# Patient Record
Sex: FEMALE | Race: WHITE | NOT HISPANIC OR LATINO | Employment: OTHER | URBAN - METROPOLITAN AREA
[De-identification: names, ages, dates, MRNs, and addresses within clinical notes are randomized per-mention and may not be internally consistent; named-entity substitution may affect disease eponyms.]

---

## 2017-01-24 ENCOUNTER — OFFICE VISIT (OUTPATIENT)
Dept: FAMILY MEDICINE | Facility: CLINIC | Age: 82
End: 2017-01-24
Payer: MEDICARE

## 2017-01-24 ENCOUNTER — DOCUMENTATION ONLY (OUTPATIENT)
Dept: FAMILY MEDICINE | Facility: CLINIC | Age: 82
End: 2017-01-24

## 2017-01-24 VITALS
SYSTOLIC BLOOD PRESSURE: 114 MMHG | WEIGHT: 136 LBS | DIASTOLIC BLOOD PRESSURE: 73 MMHG | HEART RATE: 65 BPM | TEMPERATURE: 99 F | BODY MASS INDEX: 20.61 KG/M2 | OXYGEN SATURATION: 98 % | RESPIRATION RATE: 16 BRPM | HEIGHT: 68 IN

## 2017-01-24 DIAGNOSIS — Z23 NEED FOR VACCINATION WITH 13-POLYVALENT PNEUMOCOCCAL CONJUGATE VACCINE: ICD-10-CM

## 2017-01-24 DIAGNOSIS — M79.602 LEFT ARM PAIN: Primary | ICD-10-CM

## 2017-01-24 DIAGNOSIS — I10 ESSENTIAL HYPERTENSION: ICD-10-CM

## 2017-01-24 DIAGNOSIS — G47.00 INSOMNIA, UNSPECIFIED TYPE: ICD-10-CM

## 2017-01-24 DIAGNOSIS — F41.8 DEPRESSION WITH ANXIETY: ICD-10-CM

## 2017-01-24 DIAGNOSIS — K21.9 GASTROESOPHAGEAL REFLUX DISEASE, ESOPHAGITIS PRESENCE NOT SPECIFIED: ICD-10-CM

## 2017-01-24 DIAGNOSIS — M79.2 PERIPHERAL NEUROPATHIC PAIN: ICD-10-CM

## 2017-01-24 DIAGNOSIS — E03.9 HYPOTHYROIDISM, UNSPECIFIED TYPE: ICD-10-CM

## 2017-01-24 DIAGNOSIS — E78.5 HYPERLIPIDEMIA, UNSPECIFIED HYPERLIPIDEMIA TYPE: ICD-10-CM

## 2017-01-24 PROCEDURE — 1157F ADVNC CARE PLAN IN RCRD: CPT | Mod: S$GLB,,, | Performed by: INTERNAL MEDICINE

## 2017-01-24 PROCEDURE — G0009 ADMIN PNEUMOCOCCAL VACCINE: HCPCS | Mod: S$GLB,,, | Performed by: INTERNAL MEDICINE

## 2017-01-24 PROCEDURE — 93010 ELECTROCARDIOGRAM REPORT: CPT | Mod: ,,, | Performed by: INTERNAL MEDICINE

## 2017-01-24 PROCEDURE — 1159F MED LIST DOCD IN RCRD: CPT | Mod: S$GLB,,, | Performed by: INTERNAL MEDICINE

## 2017-01-24 PROCEDURE — 1126F AMNT PAIN NOTED NONE PRSNT: CPT | Mod: S$GLB,,, | Performed by: INTERNAL MEDICINE

## 2017-01-24 PROCEDURE — 3074F SYST BP LT 130 MM HG: CPT | Mod: S$GLB,,, | Performed by: INTERNAL MEDICINE

## 2017-01-24 PROCEDURE — 93005 ELECTROCARDIOGRAM TRACING: CPT | Mod: S$GLB,,, | Performed by: INTERNAL MEDICINE

## 2017-01-24 PROCEDURE — 99499 UNLISTED E&M SERVICE: CPT | Mod: S$GLB,,, | Performed by: INTERNAL MEDICINE

## 2017-01-24 PROCEDURE — 1160F RVW MEDS BY RX/DR IN RCRD: CPT | Mod: S$GLB,,, | Performed by: INTERNAL MEDICINE

## 2017-01-24 PROCEDURE — 99214 OFFICE O/P EST MOD 30 MIN: CPT | Mod: 25,S$GLB,, | Performed by: INTERNAL MEDICINE

## 2017-01-24 PROCEDURE — 90670 PCV13 VACCINE IM: CPT | Mod: S$GLB,,, | Performed by: INTERNAL MEDICINE

## 2017-01-24 PROCEDURE — 3078F DIAST BP <80 MM HG: CPT | Mod: S$GLB,,, | Performed by: INTERNAL MEDICINE

## 2017-01-24 RX ORDER — ATENOLOL 50 MG/1
50 TABLET ORAL NIGHTLY
Qty: 90 TABLET | Refills: 3 | Status: SHIPPED | OUTPATIENT
Start: 2017-01-24 | End: 2017-01-25 | Stop reason: SDUPTHER

## 2017-01-24 RX ORDER — PAROXETINE HYDROCHLORIDE 20 MG/1
20 TABLET, FILM COATED ORAL DAILY
Qty: 90 TABLET | Refills: 3 | Status: SHIPPED | OUTPATIENT
Start: 2017-01-24 | End: 2017-12-09 | Stop reason: SDUPTHER

## 2017-01-24 RX ORDER — DILTIAZEM HYDROCHLORIDE 240 MG/1
240 CAPSULE, EXTENDED RELEASE ORAL DAILY
Qty: 90 CAPSULE | Refills: 3 | Status: SHIPPED | OUTPATIENT
Start: 2017-01-24 | End: 2017-12-09 | Stop reason: SDUPTHER

## 2017-01-24 RX ORDER — OMEPRAZOLE 20 MG/1
20 CAPSULE, DELAYED RELEASE ORAL DAILY
Qty: 90 CAPSULE | Refills: 1 | Status: SHIPPED | OUTPATIENT
Start: 2017-01-24 | End: 2017-06-14 | Stop reason: SDUPTHER

## 2017-01-24 RX ORDER — PRAVASTATIN SODIUM 20 MG/1
20 TABLET ORAL DAILY
Qty: 30 TABLET | Refills: 11 | Status: SHIPPED | OUTPATIENT
Start: 2017-01-24 | End: 2017-08-23 | Stop reason: HOSPADM

## 2017-01-24 RX ORDER — CYANOCOBALAMIN 1000 UG/ML
INJECTION, SOLUTION INTRAMUSCULAR; SUBCUTANEOUS
Qty: 10 ML | Refills: 11 | Status: SHIPPED | OUTPATIENT
Start: 2017-01-24 | End: 2017-04-24 | Stop reason: SDUPTHER

## 2017-01-24 NOTE — PROGRESS NOTES
Allergies and two pt identifiers verified.  Pneumococcal 13 vaccine administered IM per MD order and MAR.  Tolerated injection well.  VIS given and instructed to wait 15 minutes before leaving clinic to monitor for adverse reactions.  Voiced understanding.

## 2017-01-24 NOTE — PROGRESS NOTES
Subjective:       Patient ID: Lorena Vallejo is a 83 y.o. female.    Chief Complaint: Insomnia (refills)    HPI         CHIEF COMPLAINT: Left arm pain HPI:     ONSET/TIMIN weeks ago   arm pain occurred while she was sitting still.  It didn't matter if she moved her arm.  There was no tenderness at the time.    DURATION:  2 minutes  QUALITY/COURSE:  Resolved.    SEVERITY: #   8  /10 ( on 1 to 10 scale)    PREVIOUS CARDIAC TESTING: : electrocardiogram (ECG).    LOCATION:  substernal   Radiation -  none    All choices for next 4 sections below are positive to the patient ONLY if BOLDED, otherwise negative:    AGGRAVATING FACTORS: Swallowing, , Deep_Breathing, Coughing, Neck/Arm/Chest_Movement     RELIEVING FACTORS: Nitroglycerin, Resting, Change_of_Position    ASSOCIATED SYMPTOMS:  Hemoptysis,Tenderness,  Leg_Pain    RISK FACTORS: Diabetes, HTN, Hyperlipidemia, smoking,                       CHIEF COMPLAINT: Hypertension  HPI:     ONSET:      QUALITY/COURSE:   Controlled:  yes     INTENSITY/SEVERITY:  Average blood pressure is 115/70 .     MODIFIERS/TREATMENTS:  Taking medications: yes. .High sodium intake: no. alcohol: no      The following symptoms are positive only if BOLDED, otherwise are negative.      SYMPTOMS/RELATED: Possible medication side effects include:   Depression..  . Cough. . Constipation.    REVIEW OF SYMPTOMS: . Weight_loss . Weight_gain . Leg_cramps ..    TARGET ORGAN DAMAGE:: angina/ prior myocardial infarction, chronic kidney disease, heart failure, left ventricular hypertrophy, peripheral artery disease, prior coronary revascularization, retinopathy, stroke. transient ischemic attack.    CHIEF COMPLAINT: insomnia .  HPI:     ONSET/TIMING: Onset     40 y     ago. Sudden: no .  Similar problems in past: no. ..    DURATION:  Intermittent    QUALITY/COURSE: .Improving.     Can't fall asleep: yes. . Wakes up early: no.      INTENSITY/SEVERITY:  Severity 7   (on a 1-10 scale).       "MODIFIERS/TREATMENTS: . Taking medications: yes. Benedryl.  . Effective: yes.  SYMPTOMS/RELATED: . Possible medication side effects include:     .     The following symptoms/statements  are positive if BOLD, negative otherwise.      CONTEXT/WHEN:  .Nocturnal. . Napping.  shift work . Time_zone_changes.     REVIEW OF SYSTEMS :  .   Nocturia 1x/night . Restless_legs . pain . Obstructive_sleep_apnea?  . depression . anxiety . Substance_abuse . Copd . Grief .         Review of Systems   Constitutional: Negative for diaphoresis, fever and unexpected weight change.   HENT: Negative for trouble swallowing.    Eyes: Negative for visual disturbance.   Respiratory: Negative for cough, chest tightness and shortness of breath.    Cardiovascular: Negative for chest pain, palpitations and leg swelling.   Gastrointestinal: Negative for nausea and vomiting.   Neurological: Negative for dizziness, syncope, weakness, light-headedness, numbness and headaches.   Psychiatric/Behavioral: Negative for dysphoric mood. The patient is not nervous/anxious.        Objective:      Vitals:    01/24/17 1010   BP: 114/73   Pulse: 65   Resp: 16   Temp: 98.6 °F (37 °C)   TempSrc: Oral   SpO2: 98%   Weight: 61.7 kg (136 lb 0.4 oz)   Height: 5' 8" (1.727 m)   PainSc: 0-No pain     Physical Exam   Constitutional: She appears well-developed and well-nourished.   Eyes: Pupils are equal, round, and reactive to light.   Cardiovascular: Normal rate, regular rhythm and normal heart sounds.    Pulmonary/Chest: Effort normal and breath sounds normal.   Abdominal: Soft. There is no tenderness.   Neurological: She is alert.   Psychiatric: She has a normal mood and affect. Her behavior is normal. Thought content normal.   Nursing note and vitals reviewed.        Assessment:       1. Left arm pain    2. Depression with anxiety    3. Essential hypertension    4. Peripheral neuropathic pain    5. Need for vaccination with 13-polyvalent pneumococcal conjugate vaccine "    6. Hyperlipidemia, unspecified hyperlipidemia type    7. Insomnia, unspecified type    8. Gastroesophageal reflux disease, esophagitis presence not specified    9. Hypothyroidism, unspecified type          Plan:     Left arm pain  -     EKG 12-lead  -     Ambulatory referral to Cardiology    Depression with anxiety  -     paroxetine (PAXIL) 20 MG tablet; Take 1 tablet (20 mg total) by mouth once daily.  Dispense: 90 tablet; Refill: 3    Essential hypertension  -     diltiaZEM (TIAZAC) 240 MG CpSR; Take 1 capsule (240 mg total) by mouth once daily.  Dispense: 90 capsule; Refill: 3  -     atenolol (TENORMIN) 50 MG tablet; Take 1 tablet (50 mg total) by mouth every evening.  Dispense: 90 tablet; Refill: 3    Peripheral neuropathic pain  -     cyanocobalamin 1,000 mcg/mL injection; INJECT 2ML IMTRAMUSCULAR EVERY MONTH  Dispense: 10 mL; Refill: 11    Need for vaccination with 13-polyvalent pneumococcal conjugate vaccine  -     Pneumococcal Conjugate Vaccine (13 Valent) (IM)    Hyperlipidemia, unspecified hyperlipidemia type  -     Comprehensive metabolic panel; Future; Expected date: 1/24/17  -     pravastatin (PRAVACHOL) 20 MG tablet; Take 1 tablet (20 mg total) by mouth once daily.  Dispense: 30 tablet; Refill: 11  -     Lipid panel; Future; Expected date: 1/24/17    Insomnia, unspecified type    Gastroesophageal reflux disease, esophagitis presence not specified  -     omeprazole (PRILOSEC) 20 MG capsule; Take 1 capsule (20 mg total) by mouth once daily. Pt takes 1 every other day  Dispense: 90 capsule; Refill: 1    Hypothyroidism, unspecified type  -     thyroid, pork, (ARMOUR THYROID) 300 mg Tab; Take 150 mg by mouth once daily. 30 mg sent earlier in error.  Dispense: 45 tablet; Refill: 3      Return in about 3 months (around 4/24/2017).

## 2017-01-24 NOTE — PATIENT INSTRUCTIONS
Restrict yourself to 4-1/2 hours of sleep for 4 days.  Then increase it by 15 minutes every 4 days.    Decreased Benadryl to 25 mg    Have Kiran watch his sleep and see if you stop.breathing

## 2017-01-24 NOTE — PROGRESS NOTES
Health Maintenance Due   Topic Date Due    TETANUS VACCINE  03/27/1951    Zoster Vaccine  03/27/1993    Influenza Vaccine  08/01/2016    Pneumococcal (65+) (2 of 2 - PPSV23) 11/04/2016

## 2017-01-24 NOTE — MR AVS SNAPSHOT
[facility]  [address] [address] [address]  Phone: [phone]  Fax: [phone]                  Lorena Vallejo   2017 9:40 AM   Office Visit    Description:  Female : 3/27/1933   Provider:  Stephon Cates MD   Department:  [facility]           Reason for Visit     Insomnia           Diagnoses this Visit        Comments    Left arm pain    -  Primary     Depression with anxiety         Essential hypertension         Peripheral neuropathic pain         Need for vaccination with 13-polyvalent pneumococcal conjugate vaccine         Hyperlipidemia, unspecified hyperlipidemia type         Insomnia, unspecified type         Gastroesophageal reflux disease, esophagitis presence not specified         Hypothyroidism, unspecified type                To Do List           Future Appointments        Provider Department Dept Phone    2017 8:15 AM LAB, [facility] - Lab [phone]    2017 11:20 AM Stephon Cates MD [facility] [phone]      Goals (5 Years of Data)     None      Follow-Up and Disposition     Return in about 3 months (around 2017).       These Medications        Disp Refills Start End    paroxetine (PAXIL) 20 MG tablet 90 tablet 3 2017     Take 1 tablet (20 mg total) by mouth once daily. - Oral    [facility]: [facility] [address] [facility] Ph #: [phone]       diltiaZEM (TIAZAC) 240 MG CpSR 90 capsule 3 2017     Take 1 capsule (240 mg total) by mouth once daily. - Oral    Pharmacy: [facility] [address] [facility] Ph #: [phone]       atenolol (TENORMIN) 50 MG tablet 90 tablet 3 2017     Take 1 tablet (50 mg total) by mouth every evening. - Oral    Pharmacy: [facility] [address] [facility] Ph #: [phone]       omeprazole (PRILOSEC) 20 MG capsule 90 capsule 1  1/24/2017     Take 1 capsule (20 mg total) by mouth once daily. Pt takes 1 every other day - Oral    Pharmacy: [facility] [address] [facility] Ph #: [phone]       cyanocobalamin 1,000 mcg/mL injection 10 mL 11 1/24/2017     INJECT 2ML IMTRAMUSCULAR EVERY MONTH    Pharmacy: [facility] [address] [facility] Ph #: [phone]       thyroid, pork, (ARMOUR THYROID) 300 mg Tab 45 tablet 3 1/24/2017 1/24/2018    Take 150 mg by mouth once daily. 30 mg sent earlier in error. - Oral    Pharmacy: [facility] [address] [facility] Ph #: [phone]       pravastatin (PRAVACHOL) 20 MG tablet 30 tablet 11 1/24/2017     Take 1 tablet (20 mg total) by mouth once daily. - Oral    Pharmacy: [facility] [address] [facility] Ph #: [phone]         [facility]     [facility] On Call Nurse Care Line - 24/7 Assistance  Registered nurses in the [facility] On Call Center provide clinical advisement, health education, appointment booking, and other advisory services.  Call for this free service at [phone].             Medications           Message regarding Medications     Verify the changes and/or additions to your medication regime listed below are the same as discussed with your clinician today.  If any of these changes or additions are incorrect, please notify your healthcare provider.        START taking these NEW medications        Refills    pravastatin (PRAVACHOL) 20 MG tablet 11    Sig: Take 1 tablet (20 mg total) by mouth once daily.    Class: Normal    Route: Oral      CHANGE how you are taking these medications     Start Taking Instead of    diltiaZEM (TIAZAC) 240 MG CpSR diltiazem (TIAZAC) 240 MG CpSR    Dosage:  Take 1 capsule (240 mg total) by mouth once daily. Dosage:  Take 1 capsule (240 mg total) by mouth once daily.    Reason for Change:  Reorder     omeprazole  (PRILOSEC) 20 MG capsule omeprazole (PRILOSEC) 20 MG capsule    Dosage:  Take 1 capsule (20 mg total) by mouth once daily. Pt takes 1 every other day Dosage:  Take 1 capsule by mouth once daily. Pt takes 1 every other day    Reason for Change:  Reorder       STOP taking these medications     ranitidine (ZANTAC) 150 MG tablet Take 1 tablet (150 mg total) by mouth 2 (two) times daily.    ranitidine (ZANTAC) 150 MG tablet Take 1 tablet (150 mg total) by mouth 2 (two) times daily.           Verify that the below list of medications is an accurate representation of the medications you are currently taking.  If none reported, the list may be blank. If incorrect, please contact your healthcare provider. Carry this list with you in case of emergency.           Current Medications     acetaminophen (TYLENOL) 500 MG tablet Take 1,000 mg by mouth every 6 (six) hours as needed.    aspirin (ECOTRIN) 81 MG EC tablet Take 81 mg by mouth every evening.    atenolol (TENORMIN) 50 MG tablet Take 1 tablet (50 mg total) by mouth every evening.    BIFIDOBACTERIUM INFANTIS (ALIGN ORAL) Take by mouth once daily.    cyanocobalamin 1,000 mcg/mL injection INJECT 2ML IMTRAMUSCULAR EVERY MONTH    diltiaZEM (TIAZAC) 240 MG CpSR Take 1 capsule (240 mg total) by mouth once daily.    fish oil-omega-3 fatty acids 300-1,000 mg capsule Take 2 g by mouth 2 (two) times daily.    loperamide (IMODIUM) 2 mg capsule Take 2 mg by mouth 4 (four) times daily as needed for Diarrhea.    magnesium oxide (MAG-OX) 400 mg tablet Take 400 mg by mouth once daily.     omeprazole (PRILOSEC) 20 MG capsule Take 1 capsule (20 mg total) by mouth once daily. Pt takes 1 every other day    paroxetine (PAXIL) 20 MG tablet Take 1 tablet (20 mg total) by mouth once daily.    ranitidine (ZANTAC) 150 MG capsule Take 1 capsule (150 mg total) by mouth 2 (two) times daily.    thyroid, pork, (ARMOUR THYROID) 300 mg Tab Take 150 mg by mouth once daily. 30 mg sent earlier in error.     "VIT C/E/ZN/COPPR/LUTEIN/ZEAXAN (PRESERVISION AREDS 2 ORAL) Take by mouth 2 (two) times daily.    pravastatin (PRAVACHOL) 20 MG tablet Take 1 tablet (20 mg total) by mouth once daily.           Clinical Reference Information           Vital Signs - Last Recorded  Most recent update: 1/24/2017 10:13 AM by Mikala Vizcarra MA    BP Pulse Temp Resp Ht Wt    114/73 (BP Location: Left arm, Patient Position: Sitting, BP Method: Automatic) 65 98.6 °F (37 °C) (Oral) 16 5' 8" (1.727 m) 61.7 kg (136 lb 0.4 oz)    SpO2 BMI             98% 20.68 kg/m2         Blood Pressure          Most Recent Value    BP  114/73      Allergies as of 1/24/2017     Flagyl [Metronidazole Hcl]    Latex, Natural Rubber    Levaquin [Levofloxacin]    Morphine    Augmentin [Amoxicillin-pot Clavulanate]    Ciprofloxacin    Codeine    Demerol [Meperidine]    Dilaudid [Hydromorphone (Bulk)]    Fentanyl    Naproxen    Nsaids (Non-steroidal Anti-inflammatory Drug)    Percocet [Oxycodone-acetaminophen]    Prednisone    Reglan [Metoclopramide Hcl]    Sulfa (Sulfonamide Antibiotics)    Vicodin [Hydrocodone-acetaminophen]    Zosyn [Piperacillin-tazobactam]    Ambien [Zolpidem]    Ibuprofen    Macrobid [Nitrofurantoin Monohyd/m-cryst]    Pcn [Penicillins]    Vasotec [Enalapril Maleate]      Immunizations Administered on Date of Encounter - 1/24/2017     Name Date Dose VIS Date Route    Pneumococcal Conjugate - 13 Valent  Incomplete 0.5 mL 11/5/2015 Intramuscular      Orders Placed During Today's Visit      Normal Orders This Visit    Ambulatory referral to Cardiology     EKG 12-lead     Pneumococcal Conjugate Vaccine (13 Valent) (IM)     Future Labs/Procedures Expected by Expires    Comprehensive metabolic panel  1/24/2017 1/25/2018    Lipid panel  1/24/2017 1/25/2018      Instructions    Restrict yourself to 4-1/2 hours of sleep for 4 days.  Then increase it by 15 minutes every 4 days.    Decreased Benadryl to 25 mg    Have Kiran watch his sleep and see " if you stop.breathing

## 2017-01-25 ENCOUNTER — TELEPHONE (OUTPATIENT)
Dept: FAMILY MEDICINE | Facility: CLINIC | Age: 82
End: 2017-01-25

## 2017-01-25 DIAGNOSIS — I10 ESSENTIAL HYPERTENSION: ICD-10-CM

## 2017-01-25 RX ORDER — ATENOLOL 50 MG/1
50 TABLET ORAL NIGHTLY
Qty: 90 TABLET | Refills: 3 | Status: SHIPPED | OUTPATIENT
Start: 2017-01-25 | End: 2017-08-09

## 2017-01-25 NOTE — TELEPHONE ENCOUNTER
Dr Cates the prescription for atenolol did not go to [facility] and the patient doesn't have the printed one.Could you resend to [facility] please.

## 2017-01-27 ENCOUNTER — TELEPHONE (OUTPATIENT)
Dept: FAMILY MEDICINE | Facility: CLINIC | Age: 82
End: 2017-01-27

## 2017-01-27 NOTE — TELEPHONE ENCOUNTER
----- Message from Rosmery Raya sent at 1/27/2017  2:00 PM CST -----  Contact: pt [phone]  Call placed to pod patient called and asked for a call back today from the nurse about her medication. Amaprozole.

## 2017-04-24 ENCOUNTER — OFFICE VISIT (OUTPATIENT)
Dept: FAMILY MEDICINE | Facility: CLINIC | Age: 82
End: 2017-04-24
Payer: MEDICARE

## 2017-04-24 ENCOUNTER — DOCUMENTATION ONLY (OUTPATIENT)
Dept: FAMILY MEDICINE | Facility: CLINIC | Age: 82
End: 2017-04-24

## 2017-04-24 VITALS
DIASTOLIC BLOOD PRESSURE: 67 MMHG | TEMPERATURE: 98 F | WEIGHT: 133.19 LBS | OXYGEN SATURATION: 97 % | BODY MASS INDEX: 20.18 KG/M2 | HEIGHT: 68 IN | SYSTOLIC BLOOD PRESSURE: 132 MMHG | HEART RATE: 68 BPM | RESPIRATION RATE: 16 BRPM

## 2017-04-24 DIAGNOSIS — M67.472 GANGLION CYST OF LEFT FOOT: Primary | ICD-10-CM

## 2017-04-24 DIAGNOSIS — M70.21 OLECRANON BURSITIS OF RIGHT ELBOW: ICD-10-CM

## 2017-04-24 DIAGNOSIS — E03.9 HYPOTHYROIDISM, UNSPECIFIED TYPE: ICD-10-CM

## 2017-04-24 DIAGNOSIS — E78.5 HYPERLIPIDEMIA, UNSPECIFIED HYPERLIPIDEMIA TYPE: ICD-10-CM

## 2017-04-24 DIAGNOSIS — M79.2 PERIPHERAL NEUROPATHIC PAIN: ICD-10-CM

## 2017-04-24 PROCEDURE — 3078F DIAST BP <80 MM HG: CPT | Mod: S$GLB,,, | Performed by: INTERNAL MEDICINE

## 2017-04-24 PROCEDURE — 1160F RVW MEDS BY RX/DR IN RCRD: CPT | Mod: S$GLB,,, | Performed by: INTERNAL MEDICINE

## 2017-04-24 PROCEDURE — 3075F SYST BP GE 130 - 139MM HG: CPT | Mod: S$GLB,,, | Performed by: INTERNAL MEDICINE

## 2017-04-24 PROCEDURE — 99213 OFFICE O/P EST LOW 20 MIN: CPT | Mod: S$GLB,,, | Performed by: INTERNAL MEDICINE

## 2017-04-24 PROCEDURE — 1159F MED LIST DOCD IN RCRD: CPT | Mod: S$GLB,,, | Performed by: INTERNAL MEDICINE

## 2017-04-24 PROCEDURE — 1126F AMNT PAIN NOTED NONE PRSNT: CPT | Mod: S$GLB,,, | Performed by: INTERNAL MEDICINE

## 2017-04-24 PROCEDURE — 99499 UNLISTED E&M SERVICE: CPT | Mod: S$GLB,,, | Performed by: INTERNAL MEDICINE

## 2017-04-24 RX ORDER — CYANOCOBALAMIN 1000 UG/ML
INJECTION, SOLUTION INTRAMUSCULAR; SUBCUTANEOUS
Qty: 10 ML | Refills: 11 | Status: SHIPPED | OUTPATIENT
Start: 2017-04-24 | End: 2019-06-21

## 2017-04-24 NOTE — PROGRESS NOTES
Health Maintenance Due   Topic Date Due    TETANUS VACCINE  [birth year]27/1951    Zoster Vaccine  03/27/1993    Influenza Vaccine  08/01/2016

## 2017-04-24 NOTE — MR AVS SNAPSHOT
[facility]  [address] [address] [address]  Phone: [phone]  Fax: [phone]                  Lorena Vallejo   2017 11:20 AM   Office Visit    Description:  Female : 3/27/1933   Provider:  Stephon Cates MD   Department:  [facility]           Reason for Visit     knot on toe           Diagnoses this Visit        Comments    Ganglion cyst of left foot    -  Primary     Peripheral neuropathic pain         Hypothyroidism, unspecified type         Olecranon bursitis of right elbow         Hyperlipidemia, unspecified hyperlipidemia type                To Do List           Future Appointments        Provider Department Dept Phone    2017 8:15 AM [facility] - Lab [phone]    2017 2:40 PM Stephon Cates MD [facility] [phone]      Goals (5 Years of Data)     None      Follow-Up and Disposition     Return in about 1 month (around 2017).    Follow-up and Disposition History       These Medications        Disp Refills Start End    cyanocobalamin 1,000 mcg/mL injection 10 mL 11 2017     INJECT 2ML IMTRAMUSCULAR EVERY MONTH    Pharmacy: [facility] [address] [place] [address]. Ph #: [phone]       thyroid, pork, (ARMOUR THYROID) 300 mg Tab 45 tablet 3 2017    Take 150 mg by mouth once daily. 30 mg sent earlier in error. - Oral    Pharmacy: [facility] [address] [place] [address]. Ph #: [phone]         [facility] On Call     [facility] On [facility] Nurse Care Line -  Assistance  Unless otherwise directed by your provider, please contact [facility], our nurse care line that is available for  assistance.     Registered nurses in the [facility] On Call Center provide: appointment scheduling, clinical advisement, health education, and other advisory services.  Call: [phone] (toll free)               Medications            Message regarding Medications     Verify the changes and/or additions to your medication regime listed below are the same as discussed with your clinician today.  If any of these changes or additions are incorrect, please notify your healthcare provider.             Verify that the below list of medications is an accurate representation of the medications you are currently taking.  If none reported, the list may be blank. If incorrect, please contact your healthcare provider. Carry this list with you in case of emergency.           Current Medications     acetaminophen (TYLENOL) 500 MG tablet Take 1,000 mg by mouth every 6 (six) hours as needed.    aspirin (ECOTRIN) 81 MG EC tablet Take 81 mg by mouth every evening.    atenolol (TENORMIN) 50 MG tablet Take 1 tablet (50 mg total) by mouth every evening.    BIFIDOBACTERIUM INFANTIS (ALIGN ORAL) Take by mouth once daily.    cyanocobalamin 1,000 mcg/mL injection INJECT 2ML IMTRAMUSCULAR EVERY MONTH    diltiaZEM (TIAZAC) 240 MG CpSR Take 1 capsule (240 mg total) by mouth once daily.    fish oil-omega-3 fatty acids 300-1,000 mg capsule Take 2 g by mouth 2 (two) times daily.    loperamide (IMODIUM) 2 mg capsule Take 2 mg by mouth 4 (four) times daily as needed for Diarrhea.    magnesium oxide (MAG-OX) 400 mg tablet Take 400 mg by mouth once daily.     omeprazole (PRILOSEC) 20 MG capsule Take 1 capsule (20 mg total) by mouth once daily. Pt takes 1 every other day    paroxetine (PAXIL) 20 MG tablet Take 1 tablet (20 mg total) by mouth once daily.    ranitidine (ZANTAC) 150 MG capsule Take 1 capsule (150 mg total) by mouth 2 (two) times daily.    thyroid, pork, (ARMOUR THYROID) 300 mg Tab Take 150 mg by mouth once daily. 30 mg sent earlier in error.    VIT C/E/ZN/COPPR/LUTEIN/ZEAXAN (PRESERVISION AREDS 2 ORAL) Take by mouth 2 (two) times daily.    pravastatin (PRAVACHOL) 20 MG tablet Take 1 tablet (20 mg total) by mouth once daily.           Clinical Reference Information  "          Your Vitals Were     BP Pulse Temp Resp Height Weight    132/67 (BP Location: Right arm, Patient Position: Sitting, BP Method: Automatic) 68 98.3 °F (36.8 °C) (Oral) 16 5' 8" (1.727 m) 60.4 kg (133 lb 2.5 oz)    SpO2 BMI             97% 20.25 kg/m2         Blood Pressure          Most Recent Value    BP  132/67      Allergies as of 4/24/2017     Flagyl [Metronidazole Hcl]    Latex, Natural Rubber    Levaquin [Levofloxacin]    Morphine    Augmentin [Amoxicillin-pot Clavulanate]    Ciprofloxacin    Codeine    Demerol [Meperidine]    Dilaudid [Hydromorphone (Bulk)]    Fentanyl    Naproxen    Nsaids (Non-steroidal Anti-inflammatory Drug)    Percocet [Oxycodone-acetaminophen]    Prednisone    Reglan [Metoclopramide Hcl]    Sulfa (Sulfonamide Antibiotics)    Vicodin [Hydrocodone-acetaminophen]    Zosyn [Piperacillin-tazobactam]    Ambien [Zolpidem]    Ibuprofen    Macrobid [Nitrofurantoin Monohyd/m-cryst]    Pcn [Penicillins]    Vasotec [Enalapril Maleate]      Immunizations Administered on Date of Encounter - 4/24/2017     None      Orders Placed During Today's Visit      Normal Orders This Visit    Ambulatory Referral to Podiatry     Future Labs/Procedures Expected by Expires    Comprehensive metabolic panel  4/24/2017 4/25/2018    Lipid panel  4/24/2017 4/25/2018    TSH  4/24/2017 4/24/2018      Instructions    Ace bandage to right elbow.    Get labs that were ordered and take the Pravachol.       Language Assistance Services     ATTENTION: Language assistance services are available, free of charge. Please call [phone].      ATENCIÓN: Si habkirsten white, tiene a renner disposición servicios gratuitos de asistencia lingüística. Llame al [phone].     CHÚ Ý: N?u b?n nói Ti?ng Vi?t, có các d?ch v? h? tr? ngôn ng? mi?n phí dành cho b?n. G?i s? [phone].         [facility] complies with applicable Federal civil rights laws and does not discriminate on the basis of race, color, " national origin, age, disability, or sex.

## 2017-04-24 NOTE — PROGRESS NOTES
"Subjective:       Patient ID: Lorena Vallejo is a 84 y.o. female.    Chief Complaint: knot on toe    HPI       Lower_Extremity_Problems(+). Pain: yes. . Injury: no.   HPI:     ONSET/TIMING: . Onset   5 months   ago.     DURATION:   Continuous    QUALITY/COURSE:    Worsening,. .     LOCATION: PP joint of left great toe       . Radiation: no.      INTENSITY/SEVERITY:. Severity is #  1  (10 point scale).        MODIFIERS/TREATMENTS: Current_Limitations_are:  none..   Taking medications: no    Physical_Therapy: no.  Chiropractor: no.     SYMPTOMS/RELATED: . --Possible medication side effects include:.     The following symptoms/statements  are positive if BOLD, negative otherwise.      CONTEXT/WHEN: . Activity . weight bearing. Inactivity.  Sudden  Work related.  Similar_problems_in past.   . Litigation_pending . X-rays. CT . MRI      REVIEW OF SYMPTOMS:   Numbness. Weakness. Muscle_Problems. Fever. Joint_Problems. Swelling. Erythema. Weight_loss. Instability.  Numbness. Weakness.     .   The patient never started her Pravachol because she didn't think her cholesterol was bad.      The patient fell 2 weeks ago and since then has had a swelling over the elbow.  It's improving.  3/10 severity.  No pain.  No numbness or weakness in the hands          Review of Systems    Objective:      Vitals:    04/24/17 1214   BP: 132/67   Pulse: 68   Resp: 16   Temp: 98.3 °F (36.8 °C)   TempSrc: Oral   SpO2: 97%   Weight: 60.4 kg (133 lb 2.5 oz)   Height: 5' 8" (1.727 m)   PainSc: 0-No pain     Physical Exam   Musculoskeletal:   6 soft fluctuant fluid collection over the right olecranon bursa   Skin:   Hard 6 mm nodule on the dorsal side of the PP joint of the left great toe         Assessment:       1. Ganglion cyst of left foot    2. Peripheral neuropathic pain    3. Hypothyroidism, unspecified type    4. Olecranon bursitis of right elbow    5. Hyperlipidemia, unspecified hyperlipidemia type          Plan:     Ganglion cyst of left " foot  -     Ambulatory Referral to Podiatry    Peripheral neuropathic pain  -     cyanocobalamin 1,000 mcg/mL injection; INJECT 2ML IMTRAMUSCULAR EVERY MONTH  Dispense: 10 mL; Refill: 11    Hypothyroidism, unspecified type  -     thyroid, pork, (ARMOUR THYROID) 300 mg Tab; Take 150 mg by mouth once daily. 30 mg sent earlier in error.  Dispense: 45 tablet; Refill: 3  -     TSH; Future; Expected date: 4/24/17    Olecranon bursitis of right elbow    Hyperlipidemia, unspecified hyperlipidemia type  -     Comprehensive metabolic panel; Future; Expected date: 4/24/17  -     Lipid panel; Future; Expected date: 4/24/17      Return in about 1 month (around 5/24/2017).

## 2017-05-16 ENCOUNTER — LAB VISIT (OUTPATIENT)
Dept: LAB | Facility: HOSPITAL | Age: 82
End: 2017-05-16
Attending: INTERNAL MEDICINE
Payer: MEDICARE

## 2017-05-16 DIAGNOSIS — E78.5 HYPERLIPIDEMIA, UNSPECIFIED HYPERLIPIDEMIA TYPE: ICD-10-CM

## 2017-05-16 DIAGNOSIS — E03.9 HYPOTHYROIDISM, UNSPECIFIED TYPE: ICD-10-CM

## 2017-05-16 LAB
ALBUMIN SERPL BCP-MCNC: 3.4 G/DL
ALP SERPL-CCNC: 71 U/L
ALT SERPL W/O P-5'-P-CCNC: 10 U/L
ANION GAP SERPL CALC-SCNC: 11 MMOL/L
AST SERPL-CCNC: 15 U/L
BILIRUB SERPL-MCNC: 0.5 MG/DL
BUN SERPL-MCNC: 15 MG/DL
CALCIUM SERPL-MCNC: 8.8 MG/DL
CHLORIDE SERPL-SCNC: 112 MMOL/L
CHOLEST/HDLC SERPL: 1.8 {RATIO}
CO2 SERPL-SCNC: 24 MMOL/L
CREAT SERPL-MCNC: 0.9 MG/DL
EST. GFR  (AFRICAN AMERICAN): >60 ML/MIN/1.73 M^2
EST. GFR  (NON AFRICAN AMERICAN): 58.9 ML/MIN/1.73 M^2
GLUCOSE SERPL-MCNC: 84 MG/DL
HDL/CHOLESTEROL RATIO: 55.6 %
HDLC SERPL-MCNC: 55 MG/DL
HDLC SERPL-MCNC: 99 MG/DL
LDLC SERPL CALC-MCNC: 18.8 MG/DL
NONHDLC SERPL-MCNC: 44 MG/DL
POTASSIUM SERPL-SCNC: 4.3 MMOL/L
PROT SERPL-MCNC: 6.4 G/DL
SODIUM SERPL-SCNC: 147 MMOL/L
T4 FREE SERPL-MCNC: 0.68 NG/DL
TRIGL SERPL-MCNC: 126 MG/DL
TSH SERPL DL<=0.005 MIU/L-ACNC: 4.09 UIU/ML

## 2017-05-16 PROCEDURE — 80061 LIPID PANEL: CPT

## 2017-05-16 PROCEDURE — 84439 ASSAY OF FREE THYROXINE: CPT

## 2017-05-16 PROCEDURE — 80053 COMPREHEN METABOLIC PANEL: CPT

## 2017-05-16 PROCEDURE — 84443 ASSAY THYROID STIM HORMONE: CPT

## 2017-05-16 PROCEDURE — 36415 COLL VENOUS BLD VENIPUNCTURE: CPT | Mod: PO

## 2017-05-18 ENCOUNTER — OFFICE VISIT (OUTPATIENT)
Dept: PODIATRY | Facility: CLINIC | Age: 82
End: 2017-05-18
Payer: MEDICARE

## 2017-05-18 ENCOUNTER — HOSPITAL ENCOUNTER (OUTPATIENT)
Dept: RADIOLOGY | Facility: CLINIC | Age: 82
Discharge: HOME | End: 2017-05-18
Attending: PODIATRIST
Payer: MEDICARE

## 2017-05-18 VITALS — HEIGHT: 68 IN | WEIGHT: 130.5 LBS | BODY MASS INDEX: 19.78 KG/M2

## 2017-05-18 DIAGNOSIS — M67.471 DIGITAL MUCOUS CYST OF TOE OF RIGHT FOOT: Primary | ICD-10-CM

## 2017-05-18 DIAGNOSIS — M79.674 TOE PAIN, RIGHT: ICD-10-CM

## 2017-05-18 DIAGNOSIS — M72.2 PLANTAR FIBROMATOSIS: ICD-10-CM

## 2017-05-18 DIAGNOSIS — M67.471 DIGITAL MUCOUS CYST OF TOE OF RIGHT FOOT: ICD-10-CM

## 2017-05-18 PROCEDURE — 73630 X-RAY EXAM OF FOOT: CPT | Mod: 26,RT,S$GLB, | Performed by: RADIOLOGY

## 2017-05-18 PROCEDURE — 1159F MED LIST DOCD IN RCRD: CPT | Mod: S$GLB,,, | Performed by: PODIATRIST

## 2017-05-18 PROCEDURE — 99999 PR PBB SHADOW E&M-EST. PATIENT-LVL III: CPT | Mod: PBBFAC,,, | Performed by: PODIATRIST

## 2017-05-18 PROCEDURE — 99203 OFFICE O/P NEW LOW 30 MIN: CPT | Mod: 25,S$GLB,, | Performed by: PODIATRIST

## 2017-05-18 PROCEDURE — 1126F AMNT PAIN NOTED NONE PRSNT: CPT | Mod: S$GLB,,, | Performed by: PODIATRIST

## 2017-05-18 PROCEDURE — 1160F RVW MEDS BY RX/DR IN RCRD: CPT | Mod: S$GLB,,, | Performed by: PODIATRIST

## 2017-05-18 PROCEDURE — 20612 ASPIRATE/INJ GANGLION CYST: CPT | Mod: RT,S$GLB,, | Performed by: PODIATRIST

## 2017-05-18 PROCEDURE — 73630 X-RAY EXAM OF FOOT: CPT | Mod: TC,PO,RT

## 2017-05-18 RX ORDER — DEXAMETHASONE SODIUM PHOSPHATE 4 MG/ML
4 INJECTION, SOLUTION INTRA-ARTICULAR; INTRALESIONAL; INTRAMUSCULAR; INTRAVENOUS; SOFT TISSUE
Status: COMPLETED | OUTPATIENT
Start: 2017-05-18 | End: 2017-05-18

## 2017-05-18 RX ADMIN — DEXAMETHASONE SODIUM PHOSPHATE 4 MG: 4 INJECTION, SOLUTION INTRA-ARTICULAR; INTRALESIONAL; INTRAMUSCULAR; INTRAVENOUS; SOFT TISSUE at 01:05

## 2017-05-18 NOTE — PROGRESS NOTES
Subjective:      Patient ID: Lorena Vallejo is a 84 y.o. female.    Chief Complaint: Cyst (right great toe)    Small mass right big toe and bottom right arch with occasional pain/tenderness.  Gradual onset, worsening over past several weeks, aggravated by increased weight bearing, shoe gear, pressure.  No previous medical treatment.  OTC pain med not helping.  Denies trauma and signs infection.      Review of Systems   Constitution: Negative for chills, diaphoresis, fever, malaise/fatigue and night sweats.   Cardiovascular: Negative for claudication, cyanosis, leg swelling and syncope.   Skin: Positive for suspicious lesions. Negative for color change, dry skin, nail changes, rash and unusual hair distribution.   Musculoskeletal: Negative for falls, joint pain, joint swelling, muscle cramps, muscle weakness and stiffness.   Gastrointestinal: Negative for constipation, diarrhea, nausea and vomiting.   Neurological: Negative for brief paralysis, disturbances in coordination, focal weakness, numbness, paresthesias, sensory change and tremors.           Objective:      Physical Exam   Constitutional: She is oriented to person, place, and time. She appears well-developed and well-nourished. She is cooperative.   Oriented to time, place, and person.   Cardiovascular:   Pulses:       Dorsalis pedis pulses are 1+ on the right side, and 1+ on the left side.        Posterior tibial pulses are 1+ on the right side, and 1+ on the left side.   Capillary fill time 3-5 seconds.  All toes warm to touch.      Negative lower extremity edema bilateral.    Negative elevational pallor and dependent rubor bilateral.     Musculoskeletal:   Normal angle, base, station of gait. Decreased stride length, early heel off, moderately propulsive toe off bilateral.    All ten toes without clubbing, cyanosis, or signs of ischemia.      No pain to palpation bilateral lower extremities.      Range of motion, stability, muscle strength, and muscle tone  are age and health appropriate normal bilateral feet and legs.       Lymphadenopathy:   Negative lymphadenopathy bilateral popliteal fossa and tarsal tunnel.     Neurological: She is alert and oriented to person, place, and time. She has normal strength. She is not disoriented. She displays no atrophy and no tremor. No sensory deficit. She exhibits normal muscle tone.   Reflex Scores:       Patellar reflexes are 2+ on the right side and 2+ on the left side.       Achilles reflexes are 2+ on the right side and 2+ on the left side.  Negative tinel sign to percussion sural, superficial peroneal, deep peroneal, saphenous, and posterior tibial nerves right and left ankles and feet.       Skin: Skin is warm, dry and intact. No abrasion, no bruising, no burn, no ecchymosis, no laceration, no lesion, no petechiae and no rash noted. She is not diaphoretic. No cyanosis or erythema. No pallor. Nails show no clubbing.   Skin thin, atrophic, with decreased density and distribution of pedal hair bilateral, but without hyperpigmentation, ofelia discoloration,  ulcers, masses, nodules or cords palpated bilateral feet and legs.      Toenails 1st, 2nd, 3rd, 4th, 5th  bilateral are hypertrophic thickened 2-3 mm, dystrophic, discolored tanish brown with tan, gray crumbly subungual debris.  Tender to distal nail plate pressure, without periungual skin abnormality of each.               Assessment:       Encounter Diagnoses   Name Primary?    Digital mucous cyst of toe of right foot Yes    Toe pain, right     Plantar fibromatosis          Plan:       Lorena was seen today for cyst.    Diagnoses and all orders for this visit:    Digital mucous cyst of toe of right foot  -     X-Ray Foot Complete Right; Future    Toe pain, right  -     X-Ray Foot Complete Right; Future    Plantar fibromatosis    Other orders  -     dexamethasone injection 4 mg; Inject 1 mL (4 mg total) into the vein one time.      I counseled the patient on her  conditions, their implications and medical management.    Discussed very low chance of cancer with any mass in body only disprovable by biopsy and pathology.  Patient verbalizes understanding and declines biopsy/immaging today.       Discussed conservative treatment with shoes of adequate dimensions, material, and style to alleviate symptoms and delay or prevent surgical intervention.     Discussed very low chance of cancer with any mass in body only disprovable by biopsy and pathology.  Patient verbalizes understanding and declines biopsy/immaging today.     Counseled the patient on the potential complications of local injection of corticosteroid including, but not limited to:  Discoloration of skin, erosion of soft tissue, and increased likelihood of rupture of a soft tissue structure (ie. Plantar fascia, muscle, tendon, ligament, or capsule in the area of injection).   Very high rate of recurrence.  Patient indicates understanding of my explanation, that any questions have been answered to patient satisfaction, and patient gives verbal consent for injection of affected area.    After sterile skin prep, steroid injection was performed with patient verbal consent and timeout procedure, at right hallux dorsal medial cyst 2mg dexamethazone sodium phosphate. This was well tolerated.    Rx xrays.     Declines scheduled follow up.          Return if symptoms worsen or fail to improve.

## 2017-05-18 NOTE — MR AVS SNAPSHOT
[facility]  [address]  [place] [address]  Phone: [phone]                  Lorena Vallejo   2017 1:30 PM   Office Visit    Description:  Female : 3/27/1933   Provider:  Matt Hollis DPM   Department:  [facility]           Reason for Visit     Cyst           Diagnoses this Visit        Comments    Digital mucous cyst of toe of right foot    -  Primary     Toe pain, right                To Do List           Future Appointments        Provider Department Dept Phone    2017 2:40 PM Stephon Cates MD [facility] [phone]      Goals (5 Years of Data)     None      Follow-Up and Disposition     Return if symptoms worsen or fail to improve.      [facility]     [facility] On Call Nurse Care Line -  Assistance  Unless otherwise directed by your provider, please contact [facility] On-Call, our nurse care line that is available for  assistance.     Registered nurses in the [facility] On Call Center provide: appointment scheduling, clinical advisement, health education, and other advisory services.  Call: [phone] (toll free)               Medications           Message regarding Medications     Verify the changes and/or additions to your medication regime listed below are the same as discussed with your clinician today.  If any of these changes or additions are incorrect, please notify your healthcare provider.        These medications were administered today        Dose Freq    dexamethasone injection 4 mg 4 mg Clinic/HOD 1 time    Sig: Inject 1 mL (4 mg total) into the vein one time.    Class: Normal    Route: Intravenous           Verify that the below list of medications is an accurate representation of the medications you are currently taking.  If none reported, the list may be blank. If incorrect, please contact your healthcare provider. Carry this list with you in case of emergency.           Current Medications     acetaminophen (TYLENOL)  "500 MG tablet Take 1,000 mg by mouth every 6 (six) hours as needed.    aspirin (ECOTRIN) 81 MG EC tablet Take 81 mg by mouth every evening.    atenolol (TENORMIN) 50 MG tablet Take 1 tablet (50 mg total) by mouth every evening.    BIFIDOBACTERIUM INFANTIS (ALIGN ORAL) Take by mouth once daily.    cyanocobalamin 1,000 mcg/mL injection INJECT 2ML IMTRAMUSCULAR EVERY MONTH    diltiaZEM (TIAZAC) 240 MG CpSR Take 1 capsule (240 mg total) by mouth once daily.    fish oil-omega-3 fatty acids 300-1,000 mg capsule Take 2 g by mouth 2 (two) times daily.    loperamide (IMODIUM) 2 mg capsule Take 2 mg by mouth 4 (four) times daily as needed for Diarrhea.    magnesium oxide (MAG-OX) 400 mg tablet Take 400 mg by mouth once daily.     omeprazole (PRILOSEC) 20 MG capsule Take 1 capsule (20 mg total) by mouth once daily. Pt takes 1 every other day    paroxetine (PAXIL) 20 MG tablet Take 1 tablet (20 mg total) by mouth once daily.    pravastatin (PRAVACHOL) 20 MG tablet Take 1 tablet (20 mg total) by mouth once daily.    ranitidine (ZANTAC) 150 MG capsule Take 1 capsule (150 mg total) by mouth 2 (two) times daily.    thyroid, pork, (ARMOUR THYROID) 300 mg Tab Take 150 mg by mouth once daily. 30 mg sent earlier in error.    VIT C/E/ZN/COPPR/LUTEIN/ZEAXAN (PRESERVISION AREDS 2 ORAL) Take by mouth 2 (two) times daily.           Clinical Reference Information           Your Vitals Were     Height Weight BMI          5' 8" (1.727 m) 59.2 kg (130 lb 8.2 oz) 19.84 kg/m2        Allergies as of 5/18/2017     Flagyl [Metronidazole Hcl]    Latex, Natural Rubber    Levaquin [Levofloxacin]    Morphine    Augmentin [Amoxicillin-pot Clavulanate]    Ciprofloxacin    Codeine    Demerol [Meperidine]    Dilaudid [Hydromorphone (Bulk)]    Fentanyl    Naproxen    Nsaids (Non-steroidal Anti-inflammatory Drug)    Percocet [Oxycodone-acetaminophen]    Prednisone    Reglan [Metoclopramide Hcl]    Sulfa (Sulfonamide Antibiotics)    Vicodin " [Hydrocodone-acetaminophen]    Zosyn [Piperacillin-tazobactam]    Ambien [Zolpidem]    Ibuprofen    Macrobid [Nitrofurantoin Monohyd/m-cryst]    Pcn [Penicillins]    Vasotec [Enalapril Maleate]      Immunizations Administered on Date of Encounter - 5/18/2017     None      Orders Placed During Today's Visit     Future Labs/Procedures Expected by Expires    X-Ray Foot Complete Right  5/18/2017 5/18/2018      Language Assistance Services     ATTENTION: Language assistance services are available, free of charge. Please call [phone].      ATENCIÓN: Si habla christopher, tiene a renner disposición servicios gratuitos de asistencia lingüística. Llame al [phone].     CHÚ Ý: N?u b?n nói Ti?ng Vi?t, có các d?ch v? h? tr? ngôn ng? mi?n phí dành cho b?n. G?i s? [phone].         [facility] complies with applicable Federal civil rights laws and does not discriminate on the basis of race, color, national origin, age, disability, or sex.

## 2017-05-18 NOTE — LETTER
May 18, 2017      Stephon Cates MD  [address]  [place] [address]           [facility]  [address]  [place] [address]  Phone: [phone]          Patient: Lorena Vallejo   MR Number: [id number]   YOB: 1933   Date of Visit: 5/18/2017       Dear Dr. Stephon Cates:    Thank you for referring Lorena Vallejo to me for evaluation. Attached you will find relevant portions of my assessment and plan of care.    If you have questions, please do not hesitate to call me. I look forward to following Lorena Vallejo along with you.    Sincerely,    Matt Hollis, LUIZA    Enclosure  CC:  No Recipients    If you would like to receive this communication electronically, please contact [email or web address] or [phone] to request more information on [facility] access.    For providers and/or their staff who would like to refer a patient to [facility], please contact us through our one-stop-shop provider referral line, [facility], at [phone].    If you feel you have received this communication in error or would no longer like to receive these types of communications, please e-mail [email or web address]

## 2017-05-23 ENCOUNTER — DOCUMENTATION ONLY (OUTPATIENT)
Dept: FAMILY MEDICINE | Facility: CLINIC | Age: 82
End: 2017-05-23

## 2017-05-23 ENCOUNTER — OFFICE VISIT (OUTPATIENT)
Dept: FAMILY MEDICINE | Facility: CLINIC | Age: 82
End: 2017-05-23
Payer: MEDICARE

## 2017-05-23 VITALS
BODY MASS INDEX: 19.55 KG/M2 | OXYGEN SATURATION: 99 % | WEIGHT: 129 LBS | DIASTOLIC BLOOD PRESSURE: 66 MMHG | TEMPERATURE: 98 F | HEIGHT: 68 IN | SYSTOLIC BLOOD PRESSURE: 123 MMHG | HEART RATE: 62 BPM

## 2017-05-23 DIAGNOSIS — I10 ESSENTIAL HYPERTENSION: ICD-10-CM

## 2017-05-23 DIAGNOSIS — K90.829 SHORT BOWEL SYNDROME: ICD-10-CM

## 2017-05-23 DIAGNOSIS — M70.50 ANSERINE BURSITIS: Primary | ICD-10-CM

## 2017-05-23 PROCEDURE — 3074F SYST BP LT 130 MM HG: CPT | Mod: S$GLB,,, | Performed by: INTERNAL MEDICINE

## 2017-05-23 PROCEDURE — 99499 UNLISTED E&M SERVICE: CPT | Mod: S$GLB,,, | Performed by: INTERNAL MEDICINE

## 2017-05-23 PROCEDURE — 99212 OFFICE O/P EST SF 10 MIN: CPT | Mod: 25,S$GLB,, | Performed by: INTERNAL MEDICINE

## 2017-05-23 PROCEDURE — 1159F MED LIST DOCD IN RCRD: CPT | Mod: S$GLB,,, | Performed by: INTERNAL MEDICINE

## 2017-05-23 PROCEDURE — 3078F DIAST BP <80 MM HG: CPT | Mod: S$GLB,,, | Performed by: INTERNAL MEDICINE

## 2017-05-23 PROCEDURE — 1160F RVW MEDS BY RX/DR IN RCRD: CPT | Mod: S$GLB,,, | Performed by: INTERNAL MEDICINE

## 2017-05-23 PROCEDURE — 20610 DRAIN/INJ JOINT/BURSA W/O US: CPT | Mod: S$GLB,,, | Performed by: INTERNAL MEDICINE

## 2017-05-23 PROCEDURE — 1125F AMNT PAIN NOTED PAIN PRSNT: CPT | Mod: S$GLB,,, | Performed by: INTERNAL MEDICINE

## 2017-05-23 RX ORDER — METHYLPREDNISOLONE ACETATE 40 MG/ML
40 INJECTION, SUSPENSION INTRA-ARTICULAR; INTRALESIONAL; INTRAMUSCULAR; SOFT TISSUE
Status: DISCONTINUED | OUTPATIENT
Start: 2017-05-23 | End: 2017-05-23 | Stop reason: HOSPADM

## 2017-05-23 RX ADMIN — METHYLPREDNISOLONE ACETATE 40 MG: 40 INJECTION, SUSPENSION INTRA-ARTICULAR; INTRALESIONAL; INTRAMUSCULAR; SOFT TISSUE at 04:05

## 2017-05-23 NOTE — PROCEDURES
Large Joint Aspiration/Injection  Date/Time: 5/23/2017 4:42 PM  Performed by: ASHLEY LANE  Authorized by: ASHLEY LANE     Consent Done?:  Yes (Written)  Indications:  Pain  Timeout: Prior to procedure the correct patient, procedure, and site was verified      Location:  Knee  Site:  L knee  Ultrasonic Guidance for needle placement: No  Needle size:  25 G  Approach:  Medial  Medications:  40 mg methylPREDNISolone acetate 40 mg/mL  Patient tolerance:  Patient tolerated the procedure well with no immediate complications

## 2017-05-23 NOTE — PROGRESS NOTES
Subjective:       Patient ID: Lorena Vallejo is a 84 y.o. female.    Chief Complaint: Follow-up    HPI       CHIEF COMPLAINT: Hypertension  HPI:     ONSET:      QUALITY/COURSE:   Controlled:  yes     INTENSITY/SEVERITY:  Average blood pressure is 155/80 in the morning.  During the day it's 120/60.     MODIFIERS/TREATMENTS:  Taking medications: yes. .High sodium intake: no. alcohol: no      The following symptoms are positive only if BOLDED, otherwise are negative.      SYMPTOMS/RELATED: Possible medication side effects include:   Depression..  . Cough. . Constipation.    REVIEW OF SYMPTOMS: . Weight_loss . Weight_gain . Leg_cramps .Potency_problems .    TARGET ORGAN DAMAGE:: angina/ prior myocardial infarction, chronic kidney disease, heart failure, left ventricular hypertrophy, peripheral artery disease, prior coronary revascularization, retinopathy, stroke. transient ischemic attack.          Lower_Extremity_Problems(+). Pain: yes. . Injury: no.   HPI:     ONSET/TIMING: . Onset   one month   ago.     DURATION:   Intermittent         QUALITY/COURSE:    Worsening,. .     LOCATION:  Left knee      . Radiation: no.      INTENSITY/SEVERITY:. Severity is #   6   (10 point scale).        MODIFIERS/TREATMENTS: Current_Limitations_are:  none..   Taking medications: no    Physical_Therapy: no.  Chiropractor: no.     SYMPTOMS/RELATED: . --Possible medication side effects include:.     The following symptoms/statements  are positive if BOLD, negative otherwise.      CONTEXT/WHEN: . Activity . weight bearing. Inactivity.  Sudden  Work related.  Similar_problems_in past.   . Litigation_pending . X-rays. CT . MRI      REVIEW OF SYMPTOMS:   Numbness. Weakness. Muscle_Problems. Fever. Joint_Problems. Swelling. Erythema. Weight_loss. Instability.      .               Review of Systems    Objective:      Vitals:    05/23/17 1519   BP: 123/66   Pulse: 62   Temp: 98.4 °F (36.9 °C)   TempSrc: Oral   SpO2: 99%   Weight: 58.5 kg (128 lb  "15.5 oz)   Height: 5' 8" (1.727 m)   PainSc:   5   PainLoc: Knee     Physical Exam   Constitutional: She appears well-developed and well-nourished.   Eyes: Pupils are equal, round, and reactive to light.   Cardiovascular: Normal rate, regular rhythm and normal heart sounds.    Pulmonary/Chest: Effort normal and breath sounds normal.   Abdominal: Soft. There is no tenderness.   Neurological: She is alert.   Psychiatric: She has a normal mood and affect. Her behavior is normal. Thought content normal.   Nursing note and vitals reviewed.        Assessment:       1. Anserine bursitis (for procedure only)     2. Essential hypertension          Plan:     Anserine bursitis  -     Large Joint Aspiration/Injection  -     methylPREDNISolone acetate injection 40 mg; Inject 40 mg into the articular space.    Essential hypertension    Short bowel syndrome  -     Comprehensive metabolic panel; Future; Expected date: 05/23/2017      Return in about 3 months (around 8/23/2017).      "

## 2017-05-23 NOTE — PATIENT INSTRUCTIONS
Take the diltiazem at bedtime        Low-Salt Diet  This diet removes foods that are high in salt. It also limits the amount of salt you use when cooking. It is most often used for people with high blood pressure, edema (fluid retention), and kidney, liver, or heart disease.  Table salt contains the mineral sodium. Your body needs sodium to work normally. But too much sodium can make your health problems worse. Your healthcare provider is recommending a low-salt (also called low-sodium) diet for you. Your total daily allowance of salt is 1,500 to 2,300 milligrams (mg). It is less than 1 teaspoon of table salt. This means you can have only about 500 to 700 mg of sodium at each meal. People with certain health problems should limit salt intake to the lower end of the recommended range.    When you cook, dont add much salt. If you can cook without using salt, even better. Dont add salt to your food at the table.  When shopping, read food labels. Salt is often called sodium on the label. Choose foods that are salt-free, low salt, or very low salt. Note that foods with reduced salt may not lower your salt intake enough.    Beans, potatoes, and pasta  Ok: Dry beans, split peas, lentils, potatoes, rice, macaroni, pasta, spaghetti without added salt  Avoid: Potato chips, tortilla chips, and similar products  Breads and cereals  Ok: Low-sodium breads, rolls, cereals, and cakes; low-salt crackers, matzo crackers  Avoid: Salted crackers, pretzels, popcorn, Yoruba toast, pancakes, muffins  Dairy  Ok: Milk, chocolate milk, hot chocolate mix, low-salt cheeses, and yogurt  Avoid: Processed cheese and cheese spreads; Roquefort, Camembert, and cottage cheese; buttermilk, instant breakfast drink  Desserts  Ok: Ice cream, frozen yogurt, juice bars, gelatin, cookies and pies, sugar, honey, jelly, hard candy  Avoid: Most pies, cakes and cookies prepared or processed with salt; instant pudding  Drinks  Ok: Tea, coffee, fizzy  (carbonated) drinks, juices  Avoid: Flavored coffees, electrolyte replacement drinks, sports drinks  Meats  Ok: All fresh meat, fish, poultry, low-salt tuna, eggs, egg substitute  Avoid: Smoked, pickled, brine-cured, or salted meats and fish. This includes jimenez, chipped beef, corned beef, hot dogs, deli meats, ham, kosher meats, salt pork, sausage, canned tuna, salted codfish, smoked salmon, herring, sardines, or anchovies.  Seasonings and spices  Ok: Most seasonings are okay. Good substitutes for salt include: fresh herb blends, hot sauce, lemon, garlic, castle, vinegar, dry mustard, parsley, cilantro, horseradish, tomato paste, regular margarine, mayonnaise, unsalted butter, cream cheese, vegetable oil, cream, low-salt salad dressing and gravy.  Avoid: Regular ketchup, relishes, pickles, soy sauce, teriyaki sauce, Worcestershire sauce, BBQ sauce, tartar sauce, meat tenderizer, chili sauce, regular gravy, regular salad dressing, salted butter  Soups  Ok: Low-salt soups and broths made with allowed foods  Avoid: Bouillon cubes, soups with smoked or salted meats, regular soup and broth  Vegetables  Ok: Most vegetables are okay; also low-salt tomato and vegetable juices  Avoid: Sauerkraut and other brine-soaked vegetables; pickles and other pickled vegetables; tomato juice, olives  © 8928-2466 [employer]. [address] [place] [address]. All rights reserved. This information is not intended as a substitute for professional medical care. Always follow your healthcare professional's instructions.

## 2017-05-23 NOTE — PROGRESS NOTES
Health Maintenance Due   Topic Date Due    TETANUS VACCINE  03/27/1951    Zoster Vaccine  03/27/1993

## 2017-06-14 DIAGNOSIS — K21.9 GASTROESOPHAGEAL REFLUX DISEASE, ESOPHAGITIS PRESENCE NOT SPECIFIED: ICD-10-CM

## 2017-06-14 RX ORDER — OMEPRAZOLE 20 MG/1
CAPSULE, DELAYED RELEASE ORAL
Qty: 90 CAPSULE | Refills: 1 | Status: SHIPPED | OUTPATIENT
Start: 2017-06-14 | End: 2017-08-09 | Stop reason: SDUPTHER

## 2017-06-29 ENCOUNTER — OFFICE VISIT (OUTPATIENT)
Dept: PODIATRY | Facility: CLINIC | Age: 82
End: 2017-06-29
Payer: MEDICARE

## 2017-06-29 VITALS — WEIGHT: 131.81 LBS | HEIGHT: 68 IN | BODY MASS INDEX: 19.98 KG/M2

## 2017-06-29 DIAGNOSIS — M79.674 TOE PAIN, RIGHT: ICD-10-CM

## 2017-06-29 DIAGNOSIS — M67.471 DIGITAL MUCOUS CYST OF TOE OF RIGHT FOOT: Primary | ICD-10-CM

## 2017-06-29 PROCEDURE — 20612 ASPIRATE/INJ GANGLION CYST: CPT | Mod: RT,S$GLB,, | Performed by: PODIATRIST

## 2017-06-29 PROCEDURE — 99999 PR PBB SHADOW E&M-EST. PATIENT-LVL II: CPT | Mod: PBBFAC,,, | Performed by: PODIATRIST

## 2017-06-29 PROCEDURE — 1159F MED LIST DOCD IN RCRD: CPT | Mod: S$GLB,,, | Performed by: PODIATRIST

## 2017-06-29 PROCEDURE — 1125F AMNT PAIN NOTED PAIN PRSNT: CPT | Mod: S$GLB,,, | Performed by: PODIATRIST

## 2017-06-29 PROCEDURE — 99212 OFFICE O/P EST SF 10 MIN: CPT | Mod: 25,S$GLB,, | Performed by: PODIATRIST

## 2017-06-29 NOTE — PROGRESS NOTES
Subjective:      Patient ID: Lorena Vallejo is a 84 y.o. female.    Chief Complaint: Foot Pain (right)    Small mass right big toe and bottom right arch with occasional pain/tenderness.  Gradual onset, worsening over past several weeks, aggravated by increased weight bearing, shoe gear, pressure.  No previous medical treatment.  OTC pain med not helping.  Denies trauma and signs infection.      Review of Systems   Constitution: Negative for chills, diaphoresis, fever, malaise/fatigue and night sweats.   Cardiovascular: Negative for claudication, cyanosis, leg swelling and syncope.   Skin: Positive for suspicious lesions. Negative for color change, dry skin, nail changes, rash and unusual hair distribution.   Musculoskeletal: Negative for falls, joint pain, joint swelling, muscle cramps, muscle weakness and stiffness.   Gastrointestinal: Negative for constipation, diarrhea, nausea and vomiting.   Neurological: Negative for brief paralysis, disturbances in coordination, focal weakness, numbness, paresthesias, sensory change and tremors.           Objective:      Physical Exam   Constitutional: She is oriented to person, place, and time. She appears well-developed and well-nourished. She is cooperative.   Oriented to time, place, and person.   Cardiovascular:   Pulses:       Dorsalis pedis pulses are 1+ on the right side, and 1+ on the left side.        Posterior tibial pulses are 1+ on the right side, and 1+ on the left side.   Capillary fill time 3-5 seconds.  All toes warm to touch.      Negative lower extremity edema bilateral.    Negative elevational pallor and dependent rubor bilateral.     Musculoskeletal:   Normal angle, base, station of gait. Decreased stride length, early heel off, moderately propulsive toe off bilateral.    All ten toes without clubbing, cyanosis, or signs of ischemia.      No pain to palpation bilateral lower extremities.      Range of motion, stability, muscle strength, and muscle tone are  age and health appropriate normal bilateral feet and legs.       Lymphadenopathy:   Negative lymphadenopathy bilateral popliteal fossa and tarsal tunnel.     Neurological: She is alert and oriented to person, place, and time. She has normal strength. She is not disoriented. She displays no atrophy and no tremor. No sensory deficit. She exhibits normal muscle tone.   Reflex Scores:       Patellar reflexes are 2+ on the right side and 2+ on the left side.       Achilles reflexes are 2+ on the right side and 2+ on the left side.  Negative tinel sign to percussion sural, superficial peroneal, deep peroneal, saphenous, and posterior tibial nerves right and left ankles and feet.       Skin: Skin is warm, dry and intact. No abrasion, no bruising, no burn, no ecchymosis, no laceration, no lesion, no petechiae and no rash noted. She is not diaphoretic. No cyanosis or erythema. No pallor. Nails show no clubbing.   Small <1cm diameter cyst distal medial right hallux at proximal medial nail fold  without ulceration, drainage, pus, tracking, fluctuance, malodor, or cardinal signs infection.     Otherwise,Skin thin, atrophic, with decreased density and distribution of pedal hair bilateral, but without hyperpigmentation, ofelia discoloration,  ulcers, masses, nodules or cords palpated bilateral feet and legs.      Toenails 1st, 2nd, 3rd, 4th, 5th  bilateral are hypertrophic thickened 2-3 mm, dystrophic, discolored tanish brown with tan, gray crumbly subungual debris.  Tender to distal nail plate pressure, without periungual skin abnormality of each.               Assessment:       Encounter Diagnoses   Name Primary?    Digital mucous cyst of toe of right foot Yes    Toe pain, right          Plan:       Lorena was seen today for foot pain.    Diagnoses and all orders for this visit:    Digital mucous cyst of toe of right foot    Toe pain, right      I counseled the patient on her conditions, their implications and medical  management.    Discussed very low chance of cancer with any mass in body only disprovable by biopsy and pathology.  Patient verbalizes understanding and declines biopsy/immaging today.     I advised against repeated steroid injection to this area (toe again) due to very thin tissues and potential for destruction, infection and bone spread of infectious process.  Patient verbalizes understanding.    Discussed conservative treatment with shoes of adequate dimensions, material, and style to alleviate symptoms and delay or prevent surgical intervention.     Very high rate of recurrence.  Patient indicates understanding of my explanation, that any questions have been answered to patient satisfaction, and patient gives verbal consent for injection drainage of affected area.    After sterile skin prep, injection/aspiration cyst was performed right hallux with patient verbal consent and timeout procedure, at right hallux dorsal medial cyst. This was well tolerated.    Rx xrays.    Declines scheduled follow up.          No Follow-up on file.

## 2017-07-31 ENCOUNTER — TELEPHONE (OUTPATIENT)
Dept: PODIATRY | Facility: CLINIC | Age: 82
End: 2017-07-31

## 2017-07-31 DIAGNOSIS — Z01.818 PRE-OP TESTING: Primary | ICD-10-CM

## 2017-07-31 NOTE — TELEPHONE ENCOUNTER
----- Message from Soledad Steward sent at 7/28/2017 12:57 PM CDT -----  states that she's ready for dr to take cyst off toe, told by  appt didn't have to be made but to just let him know..[phone] (home)

## 2017-08-02 NOTE — TELEPHONE ENCOUNTER
Patient is ready to have surgery, Digital mucous cyst of toe of right foot. Can she be cleared by Chart, if not can she get soon. Surgical date of 08/14/2017.

## 2017-08-03 ENCOUNTER — TELEPHONE (OUTPATIENT)
Dept: PODIATRY | Facility: CLINIC | Age: 82
End: 2017-08-03

## 2017-08-03 NOTE — TELEPHONE ENCOUNTER
----- Message from Marjorie Denice sent at 8/2/2017 12:39 PM CDT -----  Patient would like to know what she needs to do to set up her surgery on her toe.     Call placed to pod. No answer    Please call patient on her cell [phone] or at home after 2 pm    Thank you

## 2017-08-04 NOTE — TELEPHONE ENCOUNTER
Patient will need follow up.  Last visit in  May.  Scheduled for 08/09/17 at 120 pm.  information given to spouse.  Patient was not available.

## 2017-08-09 ENCOUNTER — OFFICE VISIT (OUTPATIENT)
Dept: FAMILY MEDICINE | Facility: CLINIC | Age: 82
End: 2017-08-09
Payer: MEDICARE

## 2017-08-09 VITALS
TEMPERATURE: 99 F | OXYGEN SATURATION: 98 % | SYSTOLIC BLOOD PRESSURE: 111 MMHG | WEIGHT: 131.19 LBS | HEIGHT: 68 IN | DIASTOLIC BLOOD PRESSURE: 69 MMHG | BODY MASS INDEX: 19.88 KG/M2 | HEART RATE: 66 BPM | RESPIRATION RATE: 16 BRPM

## 2017-08-09 DIAGNOSIS — Z01.818 PREOPERATIVE CLEARANCE: ICD-10-CM

## 2017-08-09 DIAGNOSIS — R00.2 PALPITATIONS: ICD-10-CM

## 2017-08-09 DIAGNOSIS — K29.50 CHRONIC GASTRITIS, PRESENCE OF BLEEDING UNSPECIFIED, UNSPECIFIED GASTRITIS TYPE: ICD-10-CM

## 2017-08-09 DIAGNOSIS — N39.0 URINARY TRACT INFECTION WITHOUT HEMATURIA, SITE UNSPECIFIED: Primary | ICD-10-CM

## 2017-08-09 DIAGNOSIS — K21.9 GASTROESOPHAGEAL REFLUX DISEASE, ESOPHAGITIS PRESENCE NOT SPECIFIED: ICD-10-CM

## 2017-08-09 DIAGNOSIS — M17.12 OSTEOARTHRITIS OF LEFT KNEE, UNSPECIFIED OSTEOARTHRITIS TYPE: ICD-10-CM

## 2017-08-09 PROCEDURE — 87088 URINE BACTERIA CULTURE: CPT

## 2017-08-09 PROCEDURE — 87077 CULTURE AEROBIC IDENTIFY: CPT

## 2017-08-09 PROCEDURE — 99214 OFFICE O/P EST MOD 30 MIN: CPT | Mod: S$GLB,,, | Performed by: INTERNAL MEDICINE

## 2017-08-09 PROCEDURE — 1159F MED LIST DOCD IN RCRD: CPT | Mod: S$GLB,,, | Performed by: INTERNAL MEDICINE

## 2017-08-09 PROCEDURE — 1126F AMNT PAIN NOTED NONE PRSNT: CPT | Mod: S$GLB,,, | Performed by: INTERNAL MEDICINE

## 2017-08-09 PROCEDURE — 3078F DIAST BP <80 MM HG: CPT | Mod: S$GLB,,, | Performed by: INTERNAL MEDICINE

## 2017-08-09 PROCEDURE — 3074F SYST BP LT 130 MM HG: CPT | Mod: S$GLB,,, | Performed by: INTERNAL MEDICINE

## 2017-08-09 PROCEDURE — 87186 SC STD MICRODIL/AGAR DIL: CPT

## 2017-08-09 PROCEDURE — 87086 URINE CULTURE/COLONY COUNT: CPT

## 2017-08-09 PROCEDURE — 81002 URINALYSIS NONAUTO W/O SCOPE: CPT | Mod: S$GLB,,, | Performed by: INTERNAL MEDICINE

## 2017-08-09 PROCEDURE — 99499 UNLISTED E&M SERVICE: CPT | Mod: S$GLB,,, | Performed by: INTERNAL MEDICINE

## 2017-08-09 PROCEDURE — 3008F BODY MASS INDEX DOCD: CPT | Mod: S$GLB,,, | Performed by: INTERNAL MEDICINE

## 2017-08-09 RX ORDER — GRANULES FOR ORAL 3 G/1
3 POWDER ORAL ONCE
Qty: 3 G | Refills: 0 | Status: SHIPPED | OUTPATIENT
Start: 2017-08-09 | End: 2017-08-09 | Stop reason: SDUPTHER

## 2017-08-09 RX ORDER — OMEPRAZOLE 20 MG/1
20 CAPSULE, DELAYED RELEASE ORAL DAILY
Qty: 90 CAPSULE | Refills: 1 | Status: SHIPPED | OUTPATIENT
Start: 2017-08-09 | End: 2017-12-27 | Stop reason: SDUPTHER

## 2017-08-09 RX ORDER — METOPROLOL SUCCINATE 100 MG/1
100 TABLET, EXTENDED RELEASE ORAL DAILY
Qty: 90 TABLET | Refills: 1 | Status: SHIPPED | OUTPATIENT
Start: 2017-08-09 | End: 2017-08-23

## 2017-08-09 RX ORDER — GRANULES FOR ORAL 3 G/1
3 POWDER ORAL ONCE
Qty: 3 G | Refills: 0 | Status: SHIPPED | OUTPATIENT
Start: 2017-08-09 | End: 2017-08-09

## 2017-08-09 RX ORDER — GABAPENTIN 100 MG/1
100 CAPSULE ORAL NIGHTLY
COMMUNITY
End: 2017-08-09 | Stop reason: SDUPTHER

## 2017-08-09 NOTE — PROGRESS NOTES
Subjective:       Patient ID: Lorena Vallejo is a 84 y.o. female.    Chief Complaint: surgical clearance (spot on toe) and Dysuria (refills and discuss medications)    HPI        CHIEF COMPLAINT: Bladder/UTI(+).  HPI:     ONSET/TIMING: Onset   1 d    ago. Sudden:: no .     DURATION:  continuous    QUALITY/COURSE:   worse     LOCATION: pain/pressure: suprapubic    .     INTENSITY/SEVERITY: # 4   /10 (on a 1 to 10 scale).    CONTEXT/WHEN: --Similar problems: yes. .  Past_treatments: no . Past_evaluations: no    MODIFIERS/TREATMENTS:  .     The following symptoms are positive if BOLD, negative otherwise.       REVIEW OF SYMPTOMS:Urine_Frequency . Urine_Urgency . Dysuria . Suprapubic_Pain . Hematuria . Urine_Incontinence . . Fever . Chills . Nausea . Vomiting . Perineal Pain .  Sharp_pain . Dull_pain . Burning_pain .Tenesmus . Back_pain . Vaginal_Discharge . Vaginal_Itching . Vaginal_Burning . Dyspareunia .     Urinalysis  @[id number](coloru,clarityu,specgrav,phur,proteinua,glucoseu,bilirubincon,bloodu,wbcu,rbcu,bacteria,mucus,nitrite,leukocytesur,urobilinogen,hyalinecasts)@            C.C..  Surgical Clearance    Surgery:     Has a ganglion cyst ,  To be removed.   Date of Surgery:     8/14/17  By Bunny.      Patient has  tolerated anesthesia without problems in the past. No hx of cad. No chest pain in last 6 mo. No hx of lung disease and is not a smoker.     Functional status:  Good.       ;      Lower_Extremity_Problems(+). Pain: yes. . Injury: no.   HPI:     ONSET/TIMING: . Onset  1 y    ago.     DURATION:   Intermittent  worse after inactivity       QUALITY/COURSE:    unchanged ,. .     LOCATION:    Left knee    . Radiation: no.      INTENSITY/SEVERITY:. Severity is #   5   (10 point scale).        MODIFIERS/TREATMENTS: Current_Limitations_are:  none..   Taking medications: no    Physical_Therapy: no.  Chiropractor: no.     SYMPTOMS/RELATED: . --Possible medication side effects include:.     The following  "symptoms/statements  are positive if BOLD, negative otherwise.      CONTEXT/WHEN: . Activity . weight bearing. Inactivity.  Sudden  Work related.  Similar_problems_in past.   . Litigation_pending . X-rays. CT . MRI      REVIEW OF SYMPTOMS:   Numbness. Weakness. Muscle_Problems. Fever. Joint_Problems. Swelling. Erythema. Weight_loss. Instability.      .                          Review of Systems    Objective:      Vitals:    08/09/17 1338   BP: 111/69   Pulse: 66   Resp: 16   Temp: 98.7 °F (37.1 °C)   TempSrc: Oral   SpO2: 98%   Weight: 59.5 kg (131 lb 2.8 oz)   Height: 5' 8" (1.727 m)   PainSc: 0-No pain     Physical Exam   Constitutional: She appears well-developed and well-nourished.   Eyes: Pupils are equal, round, and reactive to light.   Cardiovascular: Normal rate, regular rhythm and normal heart sounds.    Pulmonary/Chest: Effort normal and breath sounds normal.   Abdominal: Soft. There is no tenderness.   Neurological: She is alert.   Skin:   Ganglion cyst on left great toe   Psychiatric: She has a normal mood and affect. Her behavior is normal. Thought content normal.   Nursing note and vitals reviewed.        Assessment:       1. Urinary tract infection without hematuria, site unspecified    2. Gastroesophageal reflux disease, esophagitis presence not specified    3. Osteoarthritis of left knee, unspecified osteoarthritis type    4. Chronic gastritis, presence of bleeding unspecified, unspecified gastritis type    5. Palpitations    6. Preoperative clearance          Plan:     Urinary tract infection without hematuria, site unspecified  -     Discontinue: fosfomycin (MONUROL) 3 gram Pack; Take 3 g by mouth once.  Dispense: 3 g; Refill: 0  -     Urine culture  -     fosfomycin (MONUROL) 3 gram Pack; Take 3 g by mouth once.  Dispense: 3 g; Refill: 0    Gastroesophageal reflux disease, esophagitis presence not specified  -     omeprazole (PRILOSEC) 20 MG capsule; Take 1 capsule (20 mg total) by mouth once " daily.  Dispense: 90 capsule; Refill: 1    Osteoarthritis of left knee, unspecified osteoarthritis type  -     Ambulatory Referral to Orthopedics    Chronic gastritis, presence of bleeding unspecified, unspecified gastritis type  -     ranitidine (ZANTAC) 150 MG capsule; Take 1 capsule (150 mg total) by mouth 2 (two) times daily.  Dispense: 60 capsule; Refill: 11    Palpitations  -     metoprolol succinate (TOPROL-XL) 100 MG 24 hr tablet; Take 1 tablet (100 mg total) by mouth once daily.  Dispense: 90 tablet; Refill: 1    Preoperative clearance      Return for if you are not better return in one week.

## 2017-08-10 RX ORDER — GABAPENTIN 100 MG/1
100 CAPSULE ORAL NIGHTLY
Qty: 360 CAPSULE | Refills: 0 | Status: SHIPPED | OUTPATIENT
Start: 2017-08-10 | End: 2017-09-18 | Stop reason: DRUGHIGH

## 2017-08-11 ENCOUNTER — TELEPHONE (OUTPATIENT)
Dept: PODIATRY | Facility: CLINIC | Age: 82
End: 2017-08-11

## 2017-08-11 NOTE — TELEPHONE ENCOUNTER
Spoke with Nelsy Buckley about patient having a UTI and not being scheduled for surgery. (Culture results still pending at this time.) Informed patient that she will have to be cleared from the UTI before surgery can be scheduled.  Patient will contact Dr Cates's office on Monday, and will speak with Nesly next week.

## 2017-08-11 NOTE — TELEPHONE ENCOUNTER
----- Message from Karlo DUMONT Frisard sent at 8/11/2017  2:31 PM CDT -----  Contact: same  Patient called in and stated she thought she had surgery schedule for this Monday 8/14 at [facility]?  Patient call back number is [phone]

## 2017-08-12 LAB — BACTERIA UR CULT: NORMAL

## 2017-08-14 ENCOUNTER — TELEPHONE (OUTPATIENT)
Dept: FAMILY MEDICINE | Facility: CLINIC | Age: 82
End: 2017-08-14

## 2017-08-14 DIAGNOSIS — N39.0 URINARY TRACT INFECTION WITHOUT HEMATURIA, SITE UNSPECIFIED: Primary | ICD-10-CM

## 2017-08-14 RX ORDER — CEPHALEXIN 500 MG/1
500 CAPSULE ORAL 4 TIMES DAILY
Qty: 20 CAPSULE | Refills: 0 | Status: SHIPPED | OUTPATIENT
Start: 2017-08-14 | End: 2017-08-23 | Stop reason: ALTCHOICE

## 2017-08-14 NOTE — TELEPHONE ENCOUNTER
Spoke with patient.  Has taken Keflex 500 mg tid before.  Did not cause diarrhea like other do.  [facility]  On [address].

## 2017-08-14 NOTE — TELEPHONE ENCOUNTER
----- Message from Dilma Galindo sent at 8/14/2017 11:52 AM CDT -----  Contact:   call  //[phone]    Calling to  Speak  To the  Nurse // please call

## 2017-08-15 LAB
BILIRUB SERPL-MCNC: NORMAL MG/DL
BLOOD URINE, POC: NORMAL
COLOR, POC UA: YELLOW
GLUCOSE UR QL STRIP: NORMAL
KETONES UR QL STRIP: NORMAL
LEUKOCYTE ESTERASE URINE, POC: NORMAL
NITRITE, POC UA: NORMAL
PH, POC UA: 5
PROTEIN, POC: NORMAL
SPECIFIC GRAVITY, POC UA: 1.01
UROBILINOGEN, POC UA: NORMAL

## 2017-08-15 NOTE — TELEPHONE ENCOUNTER
----- Message from Carlota Torres sent at 8/15/2017  1:24 PM CDT -----  Contact: Magdalena  Patient waiting for Antibiotic at pharmacy please send over, any questions please call back at [phone] (home)

## 2017-08-16 ENCOUNTER — LAB VISIT (OUTPATIENT)
Dept: LAB | Facility: HOSPITAL | Age: 82
End: 2017-08-16
Attending: INTERNAL MEDICINE
Payer: MEDICARE

## 2017-08-16 DIAGNOSIS — K90.829 SHORT BOWEL SYNDROME: ICD-10-CM

## 2017-08-16 LAB
ALBUMIN SERPL BCP-MCNC: 3.7 G/DL
ALP SERPL-CCNC: 94 U/L
ALT SERPL W/O P-5'-P-CCNC: 21 U/L
ANION GAP SERPL CALC-SCNC: 9 MMOL/L
AST SERPL-CCNC: 21 U/L
BILIRUB SERPL-MCNC: 0.7 MG/DL
BUN SERPL-MCNC: 22 MG/DL
CALCIUM SERPL-MCNC: 9.6 MG/DL
CHLORIDE SERPL-SCNC: 103 MMOL/L
CO2 SERPL-SCNC: 28 MMOL/L
CREAT SERPL-MCNC: 0.8 MG/DL
EST. GFR  (AFRICAN AMERICAN): >60 ML/MIN/1.73 M^2
EST. GFR  (NON AFRICAN AMERICAN): >60 ML/MIN/1.73 M^2
GLUCOSE SERPL-MCNC: 84 MG/DL
POTASSIUM SERPL-SCNC: 4.6 MMOL/L
PROT SERPL-MCNC: 7 G/DL
SODIUM SERPL-SCNC: 140 MMOL/L

## 2017-08-16 PROCEDURE — 36415 COLL VENOUS BLD VENIPUNCTURE: CPT | Mod: PO

## 2017-08-16 PROCEDURE — 80053 COMPREHEN METABOLIC PANEL: CPT

## 2017-08-22 ENCOUNTER — DOCUMENTATION ONLY (OUTPATIENT)
Dept: FAMILY MEDICINE | Facility: CLINIC | Age: 82
End: 2017-08-22

## 2017-08-23 ENCOUNTER — OFFICE VISIT (OUTPATIENT)
Dept: FAMILY MEDICINE | Facility: CLINIC | Age: 82
End: 2017-08-23
Payer: MEDICARE

## 2017-08-23 VITALS
TEMPERATURE: 99 F | HEIGHT: 68 IN | WEIGHT: 131.38 LBS | RESPIRATION RATE: 16 BRPM | OXYGEN SATURATION: 97 % | SYSTOLIC BLOOD PRESSURE: 139 MMHG | DIASTOLIC BLOOD PRESSURE: 73 MMHG | BODY MASS INDEX: 19.91 KG/M2 | HEART RATE: 85 BPM

## 2017-08-23 DIAGNOSIS — N39.0 URINARY TRACT INFECTION WITHOUT HEMATURIA, SITE UNSPECIFIED: ICD-10-CM

## 2017-08-23 DIAGNOSIS — I47.10 PSVT (PAROXYSMAL SUPRAVENTRICULAR TACHYCARDIA): ICD-10-CM

## 2017-08-23 DIAGNOSIS — E78.5 HYPERLIPIDEMIA, UNSPECIFIED HYPERLIPIDEMIA TYPE: ICD-10-CM

## 2017-08-23 DIAGNOSIS — M67.479 GANGLION CYST OF FOOT: ICD-10-CM

## 2017-08-23 DIAGNOSIS — Z01.818 PREOPERATIVE CLEARANCE: Primary | ICD-10-CM

## 2017-08-23 DIAGNOSIS — K90.829 SHORT BOWEL SYNDROME: ICD-10-CM

## 2017-08-23 PROCEDURE — 3078F DIAST BP <80 MM HG: CPT | Mod: S$GLB,,, | Performed by: INTERNAL MEDICINE

## 2017-08-23 PROCEDURE — 99213 OFFICE O/P EST LOW 20 MIN: CPT | Mod: 25,S$GLB,, | Performed by: INTERNAL MEDICINE

## 2017-08-23 PROCEDURE — 99499 UNLISTED E&M SERVICE: CPT | Mod: S$GLB,,, | Performed by: INTERNAL MEDICINE

## 2017-08-23 PROCEDURE — 3008F BODY MASS INDEX DOCD: CPT | Mod: S$GLB,,, | Performed by: INTERNAL MEDICINE

## 2017-08-23 PROCEDURE — 3075F SYST BP GE 130 - 139MM HG: CPT | Mod: S$GLB,,, | Performed by: INTERNAL MEDICINE

## 2017-08-23 PROCEDURE — 81002 URINALYSIS NONAUTO W/O SCOPE: CPT | Mod: S$GLB,,, | Performed by: INTERNAL MEDICINE

## 2017-08-23 PROCEDURE — 1126F AMNT PAIN NOTED NONE PRSNT: CPT | Mod: S$GLB,,, | Performed by: INTERNAL MEDICINE

## 2017-08-23 PROCEDURE — 1159F MED LIST DOCD IN RCRD: CPT | Mod: S$GLB,,, | Performed by: INTERNAL MEDICINE

## 2017-08-23 RX ORDER — METOPROLOL SUCCINATE 50 MG/1
50 TABLET, EXTENDED RELEASE ORAL DAILY
Qty: 90 TABLET | Refills: 1 | Status: SHIPPED | OUTPATIENT
Start: 2017-08-23 | End: 2018-03-13 | Stop reason: SDUPTHER

## 2017-08-23 RX ORDER — CHOLECALCIFEROL (VITAMIN D3) 25 MCG
1000 TABLET ORAL DAILY
COMMUNITY
End: 2022-05-27

## 2017-08-23 NOTE — PROGRESS NOTES
"Subjective:       Patient ID: Lorena Vallejo is a 84 y.o. female.    Chief Complaint: surgical clearance (discuss medications)    HPI     C.C..  Surgical Clearance    Surgery:     Ganglion cyst   Date of Surgery:   Not scheduled.        Patient has  tolerated anesthesia without problems in the past. No hx of cad except paroxysmal atrial tachycardia. No chest pain in last 6 mo. No hx of lung disease and is not a smoker.     Functional status:  Good.       Last week the patient had a urinary tract infection.  Fosfomycin was prescribed.  She had diarrhea with it that is no longer having any dysuria.    Review of Systems    Objective:      Vitals:    08/23/17 1058   BP: 139/73   Pulse: 85   Resp: 16   Temp: 98.5 °F (36.9 °C)   TempSrc: Oral   SpO2: 97%   Weight: 59.6 kg (131 lb 6.3 oz)   Height: 5' 8" (1.727 m)   PainSc: 0-No pain     Physical Exam   Constitutional: She appears well-developed and well-nourished.   Eyes: Pupils are equal, round, and reactive to light.   Cardiovascular: Normal rate, regular rhythm and normal heart sounds.    Pulmonary/Chest: Effort normal and breath sounds normal.   Abdominal: Soft. There is no tenderness.   Neurological: She is alert.   Skin:   5 mm cyst on the dorsum of the right great toe.   Psychiatric: She has a normal mood and affect. Her behavior is normal. Thought content normal.   Nursing note and vitals reviewed.   urinalysis normal  EKG done 3 months ago was normal.      Assessment:       1. Preoperative clearance    2. Ganglion cyst of foot    3. PSVT (paroxysmal supraventricular tachycardia)    4. Urinary tract infection without hematuria, site unspecified          Plan:   Patient is medically cleared.  A letter sent to Dr. Hollis.  Preoperative clearance    Ganglion cyst of foot    PSVT (paroxysmal supraventricular tachycardia)  -     metoprolol succinate (TOPROL-XL) 50 MG 24 hr tablet; Take 1 tablet (50 mg total) by mouth once daily.  Dispense: 90 tablet; Refill: 1    Urinary " tract infection without hematuria, site unspecified  -     POCT urine dipstick without microscope      No Follow-up on file.

## 2017-08-24 LAB
BILIRUB SERPL-MCNC: NORMAL MG/DL
BLOOD URINE, POC: NORMAL
COLOR, POC UA: NORMAL
GLUCOSE UR QL STRIP: NORMAL
KETONES UR QL STRIP: NORMAL
LEUKOCYTE ESTERASE URINE, POC: NORMAL
NITRITE, POC UA: NORMAL
PH, POC UA: 5
PROTEIN, POC: NORMAL
SPECIFIC GRAVITY, POC UA: 1.01
UROBILINOGEN, POC UA: NORMAL

## 2017-08-28 ENCOUNTER — TELEPHONE (OUTPATIENT)
Dept: FAMILY MEDICINE | Facility: CLINIC | Age: 82
End: 2017-08-28

## 2017-08-28 NOTE — TELEPHONE ENCOUNTER
----- Message from Marah Fajardo sent at 8/28/2017  2:40 PM CDT -----  [facility]/ [phone] is calling because they received a prescription for this patient but they do not have her in their system. She states that it was on a [employer] form.

## 2017-08-28 NOTE — TELEPHONE ENCOUNTER
Gabapentin was faxed to them.  Patient is not in their system.  Instructed to disregard.  [facility]

## 2017-09-05 ENCOUNTER — OFFICE VISIT (OUTPATIENT)
Dept: ORTHOPEDICS | Facility: CLINIC | Age: 82
End: 2017-09-05
Payer: MEDICARE

## 2017-09-05 ENCOUNTER — HOSPITAL ENCOUNTER (OUTPATIENT)
Dept: RADIOLOGY | Facility: HOSPITAL | Age: 82
Discharge: HOME | End: 2017-09-05
Attending: ORTHOPAEDIC SURGERY
Payer: MEDICARE

## 2017-09-05 VITALS
WEIGHT: 131.38 LBS | BODY MASS INDEX: 19.91 KG/M2 | DIASTOLIC BLOOD PRESSURE: 65 MMHG | SYSTOLIC BLOOD PRESSURE: 137 MMHG | HEIGHT: 68 IN | HEART RATE: 71 BPM

## 2017-09-05 DIAGNOSIS — M25.562 LEFT KNEE PAIN, UNSPECIFIED CHRONICITY: ICD-10-CM

## 2017-09-05 DIAGNOSIS — M25.562 LEFT KNEE PAIN, UNSPECIFIED CHRONICITY: Primary | ICD-10-CM

## 2017-09-05 DIAGNOSIS — S82.025A CLOSED NONDISPLACED LONGITUDINAL FRACTURE OF LEFT PATELLA, INITIAL ENCOUNTER: Primary | ICD-10-CM

## 2017-09-05 PROCEDURE — 1159F MED LIST DOCD IN RCRD: CPT | Mod: S$GLB,,, | Performed by: ORTHOPAEDIC SURGERY

## 2017-09-05 PROCEDURE — 99203 OFFICE O/P NEW LOW 30 MIN: CPT | Mod: S$GLB,,, | Performed by: ORTHOPAEDIC SURGERY

## 2017-09-05 PROCEDURE — 73562 X-RAY EXAM OF KNEE 3: CPT | Mod: 26,LT,, | Performed by: RADIOLOGY

## 2017-09-05 PROCEDURE — 73560 X-RAY EXAM OF KNEE 1 OR 2: CPT | Mod: TC,PN,RT

## 2017-09-05 PROCEDURE — 99999 PR PBB SHADOW E&M-EST. PATIENT-LVL III: CPT | Mod: PBBFAC,,, | Performed by: ORTHOPAEDIC SURGERY

## 2017-09-05 PROCEDURE — 1125F AMNT PAIN NOTED PAIN PRSNT: CPT | Mod: S$GLB,,, | Performed by: ORTHOPAEDIC SURGERY

## 2017-09-05 PROCEDURE — 3008F BODY MASS INDEX DOCD: CPT | Mod: S$GLB,,, | Performed by: ORTHOPAEDIC SURGERY

## 2017-09-05 PROCEDURE — 73560 X-RAY EXAM OF KNEE 1 OR 2: CPT | Mod: 26,59,RT, | Performed by: RADIOLOGY

## 2017-09-05 PROCEDURE — 3078F DIAST BP <80 MM HG: CPT | Mod: S$GLB,,, | Performed by: ORTHOPAEDIC SURGERY

## 2017-09-05 PROCEDURE — 3075F SYST BP GE 130 - 139MM HG: CPT | Mod: S$GLB,,, | Performed by: ORTHOPAEDIC SURGERY

## 2017-09-07 DIAGNOSIS — M25.562 LEFT KNEE PAIN, UNSPECIFIED CHRONICITY: Primary | ICD-10-CM

## 2017-09-07 NOTE — PROGRESS NOTES
Past Medical History:   Diagnosis Date    Arthritis     Blood transfusion     Bowel obstruction     X 3    Cancer     MELANOMA RIGHT EYE    Cancer of eyeball, except conjunctiva, cornea, retina, and choroid right    Diverticulitis     GERD (gastroesophageal reflux disease)     Hypertension     Peripheral neuropathy     PSVT (paroxysmal supraventricular tachycardia)     HAD X 3 OVER 10 YRS. LAST 7-25-12. HAD ADENOSINE IN [facility] ER. WAS ON INCREASED THYROID MED. NO PROBLEM SINCE    Salmonella     Short bowel syndrome     Thyroid disease     Transient ischemic attack     UTI (urinary tract infection)        Past Surgical History:   Procedure Laterality Date    ABDOMINAL SURGERY      X 3. ILEUM REMOVED. OSTOMY PLACED AND CLOSED.    ADENOIDECTOMY      COLONOSCOPY      COLONOSCOPY N/A 8/24/2016    Procedure: COLONOSCOPY;  Surgeon: Alex Corral MD;  Location: [facility];  Service: Endoscopy;  Laterality: N/A;    CTR      BILAT    EYE SURGERY      RIGHT  - LASER MELANOMA; BILAT CATARACT    HYSTERECTOMY      BSO    JOINT REPLACEMENT      RIGHT KNEE    MENISCECTOMY      MEDIAL AND LATERAL REPAIR, BAKERS CYST    TENDON REPAIR      LEFT THUMB    TONSILLECTOMY      T AND A       Current Outpatient Prescriptions   Medication Sig    acetaminophen (TYLENOL) 500 MG tablet Take 1,000 mg by mouth every 6 (six) hours as needed.    aspirin (ECOTRIN) 81 MG EC tablet Take 81 mg by mouth every evening.    BIFIDOBACTERIUM INFANTIS (ALIGN ORAL) Take by mouth once daily.    cyanocobalamin 1,000 mcg/mL injection INJECT 2ML IMTRAMUSCULAR EVERY MONTH    diltiaZEM (TIAZAC) 240 MG CpSR Take 1 capsule (240 mg total) by mouth once daily.    fish oil-omega-3 fatty acids 300-1,000 mg capsule Take 2 g by mouth 2 (two) times daily.    gabapentin (NEURONTIN) 100 MG capsule Take 1 capsule (100 mg total) by mouth every evening. Pt takes four tabs qhs (Patient taking differently: Take 100 mg by mouth every evening. Pt takes 3  tabs qhs)    loperamide (IMODIUM) 2 mg capsule Take 2 mg by mouth 4 (four) times daily as needed for Diarrhea.    magnesium oxide (MAG-OX) 400 mg tablet Take 400 mg by mouth once daily.     metoprolol succinate (TOPROL-XL) 50 MG 24 hr tablet Take 1 tablet (50 mg total) by mouth once daily.    omeprazole (PRILOSEC) 20 MG capsule Take 1 capsule (20 mg total) by mouth once daily.    paroxetine (PAXIL) 20 MG tablet Take 1 tablet (20 mg total) by mouth once daily.    ranitidine (ZANTAC) 150 MG capsule Take 1 capsule (150 mg total) by mouth 2 (two) times daily.    thyroid, pork, (ARMOUR THYROID) 300 mg Tab Take 150 mg by mouth once daily. 30 mg sent earlier in error.    VIT C/E/ZN/COPPR/LUTEIN/ZEAXAN (PRESERVISION AREDS 2 ORAL) Take by mouth 2 (two) times daily.    vitamin D 1000 units Tab Take 1,000 Units by mouth once daily.     No current facility-administered medications for this visit.        Review of patient's allergies indicates:   Allergen Reactions    Flagyl [metronidazole hcl] Diarrhea     SEVERE DIARRHEA    Latex, natural rubber Rash     ITCHING    Levaquin [levofloxacin] Diarrhea     MOOD CHANGES, WEAKNESS    Morphine Other (See Comments)     HALLUCINATES    Augmentin [amoxicillin-pot clavulanate] Other (See Comments)     AGITATION    Ciprofloxacin Anxiety    Codeine Nausea And Vomiting    Demerol [meperidine] Nausea And Vomiting     CAN TAKE WITH PHENERGAN    Dilaudid [hydromorphone (bulk)] Itching     OK WITH BENADRYL    Fentanyl Itching     OK WITH BENADRYL    Fosfomycin      diarrhia    Naproxen Other (See Comments)     HURT STOMACH    Nsaids (non-steroidal anti-inflammatory drug) Other (See Comments)     TOLD NOT TO TAKE AS HAD AN ULCER. HAD BLEEDING    Percocet [oxycodone-acetaminophen] Rash    Prednisone Other (See Comments)     AGITATION    Reglan [metoclopramide hcl] Anxiety    Sulfa (sulfonamide antibiotics) Rash    Vicodin [hydrocodone-acetaminophen] Rash    Zosyn  "[piperacillin-tazobactam] Rash    Ambien [zolpidem]     Ibuprofen     Macrobid [nitrofurantoin monohyd/m-cryst]     Pcn [penicillins]     Vasotec [enalapril maleate] Other (See Comments)     DRY COUGH       Family History   Problem Relation Age of Onset    Coronary artery disease Other     Hypertension Other     Stroke Other     Diabetes Other     COPD Other        Social History     Social History    Marital status:      Spouse name: N/A    Number of children: N/A    Years of education: N/A     Occupational History    Not on file.     Social History Main Topics    Smoking status: Former Smoker     Packs/day: 1.00     Years: 18.00     Quit date: 2/13/1973    Smokeless tobacco: Not on file    Alcohol use Yes      Comment: WINE 2-3 X PER WEEK    Drug use: No    Sexual activity: Not on file     Other Topics Concern    Not on file     Social History Narrative    No narrative on file       Chief Complaint:   Chief Complaint   Patient presents with    Left Knee - Injury       Consulting Physician: Stephon Cates MD    History of present illness:    This is a 84 y.o. year old female who complains of left knee pain following a fall 9-4-17 onto knee. Pain 10/10 and worse with WB and use.    Review of Systems:    Constitution: Denies chills, fever, and sweats.  HENT: Denies headaches or blurry vision.  Cardiovascular: Denies chest pain or irregular heart beat.  Respiratory: Denies cough or shortness of breath.  Gastrointestinal: Denies abdominal pain, nausea, or vomiting.  Musculoskeletal:  Denies muscle cramps.  Neurological: Denies dizziness or focal weakness.  Psychiatric/Behavioral: Normal mental status.  Hematologic/Lymphatic: Denies bleeding problem or easy bruising/bleeding.  Skin: Denies rash or suspicious lesions.    Examination:    Vital Signs:    Vitals:    09/05/17 1558   BP: 137/65   Pulse: 71   Weight: 59.6 kg (131 lb 6.3 oz)   Height: 5' 8" (1.727 m)   PainSc: 10-Worst pain ever "   PainLoc: Knee       Body mass index is 19.98 kg/m².    This a well-developed, well nourished patient in no acute distress.    Alert and oriented x 3 and cooperative to examination.       Physical Exam: Left Knee Exam    Gait   antalgic    Skin  Rash:   None  Scars:   None    Inspection  Erythema:  None  Bruising:  None  Effusion:  mild  Masses:  None  Lymphadenopathy: None    Range of Motion: Not tested due to fracture    Medial Joint : None  Lateral Joint : None    Patellofemoral Tenderness: yes    Varus Stress:  Stable  Valgus Stress:  Stable    Strength:  4+/5    Pulses:  Palpable distal    Sensation:  Intact          Imaging: X-rays ordered and reviewed today personally of the left knee show a non-displaced vertical fracture.        Assessment: Closed nondisplaced longitudinal fracture of left patella, initial encounter        Plan:  We will place her into an immobilizer brace and allow WB for transfers but minimal otherwise. Back in 2 weeks with new XR.      DISCLAIMER: This note may have been dictated using voice recognition software and may contain grammatical errors.     NOTE: Consult report sent to referring provider via [employer].

## 2017-09-12 ENCOUNTER — OFFICE VISIT (OUTPATIENT)
Dept: ORTHOPEDICS | Facility: CLINIC | Age: 82
End: 2017-09-12
Payer: MEDICARE

## 2017-09-12 ENCOUNTER — HOSPITAL ENCOUNTER (OUTPATIENT)
Dept: RADIOLOGY | Facility: HOSPITAL | Age: 82
Discharge: HOME | End: 2017-09-12
Attending: ORTHOPAEDIC SURGERY
Payer: MEDICARE

## 2017-09-12 VITALS
BODY MASS INDEX: 19.91 KG/M2 | HEIGHT: 68 IN | WEIGHT: 131.38 LBS | SYSTOLIC BLOOD PRESSURE: 126 MMHG | HEART RATE: 73 BPM | DIASTOLIC BLOOD PRESSURE: 56 MMHG

## 2017-09-12 DIAGNOSIS — S82.025D CLOSED NONDISPLACED LONGITUDINAL FRACTURE OF LEFT PATELLA WITH ROUTINE HEALING, SUBSEQUENT ENCOUNTER: Primary | ICD-10-CM

## 2017-09-12 DIAGNOSIS — M25.562 LEFT KNEE PAIN, UNSPECIFIED CHRONICITY: ICD-10-CM

## 2017-09-12 PROCEDURE — 99999 PR PBB SHADOW E&M-EST. PATIENT-LVL III: CPT | Mod: PBBFAC,,, | Performed by: ORTHOPAEDIC SURGERY

## 2017-09-12 PROCEDURE — 1159F MED LIST DOCD IN RCRD: CPT | Mod: S$GLB,,, | Performed by: ORTHOPAEDIC SURGERY

## 2017-09-12 PROCEDURE — 3078F DIAST BP <80 MM HG: CPT | Mod: S$GLB,,, | Performed by: ORTHOPAEDIC SURGERY

## 2017-09-12 PROCEDURE — 99213 OFFICE O/P EST LOW 20 MIN: CPT | Mod: S$GLB,,, | Performed by: ORTHOPAEDIC SURGERY

## 2017-09-12 PROCEDURE — 1125F AMNT PAIN NOTED PAIN PRSNT: CPT | Mod: S$GLB,,, | Performed by: ORTHOPAEDIC SURGERY

## 2017-09-12 PROCEDURE — 3008F BODY MASS INDEX DOCD: CPT | Mod: S$GLB,,, | Performed by: ORTHOPAEDIC SURGERY

## 2017-09-12 PROCEDURE — 73560 X-RAY EXAM OF KNEE 1 OR 2: CPT | Mod: 26,LT,, | Performed by: RADIOLOGY

## 2017-09-12 PROCEDURE — 3074F SYST BP LT 130 MM HG: CPT | Mod: S$GLB,,, | Performed by: ORTHOPAEDIC SURGERY

## 2017-09-12 PROCEDURE — 73560 X-RAY EXAM OF KNEE 1 OR 2: CPT | Mod: TC,PN,LT

## 2017-09-15 ENCOUNTER — DOCUMENTATION ONLY (OUTPATIENT)
Dept: FAMILY MEDICINE | Facility: CLINIC | Age: 82
End: 2017-09-15

## 2017-09-15 NOTE — PROGRESS NOTES
Pre-Visit Chart Review  For Appointment Scheduled on 09/18/2017      Health Maintenance Due   Topic Date Due    Influenza Vaccine  08/01/2017

## 2017-09-16 NOTE — PROGRESS NOTES
Past Medical History:   Diagnosis Date    Arthritis     Blood transfusion     Bowel obstruction     X 3    Cancer     MELANOMA RIGHT EYE    Cancer of eyeball, except conjunctiva, cornea, retina, and choroid right    Diverticulitis     GERD (gastroesophageal reflux disease)     Hypertension     Peripheral neuropathy     PSVT (paroxysmal supraventricular tachycardia)     HAD X 3 OVER 10 YRS. LAST 7-25-12. HAD ADENOSINE IN [facility] ER. WAS ON INCREASED THYROID MED. NO PROBLEM SINCE    Salmonella     Short bowel syndrome     Thyroid disease     Transient ischemic attack     UTI (urinary tract infection)        Past Surgical History:   Procedure Laterality Date    ABDOMINAL SURGERY      X 3. ILEUM REMOVED. OSTOMY PLACED AND CLOSED.    ADENOIDECTOMY      COLONOSCOPY      COLONOSCOPY N/A 8/24/2016    Procedure: COLONOSCOPY;  Surgeon: Alex Corral MD;  Location: [facility];  Service: Endoscopy;  Laterality: N/A;    CTR      BILAT    EYE SURGERY      RIGHT  - LASER MELANOMA; BILAT CATARACT    HYSTERECTOMY      BSO    JOINT REPLACEMENT      RIGHT KNEE    MENISCECTOMY      MEDIAL AND LATERAL REPAIR, BAKERS CYST    TENDON REPAIR      LEFT THUMB    TONSILLECTOMY      T AND A       Current Outpatient Prescriptions   Medication Sig    acetaminophen (TYLENOL) 500 MG tablet Take 1,000 mg by mouth every 6 (six) hours as needed.    aspirin (ECOTRIN) 81 MG EC tablet Take 81 mg by mouth every evening.    BIFIDOBACTERIUM INFANTIS (ALIGN ORAL) Take by mouth once daily.    cyanocobalamin 1,000 mcg/mL injection INJECT 2ML IMTRAMUSCULAR EVERY MONTH    diltiaZEM (TIAZAC) 240 MG CpSR Take 1 capsule (240 mg total) by mouth once daily.    fish oil-omega-3 fatty acids 300-1,000 mg capsule Take 2 g by mouth 2 (two) times daily.    gabapentin (NEURONTIN) 100 MG capsule Take 1 capsule (100 mg total) by mouth every evening. Pt takes four tabs qhs (Patient taking differently: Take 100 mg by mouth every evening. Pt takes 3  tabs qhs)    loperamide (IMODIUM) 2 mg capsule Take 2 mg by mouth 4 (four) times daily as needed for Diarrhea.    magnesium oxide (MAG-OX) 400 mg tablet Take 400 mg by mouth once daily.     metoprolol succinate (TOPROL-XL) 50 MG 24 hr tablet Take 1 tablet (50 mg total) by mouth once daily.    omeprazole (PRILOSEC) 20 MG capsule Take 1 capsule (20 mg total) by mouth once daily.    paroxetine (PAXIL) 20 MG tablet Take 1 tablet (20 mg total) by mouth once daily.    ranitidine (ZANTAC) 150 MG capsule Take 1 capsule (150 mg total) by mouth 2 (two) times daily.    thyroid, pork, (ARMOUR THYROID) 300 mg Tab Take 150 mg by mouth once daily. 30 mg sent earlier in error.    VIT C/E/ZN/COPPR/LUTEIN/ZEAXAN (PRESERVISION AREDS 2 ORAL) Take by mouth 2 (two) times daily.    vitamin D 1000 units Tab Take 1,000 Units by mouth once daily.     No current facility-administered medications for this visit.        Review of patient's allergies indicates:   Allergen Reactions    Flagyl [metronidazole hcl] Diarrhea     SEVERE DIARRHEA    Latex, natural rubber Rash     ITCHING    Levaquin [levofloxacin] Diarrhea     MOOD CHANGES, WEAKNESS    Morphine Other (See Comments)     HALLUCINATES    Augmentin [amoxicillin-pot clavulanate] Other (See Comments)     AGITATION    Ciprofloxacin Anxiety    Codeine Nausea And Vomiting    Demerol [meperidine] Nausea And Vomiting     CAN TAKE WITH PHENERGAN    Dilaudid [hydromorphone (bulk)] Itching     OK WITH BENADRYL    Fentanyl Itching     OK WITH BENADRYL    Fosfomycin      diarrhia    Naproxen Other (See Comments)     HURT STOMACH    Nsaids (non-steroidal anti-inflammatory drug) Other (See Comments)     TOLD NOT TO TAKE AS HAD AN ULCER. HAD BLEEDING    Percocet [oxycodone-acetaminophen] Rash    Prednisone Other (See Comments)     AGITATION    Reglan [metoclopramide hcl] Anxiety    Sulfa (sulfonamide antibiotics) Rash    Vicodin [hydrocodone-acetaminophen] Rash    Zosyn  "[piperacillin-tazobactam] Rash    Ambien [zolpidem]     Ibuprofen     Macrobid [nitrofurantoin monohyd/m-cryst]     Pcn [penicillins]     Vasotec [enalapril maleate] Other (See Comments)     DRY COUGH       Family History   Problem Relation Age of Onset    Coronary artery disease Other     Hypertension Other     Stroke Other     Diabetes Other     COPD Other        Social History     Social History    Marital status:      Spouse name: N/A    Number of children: N/A    Years of education: N/A     Occupational History    Not on file.     Social History Main Topics    Smoking status: Former Smoker     Packs/day: 1.00     Years: 18.00     Quit date: 2/13/1973    Smokeless tobacco: Not on file    Alcohol use Yes      Comment: WINE 2-3 X PER WEEK    Drug use: No    Sexual activity: Not on file     Other Topics Concern    Not on file     Social History Narrative    No narrative on file       Chief Complaint:   Chief Complaint   Patient presents with    Knee Injury     longitudinal fx of Left patella        Consulting Physician: No ref. provider found    History of present illness:    This is a 84 y.o. year old female who complains of left knee pain following a fall 9-4-17 onto knee. Pain 4/10.    Review of Systems:    Constitution: Denies chills, fever, and sweats.  HENT: Denies headaches or blurry vision.  Cardiovascular: Denies chest pain or irregular heart beat.  Respiratory: Denies cough or shortness of breath.  Gastrointestinal: Denies abdominal pain, nausea, or vomiting.  Musculoskeletal:  Denies muscle cramps.  Neurological: Denies dizziness or focal weakness.  Psychiatric/Behavioral: Normal mental status.  Hematologic/Lymphatic: Denies bleeding problem or easy bruising/bleeding.  Skin: Denies rash or suspicious lesions.    Examination:    Vital Signs:    Vitals:    09/12/17 1357   BP: (!) 126/56   Pulse: 73   Weight: 59.6 kg (131 lb 6.3 oz)   Height: 5' 8" (1.727 m)   PainSc:   4 "   PainLoc: Knee       Body mass index is 19.98 kg/m².    This a well-developed, well nourished patient in no acute distress.    Alert and oriented x 3 and cooperative to examination.       Physical Exam: Left Knee Exam    Gait   antalgic    Skin  Rash:   None  Scars:   None    Inspection  Erythema:  None  Bruising:  None  Effusion:  mild  Masses:  None  Lymphadenopathy: None    Range of Motion: Not tested due to fracture    Medial Joint : None  Lateral Joint : None    Patellofemoral Tenderness: yes    Varus Stress:  Stable  Valgus Stress:  Stable    Strength:  4+/5    Pulses:  Palpable distal    Sensation:  Intact          Imaging: X-rays ordered and reviewed today personally of the left knee show a non-displaced vertical fracture.        Assessment: Closed nondisplaced longitudinal fracture of left patella with routine healing, subsequent encounter        Plan:  We will continue her immobilizer brace and allow WB for transfers but minimal otherwise. Back in 2 weeks with new XR.      DISCLAIMER: This note may have been dictated using voice recognition software and may contain grammatical errors.     NOTE: Consult report sent to referring provider via [employer].

## 2017-09-18 ENCOUNTER — OFFICE VISIT (OUTPATIENT)
Dept: FAMILY MEDICINE | Facility: CLINIC | Age: 82
End: 2017-09-18
Payer: MEDICARE

## 2017-09-18 VITALS
SYSTOLIC BLOOD PRESSURE: 129 MMHG | HEART RATE: 77 BPM | DIASTOLIC BLOOD PRESSURE: 76 MMHG | HEIGHT: 68 IN | WEIGHT: 135.13 LBS | TEMPERATURE: 99 F | BODY MASS INDEX: 20.48 KG/M2

## 2017-09-18 DIAGNOSIS — H35.30 MACULAR DEGENERATION (SENILE) OF RETINA, UNSPECIFIED: ICD-10-CM

## 2017-09-18 DIAGNOSIS — Z87.81 HISTORY OF FRACTURE: ICD-10-CM

## 2017-09-18 DIAGNOSIS — E78.5 HYPERLIPIDEMIA, UNSPECIFIED HYPERLIPIDEMIA TYPE: ICD-10-CM

## 2017-09-18 DIAGNOSIS — G62.9 PERIPHERAL POLYNEUROPATHY: ICD-10-CM

## 2017-09-18 DIAGNOSIS — Z23 FLU VACCINE NEED: ICD-10-CM

## 2017-09-18 DIAGNOSIS — Z78.0 ASYMPTOMATIC MENOPAUSAL STATE: ICD-10-CM

## 2017-09-18 DIAGNOSIS — Z00.00 ENCOUNTER FOR PREVENTIVE HEALTH EXAMINATION: Primary | ICD-10-CM

## 2017-09-18 DIAGNOSIS — F32.A DEPRESSION, UNSPECIFIED DEPRESSION TYPE: ICD-10-CM

## 2017-09-18 PROCEDURE — 99397 PER PM REEVAL EST PAT 65+ YR: CPT | Mod: 25,S$GLB,, | Performed by: PHYSICIAN ASSISTANT

## 2017-09-18 PROCEDURE — G0008 ADMIN INFLUENZA VIRUS VAC: HCPCS | Mod: S$GLB,,, | Performed by: PHYSICIAN ASSISTANT

## 2017-09-18 PROCEDURE — 90662 IIV NO PRSV INCREASED AG IM: CPT | Mod: S$GLB,,, | Performed by: PHYSICIAN ASSISTANT

## 2017-09-18 PROCEDURE — 99999 PR PBB SHADOW E&M-EST. PATIENT-LVL III: CPT | Mod: PBBFAC,,, | Performed by: PHYSICIAN ASSISTANT

## 2017-09-18 PROCEDURE — 99499 UNLISTED E&M SERVICE: CPT | Mod: S$GLB,,, | Performed by: PHYSICIAN ASSISTANT

## 2017-09-18 RX ORDER — GABAPENTIN 300 MG/1
300 CAPSULE ORAL NIGHTLY
COMMUNITY
End: 2018-05-09

## 2017-09-18 NOTE — Clinical Note
Primary Care Providers: Stephon Cates MD, MD (General)  Your patient was seen today for a HRA visit. Gap(s) in care (HEDIS gaps) have been identified during this visit that require additional testing and possible follow up.  Orders Placed This Encounter     DXA Bone Density Spine And Hip         Standing Status: Future         Standing Expiration Date: 9/18/2018         Order Specific Question: May the Radiologist modify the order per protocol to meet the clinical needs of the patient?         Answer: Yes     Influenza - High Dose (65+) (PF) (IM)   These orders were placed using [facility] approved protocol and any results will be forwarded to your office for appropriate follow up. I have included a copy of my visit note; please review the note and feel free to contact me with any questions.   Thank you for allowing me to participate in the care of your patients. TEQUILA Magaña

## 2017-09-18 NOTE — PROGRESS NOTES
"Lorena Vallejo presented for a  Medicare AWV and comprehensive Health Risk Assessment today. The following components were reviewed and updated:    · Medical history  · Family History  · Social history  · Allergies and Current Medications  · Health Risk Assessment  · Health Maintenance  · Care Team     ** See Completed Assessments for Annual Wellness Visit within the encounter summary.**       The following assessments were completed:  · Living Situation  · CAGE  · Depression Screening  · Timed Get Up and Go  · Whisper Test  · Cognitive Function Screening  · Nutrition Screening  · ADL Screening  · PAQ Screening    Vitals:    09/18/17 1400   BP: 129/76   BP Location: Left arm   Patient Position: Sitting   BP Method: Small (Automatic)   Pulse: 77   Temp: 98.6 °F (37 °C)   TempSrc: Oral   Weight: 61.3 kg (135 lb 2.3 oz)   Height: 5' 8" (1.727 m)     Body mass index is 20.55 kg/m².  Physical Exam   Constitutional: She is oriented to person, place, and time. She appears well-developed and well-nourished.   HENT:   Head: Normocephalic and atraumatic.   Eyes: Conjunctivae are normal. Pupils are equal, round, and reactive to light.   Neck: Normal range of motion. Neck supple. No JVD present.   Cardiovascular: Normal rate and regular rhythm.  Exam reveals no gallop and no friction rub.    No murmur heard.  Pulmonary/Chest: Effort normal and breath sounds normal. No respiratory distress. She has no wheezes. She has no rales.   Neurological: She is alert and oriented to person, place, and time.   Skin: Skin is warm and dry.   Psychiatric: She has a normal mood and affect. Her behavior is normal. Judgment and thought content normal.               Diagnoses and health risks identified today and associated recommendations/orders:    Lorena was seen today for health risk assessment.    Diagnoses and all orders for this visit:    Encounter for preventive health examination    History of fracture  Comments:  followed by " Orthopedics    Asymptomatic menopausal state  Comments:  stable, continue to monitor  Orders:  -     DXA Bone Density Spine And Hip; Future    Hyperlipidemia, unspecified hyperlipidemia type  Comments:  controlled, continue regimen    Peripheral polyneuropathy  Comments:  chronic, continue regimen    Macular degeneration   Comments:  chronic, continue regimen    Depression, unspecified depression type  Comments:  controlled, continue regimen    Flu vaccine need  Comments:  Flu vaccine given today  Orders:  -     Influenza - High Dose (65+) (PF) (IM)        Provided Lorena with a 5-10 year written screening schedule and personal prevention plan. Recommendations were developed using the USPSTF age appropriate recommendations. Education, counseling, and referrals were provided as needed. After Visit Summary printed and given to patient which includes a list of additional screenings\tests needed.    No Follow-up on file.    TEQUILA Magaña

## 2017-09-18 NOTE — PATIENT INSTRUCTIONS
Counseling and Referral of Other Preventative  (Italic type indicates deductible and co-insurance are waived)    Patient Name: Lorena Vallejo  Today's Date: 9/18/2017      SERVICE LIMITATIONS RECOMMENDATION    Vaccines    · Pneumococcal (once after 65)    · Influenza (annually)    · Hepatitis B (if medium/high risk)    · Prevnar 13      Hepatitis B medium/high risk factors:       - End-stage renal disease       - Hemophiliacs who received Factor VII or         IX concentrates       - Clients of institutions for the mentally             retarded       - Persons who live in the same house as          a HepB carrier       - Homosexual men       - Illicit injectable drug abusers     Pneumococcal: Scheduled - see appointments     Influenza: Scheduled - see appointments     Hepatitis B: N/A     Prevnar 13: N/A    Mammogram (biennial age 50-74)  Annually (age 40 or over)  N/A    Pap (up to age 70 and after 70 if unknown history or abnormal study last 10 years)    N/A     The USPSTF recommends against screening for cervical cancer in women older than age 65 years who have had adequate prior screening and are not otherwise at high risk for cervical cancer.      Colorectal cancer screening (to age 75)    · Fecal occult blood test (annual)  · Flexible sigmoidoscopy (5y)  · Screening colonoscopy (10y)  · Barium enema   N/A    Diabetes self-management training (no USPSTF recommendations)  Requires referral by treating physician for patient with diabetes or renal disease. 10 hours of initial DSMT sessions of no less than 30 minutes each in a continuous 12-month period. 2 hours of follow-up DSMT in subsequent years.  N/A    Bone mass measurements (age 65 & older, biennial)  Requires diagnosis related to osteoporosis or estrogen deficiency. Biennial benefit unless patient has history of long-term glucocorticoid  Scheduled, see appointments    Glaucoma screening (no USPSTF recommendation)  Diabetes mellitus, family history     American, age 50 or over    American, age 65 or over  Recommend follow up with eye care professional regularly    Medical nutrition therapy for diabetes or renal disease (no recommended schedule)  Requires referral by treating physician for patient with diabetes or renal disease or kidney transplant within the past 3 years.  Can be provided in same year as diabetes self-management training (DSMT), and CMS recommends medical nutrition therapy take place after DSMT. Up to 3 hours for initial year and 2 hours in subsequent years.  N/A    Cardiovascular screening blood tests (every 5 years)  · Fasting lipid panel  Order as a panel if possible  Last done 5/16/17, recommend to repeat every 1  years    Diabetes screening tests (at least every 3 years, Medicare covers annually or at 6-month intervals for prediabetic patients)  · Fasting blood sugar (FBS) or glucose tolerance test (GTT)  Patient must be diagnosed with one of the following:       - Hypertension       - Dyslipidemia       - Obesity (BMI 30kg/m2)       - Previous elevated impaired FBS or GTT       ... or any two of the following:       - Overweight (BMI 25 but <30)       - Family history of diabetes       - Age 65 or older       - History of gestational diabetes or birth of baby weighing more than 9 pounds  N/A    HIV screening (annually for increased risk patients)  · HIV-1 and HIV-2 by EIA, or NICOLAS, rapid antibody test or oral mucosa transudate  Patients must be at increased risk for HIV infection per USPSTF guidelines or pregnant. Tests covered annually for patient at increased risk or as requested by the patient. Pregnant patients may receive up to 3 tests during pregnancy.  Risks discussed, screening is not recommended    Smoking cessation counseling (up to 8 sessions per year)  Patients must be asymptomatic of tobacco-related conditions to receive as a preventative service.  Non-smoker    Subsequent annual wellness visit  At least 12 months since  last AWV  Return in one year     The following information is provided to all patients.  This information is to help you find resources for any of the problems found today that may be affecting your health:                Living healthy guide: www.[facility].[place].[facility]      Understanding Diabetes: www.diabetes.org      Eating healthy: www.cdc.gov/healthyweight      CDC home safety checklist: www.cdc.gov/steadi/patient.html      Agency on Aging: www.goea.[place].[facility]      Alcoholics anonymous (AA): www.aa.org      Physical Activity: www.donya.nih.gov/ce7jfda      Tobacco use: www.quitwithusla.org

## 2017-09-25 DIAGNOSIS — M25.562 LEFT KNEE PAIN, UNSPECIFIED CHRONICITY: Primary | ICD-10-CM

## 2017-09-26 ENCOUNTER — HOSPITAL ENCOUNTER (OUTPATIENT)
Dept: RADIOLOGY | Facility: HOSPITAL | Age: 82
Discharge: HOME | End: 2017-09-26
Attending: ORTHOPAEDIC SURGERY
Payer: MEDICARE

## 2017-09-26 ENCOUNTER — OFFICE VISIT (OUTPATIENT)
Dept: ORTHOPEDICS | Facility: CLINIC | Age: 82
End: 2017-09-26
Payer: MEDICARE

## 2017-09-26 VITALS
HEART RATE: 71 BPM | BODY MASS INDEX: 20.48 KG/M2 | WEIGHT: 135.13 LBS | SYSTOLIC BLOOD PRESSURE: 124 MMHG | HEIGHT: 68 IN | DIASTOLIC BLOOD PRESSURE: 57 MMHG

## 2017-09-26 DIAGNOSIS — S82.025D CLOSED NONDISPLACED LONGITUDINAL FRACTURE OF LEFT PATELLA WITH ROUTINE HEALING, SUBSEQUENT ENCOUNTER: Primary | ICD-10-CM

## 2017-09-26 DIAGNOSIS — M25.562 LEFT KNEE PAIN, UNSPECIFIED CHRONICITY: ICD-10-CM

## 2017-09-26 PROCEDURE — 3074F SYST BP LT 130 MM HG: CPT | Mod: S$GLB,,, | Performed by: ORTHOPAEDIC SURGERY

## 2017-09-26 PROCEDURE — 3078F DIAST BP <80 MM HG: CPT | Mod: S$GLB,,, | Performed by: ORTHOPAEDIC SURGERY

## 2017-09-26 PROCEDURE — 99213 OFFICE O/P EST LOW 20 MIN: CPT | Mod: S$GLB,,, | Performed by: ORTHOPAEDIC SURGERY

## 2017-09-26 PROCEDURE — 3008F BODY MASS INDEX DOCD: CPT | Mod: S$GLB,,, | Performed by: ORTHOPAEDIC SURGERY

## 2017-09-26 PROCEDURE — 1126F AMNT PAIN NOTED NONE PRSNT: CPT | Mod: S$GLB,,, | Performed by: ORTHOPAEDIC SURGERY

## 2017-09-26 PROCEDURE — 73560 X-RAY EXAM OF KNEE 1 OR 2: CPT | Mod: 26,LT,, | Performed by: RADIOLOGY

## 2017-09-26 PROCEDURE — 73560 X-RAY EXAM OF KNEE 1 OR 2: CPT | Mod: TC,PN,LT

## 2017-09-26 PROCEDURE — 99999 PR PBB SHADOW E&M-EST. PATIENT-LVL III: CPT | Mod: PBBFAC,,, | Performed by: ORTHOPAEDIC SURGERY

## 2017-09-26 PROCEDURE — 1159F MED LIST DOCD IN RCRD: CPT | Mod: S$GLB,,, | Performed by: ORTHOPAEDIC SURGERY

## 2017-09-27 ENCOUNTER — LAB VISIT (OUTPATIENT)
Dept: LAB | Facility: HOSPITAL | Age: 82
End: 2017-09-27
Attending: INTERNAL MEDICINE
Payer: MEDICARE

## 2017-09-27 DIAGNOSIS — K90.829 SHORT BOWEL SYNDROME: ICD-10-CM

## 2017-09-27 DIAGNOSIS — E78.5 HYPERLIPIDEMIA, UNSPECIFIED HYPERLIPIDEMIA TYPE: ICD-10-CM

## 2017-09-27 LAB
ALBUMIN SERPL BCP-MCNC: 3.6 G/DL
ALP SERPL-CCNC: 91 U/L
ALT SERPL W/O P-5'-P-CCNC: 12 U/L
ANION GAP SERPL CALC-SCNC: 10 MMOL/L
AST SERPL-CCNC: 17 U/L
BASOPHILS # BLD AUTO: 0.05 K/UL
BASOPHILS NFR BLD: 0.9 %
BILIRUB SERPL-MCNC: 0.5 MG/DL
BUN SERPL-MCNC: 25 MG/DL
CALCIUM SERPL-MCNC: 9.3 MG/DL
CHLORIDE SERPL-SCNC: 108 MMOL/L
CHOLEST SERPL-MCNC: 140 MG/DL
CHOLEST/HDLC SERPL: 2.2 {RATIO}
CO2 SERPL-SCNC: 23 MMOL/L
CREAT SERPL-MCNC: 0.9 MG/DL
DIFFERENTIAL METHOD: ABNORMAL
EOSINOPHIL # BLD AUTO: 0.3 K/UL
EOSINOPHIL NFR BLD: 4.4 %
ERYTHROCYTE [DISTWIDTH] IN BLOOD BY AUTOMATED COUNT: 13.5 %
EST. GFR  (AFRICAN AMERICAN): >60 ML/MIN/1.73 M^2
EST. GFR  (NON AFRICAN AMERICAN): 58.9 ML/MIN/1.73 M^2
GLUCOSE SERPL-MCNC: 83 MG/DL
HCT VFR BLD AUTO: 37.8 %
HDLC SERPL-MCNC: 64 MG/DL
HDLC SERPL: 45.7 %
HGB BLD-MCNC: 12.2 G/DL
LDLC SERPL CALC-MCNC: 56.2 MG/DL
LYMPHOCYTES # BLD AUTO: 2.7 K/UL
LYMPHOCYTES NFR BLD: 47.5 %
MCH RBC QN AUTO: 31.9 PG
MCHC RBC AUTO-ENTMCNC: 32.3 G/DL
MCV RBC AUTO: 99 FL
MONOCYTES # BLD AUTO: 0.7 K/UL
MONOCYTES NFR BLD: 13 %
NEUTROPHILS # BLD AUTO: 1.9 K/UL
NEUTROPHILS NFR BLD: 34.2 %
NONHDLC SERPL-MCNC: 76 MG/DL
PLATELET # BLD AUTO: 276 K/UL
PMV BLD AUTO: 9.6 FL
POTASSIUM SERPL-SCNC: 4.3 MMOL/L
PROT SERPL-MCNC: 7.1 G/DL
RBC # BLD AUTO: 3.83 M/UL
SODIUM SERPL-SCNC: 141 MMOL/L
TRIGL SERPL-MCNC: 99 MG/DL
WBC # BLD AUTO: 5.62 K/UL

## 2017-09-27 PROCEDURE — 80053 COMPREHEN METABOLIC PANEL: CPT

## 2017-09-27 PROCEDURE — 36415 COLL VENOUS BLD VENIPUNCTURE: CPT | Mod: PO

## 2017-09-27 PROCEDURE — 80061 LIPID PANEL: CPT

## 2017-09-27 PROCEDURE — 85025 COMPLETE CBC W/AUTO DIFF WBC: CPT

## 2017-10-02 NOTE — PROGRESS NOTES
Past Medical History:   Diagnosis Date    Arthritis     Blood transfusion     Bowel obstruction     X 3    Cancer     MELANOMA RIGHT EYE    Cancer of eyeball, except conjunctiva, cornea, retina, and choroid right    Diverticulitis     GERD (gastroesophageal reflux disease)     Hypertension     Peripheral neuropathy     PSVT (paroxysmal supraventricular tachycardia)     HAD X 3 OVER 10 YRS. LAST 7-25-12. HAD ADENOSINE IN [facility]. WAS ON INCREASED THYROID MED. NO PROBLEM SINCE    Salmonella     Short bowel syndrome     Thyroid disease     Transient ischemic attack     UTI (urinary tract infection)        Past Surgical History:   Procedure Laterality Date    ABDOMINAL SURGERY      X 3. ILEUM REMOVED. OSTOMY PLACED AND CLOSED.    ADENOIDECTOMY      COLONOSCOPY      COLONOSCOPY N/A 8/24/2016    Procedure: COLONOSCOPY;  Surgeon: Alex Corral MD;  [facility];  Service: Endoscopy;  Laterality: N/A;    CTR      BILAT    EYE SURGERY      RIGHT  - LASER MELANOMA; BILAT CATARACT    HYSTERECTOMY      BSO    JOINT REPLACEMENT      RIGHT KNEE    MANDIBLE FRACTURE SURGERY      MENISCECTOMY      MEDIAL AND LATERAL REPAIR, BAKERS CYST    TENDON REPAIR      LEFT THUMB    TONSILLECTOMY      T AND A       Current Outpatient Prescriptions   Medication Sig    acetaminophen (TYLENOL) 500 MG tablet Take 1,000 mg by mouth every 6 (six) hours as needed.    aspirin (ECOTRIN) 81 MG EC tablet Take 81 mg by mouth every evening.    BIFIDOBACTERIUM INFANTIS (ALIGN ORAL) Take by mouth once daily.    cyanocobalamin 1,000 mcg/mL injection INJECT 2ML IMTRAMUSCULAR EVERY MONTH    diltiaZEM (TIAZAC) 240 MG CpSR Take 1 capsule (240 mg total) by mouth once daily.    fish oil-omega-3 fatty acids 300-1,000 mg capsule Take 2 g by mouth 2 (two) times daily.    gabapentin (NEURONTIN) 300 MG capsule Take 300 mg by mouth every evening.    loperamide (IMODIUM) 2 mg capsule Take 2 mg by mouth 4 (four) times daily as  needed for Diarrhea.    magnesium oxide (MAG-OX) 400 mg tablet Take 400 mg by mouth once daily.     metoprolol succinate (TOPROL-XL) 50 MG 24 hr tablet Take 1 tablet (50 mg total) by mouth once daily.    omeprazole (PRILOSEC) 20 MG capsule Take 1 capsule (20 mg total) by mouth once daily.    paroxetine (PAXIL) 20 MG tablet Take 1 tablet (20 mg total) by mouth once daily.    ranitidine (ZANTAC) 150 MG capsule Take 1 capsule (150 mg total) by mouth 2 (two) times daily.    THYROID,PORK (ARMOUR THYROID ORAL) Take 150 mg by mouth every morning.    VIT C/E/ZN/COPPR/LUTEIN/ZEAXAN (PRESERVISION AREDS 2 ORAL) Take by mouth 2 (two) times daily.    vitamin D 1000 units Tab Take 1,000 Units by mouth once daily.     No current facility-administered medications for this visit.        Review of patient's allergies indicates:   Allergen Reactions    Flagyl [metronidazole hcl] Diarrhea     SEVERE DIARRHEA    Latex, natural rubber Rash     ITCHING    Levaquin [levofloxacin] Diarrhea     MOOD CHANGES, WEAKNESS    Morphine Other (See Comments)     HALLUCINATES    Augmentin [amoxicillin-pot clavulanate] Other (See Comments)     AGITATION    Ciprofloxacin Anxiety    Codeine Nausea And Vomiting    Demerol [meperidine] Nausea And Vomiting     CAN TAKE WITH PHENERGAN    Dilaudid [hydromorphone (bulk)] Itching     OK WITH BENADRYL    Fentanyl Itching     OK WITH BENADRYL    Fosfomycin      diarrhia    Naproxen Other (See Comments)     HURT STOMACH    Nsaids (non-steroidal anti-inflammatory drug) Other (See Comments)     TOLD NOT TO TAKE AS HAD AN ULCER. HAD BLEEDING    Percocet [oxycodone-acetaminophen] Rash    Prednisone Other (See Comments)     AGITATION    Reglan [metoclopramide hcl] Anxiety    Sulfa (sulfonamide antibiotics) Rash    Vicodin [hydrocodone-acetaminophen] Rash    Zosyn [piperacillin-tazobactam] Rash    Ambien [zolpidem]     Ibuprofen     Macrobid [nitrofurantoin monohyd/m-cryst]     Pcn  "[penicillins]     Vasotec [enalapril maleate] Other (See Comments)     DRY COUGH       Family History   Problem Relation Age of Onset    Coronary artery disease Other     Hypertension Other     Stroke Other     Diabetes Other     COPD Other        Social History     Social History    Marital status:      Spouse name: N/A    Number of children: N/A    Years of education: N/A     Occupational History    Not on file.     Social History Main Topics    Smoking status: Former Smoker     Packs/day: 1.00     Years: 18.00     Quit date: 2/13/1973    Smokeless tobacco: Never Used    Alcohol use Yes      Comment: WINE once per day    Drug use: No    Sexual activity: Not on file     Other Topics Concern    Not on file     Social History Narrative    No narrative on file       Chief Complaint:   Chief Complaint   Patient presents with    Knee Injury     longitudinal fx of LEFT patella 9/4/17       Consulting Physician: No ref. provider found    History of present illness:    This is a 84 y.o. year old female who complains of left knee pain following a fall 9-4-17 onto knee. Pain 0/10.    Review of Systems:    Constitution: Denies chills, fever, and sweats.  HENT: Denies headaches or blurry vision.  Cardiovascular: Denies chest pain or irregular heart beat.  Respiratory: Denies cough or shortness of breath.  Gastrointestinal: Denies abdominal pain, nausea, or vomiting.  Musculoskeletal:  Denies muscle cramps.  Neurological: Denies dizziness or focal weakness.  Psychiatric/Behavioral: Normal mental status.  Hematologic/Lymphatic: Denies bleeding problem or easy bruising/bleeding.  Skin: Denies rash or suspicious lesions.    Examination:    Vital Signs:    Vitals:    09/26/17 1359   BP: (!) 124/57   Pulse: 71   Weight: 61.3 kg (135 lb 2.3 oz)   Height: 5' 8" (1.727 m)   PainSc: 0-No pain   PainLoc: Knee       Body mass index is 20.55 kg/m².    This a well-developed, well nourished patient in no acute " distress.    Alert and oriented x 3 and cooperative to examination.       Physical Exam: Left Knee Exam    Gait   antalgic    Skin  Rash:   None  Scars:   None    Inspection  Erythema:  None  Bruising:  None  Effusion:  mild  Masses:  None  Lymphadenopathy: None    Range of Motion: Not tested due to fracture    Medial Joint : None  Lateral Joint : None    Patellofemoral Tenderness: yes    Varus Stress:  Stable  Valgus Stress:  Stable    Strength:  4+/5    Pulses:  Palpable distal    Sensation:  Intact          Imaging: X-rays ordered and reviewed today personally of the left knee show a non-displaced vertical fracture.        Assessment: Closed nondisplaced longitudinal fracture of left patella with routine healing, subsequent encounter        Plan:  We will continue her immobilizer brace and allow WB for transfers but minimal otherwise. Back in 3 weeks with new XR.      DISCLAIMER: This note may have been dictated using voice recognition software and may contain grammatical errors.     NOTE: Consult report sent to referring provider via [employer] EMR.

## 2017-10-04 ENCOUNTER — OFFICE VISIT (OUTPATIENT)
Dept: FAMILY MEDICINE | Facility: CLINIC | Age: 82
End: 2017-10-04
Payer: MEDICARE

## 2017-10-04 ENCOUNTER — DOCUMENTATION ONLY (OUTPATIENT)
Dept: FAMILY MEDICINE | Facility: CLINIC | Age: 82
End: 2017-10-04

## 2017-10-04 VITALS
OXYGEN SATURATION: 97 % | TEMPERATURE: 98 F | HEIGHT: 68 IN | RESPIRATION RATE: 16 BRPM | WEIGHT: 132.06 LBS | SYSTOLIC BLOOD PRESSURE: 132 MMHG | BODY MASS INDEX: 20.01 KG/M2 | DIASTOLIC BLOOD PRESSURE: 68 MMHG | HEART RATE: 58 BPM

## 2017-10-04 DIAGNOSIS — E78.5 HYPERLIPIDEMIA, UNSPECIFIED HYPERLIPIDEMIA TYPE: Primary | ICD-10-CM

## 2017-10-04 DIAGNOSIS — Z86.79 HISTORY OF PSVT (PAROXYSMAL SUPRAVENTRICULAR TACHYCARDIA): ICD-10-CM

## 2017-10-04 DIAGNOSIS — D51.8 OTHER VITAMIN B12 DEFICIENCY ANEMIA: ICD-10-CM

## 2017-10-04 DIAGNOSIS — K29.50 CHRONIC GASTRITIS, PRESENCE OF BLEEDING UNSPECIFIED, UNSPECIFIED GASTRITIS TYPE: ICD-10-CM

## 2017-10-04 PROCEDURE — 99213 OFFICE O/P EST LOW 20 MIN: CPT | Mod: S$GLB,,, | Performed by: INTERNAL MEDICINE

## 2017-10-04 PROCEDURE — 99499 UNLISTED E&M SERVICE: CPT | Mod: S$GLB,,, | Performed by: INTERNAL MEDICINE

## 2017-10-04 NOTE — PROGRESS NOTES
"Subjective:       Patient ID: Lorena Vallejo is a 84 y.o. female.    Chief Complaint: Hyperlipidemia (labs)    HPI         CHIEF COMPLAINT: Hyperlipidemia. cholesterol screening: no.   HPI:     ONSET:    MODIFIERS/TREATMENTS: . Taking medications: yes. . Non-compliance with following diet: no. .     SYMPTOMS/RELATED:Possible medication side effects include:   Myalgia: no.  .     REVIEW OF SYMPTOMS: past weights:   Wt Readings from Last 1 Encounters:   10/04/17 1149 59.9 kg (132 lb 0.9 oz)                                                     Last lipids: total   Lab Results   Component Value Date    CHOL 140 09/27/2017    CHOL 99 (L) 05/16/2017    CHOL 145 09/22/2016                                                                     HDL   Lab Results   Component Value Date    HDL 64 09/27/2017    HDL 55 05/16/2017    HDL 62 09/22/2016                                                                     LDL   Lab Results   Component Value Date    LDLCALC 56.2 (L) 09/27/2017    LDLCALC 18.8 (L) 05/16/2017    LDLCALC 57.2 (L) 09/22/2016                                                                     TRIG   Lab Results   Component Value Date    TRIG 99 09/27/2017    TRIG 126 05/16/2017    TRIG 129 09/22/2016                                                                         Review of Systems    Objective:      Vitals:    10/04/17 1149   BP: 132/68   Pulse: (!) 58   Resp: 16   Temp: 98.2 °F (36.8 °C)   TempSrc: Oral   SpO2: 97%   Weight: 59.9 kg (132 lb 0.9 oz)   Height: 5' 8" (1.727 m)   PainSc: 0-No pain     Physical Exam   Constitutional: She appears well-developed and well-nourished.   Eyes: Pupils are equal, round, and reactive to light.   Cardiovascular: Normal rate, regular rhythm and normal heart sounds.    Pulmonary/Chest: Effort normal and breath sounds normal.   Abdominal: Soft. There is no tenderness.   Neurological: She is alert.   Psychiatric: She has a normal mood and affect. Her behavior is normal. " Thought content normal.   Nursing note and vitals reviewed.        Assessment:       1. Hyperlipidemia, unspecified hyperlipidemia type    2. History of PSVT (paroxysmal supraventricular tachycardia)    3. Other vitamin B12 deficiency anemia    4. Chronic gastritis, presence of bleeding unspecified, unspecified gastritis type          Plan:     Hyperlipidemia, unspecified hyperlipidemia type  -     Lipid panel; Future; Expected date: 10/04/2017  -     Comprehensive metabolic panel; Future; Expected date: 10/04/2017    History of PSVT (paroxysmal supraventricular tachycardia)  -     Ambulatory Referral to Cardiology    Other vitamin B12 deficiency anemia  -     CBC auto differential; Future; Expected date: 10/04/2017    Chronic gastritis, presence of bleeding unspecified, unspecified gastritis type  -     ranitidine (ZANTAC) 150 MG capsule; Take 1 capsule (150 mg total) by mouth 2 (two) times daily.  Dispense: 60 capsule; Refill: 11      Return in about 3 months (around 1/4/2018).

## 2017-10-12 DIAGNOSIS — S82.025D CLOSED NONDISPLACED LONGITUDINAL FRACTURE OF LEFT PATELLA WITH ROUTINE HEALING, SUBSEQUENT ENCOUNTER: Primary | ICD-10-CM

## 2017-10-18 ENCOUNTER — OFFICE VISIT (OUTPATIENT)
Dept: ORTHOPEDICS | Facility: CLINIC | Age: 82
End: 2017-10-18
Payer: MEDICARE

## 2017-10-18 ENCOUNTER — HOSPITAL ENCOUNTER (OUTPATIENT)
Dept: RADIOLOGY | Facility: HOSPITAL | Age: 82
Discharge: HOME | End: 2017-10-18
Attending: ORTHOPAEDIC SURGERY
Payer: MEDICARE

## 2017-10-18 VITALS
HEART RATE: 65 BPM | DIASTOLIC BLOOD PRESSURE: 56 MMHG | BODY MASS INDEX: 20.01 KG/M2 | WEIGHT: 132.06 LBS | HEIGHT: 68 IN | SYSTOLIC BLOOD PRESSURE: 118 MMHG

## 2017-10-18 DIAGNOSIS — S82.025D CLOSED NONDISPLACED LONGITUDINAL FRACTURE OF LEFT PATELLA WITH ROUTINE HEALING, SUBSEQUENT ENCOUNTER: Primary | ICD-10-CM

## 2017-10-18 DIAGNOSIS — S82.025D CLOSED NONDISPLACED LONGITUDINAL FRACTURE OF LEFT PATELLA WITH ROUTINE HEALING, SUBSEQUENT ENCOUNTER: ICD-10-CM

## 2017-10-18 DIAGNOSIS — M17.12 ARTHRITIS OF KNEE, LEFT: ICD-10-CM

## 2017-10-18 PROCEDURE — 99999 PR PBB SHADOW E&M-EST. PATIENT-LVL III: CPT | Mod: PBBFAC,,, | Performed by: ORTHOPAEDIC SURGERY

## 2017-10-18 PROCEDURE — 20610 DRAIN/INJ JOINT/BURSA W/O US: CPT | Mod: LT,S$GLB,, | Performed by: ORTHOPAEDIC SURGERY

## 2017-10-18 PROCEDURE — 99213 OFFICE O/P EST LOW 20 MIN: CPT | Mod: 25,S$GLB,, | Performed by: ORTHOPAEDIC SURGERY

## 2017-10-18 PROCEDURE — 73560 X-RAY EXAM OF KNEE 1 OR 2: CPT | Mod: 26,LT,, | Performed by: RADIOLOGY

## 2017-10-18 PROCEDURE — 73560 X-RAY EXAM OF KNEE 1 OR 2: CPT | Mod: TC,PN,LT

## 2017-10-18 RX ADMIN — Medication 20 MG: at 04:10

## 2017-10-23 RX ORDER — HYALURONATE SODIUM 20 MG/2 ML
20 SYRINGE (ML) INTRAARTICULAR
Status: DISCONTINUED | OUTPATIENT
Start: 2017-10-18 | End: 2017-10-23 | Stop reason: HOSPADM

## 2017-10-23 NOTE — PROCEDURES
Large Joint Aspiration/Injection  Date/Time: 10/18/2017 4:53 PM  Performed by: CHASE SERRA  Authorized by: CHASE SERRA     Consent Done?:  Yes (Verbal)  Indications:  Pain  Timeout: Prior to procedure the correct patient, procedure, and site was verified      Location:  Knee  Site:  L knee  Prep: Patient was prepped and draped in usual sterile fashion    Approach:  Anteromedial  Medications:  20 mg EUFLEXXA 10 mg/mL(mw 2.4 -3.6 million)

## 2017-10-23 NOTE — PROGRESS NOTES
Past Medical History:   Diagnosis Date    Arthritis     Blood transfusion     Bowel obstruction     X 3    Cancer     MELANOMA RIGHT EYE    Cancer of eyeball, except conjunctiva, cornea, retina, and choroid right    Diverticulitis     GERD (gastroesophageal reflux disease)     Hypertension     Peripheral neuropathy     PSVT (paroxysmal supraventricular tachycardia)     HAD X 3 OVER 10 YRS. LAST 7-25-12. HAD ADENOSINE IN [facility] ER. WAS ON INCREASED THYROID MED. NO PROBLEM SINCE    Salmonella     Short bowel syndrome     Thyroid disease     Transient ischemic attack     UTI (urinary tract infection)        Past Surgical History:   Procedure Laterality Date    ABDOMINAL SURGERY      X 3. ILEUM REMOVED. OSTOMY PLACED AND CLOSED.    ADENOIDECTOMY      COLONOSCOPY      COLONOSCOPY N/A 8/24/2016    Procedure: COLONOSCOPY;  Surgeon: Alex Corral MD;  Location: [facility];  Service: Endoscopy;  Laterality: N/A;    CTR      BILAT    EYE SURGERY      RIGHT  - LASER MELANOMA; BILAT CATARACT    HYSTERECTOMY      BSO    JOINT REPLACEMENT      RIGHT KNEE    MANDIBLE FRACTURE SURGERY      MENISCECTOMY      MEDIAL AND LATERAL REPAIR, BAKERS CYST    TENDON REPAIR      LEFT THUMB    TONSILLECTOMY      T AND A       Current Outpatient Prescriptions   Medication Sig    acetaminophen (TYLENOL) 500 MG tablet Take 1,000 mg by mouth every 6 (six) hours as needed.    aspirin (ECOTRIN) 81 MG EC tablet Take 81 mg by mouth every evening.    BIFIDOBACTERIUM INFANTIS (ALIGN ORAL) Take by mouth once daily.    cyanocobalamin 1,000 mcg/mL injection INJECT 2ML IMTRAMUSCULAR EVERY MONTH    diltiaZEM (TIAZAC) 240 MG CpSR Take 1 capsule (240 mg total) by mouth once daily.    fish oil-omega-3 fatty acids 300-1,000 mg capsule Take 2 g by mouth 2 (two) times daily.    gabapentin (NEURONTIN) 300 MG capsule Take 300 mg by mouth every evening.    loperamide (IMODIUM) 2 mg capsule Take 2 mg by mouth 4 (four) times daily as  needed for Diarrhea.    magnesium oxide (MAG-OX) 400 mg tablet Take 400 mg by mouth once daily.     metoprolol succinate (TOPROL-XL) 50 MG 24 hr tablet Take 1 tablet (50 mg total) by mouth once daily.    omeprazole (PRILOSEC) 20 MG capsule Take 1 capsule (20 mg total) by mouth once daily.    paroxetine (PAXIL) 20 MG tablet Take 1 tablet (20 mg total) by mouth once daily.    ranitidine (ZANTAC) 150 MG capsule Take 1 capsule (150 mg total) by mouth 2 (two) times daily.    THYROID,PORK (ARMOUR THYROID ORAL) Take 150 mg by mouth every morning.    VIT C/E/ZN/COPPR/LUTEIN/ZEAXAN (PRESERVISION AREDS 2 ORAL) Take by mouth 2 (two) times daily.    vitamin D 1000 units Tab Take 1,000 Units by mouth once daily.     No current facility-administered medications for this visit.        Review of patient's allergies indicates:   Allergen Reactions    Flagyl [metronidazole hcl] Diarrhea     SEVERE DIARRHEA    Latex, natural rubber Rash     ITCHING    Levaquin [levofloxacin] Diarrhea     MOOD CHANGES, WEAKNESS    Morphine Other (See Comments)     HALLUCINATES    Augmentin [amoxicillin-pot clavulanate] Other (See Comments)     AGITATION    Ciprofloxacin Anxiety    Codeine Nausea And Vomiting    Demerol [meperidine] Nausea And Vomiting     CAN TAKE WITH PHENERGAN    Dilaudid [hydromorphone (bulk)] Itching     OK WITH BENADRYL    Fentanyl Itching     OK WITH BENADRYL    Fosfomycin      diarrhia    Naproxen Other (See Comments)     HURT STOMACH    Nsaids (non-steroidal anti-inflammatory drug) Other (See Comments)     TOLD NOT TO TAKE AS HAD AN ULCER. HAD BLEEDING    Percocet [oxycodone-acetaminophen] Rash    Prednisone Other (See Comments)     AGITATION    Reglan [metoclopramide hcl] Anxiety    Sulfa (sulfonamide antibiotics) Rash    Vicodin [hydrocodone-acetaminophen] Rash    Zosyn [piperacillin-tazobactam] Rash    Ambien [zolpidem]     Ibuprofen     Macrobid [nitrofurantoin monohyd/m-cryst]     Pcn  "[penicillins]     Vasotec [enalapril maleate] Other (See Comments)     DRY COUGH       Family History   Problem Relation Age of Onset    Coronary artery disease Other     Hypertension Other     Stroke Other     Diabetes Other     COPD Other        Social History     Social History    Marital status:      Spouse name: N/A    Number of children: N/A    Years of education: N/A     Occupational History    Not on file.     Social History Main Topics    Smoking status: Former Smoker     Packs/day: 1.00     Years: 18.00     Quit date: 2/13/1973    Smokeless tobacco: Never Used    Alcohol use Yes      Comment: WINE once per day    Drug use: No    Sexual activity: Not on file     Other Topics Concern    Not on file     Social History Narrative    No narrative on file       Chief Complaint:   Chief Complaint   Patient presents with    Knee Injury     DOI:  9/4/17 NONDISPLACED LONGITUDINAL FRACTURE OF LEFT PATELLA       Consulting Physician: No ref. provider found    History of present illness:    This is a 84 y.o. year old female who complains of left knee pain following a fall 9-4-17 onto knee. Pain 0/10.    Review of Systems:    Constitution: Denies chills, fever, and sweats.  HENT: Denies headaches or blurry vision.  Cardiovascular: Denies chest pain or irregular heart beat.  Respiratory: Denies cough or shortness of breath.  Gastrointestinal: Denies abdominal pain, nausea, or vomiting.  Musculoskeletal:  Denies muscle cramps.  Neurological: Denies dizziness or focal weakness.  Psychiatric/Behavioral: Normal mental status.  Hematologic/Lymphatic: Denies bleeding problem or easy bruising/bleeding.  Skin: Denies rash or suspicious lesions.    Examination:    Vital Signs:    Vitals:    10/18/17 1354   BP: (!) 118/56   Pulse: 65   Weight: 59.9 kg (132 lb 0.9 oz)   Height: 5' 8" (1.727 m)   PainSc: 0-No pain   PainLoc: Knee       Body mass index is 20.08 kg/m².    This a well-developed, well nourished " patient in no acute distress.    Alert and oriented x 3 and cooperative to examination.       Physical Exam: Left Knee Exam    Gait   antalgic    Skin  Rash:   None  Scars:   None    Inspection  Erythema:  None  Bruising:  None  Effusion:  mild  Masses:  None  Lymphadenopathy: None    Range of Motion: 0-110    Medial Joint : None  Lateral Joint : None    Patellofemoral Tenderness: yes    Varus Stress:  Stable  Valgus Stress:  Stable    Strength:  4+/5    Pulses:  Palpable distal    Sensation:  Intact          Imaging: X-rays ordered and reviewed today personally of the left knee show a non-displaced vertical fracture.        Assessment: Closed nondisplaced longitudinal fracture of left patella with routine healing, subsequent encounter    Arthritis of knee, left  -     Large Joint Aspiration/Injection        Plan: She come out of her immobilizer this point in time.  We will allow her to begin range of motion exercises.  She does have some crepitus under the kneecap and we will start Euflexxa series for her.    DISCLAIMER: This note may have been dictated using voice recognition software and may contain grammatical errors.     NOTE: Consult report sent to referring provider via [employer].

## 2017-10-24 ENCOUNTER — OFFICE VISIT (OUTPATIENT)
Dept: ORTHOPEDICS | Facility: CLINIC | Age: 82
End: 2017-10-24
Payer: MEDICARE

## 2017-10-24 VITALS — WEIGHT: 132.06 LBS | HEIGHT: 68 IN | BODY MASS INDEX: 20.01 KG/M2

## 2017-10-24 DIAGNOSIS — M17.12 ARTHRITIS OF KNEE, LEFT: Primary | ICD-10-CM

## 2017-10-24 PROCEDURE — 99999 PR PBB SHADOW E&M-EST. PATIENT-LVL II: CPT | Mod: PBBFAC,,, | Performed by: ORTHOPAEDIC SURGERY

## 2017-10-24 PROCEDURE — 99499 UNLISTED E&M SERVICE: CPT | Mod: S$GLB,,, | Performed by: ORTHOPAEDIC SURGERY

## 2017-10-24 PROCEDURE — 20610 DRAIN/INJ JOINT/BURSA W/O US: CPT | Mod: LT,S$GLB,, | Performed by: ORTHOPAEDIC SURGERY

## 2017-10-24 RX ADMIN — Medication 20 MG: at 10:10

## 2017-10-25 RX ORDER — HYALURONATE SODIUM 20 MG/2 ML
20 SYRINGE (ML) INTRAARTICULAR
Status: DISCONTINUED | OUTPATIENT
Start: 2017-10-24 | End: 2017-10-26 | Stop reason: HOSPADM

## 2017-10-26 NOTE — PROCEDURES
Large Joint Aspiration/Injection  Date/Time: 10/24/2017 10:25 PM  Performed by: CHASE SERRA  Authorized by: CHASE SERRA     Consent Done?:  Yes (Verbal)  Indications:  Pain  Timeout: Prior to procedure the correct patient, procedure, and site was verified      Location:  Knee  Site:  L knee  Prep: Patient was prepped and draped in usual sterile fashion    Approach:  Anteromedial  Medications:  20 mg EUFLEXXA 10 mg/mL(mw 2.4 -3.6 million)

## 2017-10-26 NOTE — PROGRESS NOTES
Past Medical History:   Diagnosis Date    Arthritis     Blood transfusion     Bowel obstruction     X 3    Cancer     MELANOMA RIGHT EYE    Cancer of eyeball, except conjunctiva, cornea, retina, and choroid right    Diverticulitis     GERD (gastroesophageal reflux disease)     Hypertension     Peripheral neuropathy     PSVT (paroxysmal supraventricular tachycardia)     HAD X 3 OVER 10 YRS. LAST 7-25-12. HAD ADENOSINE IN [facility] ER. WAS ON INCREASED THYROID MED. NO PROBLEM SINCE    Salmonella     Short bowel syndrome     Thyroid disease     Transient ischemic attack     UTI (urinary tract infection)        Past Surgical History:   Procedure Laterality Date    ABDOMINAL SURGERY      X 3. ILEUM REMOVED. OSTOMY PLACED AND CLOSED.    ADENOIDECTOMY      COLONOSCOPY      COLONOSCOPY N/A 8/24/2016    Procedure: COLONOSCOPY;  Surgeon: Alex Corral MD;  Location: [facility];  Service: Endoscopy;  Laterality: N/A;    CTR      BILAT    EYE SURGERY      RIGHT  - LASER MELANOMA; BILAT CATARACT    HYSTERECTOMY      BSO    JOINT REPLACEMENT      RIGHT KNEE    MANDIBLE FRACTURE SURGERY      MENISCECTOMY      MEDIAL AND LATERAL REPAIR, BAKERS CYST    TENDON REPAIR      LEFT THUMB    TONSILLECTOMY      T AND A       Current Outpatient Prescriptions   Medication Sig    acetaminophen (TYLENOL) 500 MG tablet Take 1,000 mg by mouth every 6 (six) hours as needed.    aspirin (ECOTRIN) 81 MG EC tablet Take 81 mg by mouth every evening.    BIFIDOBACTERIUM INFANTIS (ALIGN ORAL) Take by mouth once daily.    cyanocobalamin 1,000 mcg/mL injection INJECT 2ML IMTRAMUSCULAR EVERY MONTH    diltiaZEM (TIAZAC) 240 MG CpSR Take 1 capsule (240 mg total) by mouth once daily.    fish oil-omega-3 fatty acids 300-1,000 mg capsule Take 2 g by mouth 2 (two) times daily.    gabapentin (NEURONTIN) 300 MG capsule Take 300 mg by mouth every evening.    loperamide (IMODIUM) 2 mg capsule Take 2 mg by mouth 4 (four) times daily as  needed for Diarrhea.    magnesium oxide (MAG-OX) 400 mg tablet Take 400 mg by mouth once daily.     metoprolol succinate (TOPROL-XL) 50 MG 24 hr tablet Take 1 tablet (50 mg total) by mouth once daily.    omeprazole (PRILOSEC) 20 MG capsule Take 1 capsule (20 mg total) by mouth once daily.    paroxetine (PAXIL) 20 MG tablet Take 1 tablet (20 mg total) by mouth once daily.    ranitidine (ZANTAC) 150 MG capsule Take 1 capsule (150 mg total) by mouth 2 (two) times daily.    THYROID,PORK (ARMOUR THYROID ORAL) Take 150 mg by mouth every morning.    VIT C/E/ZN/COPPR/LUTEIN/ZEAXAN (PRESERVISION AREDS 2 ORAL) Take by mouth 2 (two) times daily.    vitamin D 1000 units Tab Take 1,000 Units by mouth once daily.     No current facility-administered medications for this visit.        Review of patient's allergies indicates:   Allergen Reactions    Flagyl [metronidazole hcl] Diarrhea     SEVERE DIARRHEA    Latex, natural rubber Rash     ITCHING    Levaquin [levofloxacin] Diarrhea     MOOD CHANGES, WEAKNESS    Morphine Other (See Comments)     HALLUCINATES    Augmentin [amoxicillin-pot clavulanate] Other (See Comments)     AGITATION    Ciprofloxacin Anxiety    Codeine Nausea And Vomiting    Demerol [meperidine] Nausea And Vomiting     CAN TAKE WITH PHENERGAN    Dilaudid [hydromorphone (bulk)] Itching     OK WITH BENADRYL    Fentanyl Itching     OK WITH BENADRYL    Fosfomycin      diarrhia    Naproxen Other (See Comments)     HURT STOMACH    Nsaids (non-steroidal anti-inflammatory drug) Other (See Comments)     TOLD NOT TO TAKE AS HAD AN ULCER. HAD BLEEDING    Percocet [oxycodone-acetaminophen] Rash    Prednisone Other (See Comments)     AGITATION    Reglan [metoclopramide hcl] Anxiety    Sulfa (sulfonamide antibiotics) Rash    Vicodin [hydrocodone-acetaminophen] Rash    Zosyn [piperacillin-tazobactam] Rash    Ambien [zolpidem]     Ibuprofen     Macrobid [nitrofurantoin monohyd/m-cryst]     Pcn  "[penicillins]     Vasotec [enalapril maleate] Other (See Comments)     DRY COUGH       Family History   Problem Relation Age of Onset    Coronary artery disease Other     Hypertension Other     Stroke Other     Diabetes Other     COPD Other        Social History     Social History    Marital status:      Spouse name: N/A    Number of children: N/A    Years of education: N/A     Occupational History    Not on file.     Social History Main Topics    Smoking status: Former Smoker     Packs/day: 1.00     Years: 18.00     Quit date: 2/13/1973    Smokeless tobacco: Never Used    Alcohol use Yes      Comment: WINE once per day    Drug use: No    Sexual activity: Not on file     Other Topics Concern    Not on file     Social History Narrative    No narrative on file       Chief Complaint:   Chief Complaint   Patient presents with    Injections     EUFLEXXA 2/3 LEFT KNEE       Consulting Physician: No ref. provider found    History of present illness:    This is a 84 y.o. year old female who complains of left knee pain following a fall 9-4-17 onto knee. Pain 0/10.    Review of Systems:    Constitution: Denies chills, fever, and sweats.  HENT: Denies headaches or blurry vision.  Cardiovascular: Denies chest pain or irregular heart beat.  Respiratory: Denies cough or shortness of breath.  Gastrointestinal: Denies abdominal pain, nausea, or vomiting.  Musculoskeletal:  Denies muscle cramps.  Neurological: Denies dizziness or focal weakness.  Psychiatric/Behavioral: Normal mental status.  Hematologic/Lymphatic: Denies bleeding problem or easy bruising/bleeding.  Skin: Denies rash or suspicious lesions.    Examination:    Vital Signs:    Vitals:    10/24/17 1447   Weight: 59.9 kg (132 lb 0.9 oz)   Height: 5' 8" (1.727 m)   PainSc:   2   PainLoc: Knee       Body mass index is 20.08 kg/m².    This a well-developed, well nourished patient in no acute distress.    Alert and oriented x 3 and cooperative to " examination.       Physical Exam: Left Knee Exam    Gait   normal    Skin  Rash:   None  Scars:   None    Inspection  Erythema:  None  Bruising:  None  Effusion:  mild  Masses:  None  Lymphadenopathy: None    Range of Motion: 0-120    Medial Joint : None  Lateral Joint : None    Patellofemoral Tenderness: yes    Varus Stress:  Stable  Valgus Stress:  Stable    Strength:  4+/5    Pulses:  Palpable distal    Sensation:  Intact          Imaging: X-rays of the left knee show a non-displaced vertical fracture with healing       Assessment: Arthritis of knee, left  -     Large Joint Aspiration/Injection        Plan: She does have some DJD and crepitus under the kneecap and we will start Euflexxa series for her.    DISCLAIMER: This note may have been dictated using voice recognition software and may contain grammatical errors.     NOTE: Consult report sent to referring provider via [employer] E[employer].

## 2017-10-31 ENCOUNTER — OFFICE VISIT (OUTPATIENT)
Dept: ORTHOPEDICS | Facility: CLINIC | Age: 82
End: 2017-10-31
Payer: MEDICARE

## 2017-10-31 VITALS — WEIGHT: 132.06 LBS | HEIGHT: 68 IN | BODY MASS INDEX: 20.01 KG/M2

## 2017-10-31 DIAGNOSIS — M17.12 ARTHRITIS OF KNEE, LEFT: Primary | ICD-10-CM

## 2017-10-31 DIAGNOSIS — S82.025D CLOSED NONDISPLACED LONGITUDINAL FRACTURE OF LEFT PATELLA WITH ROUTINE HEALING, SUBSEQUENT ENCOUNTER: ICD-10-CM

## 2017-10-31 PROCEDURE — 99999 PR PBB SHADOW E&M-EST. PATIENT-LVL II: CPT | Mod: PBBFAC,,, | Performed by: ORTHOPAEDIC SURGERY

## 2017-10-31 PROCEDURE — 99499 UNLISTED E&M SERVICE: CPT | Mod: S$GLB,,, | Performed by: ORTHOPAEDIC SURGERY

## 2017-10-31 PROCEDURE — 20610 DRAIN/INJ JOINT/BURSA W/O US: CPT | Mod: LT,S$GLB,, | Performed by: ORTHOPAEDIC SURGERY

## 2017-10-31 RX ADMIN — Medication 20 MG: at 11:10

## 2017-11-01 RX ORDER — HYALURONATE SODIUM 20 MG/2 ML
20 SYRINGE (ML) INTRAARTICULAR
Status: DISCONTINUED | OUTPATIENT
Start: 2017-10-31 | End: 2017-11-02 | Stop reason: HOSPADM

## 2017-11-02 NOTE — PROGRESS NOTES
Past Medical History:   Diagnosis Date    Arthritis     Blood transfusion     Bowel obstruction     X 3    Cancer     MELANOMA RIGHT EYE    Cancer of eyeball, except conjunctiva, cornea, retina, and choroid right    Diverticulitis     GERD (gastroesophageal reflux disease)     Hypertension     Peripheral neuropathy     PSVT (paroxysmal supraventricular tachycardia)     HAD X 3 OVER 10 YRS. LAST 7-25-12. HAD ADENOSINE IN [facility] ER. WAS ON INCREASED THYROID MED. NO PROBLEM SINCE    Salmonella     Short bowel syndrome     Thyroid disease     Transient ischemic attack     UTI (urinary tract infection)        Past Surgical History:   Procedure Laterality Date    ABDOMINAL SURGERY      X 3. ILEUM REMOVED. OSTOMY PLACED AND CLOSED.    ADENOIDECTOMY      COLONOSCOPY      COLONOSCOPY N/A 8/24/2016    Procedure: COLONOSCOPY;  Surgeon: Alex Corral MD;  Location: [facility];  Service: Endoscopy;  Laterality: N/A;    CTR      BILAT    EYE SURGERY      RIGHT  - LASER MELANOMA; BILAT CATARACT    HYSTERECTOMY      BSO    JOINT REPLACEMENT      RIGHT KNEE    MANDIBLE FRACTURE SURGERY      MENISCECTOMY      MEDIAL AND LATERAL REPAIR, BAKERS CYST    TENDON REPAIR      LEFT THUMB    TONSILLECTOMY      T AND A       Current Outpatient Prescriptions   Medication Sig    acetaminophen (TYLENOL) 500 MG tablet Take 1,000 mg by mouth every 6 (six) hours as needed.    aspirin (ECOTRIN) 81 MG EC tablet Take 81 mg by mouth every evening.    BIFIDOBACTERIUM INFANTIS (ALIGN ORAL) Take by mouth once daily.    cyanocobalamin 1,000 mcg/mL injection INJECT 2ML IMTRAMUSCULAR EVERY MONTH    diltiaZEM (TIAZAC) 240 MG CpSR Take 1 capsule (240 mg total) by mouth once daily.    fish oil-omega-3 fatty acids 300-1,000 mg capsule Take 2 g by mouth 2 (two) times daily.    gabapentin (NEURONTIN) 300 MG capsule Take 300 mg by mouth every evening.    loperamide (IMODIUM) 2 mg capsule Take 2 mg by mouth 4 (four) times daily as  needed for Diarrhea.    magnesium oxide (MAG-OX) 400 mg tablet Take 400 mg by mouth once daily.     metoprolol succinate (TOPROL-XL) 50 MG 24 hr tablet Take 1 tablet (50 mg total) by mouth once daily.    omeprazole (PRILOSEC) 20 MG capsule Take 1 capsule (20 mg total) by mouth once daily.    paroxetine (PAXIL) 20 MG tablet Take 1 tablet (20 mg total) by mouth once daily.    ranitidine (ZANTAC) 150 MG capsule Take 1 capsule (150 mg total) by mouth 2 (two) times daily.    THYROID,PORK (ARMOUR THYROID ORAL) Take 150 mg by mouth every morning.    VIT C/E/ZN/COPPR/LUTEIN/ZEAXAN (PRESERVISION AREDS 2 ORAL) Take by mouth 2 (two) times daily.    vitamin D 1000 units Tab Take 1,000 Units by mouth once daily.     No current facility-administered medications for this visit.        Review of patient's allergies indicates:   Allergen Reactions    Flagyl [metronidazole hcl] Diarrhea     SEVERE DIARRHEA    Latex, natural rubber Rash     ITCHING    Levaquin [levofloxacin] Diarrhea     MOOD CHANGES, WEAKNESS    Morphine Other (See Comments)     HALLUCINATES    Augmentin [amoxicillin-pot clavulanate] Other (See Comments)     AGITATION    Ciprofloxacin Anxiety    Codeine Nausea And Vomiting    Demerol [meperidine] Nausea And Vomiting     CAN TAKE WITH PHENERGAN    Dilaudid [hydromorphone (bulk)] Itching     OK WITH BENADRYL    Fentanyl Itching     OK WITH BENADRYL    Fosfomycin      diarrhia    Naproxen Other (See Comments)     HURT STOMACH    Nsaids (non-steroidal anti-inflammatory drug) Other (See Comments)     TOLD NOT TO TAKE AS HAD AN ULCER. HAD BLEEDING    Percocet [oxycodone-acetaminophen] Rash    Prednisone Other (See Comments)     AGITATION    Reglan [metoclopramide hcl] Anxiety    Sulfa (sulfonamide antibiotics) Rash    Vicodin [hydrocodone-acetaminophen] Rash    Zosyn [piperacillin-tazobactam] Rash    Ambien [zolpidem]     Ibuprofen     Macrobid [nitrofurantoin monohyd/m-cryst]     Pcn  "[penicillins]     Vasotec [enalapril maleate] Other (See Comments)     DRY COUGH       Family History   Problem Relation Age of Onset    Coronary artery disease Other     Hypertension Other     Stroke Other     Diabetes Other     COPD Other        Social History     Social History    Marital status:      Spouse name: N/A    Number of children: N/A    Years of education: N/A     Occupational History    Not on file.     Social History Main Topics    Smoking status: Former Smoker     Packs/day: 1.00     Years: 18.00     Quit date: 2/13/1973    Smokeless tobacco: Never Used    Alcohol use Yes      Comment: WINE once per day    Drug use: No    Sexual activity: Not on file     Other Topics Concern    Not on file     Social History Narrative    No narrative on file       Chief Complaint:   Chief Complaint   Patient presents with    Injections     Euflexxa #1 Left knee        Consulting Physician: No ref. provider found    History of present illness:    This is a 84 y.o. year old female who complains of left knee pain following a fall 9-4-17 onto knee. Pain 0/10.    Review of Systems:    Constitution: Denies chills, fever, and sweats.  HENT: Denies headaches or blurry vision.  Cardiovascular: Denies chest pain or irregular heart beat.  Respiratory: Denies cough or shortness of breath.  Gastrointestinal: Denies abdominal pain, nausea, or vomiting.  Musculoskeletal:  Denies muscle cramps.  Neurological: Denies dizziness or focal weakness.  Psychiatric/Behavioral: Normal mental status.  Hematologic/Lymphatic: Denies bleeding problem or easy bruising/bleeding.  Skin: Denies rash or suspicious lesions.    Examination:    Vital Signs:    Vitals:    10/31/17 1358   Weight: 59.9 kg (132 lb 0.9 oz)   Height: 5' 8" (1.727 m)   PainLoc: Knee       Body mass index is 20.08 kg/m².    This a well-developed, well nourished patient in no acute distress.    Alert and oriented x 3 and cooperative to examination.   "     Physical Exam: Left Knee Exam    Gait   normal    Skin  Rash:   None  Scars:   None    Inspection  Erythema:  None  Bruising:  None  Effusion:  mild  Masses:  None  Lymphadenopathy: None    Range of Motion: 0-120    Medial Joint : None  Lateral Joint : None    Patellofemoral Tenderness: yes    Varus Stress:  Stable  Valgus Stress:  Stable    Strength:  4+/5    Pulses:  Palpable distal    Sensation:  Intact          Imaging:      Assessment: Arthritis of knee, left  -     Large Joint Aspiration/Injection    Closed nondisplaced longitudinal fracture of left patella with routine healing, subsequent encounter        Plan: Euflexxa series    DISCLAIMER: This note may have been dictated using voice recognition software and may contain grammatical errors.     NOTE: Consult report sent to referring provider via [employer].

## 2017-11-02 NOTE — PROCEDURES
Large Joint Aspiration/Injection  Date/Time: 10/31/2017 11:19 PM  Performed by: CHASE SERRA  Authorized by: CHASE SERRA     Consent Done?:  Yes (Verbal)  Indications:  Pain  Timeout: Prior to procedure the correct patient, procedure, and site was verified      Location:  Knee  Site:  L knee  Prep: Patient was prepped and draped in usual sterile fashion    Approach:  Anteromedial  Medications:  20 mg EUFLEXXA 10 mg/mL(mw 2.4 -3.6 million)

## 2017-12-09 DIAGNOSIS — I10 ESSENTIAL HYPERTENSION: ICD-10-CM

## 2017-12-09 DIAGNOSIS — F41.8 DEPRESSION WITH ANXIETY: ICD-10-CM

## 2017-12-11 RX ORDER — PAROXETINE HYDROCHLORIDE 20 MG/1
TABLET, FILM COATED ORAL
Qty: 90 TABLET | Refills: 3 | Status: SHIPPED | OUTPATIENT
Start: 2017-12-11 | End: 2017-12-21 | Stop reason: SDUPTHER

## 2017-12-11 RX ORDER — DILTIAZEM HYDROCHLORIDE 240 MG/1
CAPSULE, EXTENDED RELEASE ORAL
Qty: 90 CAPSULE | Refills: 3 | Status: SHIPPED | OUTPATIENT
Start: 2017-12-11 | End: 2018-10-25 | Stop reason: SDUPTHER

## 2017-12-18 ENCOUNTER — TELEPHONE (OUTPATIENT)
Dept: FAMILY MEDICINE | Facility: CLINIC | Age: 82
End: 2017-12-18

## 2017-12-18 NOTE — TELEPHONE ENCOUNTER
----- Message from Coleman Cai sent at 12/18/2017 10:24 AM CST -----  Contact: pt  Pt is calling to see if she can been seen today or this week(pain in left ear and jaw)  Call Back#[phone]  Thanks

## 2017-12-21 ENCOUNTER — DOCUMENTATION ONLY (OUTPATIENT)
Dept: FAMILY MEDICINE | Facility: CLINIC | Age: 82
End: 2017-12-21

## 2017-12-21 ENCOUNTER — OFFICE VISIT (OUTPATIENT)
Dept: FAMILY MEDICINE | Facility: CLINIC | Age: 82
End: 2017-12-21
Payer: MEDICARE

## 2017-12-21 VITALS
SYSTOLIC BLOOD PRESSURE: 126 MMHG | BODY MASS INDEX: 20.38 KG/M2 | HEART RATE: 76 BPM | DIASTOLIC BLOOD PRESSURE: 74 MMHG | TEMPERATURE: 99 F | OXYGEN SATURATION: 96 % | RESPIRATION RATE: 16 BRPM | HEIGHT: 68 IN | WEIGHT: 134.5 LBS

## 2017-12-21 DIAGNOSIS — M62.838 MUSCLE SPASM: ICD-10-CM

## 2017-12-21 DIAGNOSIS — F41.8 DEPRESSION WITH ANXIETY: ICD-10-CM

## 2017-12-21 DIAGNOSIS — G44.89 OTHER HEADACHE SYNDROME: Primary | ICD-10-CM

## 2017-12-21 LAB
CRP SERPL-MCNC: 2.4 MG/L
ERYTHROCYTE [SEDIMENTATION RATE] IN BLOOD BY WESTERGREN METHOD: 21 MM/HR

## 2017-12-21 PROCEDURE — 99213 OFFICE O/P EST LOW 20 MIN: CPT | Mod: S$GLB,,, | Performed by: NURSE PRACTITIONER

## 2017-12-21 PROCEDURE — 85651 RBC SED RATE NONAUTOMATED: CPT

## 2017-12-21 PROCEDURE — 86140 C-REACTIVE PROTEIN: CPT

## 2017-12-21 RX ORDER — PAROXETINE HYDROCHLORIDE 20 MG/1
TABLET, FILM COATED ORAL
Qty: 90 TABLET | Refills: 3 | Status: SHIPPED | OUTPATIENT
Start: 2017-12-21 | End: 2019-02-23 | Stop reason: SDUPTHER

## 2017-12-21 RX ORDER — PREDNISONE 20 MG/1
40 TABLET ORAL DAILY
Qty: 4 TABLET | Refills: 0 | Status: SHIPPED | OUTPATIENT
Start: 2017-12-21 | End: 2017-12-23

## 2017-12-21 RX ORDER — BACLOFEN 10 MG/1
10 TABLET ORAL NIGHTLY
Qty: 30 TABLET | Refills: 1 | Status: SHIPPED | OUTPATIENT
Start: 2017-12-21 | End: 2018-05-09
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
## 2017-12-22 DIAGNOSIS — R68.84 JAW PAIN: Primary | ICD-10-CM

## 2017-12-22 RX ORDER — CELECOXIB 100 MG/1
100 CAPSULE ORAL DAILY
Qty: 30 CAPSULE | Refills: 1 | Status: SHIPPED | OUTPATIENT
Start: 2017-12-22 | End: 2018-05-09

## 2017-12-27 DIAGNOSIS — K21.9 GASTROESOPHAGEAL REFLUX DISEASE, ESOPHAGITIS PRESENCE NOT SPECIFIED: ICD-10-CM

## 2017-12-28 RX ORDER — OMEPRAZOLE 20 MG/1
CAPSULE, DELAYED RELEASE ORAL
Qty: 90 CAPSULE | Refills: 1 | Status: SHIPPED | OUTPATIENT
Start: 2017-12-28 | End: 2018-10-25 | Stop reason: SDUPTHER

## 2018-02-01 ENCOUNTER — TELEPHONE (OUTPATIENT)
Dept: FAMILY MEDICINE | Facility: CLINIC | Age: 83
End: 2018-02-01

## 2018-02-01 NOTE — TELEPHONE ENCOUNTER
----- Message from Sandy Galdamez sent at 1/31/2018 10:04 AM CST -----  Contact: Lorena  Patient called to make appointment for balance issue and was trying to get close to 's appointment already scheduled of 1 pm same day (Kiran Vallejo) but this was closest time of 2:40. If any appointment becomes available to move closer please move. Thanks!

## 2018-02-07 ENCOUNTER — OFFICE VISIT (OUTPATIENT)
Dept: FAMILY MEDICINE | Facility: CLINIC | Age: 83
End: 2018-02-07
Payer: MEDICARE

## 2018-02-07 ENCOUNTER — DOCUMENTATION ONLY (OUTPATIENT)
Dept: FAMILY MEDICINE | Facility: CLINIC | Age: 83
End: 2018-02-07

## 2018-02-07 VITALS
BODY MASS INDEX: 20.06 KG/M2 | HEIGHT: 68 IN | OXYGEN SATURATION: 98 % | DIASTOLIC BLOOD PRESSURE: 70 MMHG | WEIGHT: 132.38 LBS | SYSTOLIC BLOOD PRESSURE: 108 MMHG | RESPIRATION RATE: 16 BRPM | HEART RATE: 62 BPM | TEMPERATURE: 98 F

## 2018-02-07 DIAGNOSIS — R26.9 GAIT DISTURBANCE: Primary | ICD-10-CM

## 2018-02-07 DIAGNOSIS — E53.8 B12 DEFICIENCY: ICD-10-CM

## 2018-02-07 PROCEDURE — 99499 UNLISTED E&M SERVICE: CPT | Mod: S$GLB,,, | Performed by: INTERNAL MEDICINE

## 2018-02-07 PROCEDURE — 99214 OFFICE O/P EST MOD 30 MIN: CPT | Mod: S$GLB,,, | Performed by: INTERNAL MEDICINE

## 2018-02-07 PROCEDURE — 1126F AMNT PAIN NOTED NONE PRSNT: CPT | Mod: S$GLB,,, | Performed by: INTERNAL MEDICINE

## 2018-02-07 PROCEDURE — 1159F MED LIST DOCD IN RCRD: CPT | Mod: S$GLB,,, | Performed by: INTERNAL MEDICINE

## 2018-02-07 PROCEDURE — 3008F BODY MASS INDEX DOCD: CPT | Mod: S$GLB,,, | Performed by: INTERNAL MEDICINE

## 2018-02-07 NOTE — PROGRESS NOTES
"Subjective:       Patient ID: Lorena Vallejo is a 84 y.o. female.    Chief Complaint: balance issues and spot on leg    HPI     CHIEF :COMPLAINT:  Gait problems  HPI: History of B12 deficiency but takes 2000 mg IM a month.  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  ONSET:      6 months  ago.   DURATION: .  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   is being evaluated for a poor gait over the same.   has had trouble walking during the same time frame and neurology found that he had a high arsenic level.      MODIFIERS/TREATMENTS: Taking Medications: ? .     Compliant with Taking Prescribed Medications: ? yes  . Prior X-Rays: no  Prior Labs: no    Movements, medications or activities that worsen the problem:       Movements, medications, or activities that ease the problem:      The below section is positive for the patient ONLY if BOLDED:    SYMPTOMS/RELATED: Numbness.  Weakness.        Review of Systems    Objective:      Vitals:    02/07/18 1408   BP: 108/70   Pulse: 62   Resp: 16   Temp: 98 °F (36.7 °C)   TempSrc: Oral   SpO2: 98%   Weight: 60.1 kg (132 lb 6.4 oz)   Height: 5' 8" (1.727 m)   PainSc: 0-No pain     Physical Exam   Constitutional: She appears well-developed and well-nourished.   Eyes: Pupils are equal, round, and reactive to light.   Cardiovascular: Normal rate, regular rhythm and normal heart sounds.    Pulmonary/Chest: Effort normal and breath sounds normal.   Abdominal: Soft. There is no tenderness.   Neurological: She is alert. A sensory deficit is present. Coordination (veers to the left) abnormal.   Finger to nose is normal.  Heel to shin is abnormal with a left foot   Psychiatric: She has a normal mood and affect. Her behavior is normal. Thought content normal. "   Nursing note and vitals reviewed.        Assessment:       1. Gait disturbance    2. B12 deficiency          Plan:     Gait disturbance  -     Arsenic, urine, 24 hour; Future  -     Ambulatory Referral to Neurology    B12 deficiency  -     Vitamin B12; Future; Expected date: 02/07/2018      Follow-up in about 3 months (around 5/7/2018).

## 2018-02-12 ENCOUNTER — LAB VISIT (OUTPATIENT)
Dept: LAB | Facility: HOSPITAL | Age: 83
End: 2018-02-12
Attending: INTERNAL MEDICINE
Payer: MEDICARE

## 2018-02-12 DIAGNOSIS — R26.9 GAIT DISTURBANCE: ICD-10-CM

## 2018-02-12 PROCEDURE — 82175 ASSAY OF ARSENIC: CPT

## 2018-02-14 LAB
ARSENIC 24H UR-MCNC: <15 MCG/L (ref 0–35)
ARSENIC 24H UR-MRATE: NOT DETECTED MCG/SPEC (ref 0–35)
COLLECT DURATION TIME UR: 24 H
SPECIMEN VOL UR: 1450 ML

## 2018-03-13 DIAGNOSIS — I47.10 PSVT (PAROXYSMAL SUPRAVENTRICULAR TACHYCARDIA): ICD-10-CM

## 2018-03-13 RX ORDER — METOPROLOL SUCCINATE 50 MG/1
50 TABLET, EXTENDED RELEASE ORAL DAILY
Qty: 90 TABLET | Refills: 1 | Status: SHIPPED | OUTPATIENT
Start: 2018-03-13 | End: 2018-09-18 | Stop reason: SDUPTHER

## 2018-04-03 ENCOUNTER — OFFICE VISIT (OUTPATIENT)
Dept: FAMILY MEDICINE | Facility: CLINIC | Age: 83
End: 2018-04-03
Payer: MEDICARE

## 2018-04-03 ENCOUNTER — DOCUMENTATION ONLY (OUTPATIENT)
Dept: FAMILY MEDICINE | Facility: CLINIC | Age: 83
End: 2018-04-03

## 2018-04-03 VITALS
BODY MASS INDEX: 19.95 KG/M2 | WEIGHT: 131.63 LBS | HEIGHT: 68 IN | TEMPERATURE: 98 F | DIASTOLIC BLOOD PRESSURE: 86 MMHG | HEART RATE: 80 BPM | RESPIRATION RATE: 16 BRPM | SYSTOLIC BLOOD PRESSURE: 154 MMHG | OXYGEN SATURATION: 97 %

## 2018-04-03 DIAGNOSIS — M13.141 MONOARTHRITIS OF RIGHT HAND: Primary | ICD-10-CM

## 2018-04-03 PROCEDURE — 99214 OFFICE O/P EST MOD 30 MIN: CPT | Mod: S$GLB,,, | Performed by: INTERNAL MEDICINE

## 2018-04-03 PROCEDURE — 3077F SYST BP >= 140 MM HG: CPT | Mod: CPTII,S$GLB,, | Performed by: INTERNAL MEDICINE

## 2018-04-03 PROCEDURE — 3079F DIAST BP 80-89 MM HG: CPT | Mod: CPTII,S$GLB,, | Performed by: INTERNAL MEDICINE

## 2018-04-03 RX ORDER — INDOMETHACIN 25 MG/1
25 CAPSULE ORAL 2 TIMES DAILY WITH MEALS
Qty: 20 CAPSULE | Refills: 0 | Status: SHIPPED | OUTPATIENT
Start: 2018-04-03 | End: 2019-06-21

## 2018-04-03 RX ORDER — ONDANSETRON 4 MG/1
4 TABLET, FILM COATED ORAL 2 TIMES DAILY
Qty: 20 TABLET | Refills: 1 | Status: SHIPPED | OUTPATIENT
Start: 2018-04-03 | End: 2018-05-09

## 2018-04-03 RX ORDER — CEPHALEXIN 500 MG/1
500 CAPSULE ORAL EVERY 8 HOURS
Qty: 30 CAPSULE | Refills: 0 | Status: SHIPPED | OUTPATIENT
Start: 2018-04-03 | End: 2018-05-09

## 2018-04-03 RX ORDER — HYDROCODONE BITARTRATE AND ACETAMINOPHEN 5; 325 MG/1; MG/1
1 TABLET ORAL EVERY 6 HOURS PRN
Qty: 5 TABLET | Refills: 0 | Status: SHIPPED | OUTPATIENT
Start: 2018-04-03 | End: 2018-05-09

## 2018-04-03 RX ORDER — COLCHICINE 0.6 MG/1
0.6 TABLET ORAL DAILY
Qty: 10 TABLET | Refills: 0 | Status: SHIPPED | OUTPATIENT
Start: 2018-04-03 | End: 2018-10-25 | Stop reason: SDUPTHER

## 2018-04-03 NOTE — PATIENT INSTRUCTIONS
Take Prilosec with food daily while on Indocin.      Eating to Prevent Gout  Gout is a painful form of arthritis caused by an excess of uric acid. This is a waste product made by the body. It builds up in the body and forms crystals that collect in the joints, bringing on a gout attack. Alcohol and certain foods can trigger a gout attack. Below are some guidelines for changing your diet to help you manage gout. Your healthcare provider can work with you to determine the best eating plan for you. Know that diet is only one part of managing gout. Take your medicines as prescribed and follow the other guidelines your healthcare provider has given you.  Foods to limit  Eating too many foods containing purines may increase the levels of uric acid in your body and increase your risk for a gout attack. It may be best to limit these high-purine foods:  · Alcohol (beer, red wine). You may be told to avoid alcohol completely.  · Certain fish (anchovies, sardines, fish roes, herring, tuna, mussels, codfish, scallops, trout, and kandy)  · Certain meats (red meat, processed meat, jimenez, turkey, wild game, and goose)  · Sauces and gravies made with meat  · Organ meats (such as liver, kidneys, sweetbreads, and tripe)  · Legumes (such as dried beans, peas)  · Mushrooms, spinach, asparagus, and cauliflower  · Yeast and yeast extract supplements  Foods to try  Some foods may be helpful for people with gout. You may want to try adding some of the following foods to your diet:  · Dark berries: These include blueberries, blackberries, and cherries. These berries contain chemicals that may lower uric acid.  · Tofu: Tofu, which is made from soy, is a good source of protein. Studies have shown that it may be a better choice than meat for people with gout.  · Omega fatty acids: These acids are found in fatty fish (such as salmon), certain oils (such as flax, olive, or nut oils), or nuts. They may help prevent inflammation due to  gout.  The following guidelines are recommended by the American Medical Association for people with gout. Your diet should be: ·   Date Last Reviewed: 6/17/2015  © 3452-4665 [employer]. [address] [place] [address], [place] [address]. All rights reserved. This information is not intended as a substitute for professional medical care. Always follow your healthcare professional's instructions.

## 2018-04-03 NOTE — PROGRESS NOTES
"Subjective:       Patient ID: Lorena Vallejo is a 85 y.o. female.    Chief Complaint: Hand Pain (swelling)    HPI       CHIEF COMPLAINT: Upper_Extremity_Problems. Pain: yes.. Weakness: no . Injury: no .  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  She is severely intolerant of steroids and has had a GI bleed in the past with anti-inflammatory medicines.  She has itching with narcotics    ONSET/TIMING: . Onset 2 days  ago.  Gradual. Work related: no.  Similar_problems_in past: no.     DURATION: .  Continuous         Paroxysmal: no .    QUALITY/COURSE:  Worse    LOCATION:  PIP joint of right middle finger  .  . Radiation: no.    INTENSITY/SEVERITY:. Severity is # 9  (10 point scale).    CONTEXT/WHEN: . Date_Expected_Work_Return is . Activity: yes. . Abduction greater than 90 degrees: no.. Inactivity: no      MODIFIERS/TREATMENTS:  Current_Limitations_are.        Taking medications: no.  Physical_Therapy: no .  Chiropractor: no . . Litigation_pending: no. . X-rays: no.. CT: no.. MRI: no.    SYMPTOMS/RELATED: . --Possible medication side effects include:.     The following symptoms are positive if BOLD, negative otherwise.       REVIEW OF SYMPTOMS:   Numbness. Weakness. Muscle_Problems. Fever. Joint_Problems. Swelling. Erythema. Weight_loss.    Review of Systems    Objective:      Vitals:    04/03/18 1554   BP: (!) 154/86   Pulse: 80   Resp: 16   Temp: 98.4 °F (36.9 °C)   TempSrc: Oral   SpO2: 97%   Weight: 59.7 kg (131 lb 9.8 oz)   Height: 5' 8" (1.727 m)   PainSc:   8   PainLoc: Finger     Physical Exam   Constitutional: She appears well-developed and well-nourished.   Cardiovascular: Normal rate, regular rhythm and normal heart sounds.    Pulmonary/Chest: Effort normal and breath sounds normal.   Abdominal: Soft. There is no tenderness.   Musculoskeletal: She exhibits edema and tenderness.   The PIP joint of the right middle finger is hot and swollen and tender.  She cannot move it actively or passively "   Neurological: She is alert.   Psychiatric: She has a normal mood and affect. Her behavior is normal. Thought content normal.   Nursing note and vitals reviewed.        Assessment:       1. Monoarthritis of right hand          Plan:   Most likely gout  Monoarthritis of right hand  -     colchicine 0.6 mg tablet; Take 1 tablet (0.6 mg total) by mouth once daily. The first day take 2 at once then 1 by mouth an hour later  Dispense: 10 tablet; Refill: 0  -     hydrocodone-acetaminophen 5-325mg (NORCO) 5-325 mg per tablet; Take 1 tablet by mouth every 6 (six) hours as needed. Must premedicate with Benadryl 25 mg first  Dispense: 5 tablet; Refill: 0  -     indomethacin (INDOCIN) 25 MG capsule; Take 1 capsule (25 mg total) by mouth 2 (two) times daily with meals.  Dispense: 20 capsule; Refill: 0  -     ondansetron (ZOFRAN) 4 MG tablet; Take 1 tablet (4 mg total) by mouth 2 (two) times daily.  Dispense: 20 tablet; Refill: 1  -     cephALEXin (KEFLEX) 500 MG capsule; Take 1 capsule (500 mg total) by mouth every 8 (eight) hours.  Dispense: 30 capsule; Refill: 0  -     Uric acid; Future; Expected date: 04/03/2018      Follow-up in about 6 weeks (around 5/15/2018).

## 2018-04-04 ENCOUNTER — LAB VISIT (OUTPATIENT)
Dept: LAB | Facility: HOSPITAL | Age: 83
End: 2018-04-04
Attending: INTERNAL MEDICINE
Payer: MEDICARE

## 2018-04-04 DIAGNOSIS — E53.8 B12 DEFICIENCY: ICD-10-CM

## 2018-04-04 DIAGNOSIS — D51.8 OTHER VITAMIN B12 DEFICIENCY ANEMIA: ICD-10-CM

## 2018-04-04 DIAGNOSIS — E78.5 HYPERLIPIDEMIA, UNSPECIFIED HYPERLIPIDEMIA TYPE: ICD-10-CM

## 2018-04-04 LAB
ALBUMIN SERPL BCP-MCNC: 3.5 G/DL
ALP SERPL-CCNC: 87 U/L
ALT SERPL W/O P-5'-P-CCNC: 15 U/L
ANION GAP SERPL CALC-SCNC: 7 MMOL/L
AST SERPL-CCNC: 18 U/L
BASOPHILS # BLD AUTO: 0.03 K/UL
BASOPHILS NFR BLD: 0.6 %
BILIRUB SERPL-MCNC: 0.5 MG/DL
BUN SERPL-MCNC: 19 MG/DL
CALCIUM SERPL-MCNC: 9.4 MG/DL
CHLORIDE SERPL-SCNC: 111 MMOL/L
CHOLEST SERPL-MCNC: 141 MG/DL
CHOLEST/HDLC SERPL: 2.2 {RATIO}
CO2 SERPL-SCNC: 24 MMOL/L
CREAT SERPL-MCNC: 0.9 MG/DL
DIFFERENTIAL METHOD: ABNORMAL
EOSINOPHIL # BLD AUTO: 0.2 K/UL
EOSINOPHIL NFR BLD: 4.5 %
ERYTHROCYTE [DISTWIDTH] IN BLOOD BY AUTOMATED COUNT: 13.3 %
EST. GFR  (AFRICAN AMERICAN): >60 ML/MIN/1.73 M^2
EST. GFR  (NON AFRICAN AMERICAN): 58.5 ML/MIN/1.73 M^2
GLUCOSE SERPL-MCNC: 78 MG/DL
HCT VFR BLD AUTO: 38 %
HDLC SERPL-MCNC: 64 MG/DL
HDLC SERPL: 45.4 %
HGB BLD-MCNC: 12 G/DL
IMM GRANULOCYTES # BLD AUTO: 0.01 K/UL
IMM GRANULOCYTES NFR BLD AUTO: 0.2 %
LDLC SERPL CALC-MCNC: 48.8 MG/DL
LYMPHOCYTES # BLD AUTO: 2.1 K/UL
LYMPHOCYTES NFR BLD: 43.7 %
MCH RBC QN AUTO: 31.7 PG
MCHC RBC AUTO-ENTMCNC: 31.6 G/DL
MCV RBC AUTO: 101 FL
MONOCYTES # BLD AUTO: 0.7 K/UL
MONOCYTES NFR BLD: 14.5 %
NEUTROPHILS # BLD AUTO: 1.7 K/UL
NEUTROPHILS NFR BLD: 36.5 %
NONHDLC SERPL-MCNC: 77 MG/DL
NRBC BLD-RTO: 0 /100 WBC
PLATELET # BLD AUTO: 285 K/UL
PMV BLD AUTO: 9.4 FL
POTASSIUM SERPL-SCNC: 4.9 MMOL/L
PROT SERPL-MCNC: 6.9 G/DL
RBC # BLD AUTO: 3.78 M/UL
SODIUM SERPL-SCNC: 142 MMOL/L
TRIGL SERPL-MCNC: 141 MG/DL
VIT B12 SERPL-MCNC: 659 PG/ML
WBC # BLD AUTO: 4.69 K/UL

## 2018-04-04 PROCEDURE — 80053 COMPREHEN METABOLIC PANEL: CPT

## 2018-04-04 PROCEDURE — 85025 COMPLETE CBC W/AUTO DIFF WBC: CPT

## 2018-04-04 PROCEDURE — 82607 VITAMIN B-12: CPT

## 2018-04-04 PROCEDURE — 80061 LIPID PANEL: CPT

## 2018-04-04 PROCEDURE — 36415 COLL VENOUS BLD VENIPUNCTURE: CPT | Mod: PO

## 2018-05-03 DIAGNOSIS — E03.9 HYPOTHYROIDISM, UNSPECIFIED TYPE: ICD-10-CM

## 2018-05-04 RX ORDER — THYROID, PORCINE 300 MG/1
TABLET ORAL
Qty: 45 TABLET | Refills: 3 | Status: SHIPPED | OUTPATIENT
Start: 2018-05-04 | End: 2019-06-21 | Stop reason: SDUPTHER

## 2018-05-08 ENCOUNTER — DOCUMENTATION ONLY (OUTPATIENT)
Dept: FAMILY MEDICINE | Facility: CLINIC | Age: 83
End: 2018-05-08

## 2018-05-08 NOTE — PROGRESS NOTES
Health Maintenance Due   Topic Date Due    Pneumococcal (65+) (2 of 2 - PPSV23) 01/24/2018    DEXA SCAN  05/21/2018

## 2018-05-09 ENCOUNTER — OFFICE VISIT (OUTPATIENT)
Dept: FAMILY MEDICINE | Facility: CLINIC | Age: 83
End: 2018-05-09
Payer: MEDICARE

## 2018-05-09 VITALS
HEART RATE: 85 BPM | TEMPERATURE: 99 F | DIASTOLIC BLOOD PRESSURE: 75 MMHG | BODY MASS INDEX: 19.68 KG/M2 | HEIGHT: 68 IN | SYSTOLIC BLOOD PRESSURE: 127 MMHG | WEIGHT: 129.88 LBS | OXYGEN SATURATION: 98 % | RESPIRATION RATE: 16 BRPM

## 2018-05-09 DIAGNOSIS — M13.141 MONOARTHRITIS OF RIGHT HAND: ICD-10-CM

## 2018-05-09 DIAGNOSIS — R00.2 PALPITATIONS: Primary | ICD-10-CM

## 2018-05-09 DIAGNOSIS — E03.9 HYPOTHYROIDISM, UNSPECIFIED TYPE: ICD-10-CM

## 2018-05-09 LAB
TSH SERPL DL<=0.005 MIU/L-ACNC: 2.95 UIU/ML
URATE SERPL-MCNC: 6.3 MG/DL

## 2018-05-09 PROCEDURE — 99499 UNLISTED E&M SERVICE: CPT | Mod: S$GLB,,, | Performed by: INTERNAL MEDICINE

## 2018-05-09 PROCEDURE — 84443 ASSAY THYROID STIM HORMONE: CPT

## 2018-05-09 PROCEDURE — 3074F SYST BP LT 130 MM HG: CPT | Mod: CPTII,S$GLB,, | Performed by: INTERNAL MEDICINE

## 2018-05-09 PROCEDURE — 93000 ELECTROCARDIOGRAM COMPLETE: CPT | Mod: S$GLB,,, | Performed by: INTERNAL MEDICINE

## 2018-05-09 PROCEDURE — 84550 ASSAY OF BLOOD/URIC ACID: CPT

## 2018-05-09 PROCEDURE — 3078F DIAST BP <80 MM HG: CPT | Mod: CPTII,S$GLB,, | Performed by: INTERNAL MEDICINE

## 2018-05-09 PROCEDURE — 99214 OFFICE O/P EST MOD 30 MIN: CPT | Mod: S$GLB,,, | Performed by: INTERNAL MEDICINE

## 2018-05-09 RX ORDER — LEVOTHYROXINE SODIUM 175 UG/1
175 TABLET ORAL DAILY
Qty: 30 TABLET | Refills: 11 | Status: SHIPPED | OUTPATIENT
Start: 2018-05-09 | End: 2019-06-21

## 2018-05-09 RX ORDER — LEVOTHYROXINE SODIUM 175 UG/1
175 TABLET ORAL DAILY
Qty: 30 TABLET | Refills: 11 | Status: SHIPPED | OUTPATIENT
Start: 2018-05-09 | End: 2018-05-09 | Stop reason: SDUPTHER

## 2018-05-09 NOTE — PROGRESS NOTES
Subjective:       Patient ID: Lorena Vallejo is a 85 y.o. female.    Chief Complaint: Follow-up (no concerns)    HPI     Weaning off paxil, doing better.     Didn't get uric acid level.      Palpitations  CHIEF :COMPLAINT: palpitations  HPI: rapid heart rate.  Has had PSVT in the past but it was always regular.  This is irregularly irregular.  The patient is a former nurse  ONSET:  One month  DURATION: .  2 times, longest one hour  QUALITY/COURSE: .  Unchanged    INTENSITY/SEVERITY: #  7    /10 (on 1 to 10 scale)  CONTEXT/WHEN:   The below section is positive for the patient ONLY if BOLDED:    MODIFIERS/TREATMENTS: Taking Medications: ? .     Compliant with Taking Prescribed Medications: ? yes  . Prior X-Rays: no  Prior Labs: no    Movements or activities that worsen the problem:   caffeine, decongestants, exercise, stress/anxiety, thyroid medication.    Movements, medications, or activities that ease the problem:  cough, Valsalva, digoxin, beta blocker, calcium channel blocker        SYMPTOMS/RELATED: chest pain, dizziness, a fluttering sensation, shortness of breath, a skipping sensation,syncope, abdominal pain,  leg swelling.              CHIEF COMPLAINT: Upper_Extremity_Problems. Pain: yes.. Weakness: no . Injury: no .  HPI:    ONSET/TIMING: . Onset  1 mo ago.  Gradual. Work related: no.  Similar_problems_in past: no.     DURATION: .          Paroxysmal: no .    QUALITY/COURSE:  unchanged     LOCATION:  Still has swelling and pain in rmf pip jt.   .  . Radiation: no.    INTENSITY/SEVERITY:. Severity is # 3   (10 point scale).    CONTEXT/WHEN: . Date_Expected_Work_Return is . Activity: yes. . Abduction greater than 90 degrees: no.. Inactivity: no      MODIFIERS/TREATMENTS:  Current_Limitations_are.        Taking medications: no.  Physical_Therapy: no .  Chiropractor: no . . Litigation_pending: no. . X-rays: no.. CT: no.. MRI: no.    SYMPTOMS/RELATED: . --Possible medication side effects include:.     The following  "symptoms are positive if BOLD, negative otherwise.       REVIEW OF SYMPTOMS:   Numbness. Weakness. Muscle_Problems. Fever. Joint_Problems. Swelling. Erythema. Weight_loss.    Review of Systems    Objective:      Vitals:    05/09/18 1459   BP: 127/75   Pulse: 85   Resp: 16   Temp: 98.7 °F (37.1 °C)   TempSrc: Oral   SpO2: 98%   Weight: 58.9 kg (129 lb 13.6 oz)   Height: 5' 8" (1.727 m)   PainSc: 0-No pain     Physical Exam   Constitutional: She appears well-developed and well-nourished.   Cardiovascular: Normal rate, regular rhythm and normal heart sounds.    Pulmonary/Chest: Effort normal and breath sounds normal.   Abdominal: Soft. There is no tenderness.   Musculoskeletal:   Right middle finger PIP joint is markedly arthritic, with bony enlargement and has decreased range of motion   Neurological: She is alert.   Psychiatric: She has a normal mood and affect. Her behavior is normal. Thought content normal.   Nursing note and vitals reviewed.        Assessment:       1. Palpitations    2. Monoarthritis of right hand    3. Hypothyroidism, unspecified type          Plan:     Palpitations  -     Cardiac event monitor; Future  -     Ambulatory consult to Cardiology    Monoarthritis of right hand  -     Ambulatory consult to Occupational Therapy  -     Uric acid; Future; Expected date: 05/09/2018    Hypothyroidism, unspecified type  -     TSH; Future; Expected date: 05/09/2018  -     Discontinue: levothyroxine (SYNTHROID, LEVOTHROID) 175 MCG tablet; Take 1 tablet (175 mcg total) by mouth once daily.  Dispense: 30 tablet; Refill: 11  -     TSH; Future; Expected date: 06/23/2018  -     levothyroxine (SYNTHROID, LEVOTHROID) 175 MCG tablet; Take 1 tablet (175 mcg total) by mouth once daily. Needs to branded.  Dispense: 30 tablet; Refill: 11      Follow-up in about 6 weeks (around 6/20/2018).      "

## 2018-05-25 ENCOUNTER — TELEPHONE (OUTPATIENT)
Dept: FAMILY MEDICINE | Facility: CLINIC | Age: 83
End: 2018-05-25

## 2018-05-25 NOTE — TELEPHONE ENCOUNTER
----- Message from RT Melony sent at 5/25/2018  1:56 PM CDT -----  Contact: pt    pt , requesting a call back soon to schedule the cardiac event monitor appt, thanks.

## 2018-06-19 ENCOUNTER — DOCUMENTATION ONLY (OUTPATIENT)
Dept: FAMILY MEDICINE | Facility: CLINIC | Age: 83
End: 2018-06-19

## 2018-06-20 ENCOUNTER — TELEPHONE (OUTPATIENT)
Dept: FAMILY MEDICINE | Facility: CLINIC | Age: 83
End: 2018-06-20

## 2018-06-20 ENCOUNTER — OFFICE VISIT (OUTPATIENT)
Dept: FAMILY MEDICINE | Facility: CLINIC | Age: 83
End: 2018-06-20
Payer: MEDICARE

## 2018-06-20 VITALS
BODY MASS INDEX: 20.28 KG/M2 | DIASTOLIC BLOOD PRESSURE: 86 MMHG | WEIGHT: 133.81 LBS | RESPIRATION RATE: 16 BRPM | HEIGHT: 68 IN | OXYGEN SATURATION: 98 % | TEMPERATURE: 98 F | HEART RATE: 77 BPM | SYSTOLIC BLOOD PRESSURE: 132 MMHG

## 2018-06-20 DIAGNOSIS — L29.9 EAR ITCHING: ICD-10-CM

## 2018-06-20 DIAGNOSIS — E03.9 HYPOTHYROIDISM, UNSPECIFIED TYPE: Primary | ICD-10-CM

## 2018-06-20 DIAGNOSIS — Z78.0 ASYMPTOMATIC MENOPAUSAL STATE: ICD-10-CM

## 2018-06-20 LAB — TSH SERPL DL<=0.005 MIU/L-ACNC: 2.55 UIU/ML

## 2018-06-20 PROCEDURE — 84443 ASSAY THYROID STIM HORMONE: CPT

## 2018-06-20 PROCEDURE — 99499 UNLISTED E&M SERVICE: CPT | Mod: S$GLB,,, | Performed by: INTERNAL MEDICINE

## 2018-06-20 PROCEDURE — 3079F DIAST BP 80-89 MM HG: CPT | Mod: CPTII,S$GLB,, | Performed by: INTERNAL MEDICINE

## 2018-06-20 PROCEDURE — 99213 OFFICE O/P EST LOW 20 MIN: CPT | Mod: S$GLB,,, | Performed by: INTERNAL MEDICINE

## 2018-06-20 PROCEDURE — 3075F SYST BP GE 130 - 139MM HG: CPT | Mod: CPTII,S$GLB,, | Performed by: INTERNAL MEDICINE

## 2018-06-20 RX ORDER — BACLOFEN 10 MG/1
10 TABLET ORAL NIGHTLY
COMMUNITY
End: 2019-06-21

## 2018-06-20 NOTE — PROGRESS NOTES
"Subjective:       Patient ID: Lorena Vallejo is a 85 y.o. female.    Chief Complaint: ear itching    HPI     CHIEF :COMPLAINT: hypothyroidism    HPI: feels worse on synthroid. bp up, gaining weight. Eating less.      ONSET:     20 y       ago.   DURATION: . continuous  QUALITY/COURSE: .  worse  INTENSITY/SEVERITY: # 9   /10 (on 1 to 10 scale)  MODIFIERS/TREATMENTS: Taking Medications: yes.     Prior Labs:   Lab Results   Component Value Date    TSH 2.953 05/09/2018     Thyroid scans:  no}  Ultrasound: .no      The below section is positive for the patient ONLY if BOLDED:      SYMPTOMS/RELATED:  Decrease in energy, diarrhea, constipation,  heat_intolerance,  Cold_intolerance, Nervousness, Palpitations. Hair_loss. Myalgias. Weight_loss, Weight_gain .              CHIEF COMPLAINT: Ear problems: Pain: no:   Hearing loss:no  HPI: itching, wakes her up.      ONSET/TIMING:      ago.     DURATION: intermittant    QUALITY/COURSE:   unchanged     LOCATION: -- Left > right       Radiation:   .    INTENSITY/SEVERITY:     5 /10 (on 0 to 10 scale)       CONTEXT/WHEN: .--Similar problems in past: no  . Recent failed treatment for ear infection: no      The following symptoms are positive if BOLD, negative otherwise.    MODIFIERS:  Ear motion. Chewing.  Swallowing: ..  TREATMENTS: Analgesics:  Antibiotics:. Decongestants:     SYMPTOMS/RELATED: . Malaise.   Lymphadenitis . Weight_Loss  .     Review of Systems      Objective:      Vitals:    06/20/18 0921   BP: 132/86   Pulse: 77   Resp: 16   Temp: 98.2 °F (36.8 °C)   TempSrc: Oral   SpO2: 98%   Weight: 60.7 kg (133 lb 13.1 oz)   Height: 5' 8" (1.727 m)   PainSc:   3   PainLoc: Hand     Physical Exam   Constitutional: She appears well-developed and well-nourished.   HENT:   Right Ear: External ear normal.   Left Ear: External ear normal.   Tympanic membranes normal bilaterally   Cardiovascular: Normal rate, regular rhythm and normal heart sounds.    Pulmonary/Chest: Effort normal and " breath sounds normal.   Abdominal: Soft. There is no tenderness.   Neurological: She is alert.   Psychiatric: She has a normal mood and affect. Her behavior is normal. Thought content normal.   Nursing note and vitals reviewed.        Assessment:       1. Hypothyroidism, unspecified type    2. Ear itching          Suspect the patient is ALLERGIC to latex or the material that the hearing aid is made out of.  Plan:   The patient is miserable on the Synthroid.  If she is hypothyroid on the present dose will increase the Synthroid otherwise will put her back on the old dose of [place] Thyroid    Hypothyroidism, unspecified type  -     TSH; Future; Expected date: 06/20/2018    Ear itching      Follow-up in about 3 months (around 9/20/2018).

## 2018-06-20 NOTE — PATIENT INSTRUCTIONS
Increase the Flonase to the ears to twice a day..  Do not wear the hearing aid in the left ear for 3 days.  Replace the tip of your hearing aid with a hypoallergenic tip.

## 2018-06-22 DIAGNOSIS — E03.9 HYPOTHYROIDISM, UNSPECIFIED TYPE: ICD-10-CM

## 2018-06-22 NOTE — TELEPHONE ENCOUNTER
----- Message from Veronica Mcgregor sent at 6/22/2018  9:21 AM CDT -----  Contact: pt  Pt is calling to speak to nurse to check on the status of her refill for(  ARMOUR THYROID 300 mg Tab,  pharmacy said it was never called in, please call pt to advise  Call Back   Thanks        [facility] [id number] - [place] - [id number] AT [facility]  [facility] [place] [address]  Phone: [phone] Fax: [phone]

## 2018-07-09 ENCOUNTER — TELEPHONE (OUTPATIENT)
Dept: FAMILY MEDICINE | Facility: CLINIC | Age: 83
End: 2018-07-09

## 2018-07-09 NOTE — TELEPHONE ENCOUNTER
----- Message from Purnima eSals sent at 7/9/2018 10:05 AM CDT -----  Type:  Sooner Apoointment Request    Caller is requesting a sooner appointment.  Caller declined first available appointment listed below.  Caller will not accept being placed on the waitlist and is requesting a message be sent to doctor.    Name of Caller: patient  When is the first available appointment?  8/7/18  Symptoms:  Hospital discharge follow up/[facility]  Best Call Back Number: [phone] (home)     Additional Information:  Wanted to be seen this Thursday/Friday, 7/12-7/13 for hospital follow up

## 2018-07-13 ENCOUNTER — DOCUMENTATION ONLY (OUTPATIENT)
Dept: FAMILY MEDICINE | Facility: CLINIC | Age: 83
End: 2018-07-13

## 2018-07-13 ENCOUNTER — OFFICE VISIT (OUTPATIENT)
Dept: FAMILY MEDICINE | Facility: CLINIC | Age: 83
End: 2018-07-13
Payer: MEDICARE

## 2018-07-13 VITALS
WEIGHT: 128.5 LBS | TEMPERATURE: 98 F | DIASTOLIC BLOOD PRESSURE: 72 MMHG | HEART RATE: 98 BPM | HEIGHT: 68 IN | OXYGEN SATURATION: 96 % | BODY MASS INDEX: 19.48 KG/M2 | SYSTOLIC BLOOD PRESSURE: 120 MMHG

## 2018-07-13 DIAGNOSIS — R07.9 CHEST PAIN, UNSPECIFIED TYPE: Primary | ICD-10-CM

## 2018-07-13 DIAGNOSIS — R07.9 CHEST PAIN, UNSPECIFIED TYPE: ICD-10-CM

## 2018-07-13 DIAGNOSIS — J18.9 PNEUMONIA OF RIGHT LOWER LOBE DUE TO INFECTIOUS ORGANISM: ICD-10-CM

## 2018-07-13 PROCEDURE — 3078F DIAST BP <80 MM HG: CPT | Mod: CPTII,S$GLB,, | Performed by: INTERNAL MEDICINE

## 2018-07-13 PROCEDURE — 99499 UNLISTED E&M SERVICE: CPT | Mod: S$GLB,,, | Performed by: INTERNAL MEDICINE

## 2018-07-13 PROCEDURE — 99214 OFFICE O/P EST MOD 30 MIN: CPT | Mod: S$GLB,,, | Performed by: INTERNAL MEDICINE

## 2018-07-13 PROCEDURE — 3074F SYST BP LT 130 MM HG: CPT | Mod: CPTII,S$GLB,, | Performed by: INTERNAL MEDICINE

## 2018-07-13 RX ORDER — NITROGLYCERIN 0.4 MG/1
0.4 TABLET SUBLINGUAL EVERY 5 MIN PRN
Qty: 20 TABLET | Refills: 1 | Status: SHIPPED | OUTPATIENT
Start: 2018-07-13 | End: 2018-07-13 | Stop reason: SDUPTHER

## 2018-07-13 RX ORDER — IPRATROPIUM BROMIDE AND ALBUTEROL SULFATE 2.5; .5 MG/3ML; MG/3ML
SOLUTION RESPIRATORY (INHALATION)
Refills: 0 | COMMUNITY
Start: 2018-07-05 | End: 2019-07-18

## 2018-07-13 RX ORDER — NAPROXEN SODIUM 220 MG/1
81 TABLET, FILM COATED ORAL DAILY
Start: 2018-07-13 | End: 2019-06-21

## 2018-07-13 RX ORDER — NITROGLYCERIN 0.4 MG/1
TABLET SUBLINGUAL
Qty: 275 TABLET | Refills: 1 | Status: SHIPPED | OUTPATIENT
Start: 2018-07-13 | End: 2021-07-20

## 2018-07-13 NOTE — PATIENT INSTRUCTIONS
If you get a chest pain last more than 5 minutes taken nitroglycerin and some Mylanta.  If it lasts more than 20 minutes call 911

## 2018-07-13 NOTE — PROGRESS NOTES
"Subjective:       Patient ID: Lorena Vallejo is a 85 y.o. female.    Chief Complaint: Hospital Follow Up (pneumonia)    HPI         CHIEF COMPLAINT: CHEST PAIN    HPI: found at [facility] to have pneumonia    ONSET/TIMIN wk    Ago sudden    DURATION:  5 min  QUALITY/COURSE:  unchanged.    SEVERITY: #    9 /10 ( on 1 to 10 scale)    PREVIOUS CARDIAC TESTING: : electrocardiogram (ECG). No stress test done, sees cardiologist in 3 d.     LOCATION:  substernal   Radiation -  neck    All choices for next 4 sections below are positive to the patient ONLY if BOLDED, otherwise negative:    AGGRAVATING FACTORS: Swallowing, , Deep_Breathing, Coughing, Neck/Arm/Chest_Movement     RELIEVING FACTORS: Nitroglycerin, Resting, Change_of_Position    ASSOCIATED SYMPTOMS:  Hemoptysis,Tenderness,  Leg_Pain    RISK FACTORS: Diabetes, HTN, Hyperlipidemia, smoking,                     Review of Systems   Constitutional: Positive for fatigue. Negative for diaphoresis, fever and unexpected weight change.   HENT: Negative for trouble swallowing.    Respiratory: Positive for cough, chest tightness and shortness of breath. Negative for wheezing.    Cardiovascular: Positive for chest pain (for 5 mins. ) and palpitations (atrial fib). Negative for leg swelling.   Gastrointestinal: Positive for nausea. Negative for vomiting.   Neurological: Positive for dizziness and weakness. Negative for syncope, light-headedness and numbness.   Psychiatric/Behavioral: Negative for dysphoric mood. The patient is nervous/anxious.          Objective:      Vitals:    18 1418   BP: 120/72   Pulse: 98   Temp: 98.1 °F (36.7 °C)   TempSrc: Oral   SpO2: 96%   Weight: 58.3 kg (128 lb 8.5 oz)   Height: 5' 8" (1.727 m)   PainSc: 0-No pain     Physical Exam   Constitutional: She appears well-developed and well-nourished.   Cardiovascular: Normal rate, regular rhythm and normal heart sounds.    Pulmonary/Chest: Effort normal. She has rales (and egophony in the right base). "   Abdominal: Soft. There is no tenderness.   Neurological: She is alert.   Psychiatric: She has a normal mood and affect. Her behavior is normal. Thought content normal.   Nursing note and vitals reviewed.      chest x-ray shows slight worsening.  Assessment:       1. Chest pain, unspecified type    2. Pneumonia of right lower lobe due to infectious organism          Plan:       Chest pain, unspecified type  -     Discontinue: nitroGLYCERIN (NITROSTAT) 0.4 MG SL tablet; Place 1 tablet (0.4 mg total) under the tongue every 5 (five) minutes as needed for Chest pain.  Dispense: 20 tablet; Refill: 1  -     aspirin 81 MG Chew; Take 1 tablet (81 mg total) by mouth once daily.  -     nitroGLYCERIN (NITROSTAT) 0.4 MG SL tablet; Place 1 tablet (0.4 mg total) under the tongue every 5 (five) minutes as needed for Chest pain.  Dispense: 20 tablet; Refill: 1    Pneumonia of right lower lobe due to infectious organism  -     Cancel: X-Ray Chest PA And Lateral; Future; Expected date: 07/13/2018  -     Cancel: X-Ray Chest PA And Lateral; Future; Expected date: 08/27/2018  -     X-Ray Chest PA And Lateral; Future; Expected date: 07/13/2018  -     X-Ray Chest PA And Lateral; Future; Expected date: 08/27/2018      Follow-up in about 6 weeks (around 8/24/2018).

## 2018-07-24 ENCOUNTER — DOCUMENTATION ONLY (OUTPATIENT)
Dept: FAMILY MEDICINE | Facility: CLINIC | Age: 83
End: 2018-07-24

## 2018-07-25 ENCOUNTER — OFFICE VISIT (OUTPATIENT)
Dept: FAMILY MEDICINE | Facility: CLINIC | Age: 83
End: 2018-07-25
Payer: MEDICARE

## 2018-07-25 VITALS
RESPIRATION RATE: 16 BRPM | BODY MASS INDEX: 19.32 KG/M2 | HEIGHT: 68 IN | SYSTOLIC BLOOD PRESSURE: 112 MMHG | DIASTOLIC BLOOD PRESSURE: 82 MMHG | OXYGEN SATURATION: 98 % | TEMPERATURE: 98 F | WEIGHT: 127.44 LBS | HEART RATE: 92 BPM

## 2018-07-25 DIAGNOSIS — J18.9 PNEUMONIA OF RIGHT LOWER LOBE DUE TO INFECTIOUS ORGANISM: Primary | ICD-10-CM

## 2018-07-25 PROCEDURE — 3079F DIAST BP 80-89 MM HG: CPT | Mod: CPTII,S$GLB,, | Performed by: INTERNAL MEDICINE

## 2018-07-25 PROCEDURE — 99213 OFFICE O/P EST LOW 20 MIN: CPT | Mod: S$GLB,,, | Performed by: INTERNAL MEDICINE

## 2018-07-25 PROCEDURE — 3074F SYST BP LT 130 MM HG: CPT | Mod: CPTII,S$GLB,, | Performed by: INTERNAL MEDICINE

## 2018-07-25 RX ORDER — CEFUROXIME AXETIL 500 MG/1
1 TABLET ORAL 2 TIMES DAILY
Refills: 0 | COMMUNITY
Start: 2018-07-18 | End: 2018-08-27 | Stop reason: SDUPTHER

## 2018-07-25 NOTE — PROGRESS NOTES
"Subjective:       Patient ID: Lorena Vallejo is a 85 y.o. female.    Chief Complaint: Hospital Follow Up; Cough; and Pneumonia    HPI         CHIEF COMPLAINT: Cough(+).  HPI: 2 admissions for pneumonia. ? Mass on CT.  Radiologist thought it could also be an area of necrosis inside of the pneumonia.    ONSET/TIMING:   3 mo  ago    DURATION:               Paroxysmal: no .    QUALITY/COURSE:. better    INTENSITY/SEVERITY: Severity is #  2 (10 point scale)      The following symptoms/statements are positive if BOLD, negative otherwise.      CONTEXT/WHEN:  Tobacco_use. Smokers_in_home. Seasonal_pattern. Allergies/Hayfever. Sinusitis. Irritant_Exposure(smoke/dust/fumes). Exposure_to_others_with_similar_symptoms.        Similar_problems_in_past.   PAST TREATMENT OR EVALUATION:   previous PPD. Recent_previous_chest_x-ray. Recent_antibiotics. Ceftin, still taking  Associated Symptoms:     sputum production: scant. copious. Hemoptysis.  Medical History: Past_pulmonary_infections.  Cardiovascular_disease.chronic_lung_disease.  tuberculosis. Asthma. AIDS. Gastroesophageal_reflux_disease .    Patient usually presented with chest pain and was found to have a pneumonia.  She has referred to the cardiologist.      Review of Systems   Constitutional: Positive for fatigue. Negative for chills, diaphoresis, fever and unexpected weight change.   HENT: Negative for rhinorrhea, sinus pressure and sore throat.    Respiratory: Positive for cough. Negative for shortness of breath and wheezing.    Cardiovascular: Negative for chest pain.   Musculoskeletal: Negative for myalgias.         Objective:      Vitals:    07/25/18 1014   BP: 112/82   Pulse: 92   Resp: 16   Temp: 97.9 °F (36.6 °C)   TempSrc: Oral   SpO2: 98%   Weight: 57.8 kg (127 lb 6.8 oz)   Height: 5' 8" (1.727 m)   PainSc: 0-No pain     Physical Exam   Constitutional: She appears well-developed and well-nourished.   Cardiovascular: Normal rate, regular rhythm and normal heart sounds. "    Pulmonary/Chest: Effort normal and breath sounds normal.   Abdominal: Soft. There is no tenderness.   Neurological: She is alert.   Psychiatric: She has a normal mood and affect. Her behavior is normal. Thought content normal.   Nursing note and vitals reviewed.        Assessment:       No diagnosis found.      Plan:       There are no diagnoses linked to this encounter.  No Follow-up on file.

## 2018-07-26 ENCOUNTER — PES CALL (OUTPATIENT)
Dept: ADMINISTRATIVE | Facility: CLINIC | Age: 83
End: 2018-07-26

## 2018-07-31 ENCOUNTER — TELEPHONE (OUTPATIENT)
Dept: HEMATOLOGY/ONCOLOGY | Facility: CLINIC | Age: 83
End: 2018-07-31

## 2018-07-31 NOTE — TELEPHONE ENCOUNTER
DR GILMORE SAW THIS PT IN CONSULT AS [facility] FOR LUNG MASS. RECORDS SHOW LESS LIKELY MALIGNANCY BUT CANNOT BE EXCLUDED. PER DR GILMORE, PT WILL FOLLOW UP W/ DR CLANCY AS OUTPT AND DOES NOT NEED A F/U HERE UNLESS THIS MASS IS INDEED CANCER.

## 2018-08-09 DIAGNOSIS — E78.5 HYPERLIPIDEMIA, UNSPECIFIED HYPERLIPIDEMIA TYPE: Primary | ICD-10-CM

## 2018-08-10 ENCOUNTER — OFFICE VISIT (OUTPATIENT)
Dept: CARDIOLOGY | Facility: CLINIC | Age: 83
End: 2018-08-10
Payer: MEDICARE

## 2018-08-10 VITALS
HEART RATE: 73 BPM | WEIGHT: 129.44 LBS | HEIGHT: 68 IN | SYSTOLIC BLOOD PRESSURE: 120 MMHG | DIASTOLIC BLOOD PRESSURE: 67 MMHG | BODY MASS INDEX: 19.62 KG/M2 | OXYGEN SATURATION: 96 %

## 2018-08-10 DIAGNOSIS — E78.5 HYPERLIPIDEMIA, UNSPECIFIED HYPERLIPIDEMIA TYPE: ICD-10-CM

## 2018-08-10 DIAGNOSIS — R07.9 CHEST PAIN, UNSPECIFIED TYPE: Primary | ICD-10-CM

## 2018-08-10 PROCEDURE — 99214 OFFICE O/P EST MOD 30 MIN: CPT | Mod: S$GLB,,, | Performed by: INTERNAL MEDICINE

## 2018-08-10 PROCEDURE — 3078F DIAST BP <80 MM HG: CPT | Mod: CPTII,S$GLB,, | Performed by: INTERNAL MEDICINE

## 2018-08-10 PROCEDURE — 99999 PR PBB SHADOW E&M-EST. PATIENT-LVL III: CPT | Mod: PBBFAC,,, | Performed by: INTERNAL MEDICINE

## 2018-08-10 PROCEDURE — 93000 ELECTROCARDIOGRAM COMPLETE: CPT | Mod: S$GLB,,, | Performed by: INTERNAL MEDICINE

## 2018-08-10 PROCEDURE — 3074F SYST BP LT 130 MM HG: CPT | Mod: CPTII,S$GLB,, | Performed by: INTERNAL MEDICINE

## 2018-08-10 NOTE — LETTER
August 10, 2018      Stephon Cates MD  [address]  [place] [address]           [facility] [facility]  [address]  [place] [address]  Phone: [phone]          Patient: Lorena Vallejo   MR Number: [id number]   YOB: 1933   Date of Visit: 8/10/2018       Dear Dr. Stephon Cates:    Thank you for referring Lorena Vallejo to me for evaluation. Attached you will find relevant portions of my assessment and plan of care.    If you have questions, please do not hesitate to call me. I look forward to following Lorena Vallejo along with you.    Sincerely,    Justo Bragg MD    Enclosure  CC:  No Recipients    If you would like to receive this communication electronically, please contact [email or web address] or [phone] to request more information on [facility] access.    For providers and/or their staff who would like to refer a patient to [facility], please contact us through our one-stop-shop provider referral line, [facility] , at [phone].    If you feel you have received this communication in error or would no longer like to receive these types of communications, please e-mail [email or web address]

## 2018-08-10 NOTE — PROGRESS NOTES
[facility]    CC:  Chest pain  Chief Complaint   Patient presents with    Consult     Dr. Cates/    PSVT       Patient ID: Lorena Vallejo is a 85 y.o. female with a past medical history of paroxysmal SVT, prior TIA  HPI  She was recently at [facility] with a pneumonia.  There is mass in the right lower lung which is under investigation.  A few days back she had an episode of chest pain at rest radiating up her jaw and down her left arm.  She has never had this pain before.  She is active and walks her dog.  She denies any exertional chest pain or shortness of breath  Past Medical History:   Diagnosis Date    Arthritis     Blood transfusion     Bowel obstruction     X 3    Cancer     MELANOMA RIGHT EYE    Cancer of eyeball, except conjunctiva, cornea, retina, and choroid right    Diverticulitis     GERD (gastroesophageal reflux disease)     Hypertension     Peripheral neuropathy     PSVT (paroxysmal supraventricular tachycardia)     HAD X 3 OVER 10 YRS. LAST 7-25-12. HAD ADENOSINE IN [facility] ER. WAS ON INCREASED THYROID MED. NO PROBLEM SINCE    Salmonella     Short bowel syndrome     Thyroid disease     Transient ischemic attack     UTI (urinary tract infection)      Past Surgical History:   Procedure Laterality Date    ABDOMINAL SURGERY      X 3. ILEUM REMOVED. OSTOMY PLACED AND CLOSED.    ADENOIDECTOMY      COLONOSCOPY      COLONOSCOPY N/A 8/24/2016    Procedure: COLONOSCOPY;  Surgeon: Alex Corral MD;  Location: [facility];  Service: Endoscopy;  Laterality: N/A;    CTR      BILAT    EYE SURGERY      RIGHT  - LASER MELANOMA; BILAT CATARACT    HYSTERECTOMY      BSO    JOINT REPLACEMENT      RIGHT KNEE    MANDIBLE FRACTURE SURGERY      MENISCECTOMY      MEDIAL AND LATERAL REPAIR, BAKERS CYST    TENDON REPAIR      LEFT THUMB    TONSILLECTOMY      T AND A     Social History     Social History    Marital status:      Spouse name: N/A    Number of children: N/A     Years of education: N/A     Occupational History    Not on file.     Social History Main Topics    Smoking status: Former Smoker     Packs/day: 1.00     Years: 18.00     Quit date: 2/13/1973    Smokeless tobacco: Never Used    Alcohol use Yes      Comment: WINE once per day    Drug use: No    Sexual activity: Not on file     Other Topics Concern    Not on file     Social History Narrative    No narrative on file     Family History   Problem Relation Age of Onset    Coronary artery disease Other     Hypertension Other     Stroke Other     Diabetes Other     COPD Other        Review of patient's allergies indicates:   Allergen Reactions    Flagyl [metronidazole hcl] Diarrhea     SEVERE DIARRHEA    Latex, natural rubber Rash     ITCHING    Levaquin [levofloxacin] Diarrhea     MOOD CHANGES, WEAKNESS    Morphine Other (See Comments)     HALLUCINATES    Augmentin [amoxicillin-pot clavulanate] Other (See Comments)     AGITATION    Ciprofloxacin Anxiety    Codeine Nausea And Vomiting    Demerol [meperidine] Nausea And Vomiting     CAN TAKE WITH PHENERGAN    Dilaudid [hydromorphone (bulk)] Itching     OK WITH BENADRYL    Fentanyl Itching     OK WITH BENADRYL    Fosfomycin      diarrhia    Naproxen Other (See Comments)     HURT STOMACH    Nsaids (non-steroidal anti-inflammatory drug) Other (See Comments)     TOLD NOT TO TAKE AS HAD AN ULCER. HAD BLEEDING    Percocet [oxycodone-acetaminophen] Rash    Prednisone Other (See Comments)     AGITATION    Reglan [metoclopramide hcl] Anxiety    Sulfa (sulfonamide antibiotics) Rash    Vicodin [hydrocodone-acetaminophen] Rash    Zosyn [piperacillin-tazobactam] Rash    Ambien [zolpidem]     Ibuprofen     Macrobid [nitrofurantoin monohyd/m-cryst]     Pcn [penicillins]     Vasotec [enalapril maleate] Other (See Comments)     DRY COUGH       Medication List with Changes/Refills   Current Medications    ACETAMINOPHEN (TYLENOL) 500 MG TABLET    Take  1,000 mg by mouth every 6 (six) hours as needed.    ALBUTEROL-IPRATROPIUM (DUO-NEB) 2.5 MG-0.5 MG/3 ML NEBULIZER SOLUTION    USE 1 VIAL IN NEBULIZER Q 6 H    ARMOUR THYROID 300 MG TAB    TAKE ONE-HALF TABLET BY MOUTH ONCE DAILY    ASPIRIN 81 MG CHEW    Take 1 tablet (81 mg total) by mouth once daily.    BACLOFEN (LIORESAL) 10 MG TABLET    Take 10 mg by mouth every evening.    CEFUROXIME (CEFTIN) 500 MG TABLET    Take 1 tablet by mouth 2 (two) times daily.    COLCHICINE 0.6 MG TABLET    Take 1 tablet (0.6 mg total) by mouth once daily. The first day take 2 at once then 1 by mouth an hour later    CYANOCOBALAMIN 1,000 MCG/ML INJECTION    INJECT 2ML IMTRAMUSCULAR EVERY MONTH    DILTIAZEM (TIAZAC) 240 MG CS24    TAKE 1 CAPSULE ONE TIME DAILY    FISH OIL-OMEGA-3 FATTY ACIDS 300-1,000 MG CAPSULE    Take 2 g by mouth 2 (two) times daily.    INDOMETHACIN (INDOCIN) 25 MG CAPSULE    Take 1 capsule (25 mg total) by mouth 2 (two) times daily with meals.    LEVOTHYROXINE (SYNTHROID, LEVOTHROID) 175 MCG TABLET    Take 1 tablet (175 mcg total) by mouth once daily. Needs to branded.    LOPERAMIDE (IMODIUM) 2 MG CAPSULE    Take 2 mg by mouth 4 (four) times daily as needed for Diarrhea.    MAGNESIUM OXIDE (MAG-OX) 400 MG TABLET    Take 400 mg by mouth once daily.     METOPROLOL SUCCINATE (TOPROL-XL) 50 MG 24 HR TABLET    Take 1 tablet (50 mg total) by mouth once daily.    NITROGLYCERIN (NITROSTAT) 0.4 MG SL TABLET    DISSOLVE 1 TABLET UNDER THE TONGUE EVERY 5 MINUTES AS NEEDED FOR CHEST PAIN    OMEPRAZOLE (PRILOSEC) 20 MG CAPSULE    TAKE 1 CAPSULE EVERY DAY    PAROXETINE (PAXIL) 20 MG TABLET    TAKE 1 TABLET ONE TIME DAILY    RANITIDINE (ZANTAC) 150 MG CAPSULE    Take 1 capsule (150 mg total) by mouth 2 (two) times daily.    THYROID, PORK, (ARMOUR THYROID) 300 MG TAB    Take 150 mg by mouth once daily.    VIT C/E/ZN/COPPR/LUTEIN/ZEAXAN (PRESERVISION AREDS 2 ORAL)    Take by mouth 2 (two) times daily.    VITAMIN D 1000 UNITS TAB     "Take 1,000 Units by mouth once daily.       Review of Systems  Constitution: Denies chills, fever, and sweats.  HENT: Denies headaches or blurry vision.  Cardiovascular: Denies chest pain or irregular heart beat.  Respiratory: Denies cough or shortness of breath.  Gastrointestinal: Denies abdominal pain, nausea, or vomiting.  Musculoskeletal: Denies muscle cramps.  Neurological: Denies dizziness or focal weakness.  Psychiatric/Behavioral: Normal mental status.  Hematologic/Lymphatic: Denies bleeding problem or easy bruising/bleeding.  Skin: Denies rash or suspicious lesions    Physical Examination  /67 (BP Location: Left arm, Patient Position: Sitting)   Pulse 73   Ht 5' 8" (1.727 m)   Wt 58.7 kg (129 lb 6.6 oz)   SpO2 96%   BMI 19.68 kg/m²     Constitutional: No acute distress, conversant  HEENT: Sclera anicteric, Pupils equal, round and reactive to light, extraocular motions intact, Oropharynx clear  Neck: No JVD, no carotid bruits  Cardiovascular: regular rate and rhythm, no murmur, rubs or gallops, normal S1/S2  Pulmonary: Clear to auscultation bilaterally  Abdominal: Abdomen soft, nontender, nondistended, positive bowel sounds  Extremities: No lower extremity edema,   Pulses:  Carotid pulses are 2+ on the right side, and 2+ on the left side.  Radial pulses are 2+ on the right side, and 2+ on the left side.      Skin: No ecchymosis, erythema, or ulcers  Psych: Alert and oriented x 3, appropriate affect  Neuro: CNII-XII intact, no focal deficits    Labs:  Most Recent Data  CBC:   Lab Results   Component Value Date    WBC 4.69 04/04/2018    HGB 12.0 04/04/2018    HCT 38.0 04/04/2018     04/04/2018     (H) 04/04/2018    RDW 13.3 04/04/2018     BMP:   Lab Results   Component Value Date     04/04/2018    K 4.9 04/04/2018     (H) 04/04/2018    CO2 24 04/04/2018    BUN 19 04/04/2018    CREATININE 0.9 04/04/2018    GLU 78 04/04/2018    CALCIUM 9.4 04/04/2018    MG 2.2 01/21/2016 "     LFTS;   Lab Results   Component Value Date    PROT 6.9 04/04/2018    ALBUMIN 3.5 04/04/2018    BILITOT 0.5 04/04/2018    AST 18 04/04/2018    ALKPHOS 87 04/04/2018    ALT 15 04/04/2018     COAGS: No results found for: INR, PROTIME, PTT  FLP:   Lab Results   Component Value Date    CHOL 141 04/04/2018    HDL 64 04/04/2018    LDLCALC 48.8 (L) 04/04/2018    TRIG 141 04/04/2018    CHOLHDL 45.4 04/04/2018     CARDIAC: No results found for: TROPONINI, CKMB, BNP    Imaging:    EKG:  Normal sinus rhythm no significant ST T wave changes      I have personally reviewed these images and echo data    Assessment/Plan:  Lorena was seen today for consult and psvt.    Diagnoses and all orders for this visit:    Chest pain, unspecified type  -     Stress test with myocardial perfusion ([facility] ONLY[facility], [place], [place], [place]); Future  -     Transthoracic echo (TTE) complete; Future    Hyperlipidemia, unspecified hyperlipidemia type  -     EKG 12-lead       further investigation would be based on the results of the stress test and echocardiography.  In addition she is getting further evaluation for the lung mass  .  If her stress test is positive and she needs an angiogram I would need to know the possible treatment strategies further tumor and specially if it is found to be malignant      Follow-up in about 4 weeks (around 9/7/2018).                Justo Bragg MD,MRCP,RPVI,FACC,FSCAI.  Interventional Cardiology   Phone [phone]

## 2018-08-22 ENCOUNTER — TELEPHONE (OUTPATIENT)
Dept: FAMILY MEDICINE | Facility: CLINIC | Age: 83
End: 2018-08-22

## 2018-08-22 NOTE — TELEPHONE ENCOUNTER
Spoke with patient.  C/o discomfort at Dr Jackson office.  They did ua and cx at [facility] lab.  Started her on macrobid.  Patient states it is not helping.  Started Monday night.

## 2018-08-22 NOTE — TELEPHONE ENCOUNTER
Spoke with [facility] urine cx is pending.  We need to follow up tomorrow for results.

## 2018-08-22 NOTE — TELEPHONE ENCOUNTER
----- Message from Wilman Nur sent at 8/22/2018 10:17 AM CDT -----  Contact: patient  Type: Needs Medical Advice    Who Called:  patient  Symptoms (possible uti):    How long has patient had these symptoms:    Pharmacy name and phone #:    Best Call Back Number: [phone]  Additional Information: called to advise that she went to her pulmonologist ,did culture there she has uti,and was given  Rx for medication which is not working was advise to follow up with primary care. Requesting a call back.

## 2018-08-23 ENCOUNTER — TELEPHONE (OUTPATIENT)
Dept: FAMILY MEDICINE | Facility: CLINIC | Age: 83
End: 2018-08-23

## 2018-08-23 DIAGNOSIS — N30.01 ACUTE CYSTITIS WITH HEMATURIA: Primary | ICD-10-CM

## 2018-08-23 RX ORDER — GRANULES FOR ORAL 3 G/1
3 POWDER ORAL ONCE
Qty: 3 G | Refills: 0 | Status: SHIPPED | OUTPATIENT
Start: 2018-08-23 | End: 2018-08-23

## 2018-08-23 NOTE — TELEPHONE ENCOUNTER
----- Message from Cain Collins sent at 8/23/2018  8:43 AM CDT -----  Type: Needs Medical Advice    Who Called:  Patient  Best Call Back Number: [phone]  Additional Information: Patient has possible bladder infection and is sicker today. Please call patient back as soon as possible.

## 2018-08-23 NOTE — TELEPHONE ENCOUNTER
Spoke with Dr. Cates he asked that I call Ms. Carrillo to see what she could take. She states she cant take anything else. She states that Magdalena ordered the rx for her and it will be in tomorrow. I told her to call us back tomorrow if she was unable to obtain the rx.   She thanked me for calling her.

## 2018-08-23 NOTE — TELEPHONE ENCOUNTER
I spoke with Magdalena and they state the rx that Dr. Cates sent over for patients bladder infection is out of stock, they are asking us to change the medication. I explained to them Dr. Cates is gone for the day, I will ask Ms. Edwards to see what she advises.

## 2018-08-23 NOTE — TELEPHONE ENCOUNTER
----- Message from Ruthann Loya sent at 8/23/2018  2:26 PM CDT -----  Contact: Lorri will/Magdalena [phone]  The patient is there in tears wanting to  her medication.  It appears that the transmission is still in progress, so will you please call the pharmacy and give them the verbal order as soon as possible.  Thank you!

## 2018-08-24 ENCOUNTER — TELEPHONE (OUTPATIENT)
Dept: FAMILY MEDICINE | Facility: CLINIC | Age: 83
End: 2018-08-24

## 2018-08-24 NOTE — TELEPHONE ENCOUNTER
----- Message from Carlota Torres sent at 8/24/2018  8:18 AM CDT -----  Contact: self  Patient want to speak with a nurse regarding taking cephalosporin please call back at [phone] (home)

## 2018-08-27 ENCOUNTER — DOCUMENTATION ONLY (OUTPATIENT)
Dept: FAMILY MEDICINE | Facility: CLINIC | Age: 83
End: 2018-08-27

## 2018-08-27 ENCOUNTER — OFFICE VISIT (OUTPATIENT)
Dept: FAMILY MEDICINE | Facility: CLINIC | Age: 83
End: 2018-08-27
Payer: MEDICARE

## 2018-08-27 VITALS
BODY MASS INDEX: 19.55 KG/M2 | HEIGHT: 68 IN | DIASTOLIC BLOOD PRESSURE: 70 MMHG | OXYGEN SATURATION: 97 % | HEART RATE: 77 BPM | TEMPERATURE: 98 F | RESPIRATION RATE: 16 BRPM | SYSTOLIC BLOOD PRESSURE: 118 MMHG | WEIGHT: 129 LBS

## 2018-08-27 DIAGNOSIS — N30.00 ACUTE CYSTITIS WITHOUT HEMATURIA: Primary | ICD-10-CM

## 2018-08-27 PROCEDURE — 3078F DIAST BP <80 MM HG: CPT | Mod: CPTII,S$GLB,, | Performed by: INTERNAL MEDICINE

## 2018-08-27 PROCEDURE — 3074F SYST BP LT 130 MM HG: CPT | Mod: CPTII,S$GLB,, | Performed by: INTERNAL MEDICINE

## 2018-08-27 PROCEDURE — 99213 OFFICE O/P EST LOW 20 MIN: CPT | Mod: 25,S$GLB,, | Performed by: INTERNAL MEDICINE

## 2018-08-27 PROCEDURE — 87086 URINE CULTURE/COLONY COUNT: CPT

## 2018-08-27 PROCEDURE — 81002 URINALYSIS NONAUTO W/O SCOPE: CPT | Mod: S$GLB,,, | Performed by: INTERNAL MEDICINE

## 2018-08-27 RX ORDER — CEFUROXIME AXETIL 500 MG/1
500 TABLET ORAL 2 TIMES DAILY
Qty: 14 TABLET | Refills: 0 | Status: SHIPPED | OUTPATIENT
Start: 2018-08-27 | End: 2018-10-25 | Stop reason: ALTCHOICE

## 2018-08-27 NOTE — PROGRESS NOTES
Health Maintenance Due   Topic Date Due    Pneumococcal (65+) (2 of 2 - PPSV23) 01/24/2018    DEXA SCAN  05/21/2018    Influenza Vaccine  08/01/2018

## 2018-08-27 NOTE — PROGRESS NOTES
"Subjective:       Patient ID: Lorena Vallejo is a 85 y.o. female.    Chief Complaint: Hospital Follow Up (pneumonia) and Dysuria    HPI          CHIEF COMPLAINT: Bladder/UTI(+).  HPI: been macrobid and fosfomycin , still has pyuria.     ONSET/TIMING: Onset      ago. Sudden:: no .     DURATION:  continuous    QUALITY/COURSE:   better     LOCATION: pain/pressure: suprapubic    .     INTENSITY/SEVERITY: # 1   /10 (on a 1 to 10 scale).    CONTEXT/WHEN: --Similar problems: yes. .  Past_treatments: no . Past_evaluations: no    MODIFIERS/TREATMENTS:  .     The following symptoms are positive if BOLD, negative otherwise.       REVIEW OF SYMPTOMS:Urine_Frequency . Urine_Urgency . Dysuria . Suprapubic_Pain . Hematuria . Urine_Incontinence . . Fever . Chills . Nausea . Vomiting . Perineal Pain .  Sharp_pain . Dull_pain . Burning_pain .Tenesmus . Back_pain . Vaginal_Discharge . Vaginal_Itching . Vaginal_Burning . Dyspareunia .                            Review of Systems      Objective:      Vitals:    08/27/18 1411   BP: 118/70   Pulse: 77   Resp: 16   Temp: 98.1 °F (36.7 °C)   TempSrc: Oral   SpO2: 97%   Weight: 58.5 kg (128 lb 15.5 oz)   Height: 5' 8" (1.727 m)   PainSc: 0-No pain     Physical Exam   Constitutional: She appears well-developed and well-nourished.   Cardiovascular: Normal rate, regular rhythm and normal heart sounds.   Pulmonary/Chest: Effort normal and breath sounds normal.   Abdominal: Soft. There is no tenderness.   Neurological: She is alert.   Psychiatric: She has a normal mood and affect. Her behavior is normal. Thought content normal.   Nursing note and vitals reviewed.      2+ leukocytes otherwise no blood or nitrates.  Assessment:       1. Acute cystitis without hematuria          Plan:       Acute cystitis without hematuria  -     cefUROXime (CEFTIN) 500 MG tablet; Take 1 tablet (500 mg total) by mouth 2 (two) times daily.  Dispense: 14 tablet; Refill: 0  -     POCT urine dipstick without microscope  -  "    Urine culture      Follow-up for if you are not better return in one week.

## 2018-08-28 LAB
BILIRUB SERPL-MCNC: NORMAL MG/DL
BLOOD URINE, POC: NORMAL
COLOR, POC UA: YELLOW
GLUCOSE UR QL STRIP: NORMAL
KETONES UR QL STRIP: NORMAL
LEUKOCYTE ESTERASE URINE, POC: NORMAL
NITRITE, POC UA: NORMAL
PH, POC UA: 5
PROTEIN, POC: NORMAL
SPECIFIC GRAVITY, POC UA: 1.02
UROBILINOGEN, POC UA: NORMAL

## 2018-08-29 ENCOUNTER — TELEPHONE (OUTPATIENT)
Dept: FAMILY MEDICINE | Facility: CLINIC | Age: 83
End: 2018-08-29

## 2018-08-29 ENCOUNTER — HOSPITAL ENCOUNTER (OUTPATIENT)
Dept: RADIOLOGY | Facility: HOSPITAL | Age: 83
Discharge: HOME | End: 2018-08-29
Attending: INTERNAL MEDICINE
Payer: MEDICARE

## 2018-08-29 ENCOUNTER — HOSPITAL ENCOUNTER (OUTPATIENT)
Dept: CARDIOLOGY | Facility: HOSPITAL | Age: 83
Discharge: HOME | End: 2018-08-29
Attending: INTERNAL MEDICINE
Payer: MEDICARE

## 2018-08-29 VITALS — SYSTOLIC BLOOD PRESSURE: 152 MMHG | DIASTOLIC BLOOD PRESSURE: 74 MMHG | HEART RATE: 90 BPM | RESPIRATION RATE: 18 BRPM

## 2018-08-29 VITALS
BODY MASS INDEX: 19.4 KG/M2 | WEIGHT: 128 LBS | SYSTOLIC BLOOD PRESSURE: 152 MMHG | HEIGHT: 68 IN | HEART RATE: 82 BPM | DIASTOLIC BLOOD PRESSURE: 74 MMHG

## 2018-08-29 DIAGNOSIS — R07.9 CHEST PAIN, UNSPECIFIED TYPE: ICD-10-CM

## 2018-08-29 LAB
AORTIC ROOT ANNULUS: 3.07 CM
AORTIC VALVE CUSP SEPERATION: 1.97 CM
AV MEAN GRADIENT: 3.28 MMHG
AV PEAK GRADIENT: 7.5 MMHG
AV VALVE AREA: 2.31 CM2
BACTERIA UR CULT: NO GROWTH
BSA FOR ECHO PROCEDURE: 1.67 M2
CV ECHO LV RWT: 0.67 CM
CV STRESS BASE HR: 86
CVPEAKDIABP: 74
CVPEAKSYSBP: 152
CVRESTDIABP: 74
CVRESTSYSBP: 152
DOP CALC AO PEAK VEL: 1.37 M/S
DOP CALC AO VTI: 24.07 CM
DOP CALC LVOT AREA: 3.08 CM2
DOP CALC LVOT DIAMETER: 1.98 CM
DOP CALC LVOT STROKE VOLUME: 55.55 CM3
DOP CALCLVOT PEAK VEL VTI: 18.05 CM
E WAVE DECELERATION TIME: 126.24 MSEC
E/A RATIO: 0.54
E/E' RATIO: 0.07
ECHO LV POSTERIOR WALL: 1.07 CM (ref 0.6–1.1)
FRACTIONAL SHORTENING: 32 % (ref 28–44)
INTERVENTRICULAR SEPTUM: 1.2 CM (ref 0.6–1.1)
IVRT: 0.09 MSEC
LEFT ATRIUM SIZE: 3.22 CM
LEFT INTERNAL DIMENSION IN SYSTOLE: 2.31 CM (ref 2.1–4)
LEFT VENTRICLE MASS INDEX: 71.9 G/M2
LEFT VENTRICULAR INTERNAL DIMENSION IN DIASTOLE: 3.41 CM (ref 3.5–6)
LEFT VENTRICULAR MASS: 120.07 G
LV LATERAL E/E' RATIO: 0.07
LV SEPTAL E/E' RATIO: 0.07
MV PEAK A VEL: 0.96 M/S
MV PEAK E VEL: 0.52 M/S
MV STENOSIS PRESSURE HALF TIME: 36.61 MS
MV VALVE AREA P 1/2 METHOD: 6.01 CM2
PISA TR MAX VEL: 1.95 M/S
PV PEAK GRADIENT: 3.33 MMHG
PV PEAK VELOCITY: 0.91 CM/S
RA PRESSURE: 3 MMHG
RIGHT VENTRICULAR END-DIASTOLIC DIMENSION: 2.52 CM
STRESS ECHO POST ESTIMATED WORKLOAD: 1 METS
STRESS ECHO POST EXERCISE DUR MIN: 1 MIN
STRESS ECHO POST EXERCISE DUR SEC: 2
TDI LATERAL: 7
TDI SEPTAL: 8
TDI: 7.5
TR MAX PG: 15.21 MMHG
TRICUSPID ANNULAR PLANE SYSTOLIC EXCURSION: 0.02 CM
TV REST PULMONARY ARTERY PRESSURE: 18.21 MMHG

## 2018-08-29 PROCEDURE — 63600175 PHARM REV CODE 636 W HCPCS

## 2018-08-29 PROCEDURE — 93018 CV STRESS TEST I&R ONLY: CPT | Mod: ,,, | Performed by: INTERNAL MEDICINE

## 2018-08-29 PROCEDURE — 78452 HT MUSCLE IMAGE SPECT MULT: CPT | Mod: 26,,, | Performed by: INTERNAL MEDICINE

## 2018-08-29 PROCEDURE — 93306 TTE W/DOPPLER COMPLETE: CPT

## 2018-08-29 PROCEDURE — 93017 CV STRESS TEST TRACING ONLY: CPT

## 2018-08-29 PROCEDURE — 78452 HT MUSCLE IMAGE SPECT MULT: CPT

## 2018-08-29 PROCEDURE — 93306 TTE W/DOPPLER COMPLETE: CPT | Mod: 26,,, | Performed by: INTERNAL MEDICINE

## 2018-08-29 PROCEDURE — 93016 CV STRESS TEST SUPVJ ONLY: CPT | Mod: ,,, | Performed by: INTERNAL MEDICINE

## 2018-08-29 RX ORDER — REGADENOSON 0.08 MG/ML
0.4 INJECTION, SOLUTION INTRAVENOUS ONCE
Status: COMPLETED | OUTPATIENT
Start: 2018-08-29 | End: 2018-08-29

## 2018-08-29 RX ORDER — REGADENOSON 0.08 MG/ML
INJECTION, SOLUTION INTRAVENOUS
Status: COMPLETED
Start: 2018-08-29 | End: 2018-08-29

## 2018-08-29 RX ADMIN — REGADENOSON 0.4 MG: 0.08 INJECTION, SOLUTION INTRAVENOUS at 08:08

## 2018-08-29 NOTE — TELEPHONE ENCOUNTER
----- Message from Marah Fajardo sent at 8/29/2018  2:28 PM CDT -----  Type:  Patient Returning Call    Who Called:  Patient  Who Left Message for Patient:  Mikala  Does the patient know what this is regarding?:  Test results  Best Call Back Number:  [phone]  Additional Information:  This phone call was not documented in chart.

## 2018-09-04 ENCOUNTER — HOSPITAL ENCOUNTER (OUTPATIENT)
Dept: RADIOLOGY | Facility: HOSPITAL | Age: 83
Discharge: HOME | End: 2018-09-04
Attending: ORTHOPAEDIC SURGERY
Payer: MEDICARE

## 2018-09-04 ENCOUNTER — TELEPHONE (OUTPATIENT)
Dept: ORTHOPEDICS | Facility: CLINIC | Age: 83
End: 2018-09-04

## 2018-09-04 DIAGNOSIS — M25.512 LEFT SHOULDER PAIN, UNSPECIFIED CHRONICITY: Primary | ICD-10-CM

## 2018-09-04 DIAGNOSIS — M25.512 LEFT SHOULDER PAIN, UNSPECIFIED CHRONICITY: ICD-10-CM

## 2018-09-04 NOTE — TELEPHONE ENCOUNTER
----- Message from Anahi Louie sent at 9/4/2018  3:10 PM CDT -----  Contact: self  Patient is returning the nurse's call about rescheduling appt due to power outages.  Please call

## 2018-09-07 ENCOUNTER — OFFICE VISIT (OUTPATIENT)
Dept: CARDIOLOGY | Facility: CLINIC | Age: 83
End: 2018-09-07
Payer: MEDICARE

## 2018-09-07 VITALS
HEIGHT: 68 IN | HEART RATE: 84 BPM | SYSTOLIC BLOOD PRESSURE: 124 MMHG | WEIGHT: 127 LBS | OXYGEN SATURATION: 92 % | BODY MASS INDEX: 19.25 KG/M2 | DIASTOLIC BLOOD PRESSURE: 78 MMHG

## 2018-09-07 DIAGNOSIS — E78.5 HYPERLIPIDEMIA, UNSPECIFIED HYPERLIPIDEMIA TYPE: ICD-10-CM

## 2018-09-07 DIAGNOSIS — R07.89 OTHER CHEST PAIN: ICD-10-CM

## 2018-09-07 PROCEDURE — 99213 OFFICE O/P EST LOW 20 MIN: CPT | Mod: PBBFAC,PO | Performed by: INTERNAL MEDICINE

## 2018-09-07 PROCEDURE — 3078F DIAST BP <80 MM HG: CPT | Mod: CPTII,,, | Performed by: INTERNAL MEDICINE

## 2018-09-07 PROCEDURE — 3074F SYST BP LT 130 MM HG: CPT | Mod: CPTII,,, | Performed by: INTERNAL MEDICINE

## 2018-09-07 PROCEDURE — 99214 OFFICE O/P EST MOD 30 MIN: CPT | Mod: S$PBB,,, | Performed by: INTERNAL MEDICINE

## 2018-09-07 PROCEDURE — 99999 PR PBB SHADOW E&M-EST. PATIENT-LVL III: CPT | Mod: PBBFAC,,, | Performed by: INTERNAL MEDICINE

## 2018-09-07 PROCEDURE — 1101F PT FALLS ASSESS-DOCD LE1/YR: CPT | Mod: CPTII,,, | Performed by: INTERNAL MEDICINE

## 2018-09-07 NOTE — PROGRESS NOTES
[facility]    CC:   Chief Complaint   Patient presents with    Follow-up     4 weeks       Patient ID: Lorena Vallejo is a 85 y.o. female with a past medical history of paroxysmal SVT, prior TIA    HPI   patient is doing well.  No further chest pains.    Past Medical History:   Diagnosis Date    Arthritis     Blood transfusion     Bowel obstruction     X 3    Cancer     MELANOMA RIGHT EYE    Cancer of eyeball, except conjunctiva, cornea, retina, and choroid right    Diverticulitis     GERD (gastroesophageal reflux disease)     Hypertension     Peripheral neuropathy     PSVT (paroxysmal supraventricular tachycardia)     HAD X 3 OVER 10 YRS. LAST 7-25-12. HAD ADENOSINE IN [facility] ER. WAS ON INCREASED THYROID MED. NO PROBLEM SINCE    Salmonella     Short bowel syndrome     Thyroid disease     Transient ischemic attack     UTI (urinary tract infection)      Past Surgical History:   Procedure Laterality Date    ABDOMINAL SURGERY      X 3. ILEUM REMOVED. OSTOMY PLACED AND CLOSED.    ADENOIDECTOMY      ARTHROPLASTY, KNEE Right 4/24/2013    Performed by [facility]ardo [facility]ider MD at [facility] OR    COLONOSCOPY      COLONOSCOPY N/A 8/24/2016    Procedure: COLONOSCOPY;  Surgeon: Alex Corral MD;  Location: [facility];  Service: Endoscopy;  Laterality: N/A;    COLONOSCOPY N/A 8/24/2016    Performed by Alex [facility]ral MD at [facility] ENDO    CTR      BILAT    ESOPHAGOGASTRODUODENOSCOPY (EGD) N/A 7/11/2016    Performed by [facility]ral [facility] at [facility]    EYE SURGERY      RIGHT  - LASER MELANOMA; BILAT CATARACT    HYSTERECTOMY      BSO    JOINT REPLACEMENT      RIGHT KNEE    MANDIBLE FRACTURE SURGERY      MENISCECTOMY      MEDIAL AND LATERAL REPAIR, BAKERS CYST    TENDON REPAIR      LEFT THUMB    TONSILLECTOMY      T AND A     Social History     Socioeconomic History    Marital status:      Spouse name: Not on file    Number of children: Not on file    Years of education: Not on file    Highest  education level: Not on file   Social Needs    Financial resource strain: Not on file    Food insecurity - worry: Not on file    Food insecurity - inability: Not on file    Transportation needs - medical: Not on file    Transportation needs - non-medical: Not on file   Occupational History    Not on file   Tobacco Use    Smoking status: Former Smoker     Packs/day: 1.00     Years: 18.00     Pack years: 18.00     Last attempt to quit: 1973     Years since quittin.5    Smokeless tobacco: Never Used   Substance and Sexual Activity    Alcohol use: Yes     Comment: WINE once per day    Drug use: No    Sexual activity: No   Other Topics Concern    Not on file   Social History Narrative    Not on file     Family History   Problem Relation Age of Onset    Coronary artery disease Other     Hypertension Other     Stroke Other     Diabetes Other     COPD Other        Review of patient's allergies indicates:   Allergen Reactions    Flagyl [metronidazole hcl] Diarrhea     SEVERE DIARRHEA    Latex, natural rubber Rash     ITCHING    Levaquin [levofloxacin] Diarrhea     MOOD CHANGES, WEAKNESS    Morphine Other (See Comments)     HALLUCINATES    Augmentin [amoxicillin-pot clavulanate] Other (See Comments)     AGITATION    Ciprofloxacin Anxiety    Codeine Nausea And Vomiting    Demerol [meperidine] Nausea And Vomiting     CAN TAKE WITH PHENERGAN    Dilaudid [hydromorphone (bulk)] Itching     OK WITH BENADRYL    Fentanyl Itching     OK WITH BENADRYL    Fosfomycin      diarrhia    Naproxen Other (See Comments)     HURT STOMACH    Nsaids (non-steroidal anti-inflammatory drug) Other (See Comments)     TOLD NOT TO TAKE AS HAD AN ULCER. HAD BLEEDING    Percocet [oxycodone-acetaminophen] Rash    Prednisone Other (See Comments)     AGITATION    Reglan [metoclopramide hcl] Anxiety    Sulfa (sulfonamide antibiotics) Rash    Vicodin [hydrocodone-acetaminophen] Rash    Zosyn  [piperacillin-tazobactam] Rash    Ambien [zolpidem]     Ibuprofen     Macrobid [nitrofurantoin monohyd/m-cryst]     Pcn [penicillins]     Vasotec [enalapril maleate] Other (See Comments)     DRY COUGH          Medication List           Accurate as of 9/7/18  4:29 PM. If you have any questions, ask your nurse or doctor.               CHANGE how you take these medications    paroxetine 20 MG tablet  Commonly known as:  PAXIL  TAKE 1 TABLET ONE TIME DAILY  What changed:    · how much to take  · how to take this  · when to take this  · additional instructions        CONTINUE taking these medications    acetaminophen 500 MG tablet  Commonly known as:  TYLENOL     albuterol-ipratropium 2.5 mg-0.5 mg/3 mL nebulizer solution  Commonly known as:  DUO-NEB     * ARMOUR THYROID 300 mg Tab  Generic drug:  thyroid (pork)  TAKE ONE-HALF TABLET BY MOUTH ONCE DAILY     * thyroid (pork) 300 mg Tab  Commonly known as:  ARMOUR THYROID  Take 150 mg by mouth once daily.     aspirin 81 MG Chew  Take 1 tablet (81 mg total) by mouth once daily.     baclofen 10 MG tablet  Commonly known as:  LIORESAL     cefUROXime 500 MG tablet  Commonly known as:  CEFTIN  Take 1 tablet (500 mg total) by mouth 2 (two) times daily.     colchicine 0.6 mg tablet  Take 1 tablet (0.6 mg total) by mouth once daily. The first day take 2 at once then 1 by mouth an hour later     cyanocobalamin 1,000 mcg/mL injection  INJECT 2ML IMTRAMUSCULAR EVERY MONTH     diltiaZEM 240 MG Cs24  Commonly known as:  TIAZAC  TAKE 1 CAPSULE ONE TIME DAILY     fish oil-omega-3 fatty acids 300-1,000 mg capsule     indomethacin 25 MG capsule  Commonly known as:  INDOCIN  Take 1 capsule (25 mg total) by mouth 2 (two) times daily with meals.     levothyroxine 175 MCG tablet  Commonly known as:  SYNTHROID, LEVOTHROID  Take 1 tablet (175 mcg total) by mouth once daily. Needs to branded.     loperamide 2 mg capsule  Commonly known as:  IMODIUM     magnesium oxide 400 mg tablet  Commonly  "known as:  MAG-OX     metoprolol succinate 50 MG 24 hr tablet  Commonly known as:  TOPROL-XL  Take 1 tablet (50 mg total) by mouth once daily.     nitroGLYCERIN 0.4 MG SL tablet  Commonly known as:  NITROSTAT  DISSOLVE 1 TABLET UNDER THE TONGUE EVERY 5 MINUTES AS NEEDED FOR CHEST PAIN     omeprazole 20 MG capsule  Commonly known as:  PRILOSEC  TAKE 1 CAPSULE EVERY DAY     PRESERVISION AREDS 2 ORAL     ranitidine 150 MG capsule  Commonly known as:  ZANTAC  Take 1 capsule (150 mg total) by mouth 2 (two) times daily.     vitamin D 1000 units Tab         * This list has 2 medication(s) that are the same as other medications prescribed for you. Read the directions carefully, and ask your doctor or other care provider to review them with you.                Review of Systems  Constitution: Denies chills, fever, and sweats.  HENT: Denies headaches or blurry vision.  Cardiovascular: Denies chest pain or irregular heart beat.  Respiratory: Denies cough or shortness of breath.  Gastrointestinal: Denies abdominal pain, nausea, or vomiting.  Musculoskeletal: Denies muscle cramps.  Neurological: Denies dizziness or focal weakness.  Psychiatric/Behavioral: Normal mental status.  Hematologic/Lymphatic: Denies bleeding problem or easy bruising/bleeding.  Skin: Denies rash or suspicious lesions    Physical Examination  /78 (BP Location: Left arm, Patient Position: Sitting)   Pulse 84   Ht 5' 8" (1.727 m)   Wt 57.6 kg (126 lb 15.8 oz)   SpO2 (!) 92%   BMI 19.31 kg/m²     Constitutional: No acute distress, conversant  HEENT: Sclera anicteric, Pupils equal, round and reactive to light, extraocular motions intact, Oropharynx clear  Neck: No JVD, no carotid bruits  Cardiovascular: regular rate and rhythm, no murmur, rubs or gallops, normal S1/S2  Pulmonary: Clear to auscultation bilaterally  Abdominal: Abdomen soft, nontender, nondistended, positive bowel sounds  Extremities: No lower extremity edema,   Pulses:  Carotid pulses " are 2+ on the right side, and 2+ on the left side.  Radial pulses are 2+ on the right side, and 2+ on the left side.   Femoral pulses are 2+ on the right side, and 2+ on the left side.  Popliteal pulses are 2+ on the right side, and 2+ on the left side.   Dorsalis pedis pulses are 2+ on the right side, and 2+ on the left side.   Posterior tibial pulses are 2+ on the right side, and 2+ on the left side.    Skin: No ecchymosis, erythema, or ulcers  Psych: Alert and oriented x 3, appropriate affect  Neuro: CNII-XII intact, no focal deficits    Labs:  Most Recent Data  CBC:   Lab Results   Component Value Date    WBC 4.69 04/04/2018    HGB 12.0 04/04/2018    HCT 38.0 04/04/2018     04/04/2018     (H) 04/04/2018    RDW 13.3 04/04/2018     BMP:   Lab Results   Component Value Date     04/04/2018    K 4.9 04/04/2018     (H) 04/04/2018    CO2 24 04/04/2018    BUN 19 04/04/2018    CREATININE 0.9 04/04/2018    GLU 78 04/04/2018    CALCIUM 9.4 04/04/2018    MG 2.2 01/21/2016     LFTS;   Lab Results   Component Value Date    PROT 6.9 04/04/2018    ALBUMIN 3.5 04/04/2018    BILITOT 0.5 04/04/2018    AST 18 04/04/2018    ALKPHOS 87 04/04/2018    ALT 15 04/04/2018     COAGS: No results found for: INR, PROTIME, PTT  FLP:   Lab Results   Component Value Date    CHOL 141 04/04/2018    HDL 64 04/04/2018    LDLCALC 48.8 (L) 04/04/2018    TRIG 141 04/04/2018    CHOLHDL 45.4 04/04/2018     CARDIAC: No results found for: TROPONINI, CKMB, BNP    Imaging:    EKG: Normal sinus rhythm no significant ST T wave changes    Echo:  Conclusion     · Left ventricle ejection fraction is normal at 55%  · Normal LV diastolic function.  · Left ventricle shows mild concentric hypertrophy.  · The left ventricle cavity is small.  · RV systolic function is normal.  · Left atrium is mildly dilated.  · Right atrium is mildly dilated.  · Mitral valve shows trace regurgitation.  · There is mild stenosis of the Mitral  Valve.  · Tricuspid valve shows mild regurgitation.  · Normal central venous pressure (3 mm Hg).  · The estimated PA systolic pressure is 18.21 mm Hg            Stress Testing:  Conclusion     · The patient reported headache during the stress test.  · There were no arrhythmias during stress.  · The EKG portion of this study is negative for myocardial ischemia.  · Perfusion imaging is negative for ischemia or infarct  · EF>70%  · Normal study         I have personally reviewed these images and echo data    Assessment/Plan:  Lorena was seen today for follow-up.    Diagnoses and all orders for this visit:    Hyperlipidemia, unspecified hyperlipidemia type  -     EKG 12-lead    Other chest pain       patient is doing well.  She has no further chest pain.  Her stress test report is as above.  The reports are normal.  There is no obvious ischemia or infarct seen     She was seen by Dr. Pagan's for pulmonology and was cleared from lung tumor is well.  Currently I do not think we need to proceed with any further investigation.  We will medically manage her.  If she has any further recurrences the chest pain we would consider an angiogram.    Follow-up in about 6 months (around 3/7/2019).          Total duration of face to face visit time 30 minutes.  Total time spent counseling greater than fifty percent of total visit time.  Counseling included discussion regarding imaging findings, diagnosis, possibilities, treatment options, risks and benefits.  The patient had many questions regarding the options and long-term effect which were all answered to my best ability.      Justo Bragg MD,MRCP,RPVI,FACC,FSCAI.  Interventional Cardiology   Phone [phone]

## 2018-09-17 ENCOUNTER — DOCUMENTATION ONLY (OUTPATIENT)
Dept: FAMILY MEDICINE | Facility: CLINIC | Age: 83
End: 2018-09-17

## 2018-09-18 ENCOUNTER — DOCUMENTATION ONLY (OUTPATIENT)
Dept: FAMILY MEDICINE | Facility: CLINIC | Age: 83
End: 2018-09-18

## 2018-09-18 ENCOUNTER — OFFICE VISIT (OUTPATIENT)
Dept: FAMILY MEDICINE | Facility: CLINIC | Age: 83
End: 2018-09-18
Payer: MEDICARE

## 2018-09-18 VITALS
TEMPERATURE: 98 F | RESPIRATION RATE: 16 BRPM | HEART RATE: 72 BPM | SYSTOLIC BLOOD PRESSURE: 120 MMHG | BODY MASS INDEX: 19.71 KG/M2 | OXYGEN SATURATION: 98 % | HEIGHT: 68 IN | DIASTOLIC BLOOD PRESSURE: 72 MMHG | WEIGHT: 130.06 LBS

## 2018-09-18 DIAGNOSIS — I47.10 PSVT (PAROXYSMAL SUPRAVENTRICULAR TACHYCARDIA): ICD-10-CM

## 2018-09-18 DIAGNOSIS — Z23 NEEDS FLU SHOT: ICD-10-CM

## 2018-09-18 DIAGNOSIS — M54.89 INTERSCAPULAR PAIN: Primary | ICD-10-CM

## 2018-09-18 DIAGNOSIS — M54.2 ACUTE NECK PAIN: ICD-10-CM

## 2018-09-18 PROCEDURE — 90662 IIV NO PRSV INCREASED AG IM: CPT | Mod: S$GLB,,, | Performed by: INTERNAL MEDICINE

## 2018-09-18 PROCEDURE — 3078F DIAST BP <80 MM HG: CPT | Mod: CPTII,S$GLB,, | Performed by: INTERNAL MEDICINE

## 2018-09-18 PROCEDURE — 99213 OFFICE O/P EST LOW 20 MIN: CPT | Mod: 25,S$GLB,, | Performed by: INTERNAL MEDICINE

## 2018-09-18 PROCEDURE — G0008 ADMIN INFLUENZA VIRUS VAC: HCPCS | Mod: S$GLB,,, | Performed by: INTERNAL MEDICINE

## 2018-09-18 PROCEDURE — 99499 UNLISTED E&M SERVICE: CPT | Mod: S$GLB,,, | Performed by: INTERNAL MEDICINE

## 2018-09-18 PROCEDURE — 1101F PT FALLS ASSESS-DOCD LE1/YR: CPT | Mod: CPTII,S$GLB,, | Performed by: INTERNAL MEDICINE

## 2018-09-18 PROCEDURE — 3074F SYST BP LT 130 MM HG: CPT | Mod: CPTII,S$GLB,, | Performed by: INTERNAL MEDICINE

## 2018-09-18 RX ORDER — METOPROLOL SUCCINATE 50 MG/1
50 TABLET, EXTENDED RELEASE ORAL DAILY
Qty: 90 TABLET | Refills: 1 | Status: SHIPPED | OUTPATIENT
Start: 2018-09-18 | End: 2019-03-15 | Stop reason: SDUPTHER

## 2018-09-18 NOTE — PROGRESS NOTES
"Subjective:       Patient ID: Lorena Vallejo is a 85 y.o. female.    Chief Complaint: Back Pain and Neck Pain (stiff,possibly pulled it)    HPI       Hx of fragility fracture.        CHIEF COMPLAINT: Neck Pain(+).  HPI:     ONSET/TIMING: Trauma: no. . Onset  18 d      ago. . Work related: no .    Similar problems  in past: no .    DURATION:      QUALITY/COURSE:    unchanged .       LOCATION:   Left intrascapular. Radiation: neck.     INTENSITY/SEVERITY: Severity is # 4 but 8 if she moves. (10 point scale).      SYMPTOMS/RELATED: .-Possible medication side effects include:     The following symptoms are positive if BOLD, negative otherwise.      CONTEXT/WHEN: --Activity. Coughing. Sneeze.. Working_[employer].  . Repetitive_work . Hx of CA. history of IV drug abuse.. Similar_problems.     MODIFIERS/TREATMENTS: . Taking medications.    Chiropractor.  Litigation_pending. c-spine_x-rays. CT. MRI. Surgery.     REVIEW OF SYMPTOMS:  . Arm_Pain_to_below _elbow . .Weight_loss.          Review of Systems      Objective:      Vitals:    09/18/18 1541   BP: 120/72   Pulse: 72   Resp: 16   Temp: 97.8 °F (36.6 °C)   TempSrc: Oral   SpO2: 98%   Weight: 59 kg (130 lb 1.1 oz)   Height: 5' 8" (1.727 m)   PainSc:   5   PainLoc: Neck     Physical Exam   Constitutional: She appears well-developed and well-nourished.   Cardiovascular: Normal rate, regular rhythm and normal heart sounds.   Pulmonary/Chest: Effort normal and breath sounds normal.   Abdominal: Soft. There is no tenderness.   Musculoskeletal: She exhibits tenderness (Left interscapular area tender T6 level).   50% reduction range of motion of the neck to either side.     Neurological: She is alert.   Psychiatric: She has a normal mood and affect. Her behavior is normal. Thought content normal.   Nursing note and vitals reviewed.        Assessment:       1. Interscapular pain    2. PSVT (paroxysmal supraventricular tachycardia)    3. Acute neck pain    4. Needs flu shot        "   Plan:       Interscapular pain  -     Ambulatory Referral to Physical Medicine Rehab  -     Ambulatory consult to Physical Therapy    PSVT (paroxysmal supraventricular tachycardia)  -     metoprolol succinate (TOPROL-XL) 50 MG 24 hr tablet; Take 1 tablet (50 mg total) by mouth once daily.  Dispense: 90 tablet; Refill: 1    Acute neck pain  -     Ambulatory Referral to Physical Medicine Rehab  -     Ambulatory consult to Physical Therapy  -     X-Ray Cervical Spine AP And Lateral; Future; Expected date: 09/18/2018    Needs flu shot  -     Influenza - High Dose (65+) (PF) (IM)      Follow-up in about 6 weeks (around 10/30/2018) for if you are not better return in 2 weeks.

## 2018-09-20 ENCOUNTER — HOSPITAL ENCOUNTER (OUTPATIENT)
Dept: RADIOLOGY | Facility: CLINIC | Age: 83
Discharge: HOME | End: 2018-09-20
Attending: INTERNAL MEDICINE
Payer: MEDICARE

## 2018-09-20 DIAGNOSIS — M54.2 ACUTE NECK PAIN: ICD-10-CM

## 2018-09-20 PROCEDURE — 72040 X-RAY EXAM NECK SPINE 2-3 VW: CPT | Mod: 26,,, | Performed by: RADIOLOGY

## 2018-09-20 PROCEDURE — 72040 X-RAY EXAM NECK SPINE 2-3 VW: CPT | Mod: TC,FY,PO

## 2018-10-04 ENCOUNTER — INITIAL CONSULT (OUTPATIENT)
Dept: PHYSICAL MEDICINE AND REHAB | Facility: CLINIC | Age: 83
End: 2018-10-04
Payer: MEDICARE

## 2018-10-04 VITALS
HEART RATE: 79 BPM | DIASTOLIC BLOOD PRESSURE: 78 MMHG | BODY MASS INDEX: 19.7 KG/M2 | WEIGHT: 130 LBS | SYSTOLIC BLOOD PRESSURE: 132 MMHG | HEIGHT: 68 IN

## 2018-10-04 DIAGNOSIS — M25.512 LEFT SHOULDER PAIN, UNSPECIFIED CHRONICITY: Primary | ICD-10-CM

## 2018-10-04 DIAGNOSIS — M75.102 TEAR OF LEFT ROTATOR CUFF, UNSPECIFIED TEAR EXTENT: ICD-10-CM

## 2018-10-04 PROCEDURE — 3075F SYST BP GE 130 - 139MM HG: CPT | Mod: CPTII,,, | Performed by: PHYSICAL MEDICINE & REHABILITATION

## 2018-10-04 PROCEDURE — 99203 OFFICE O/P NEW LOW 30 MIN: CPT | Mod: S$PBB,,, | Performed by: PHYSICAL MEDICINE & REHABILITATION

## 2018-10-04 PROCEDURE — 99999 PR PBB SHADOW E&M-EST. PATIENT-LVL V: CPT | Mod: PBBFAC,,, | Performed by: PHYSICAL MEDICINE & REHABILITATION

## 2018-10-04 PROCEDURE — 3078F DIAST BP <80 MM HG: CPT | Mod: CPTII,,, | Performed by: PHYSICAL MEDICINE & REHABILITATION

## 2018-10-04 PROCEDURE — 99215 OFFICE O/P EST HI 40 MIN: CPT | Mod: PBBFAC,PN | Performed by: PHYSICAL MEDICINE & REHABILITATION

## 2018-10-04 PROCEDURE — 1101F PT FALLS ASSESS-DOCD LE1/YR: CPT | Mod: CPTII,,, | Performed by: PHYSICAL MEDICINE & REHABILITATION

## 2018-10-04 RX ORDER — DICLOFENAC SODIUM 10 MG/G
4 GEL TOPICAL 4 TIMES DAILY
Qty: 2 TUBE | Refills: 6 | Status: SHIPPED | OUTPATIENT
Start: 2018-10-04 | End: 2020-01-07

## 2018-10-04 NOTE — PROGRESS NOTES
HPI:  Patient is a 85 y.o. year old female w. Shoulder pain. It began 5 wks ago after she had a stress test and mri of heart. She was positioned w. Her arm above her head for a prolonged period. Initially her pain radiated to her rib cage but this has resolved. Previously she had neck pain and at one point she was instructed on a HEP. She has been doing this and states overall she feels better. However, she feels that her shoulder is very stiff and is having pain w. Overhead activities    Imaging    FINDINGS:  There is no fracture.  There is 3 mm anterolisthesis of C5 on C6.  There is marked disc space narrowing at the C6-7 level where there is endplate sclerosis and osteophyte formation.  There is moderate disc space narrowing at the C4-5 level.  There is multilevel facet joint arthropathy with near fusion of the facet joints from C2 through C5.  The odontoid process is intact.  The prevertebral soft tissues are normal.  The airway is patent.  There are cerclage wires at the edge of the mandible bilaterally.      Impression       1. There is multilevel degenerative disc and facet disease without fracture.  3 mm anterolisthesis of C5 on C6 is likely degenerative in etiology.     Labs  egfr dep 58  Cr lfts gluc nl    Past Medical History:   Diagnosis Date    Arthritis     Blood transfusion     Bowel obstruction     X 3    Cancer     MELANOMA RIGHT EYE    Cancer of eyeball, except conjunctiva, cornea, retina, and choroid right    Diverticulitis     GERD (gastroesophageal reflux disease)     Hypertension     Peripheral neuropathy     PSVT (paroxysmal supraventricular tachycardia)     HAD X 3 OVER 10 YRS. LAST 7-25-12. HAD ADENOSINE IN [facility] ER. WAS ON INCREASED THYROID MED. NO PROBLEM SINCE    Salmonella     Short bowel syndrome     Thyroid disease     Transient ischemic attack     UTI (urinary tract infection)      Past Surgical History:   Procedure Laterality Date    ABDOMINAL SURGERY      X 3. ILEUM  REMOVED. OSTOMY PLACED AND CLOSED.    ADENOIDECTOMY      ARTHROPLASTY, KNEE Right 2013    Performed by Eduardo Haider MD at [facility] OR    COLONOSCOPY      COLONOSCOPY N/A 2016    Procedure: COLONOSCOPY;  Surgeon: Alex Corral MD;  Location: [facility];  Service: Endoscopy;  Laterality: N/A;    COLONOSCOPY N/A 2016    Performed by Alex Corral MD at [facility] ENDO    CTR      BILAT    ESOPHAGOGASTRODUODENOSCOPY (EGD) N/A 2016    Performed by Alex Corral MD at [facility]    EYE SURGERY      RIGHT  - LASER MELANOMA; BILAT CATARACT    HYSTERECTOMY      BSO    JOINT REPLACEMENT      RIGHT KNEE    MANDIBLE FRACTURE SURGERY      MENISCECTOMY      MEDIAL AND LATERAL REPAIR, BAKERS CYST    TENDON REPAIR      LEFT THUMB    TONSILLECTOMY      T AND A     Family History   Problem Relation Age of Onset    Coronary artery disease Other     Hypertension Other     Stroke Other     Diabetes Other     COPD Other      Social History     Socioeconomic History    Marital status:      Spouse name: Not on file    Number of children: Not on file    Years of education: Not on file    Highest education level: Not on file   Social Needs    Financial resource strain: Not on file    Food insecurity - worry: Not on file    Food insecurity - inability: Not on file    Transportation needs - medical: Not on file    Transportation needs - non-medical: Not on file   Occupational History    Not on file   Tobacco Use    Smoking status: Former Smoker     Packs/day: 1.00     Years: 18.00     Pack years: 18.00     Last attempt to quit: 1973     Years since quittin.6    Smokeless tobacco: Never Used   Substance and Sexual Activity    Alcohol use: Yes     Comment: WINE once per day    Drug use: No    Sexual activity: No   Other Topics Concern    Not on file   Social History Narrative    Not on file       Review of patient's allergies indicates:   Allergen Reactions    Flagyl [metronidazole  hcl] Diarrhea     SEVERE DIARRHEA    Latex, natural rubber Rash     ITCHING    Levaquin [levofloxacin] Diarrhea     MOOD CHANGES, WEAKNESS    Morphine Other (See Comments)     HALLUCINATES    Augmentin [amoxicillin-pot clavulanate] Other (See Comments)     AGITATION    Ciprofloxacin Anxiety    Codeine Nausea And Vomiting    Demerol [meperidine] Nausea And Vomiting     CAN TAKE WITH PHENERGAN    Dilaudid [hydromorphone (bulk)] Itching     OK WITH BENADRYL    Fentanyl Itching     OK WITH BENADRYL    Fosfomycin      diarrhia    Naproxen Other (See Comments)     HURT STOMACH    Nsaids (non-steroidal anti-inflammatory drug) Other (See Comments)     TOLD NOT TO TAKE AS HAD AN ULCER. HAD BLEEDING    Percocet [oxycodone-acetaminophen] Rash    Prednisone Other (See Comments)     AGITATION    Reglan [metoclopramide hcl] Anxiety    Sulfa (sulfonamide antibiotics) Rash    Vicodin [hydrocodone-acetaminophen] Rash    Zosyn [piperacillin-tazobactam] Rash    Ambien [zolpidem]     Ibuprofen     Macrobid [nitrofurantoin monohyd/m-cryst]     Pcn [penicillins]     Vasotec [enalapril maleate] Other (See Comments)     DRY COUGH       Current Outpatient Medications:     acetaminophen (TYLENOL) 500 MG tablet, Take 1,000 mg by mouth every 6 (six) hours as needed., Disp: , Rfl:     albuterol-ipratropium (DUO-NEB) 2.5 mg-0.5 mg/3 mL nebulizer solution, USE 1 VIAL IN NEBULIZER Q 6 H, Disp: , Rfl: 0    ARMOUR THYROID 300 mg Tab, TAKE ONE-HALF TABLET BY MOUTH ONCE DAILY, Disp: 45 tablet, Rfl: 3    aspirin 81 MG Chew, Take 1 tablet (81 mg total) by mouth once daily., Disp: , Rfl:     baclofen (LIORESAL) 10 MG tablet, Take 10 mg by mouth every evening., Disp: , Rfl:     cefUROXime (CEFTIN) 500 MG tablet, Take 1 tablet (500 mg total) by mouth 2 (two) times daily., Disp: 14 tablet, Rfl: 0    colchicine 0.6 mg tablet, Take 1 tablet (0.6 mg total) by mouth once daily. The first day take 2 at once then 1 by mouth an hour  later, Disp: 10 tablet, Rfl: 0    cyanocobalamin 1,000 mcg/mL injection, INJECT 2ML IMTRAMUSCULAR EVERY MONTH, Disp: 10 mL, Rfl: 11    diltiaZEM (TIAZAC) 240 MG Cs24, TAKE 1 CAPSULE ONE TIME DAILY, Disp: 90 capsule, Rfl: 3    fish oil-omega-3 fatty acids 300-1,000 mg capsule, Take 2 g by mouth 2 (two) times daily., Disp: , Rfl:     indomethacin (INDOCIN) 25 MG capsule, Take 1 capsule (25 mg total) by mouth 2 (two) times daily with meals., Disp: 20 capsule, Rfl: 0    levothyroxine (SYNTHROID, LEVOTHROID) 175 MCG tablet, Take 1 tablet (175 mcg total) by mouth once daily. Needs to branded., Disp: 30 tablet, Rfl: 11    loperamide (IMODIUM) 2 mg capsule, Take 2 mg by mouth 4 (four) times daily as needed for Diarrhea., Disp: , Rfl:     magnesium oxide (MAG-OX) 400 mg tablet, Take 400 mg by mouth once daily. , Disp: , Rfl:     metoprolol succinate (TOPROL-XL) 50 MG 24 hr tablet, Take 1 tablet (50 mg total) by mouth once daily., Disp: 90 tablet, Rfl: 1    nitroGLYCERIN (NITROSTAT) 0.4 MG SL tablet, DISSOLVE 1 TABLET UNDER THE TONGUE EVERY 5 MINUTES AS NEEDED FOR CHEST PAIN, Disp: 275 tablet, Rfl: 1    omeprazole (PRILOSEC) 20 MG capsule, TAKE 1 CAPSULE EVERY DAY, Disp: 90 capsule, Rfl: 1    paroxetine (PAXIL) 20 MG tablet, TAKE 1 TABLET ONE TIME DAILY (Patient taking differently: Take 20 mg by mouth once daily. Pt is taking 1/2 tab qd), Disp: 90 tablet, Rfl: 3    ranitidine (ZANTAC) 150 MG capsule, Take 1 capsule (150 mg total) by mouth 2 (two) times daily., Disp: 60 capsule, Rfl: 11    thyroid, pork, (ARMOUR THYROID) 300 mg Tab, Take 150 mg by mouth once daily., Disp: 45 tablet, Rfl: 1    VIT C/E/ZN/COPPR/LUTEIN/ZEAXAN (PRESERVISION AREDS 2 ORAL), Take by mouth 2 (two) times daily., Disp: , Rfl:     vitamin D 1000 units Tab, Take 1,000 Units by mouth once daily., Disp: , Rfl:       Review of Systems:  No nausea, vomiting, fevers, chills , contipation, diarrhea or sweats,no weight change, + neck  And shoulder  stiffness, no chest pain, no sob, no change of bowel or bladder habits,no coordination issues        Physical Exam:      Vitals:    10/04/18 0925   BP: 132/78   Pulse: 79     alert and oriented ×4 follows commands answers all questions appropriately,affect wnl  Manual muscle test 5 out of 5 except for left shoulder abduction and fwd flexion sensation to light touch grossly intact  Dec rom left shoulder all directions  +impingement 90deg in abduction and fwd flexion  +hawkin's +neers  +mild tenderness right greater troch and R QL  Nl gait  No C/C/E  -spurling's    Assessment:  Probable rtc tear +tendinopathy    Plan:  Shoulder xray to determine if she also has under laying severe oa- which will limit progress in therapy  Physical therapy to inc rom and strengthening of scapular stabilizers  voltaren gel to shoulder bid  F/u in 4wks at that time if no improvement will order MRI of shoulder and do a Shoulder US exam, she may be a candidate for percutaneous tenotomy as she is not interested in arthroscopy    Thank you for this interesting referral  Thank you for this interesting referral

## 2018-10-04 NOTE — LETTER
October 4, 2018      Stephon Cates MD  [address]  [place] [address]           [facility]  [address] [place] [address]  [address]  [place] [address]  Phone: [phone]  Fax: [phone]          Patient: Lorena Vallejo   MR Number: [id number]   YOB: 1933   Date of Visit: 10/4/2018       Dear Dr. Stephon Cates:    Thank you for referring Lorena Vallejo to me for evaluation. Attached you will find relevant portions of my assessment and plan of care.    If you have questions, please do not hesitate to call me. I look forward to following Lorena Vallejo along with you.    Sincerely,    Seda May,     Enclosure  CC:  No Recipients    If you would like to receive this communication electronically, please contact [email or web address] or [phone] to request more information on [employer] access.    For providers and/or their staff who would like to refer a patient to [facility], please contact us through our one-stop-shop provider referral line, [facility] , at [phone].    If you feel you have received this communication in error or would no longer like to receive these types of communications, please e-mail [email or web address]

## 2018-10-05 ENCOUNTER — HOSPITAL ENCOUNTER (OUTPATIENT)
Dept: RADIOLOGY | Facility: CLINIC | Age: 83
Discharge: HOME | End: 2018-10-05
Attending: PHYSICAL MEDICINE & REHABILITATION
Payer: MEDICARE

## 2018-10-05 DIAGNOSIS — M25.512 LEFT SHOULDER PAIN, UNSPECIFIED CHRONICITY: ICD-10-CM

## 2018-10-05 PROCEDURE — 73030 X-RAY EXAM OF SHOULDER: CPT | Mod: TC,FY,PO,LT

## 2018-10-05 PROCEDURE — 73030 X-RAY EXAM OF SHOULDER: CPT | Mod: 26,LT,S$GLB, | Performed by: RADIOLOGY

## 2018-10-11 ENCOUNTER — PATIENT OUTREACH (OUTPATIENT)
Dept: ADMINISTRATIVE | Facility: HOSPITAL | Age: 83
End: 2018-10-11

## 2018-10-11 DIAGNOSIS — Z78.0 ASYMPTOMATIC MENOPAUSAL STATE: Primary | ICD-10-CM

## 2018-10-11 NOTE — LETTER
October 19, 2018    Lorena Vallejo  [address] [facility] [address]             [facility]  [address]  [facility] [address]  Phone: [phone] [facility] is committed to your overall health.  To help you get the most out of each of your visits, we will review your information to make sure you are up to date on all of your recommended tests and/or procedures.       Dr. Stephon Cates MD has found that your chart shows you may be due for a Dexa Scan.     If you have had any of the above done at another facility, please bring the records or information with you so that your record at [facility] will be complete.  If you would like to schedule any of these, please contact the clinic at [phone].     If you are currently taking medication, please bring it with you to your appointment for review.     Also, if you have any type of Advanced Directives, please bring them with you to your office visit so we may scan them into your chart.     Thank You,     Your [facility] Team,   MD Isidra Morocho LPN Clinical Care Coordinator   [facility]   [address] [facility] [address]   Phone [phone]   Fax [phone]

## 2018-10-16 ENCOUNTER — CLINICAL SUPPORT (OUTPATIENT)
Dept: REHABILITATION | Facility: HOSPITAL | Age: 83
End: 2018-10-16
Attending: PHYSICAL MEDICINE & REHABILITATION
Payer: MEDICARE

## 2018-10-16 DIAGNOSIS — M25.612 DECREASED ROM OF LEFT SHOULDER: ICD-10-CM

## 2018-10-16 PROCEDURE — G8978 MOBILITY CURRENT STATUS: HCPCS | Mod: CJ,PN

## 2018-10-16 PROCEDURE — 97161 PT EVAL LOW COMPLEX 20 MIN: CPT | Mod: PN

## 2018-10-16 PROCEDURE — G8979 MOBILITY GOAL STATUS: HCPCS | Mod: CI,PN
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
## 2018-10-23 PROBLEM — M25.612 DECREASED ROM OF LEFT SHOULDER: Status: ACTIVE | Noted: 2018-10-23

## 2018-10-24 ENCOUNTER — CLINICAL SUPPORT (OUTPATIENT)
Dept: REHABILITATION | Facility: HOSPITAL | Age: 83
End: 2018-10-24
Attending: PHYSICAL MEDICINE & REHABILITATION
Payer: MEDICARE

## 2018-10-24 DIAGNOSIS — M25.612 DECREASED ROM OF LEFT SHOULDER: ICD-10-CM

## 2018-10-24 PROCEDURE — 97140 MANUAL THERAPY 1/> REGIONS: CPT | Mod: PN

## 2018-10-24 PROCEDURE — 97110 THERAPEUTIC EXERCISES: CPT | Mod: PN

## 2018-10-24 NOTE — PROGRESS NOTES
"Name: Lorena Vallejo  Clinic Number: [id number]  Date of Treatment: 10/24/2018   Diagnosis:   Encounter Diagnosis   Name Primary?    Decreased ROM of left shoulder        Time in: 1305  Time Out: 1400  Total Treatment Time: 55        Subjective:  Pt c/o pain L later flank.  Pt states the pain has improved but still present especially when pushing up from a karey to stand.  Lorena reports "I'm doing better".  "If I'm just sitting I don't have any pain".  "I'm really stiff".  Patient reports their pain to be n/a/10 on a 0-10 scale with 0 being no pain and 10 being the worst pain imaginable.    Objective    Patient received individual therapy to increase strength, endurance, ROM, flexibility and posture with 0 patients with activities as follows:     Lorena received therapeutic exercises to develop strength, endurance, ROM, flexibility and posture for 45 minutes including:     OH pulleys flex 5'  Shoulder rolls x 20 reps  scap squeezes x 20 reps  Cervical ROM flex/ext, rotation, side bending x 20 reps  Chin tucks x 20 reps  Upper trap stretch 3 x 15 sec B   Levator stretch 3 x 15 sec B  Rhomboid stretch 3 x 15 sec  Post capsule stretch 3 x 30 sec    Lorena received the following manual therapy techniques: Soft tissue Mobilization were applied to the: cervical/scapula area for 10 minutes.       Written Home Exercises Provided: HEP distributed to pt of above exercises and stretches.    Pt demo good understanding of the education provided. Lorena demonstrated good return demonstration of activities.     Assessment:   Several trigger points noted in L scapula and upper traps during STM.  Instructed pt to apply ice this pm if soreness arises.  Also, informed pt of possibility of increased soreness with beginning exercises/stretches.    Pt will continue to benefit from skilled PT intervention. Medical Necessity is demonstrated by:  Pain limits function of effected part for some activities, Unable to participate fully in daily " activities, Requires skilled supervision to complete and progress HEP and Weakness.    Patient is making good progress towards established goals.    Plan:  Continue with established Plan of Care towards PT goals.

## 2018-10-25 ENCOUNTER — OFFICE VISIT (OUTPATIENT)
Dept: FAMILY MEDICINE | Facility: CLINIC | Age: 83
End: 2018-10-25
Payer: MEDICARE

## 2018-10-25 ENCOUNTER — TELEPHONE (OUTPATIENT)
Dept: FAMILY MEDICINE | Facility: CLINIC | Age: 83
End: 2018-10-25

## 2018-10-25 VITALS
BODY MASS INDEX: 19.65 KG/M2 | WEIGHT: 129.63 LBS | HEIGHT: 68 IN | SYSTOLIC BLOOD PRESSURE: 128 MMHG | RESPIRATION RATE: 16 BRPM | TEMPERATURE: 98 F | HEART RATE: 80 BPM | OXYGEN SATURATION: 96 % | DIASTOLIC BLOOD PRESSURE: 84 MMHG

## 2018-10-25 DIAGNOSIS — D64.9 ANEMIA, UNSPECIFIED TYPE: ICD-10-CM

## 2018-10-25 DIAGNOSIS — K21.9 GASTROESOPHAGEAL REFLUX DISEASE, ESOPHAGITIS PRESENCE NOT SPECIFIED: ICD-10-CM

## 2018-10-25 DIAGNOSIS — J41.0 SIMPLE CHRONIC BRONCHITIS: Primary | ICD-10-CM

## 2018-10-25 DIAGNOSIS — I10 ESSENTIAL HYPERTENSION: ICD-10-CM

## 2018-10-25 DIAGNOSIS — M13.141 MONOARTHRITIS OF RIGHT HAND: ICD-10-CM

## 2018-10-25 DIAGNOSIS — R09.82 POSTNASAL DRIP: ICD-10-CM

## 2018-10-25 DIAGNOSIS — K90.829 SHORT BOWEL SYNDROME: Primary | ICD-10-CM

## 2018-10-25 DIAGNOSIS — K29.50 CHRONIC GASTRITIS, PRESENCE OF BLEEDING UNSPECIFIED, UNSPECIFIED GASTRITIS TYPE: ICD-10-CM

## 2018-10-25 PROCEDURE — 1101F PT FALLS ASSESS-DOCD LE1/YR: CPT | Mod: CPTII,S$GLB,, | Performed by: INTERNAL MEDICINE

## 2018-10-25 PROCEDURE — 99499 UNLISTED E&M SERVICE: CPT | Mod: S$GLB,,, | Performed by: INTERNAL MEDICINE

## 2018-10-25 PROCEDURE — 3079F DIAST BP 80-89 MM HG: CPT | Mod: CPTII,S$GLB,, | Performed by: INTERNAL MEDICINE

## 2018-10-25 PROCEDURE — 99213 OFFICE O/P EST LOW 20 MIN: CPT | Mod: S$GLB,,, | Performed by: INTERNAL MEDICINE

## 2018-10-25 PROCEDURE — 3074F SYST BP LT 130 MM HG: CPT | Mod: CPTII,S$GLB,, | Performed by: INTERNAL MEDICINE

## 2018-10-25 RX ORDER — LIDOCAINE AND PRILOCAINE 25; 25 MG/G; MG/G
CREAM TOPICAL
COMMUNITY
Start: 2018-10-12 | End: 2019-06-21

## 2018-10-25 RX ORDER — OMEPRAZOLE 20 MG/1
20 CAPSULE, DELAYED RELEASE ORAL DAILY
Qty: 90 CAPSULE | Refills: 1 | Status: SHIPPED | OUTPATIENT
Start: 2018-10-25 | End: 2019-11-20 | Stop reason: SDUPTHER

## 2018-10-25 RX ORDER — FLUTICASONE PROPIONATE 50 MCG
1 SPRAY, SUSPENSION (ML) NASAL DAILY
Qty: 3 BOTTLE | Refills: 3 | Status: SHIPPED | OUTPATIENT
Start: 2018-10-25 | End: 2020-01-07

## 2018-10-25 RX ORDER — FLUTICASONE PROPIONATE 50 MCG
1 SPRAY, SUSPENSION (ML) NASAL DAILY
COMMUNITY
End: 2018-10-25 | Stop reason: SDUPTHER

## 2018-10-25 RX ORDER — DILTIAZEM HYDROCHLORIDE 240 MG/1
240 CAPSULE, EXTENDED RELEASE ORAL DAILY
Qty: 90 CAPSULE | Refills: 3 | Status: SHIPPED | OUTPATIENT
Start: 2018-10-25 | End: 2020-11-06

## 2018-10-25 RX ORDER — COLCHICINE 0.6 MG/1
1 TABLET ORAL DAILY
Qty: 10 TABLET | Refills: 0 | Status: SHIPPED | OUTPATIENT
Start: 2018-10-25 | End: 2018-11-08 | Stop reason: SDUPTHER

## 2018-10-25 RX ORDER — LORATADINE 10 MG/1
10 TABLET ORAL DAILY
Refills: 0 | COMMUNITY
Start: 2018-10-25 | End: 2020-01-07

## 2018-10-25 NOTE — PROGRESS NOTES
Subjective:       Patient ID: Lorena Vallejo is a 85 y.o. female.    Chief Complaint: Follow-up (refills); Neck Pain; and Cough    HPI         CHIEF COMPLAINT: Neck Pain(+).  HPI:     ONSET/TIMING: Trauma: no. . Onset   2 mo     ago. . Work related: no .    Similar problems  in past: no .    DURATION:      QUALITY/COURSE:    better.       LOCATION:   Right . Radiation: no.     INTENSITY/SEVERITY: Severity is # 0 (10 point scale).      SYMPTOMS/RELATED: .-Possible medication side effects include:     The following symptoms are positive if BOLD, negative otherwise.      CONTEXT/WHEN: --Activity. Coughing. Sneeze.. Working_[employer].  . Repetitive_work . Hx of CA. history of IV drug abuse.. Similar_problems.     MODIFIERS/TREATMENTS: . Taking medications.    Chiropractor.  Litigation_pending. c-spine_x-rays. CT. MRI. Surgery.     REVIEW OF SYMPTOMS:  . Arm_Pain_to_below _elbow . .Weight_loss.              CHIEF COMPLAINT: Cough(+).  HPI:     ONSET/TIMING:    3 mo ago    DURATION:               Paroxysmal: no .    QUALITY/COURSE:. unchanged    INTENSITY/SEVERITY: Severity is # 4 (10 point scale)      The following symptoms/statements are positive if BOLD, negative otherwise.      CONTEXT/WHEN:  Tobacco_use. Smokers_in_home. Seasonal_pattern. Allergies/Hayfever. Sinusitis. Irritant_Exposure(smoke/dust/fumes). Exposure_to_others_with_similar_symptoms.        Similar_problems_in_past.   PAST TREATMENT OR EVALUATION:   previous PPD. Recent_previous_chest_x-ray. Recent_antibiotics.  Associated Symptoms:     sputum production: scant. copious. Hemoptysis.  Medical History: Past_pulmonary_infections.  Cardiovascular_disease.chronic_lung_disease.  tuberculosis. Asthma. AIDS. Gastroesophageal_reflux_disease .       Review of Systems   Constitutional: Negative for chills, diaphoresis, fever and unexpected weight change.   HENT: Positive for postnasal drip. Negative for rhinorrhea, sinus pressure and sore throat.    Respiratory:  "Positive for cough. Negative for shortness of breath and wheezing.    Cardiovascular: Negative for chest pain.   Musculoskeletal: Negative for myalgias.         Objective:      Vitals:    10/25/18 1046   BP: 128/84   Pulse: 80   Resp: 16   Temp: 98 °F (36.7 °C)   TempSrc: Oral   SpO2: 96%   Weight: 58.8 kg (129 lb 10.1 oz)   Height: 5' 8" (1.727 m)   PainSc: 0-No pain     Physical Exam   Constitutional: She appears well-developed and well-nourished.   HENT:   Right Ear: External ear normal.   Left Ear: External ear normal.   Mouth/Throat: Oropharynx is clear and moist. No oropharyngeal exudate.   Right nostril pale and boggy mucosa left nostril normal   Cardiovascular: Normal rate, regular rhythm and normal heart sounds. Exam reveals no friction rub.   No murmur heard.  Pulmonary/Chest: Effort normal and breath sounds normal. No respiratory distress. She has no wheezes. She has no rales. She exhibits no tenderness.   Abdominal: Soft. Bowel sounds are normal. There is no tenderness.   Musculoskeletal: She exhibits no edema.   Lymphadenopathy:     She has no cervical adenopathy.   Neurological: She is alert.   Psychiatric: She has a normal mood and affect. Her behavior is normal. Thought content normal.   Nursing note and vitals reviewed.        Assessment:       1. Simple chronic bronchitis    2. Gastroesophageal reflux disease, esophagitis presence not specified    3. Essential hypertension    4. Monoarthritis of right hand    5. Chronic gastritis, presence of bleeding unspecified, unspecified gastritis type    6. Postnasal drip    7. Anemia, unspecified type          Plan:       Simple chronic bronchitis  -     loratadine (CLARITIN) 10 mg tablet; Take 1 tablet (10 mg total) by mouth once daily.; Refill: 0  -     fluticasone (FLONASE) 50 mcg/actuation nasal spray; 1 spray (50 mcg total) by Each Nare route once daily.  Dispense: 3 Bottle; Refill: 3  -     X-Ray Chest PA And Lateral; Future; Expected date: " 10/25/2018    Gastroesophageal reflux disease, esophagitis presence not specified  -     omeprazole (PRILOSEC) 20 MG capsule; Take 1 capsule (20 mg total) by mouth once daily.  Dispense: 90 capsule; Refill: 1    Essential hypertension  -     diltiaZEM (TIAZAC) 240 MG Cs24; Take 1 capsule (240 mg total) by mouth once daily.  Dispense: 90 capsule; Refill: 3  -     Comprehensive metabolic panel; Future; Expected date: 10/25/2018    Monoarthritis of right hand  -     colchicine (COLCRYS) 0.6 mg tablet; Take 2 tablets (1.2 mg total) by mouth once daily. The first day take 2 at once then 1 by mouth an hour later  Dispense: 10 tablet; Refill: 0    Chronic gastritis, presence of bleeding unspecified, unspecified gastritis type  -     ranitidine (ZANTAC) 150 MG capsule; Take 1 capsule (150 mg total) by mouth 2 (two) times daily.  Dispense: 180 capsule; Refill: 1    Postnasal drip  -     loratadine (CLARITIN) 10 mg tablet; Take 1 tablet (10 mg total) by mouth once daily.; Refill: 0  -     fluticasone (FLONASE) 50 mcg/actuation nasal spray; 1 spray (50 mcg total) by Each Nare route once daily.  Dispense: 3 Bottle; Refill: 3    Anemia, unspecified type  -     CBC auto differential; Future; Expected date: 10/25/2018      Follow-up in about 3 months (around 1/25/2019).

## 2018-10-25 NOTE — TELEPHONE ENCOUNTER
----- Message from Leena Edmond sent at 10/25/2018 11:47 AM CDT -----  Pt would like to have a B-12 level as well

## 2018-10-26 ENCOUNTER — CLINICAL SUPPORT (OUTPATIENT)
Dept: REHABILITATION | Facility: HOSPITAL | Age: 83
End: 2018-10-26
Attending: PHYSICAL MEDICINE & REHABILITATION
Payer: MEDICARE

## 2018-10-26 DIAGNOSIS — M25.612 DECREASED ROM OF LEFT SHOULDER: ICD-10-CM

## 2018-10-26 PROCEDURE — 97110 THERAPEUTIC EXERCISES: CPT | Mod: PN

## 2018-10-26 NOTE — PROGRESS NOTES
"Name: Lorena Vallejo  Clinic Number: [id number]  Date of Treatment: 10/26/2018   Diagnosis:   Encounter Diagnosis   Name Primary?    Decreased ROM of left shoulder        Time in: 1000  Time Out: 1100  Total Treatment Time: 60    Subjective:    Lorena reports improvement of symptoms.  Patient reports no pain today. "I told my  I can feel every muscle in my neck and back and it feels good."    Objective:    Patient received individual therapy to increase strength, endurance, ROM, flexibility, posture and core stabilization with activities as follows:     Lorena received therapeutic exercises to develop strength, endurance, ROM, flexibility, posture and core stabilization for 60 minutes including:     Chin tucks seated 30  Seated c/s AROM 10/3: flex, extn, rot, and side flexion  Seated scap retraction B 10/3  Seated shrugs 10/3 B  Seated retro shoulder rolls B 10/3  Seated upper back stretches 3/30s   Seated OH pulley scaption 5'  Finger ladder L x 10 reps flexion    PROM L shoulder all planes    Continue HEP daily.    Pt demo good understanding of the education provided. Patient demonstrated good return demonstration of activities.     Assessment:     Improved pain per pt report. Limited c/s rotation L>R.     Pt will continue to benefit from skilled PT intervention. Medical Necessity is demonstrated by:  Requires skilled supervision to complete and progress HEP and Weakness.    Patient is making good progress towards established goals.    Plan:    Continue with established Plan of Care towards PT goals.     "

## 2018-10-29 ENCOUNTER — HOSPITAL ENCOUNTER (OUTPATIENT)
Dept: RADIOLOGY | Facility: CLINIC | Age: 83
Discharge: HOME | End: 2018-10-29
Attending: INTERNAL MEDICINE
Payer: MEDICARE

## 2018-10-29 DIAGNOSIS — Z78.0 ASYMPTOMATIC MENOPAUSAL STATE: ICD-10-CM

## 2018-10-29 DIAGNOSIS — J41.0 SIMPLE CHRONIC BRONCHITIS: ICD-10-CM

## 2018-10-29 PROCEDURE — 77080 DXA BONE DENSITY AXIAL: CPT | Mod: 26,,, | Performed by: RADIOLOGY

## 2018-10-29 PROCEDURE — 77080 DXA BONE DENSITY AXIAL: CPT | Mod: TC,PO

## 2018-10-29 PROCEDURE — 71046 X-RAY EXAM CHEST 2 VIEWS: CPT | Mod: TC,FY,PO

## 2018-10-29 PROCEDURE — 71046 X-RAY EXAM CHEST 2 VIEWS: CPT | Mod: 26,,, | Performed by: RADIOLOGY

## 2018-10-31 ENCOUNTER — CLINICAL SUPPORT (OUTPATIENT)
Dept: REHABILITATION | Facility: HOSPITAL | Age: 83
End: 2018-10-31
Attending: PHYSICAL MEDICINE & REHABILITATION
Payer: MEDICARE

## 2018-10-31 DIAGNOSIS — M25.612 DECREASED ROM OF LEFT SHOULDER: ICD-10-CM

## 2018-10-31 PROCEDURE — 97110 THERAPEUTIC EXERCISES: CPT | Mod: PN

## 2018-10-31 NOTE — PROGRESS NOTES
Name: Lorena Vallejo  Clinic Number: [id number]  Date of Treatment: 10/31/2018   Diagnosis:   Encounter Diagnosis   Name Primary?    Decreased ROM of left shoulder        Time in: 1148  Time Out: 1248  Total Treatment Time: 60      Subjective:    Lorena reports feeling better than she has in years with no pain in her neck or shoulders.  Patient reports their pain to be 0/10 on a 0-10 scale with 0 being no pain and 10 being the worst pain imaginable.    Objective  Lorena received therapeutic exercises to develop strength, endurance, ROM, flexibility and posture for 60 minutes including:    Chin tucks seated 30   Seated c/s AROM 10/3: flex, extn, rot, and side flexion   Seated scap retraction B 10/3 with LF GTB   Seated shrugs 10/3 B   Seated retro shoulder rolls B 10/3   Seated upper back stretches 3/30s    Seated OH pulley scaption 5'   Finger ladder L/R  x 10 reps flexion     PROM L shoulder all planes    Written Home Exercises Provided: Dist LF GTB for HEP  Pt demo good understanding of the education provided. Lorena demonstrated good return demonstration of activities.     Assessment:   Patient with increase CROM as well as decreased pain with cervical and shld therex.    Pt will continue to benefit from skilled PT intervention. Medical Necessity is demonstrated by:  Requires skilled supervision to complete and progress HEP and Weakness.    Patient is making good progress towards established goals.    Plan:  Continue with established Plan of Care towards PT goals.

## 2018-11-01 ENCOUNTER — TELEPHONE (OUTPATIENT)
Dept: FAMILY MEDICINE | Facility: CLINIC | Age: 83
End: 2018-11-01

## 2018-11-07 ENCOUNTER — OFFICE VISIT (OUTPATIENT)
Dept: PHYSICAL MEDICINE AND REHAB | Facility: CLINIC | Age: 83
End: 2018-11-07
Payer: MEDICARE

## 2018-11-07 VITALS
SYSTOLIC BLOOD PRESSURE: 107 MMHG | BODY MASS INDEX: 19.55 KG/M2 | HEIGHT: 68 IN | HEART RATE: 71 BPM | WEIGHT: 129 LBS | DIASTOLIC BLOOD PRESSURE: 73 MMHG

## 2018-11-07 DIAGNOSIS — M65.30 TRIGGER FINGER, UNSPECIFIED FINGER, UNSPECIFIED LATERALITY: Primary | ICD-10-CM

## 2018-11-07 PROCEDURE — 1101F PT FALLS ASSESS-DOCD LE1/YR: CPT | Mod: CPTII,S$GLB,, | Performed by: PHYSICAL MEDICINE & REHABILITATION

## 2018-11-07 PROCEDURE — 3074F SYST BP LT 130 MM HG: CPT | Mod: CPTII,S$GLB,, | Performed by: PHYSICAL MEDICINE & REHABILITATION

## 2018-11-07 PROCEDURE — 99499 UNLISTED E&M SERVICE: CPT | Mod: S$GLB,,, | Performed by: PHYSICAL MEDICINE & REHABILITATION

## 2018-11-07 PROCEDURE — 3078F DIAST BP <80 MM HG: CPT | Mod: CPTII,S$GLB,, | Performed by: PHYSICAL MEDICINE & REHABILITATION

## 2018-11-07 PROCEDURE — 99213 OFFICE O/P EST LOW 20 MIN: CPT | Mod: S$GLB,,, | Performed by: PHYSICAL MEDICINE & REHABILITATION

## 2018-11-07 PROCEDURE — 99999 PR PBB SHADOW E&M-EST. PATIENT-LVL IV: CPT | Mod: PBBFAC,,, | Performed by: PHYSICAL MEDICINE & REHABILITATION

## 2018-11-07 NOTE — PROGRESS NOTES
HPI:  Patient is a 85 y.o. year old female w. Shoulder pain. It began after she had a stress test and mri of heart. She was positioned w. Her arm above her head for a prolonged period. Last manuela BURDEN had ordered physical therapy. She states her pain has resolved. Today she is complaining of her left index finger getting stuck. Sometimes she needs to ply it open.     Imaging          FINDINGS:  There is no fracture.  There is 3 mm anterolisthesis of C5 on C6.  There is marked disc space narrowing at the C6-7 level where there is endplate sclerosis and osteophyte formation.  There is moderate disc space narrowing at the C4-5 level.  There is multilevel facet joint arthropathy with near fusion of the facet joints from C2 through C5.  The odontoid process is intact.  The prevertebral soft tissues are normal.  The airway is patent.  There are cerclage wires at the edge of the mandible bilaterally.       Impression         1. There is multilevel degenerative disc and facet disease without fracture.  3 mm anterolisthesis of C5 on C6 is likely degenerative in etiology.      Labs  egfr dep 58  Cr lfts gluc nl          Past Medical History:   Diagnosis Date    Arthritis      Blood transfusion      Bowel obstruction       X 3    Cancer       MELANOMA RIGHT EYE    Cancer of eyeball, except conjunctiva, cornea, retina, and choroid right    Diverticulitis      GERD (gastroesophageal reflux disease)      Hypertension      Peripheral neuropathy      PSVT (paroxysmal supraventricular tachycardia)       HAD X 3 OVER 10 YRS. LAST 7-25-12. HAD ADENOSINE IN [facility] ER. WAS ON INCREASED THYROID MED. NO PROBLEM SINCE    Salmonella      Short bowel syndrome      Thyroid disease      Transient ischemic attack      UTI (urinary tract infection)              Past Surgical History:   Procedure Laterality Date    ABDOMINAL SURGERY         X 3. ILEUM REMOVED. OSTOMY PLACED AND CLOSED.    ADENOIDECTOMY        ARTHROPLASTY, KNEE Right  2013     Performed by [facility]ider [facility] at [facility] OR    COLONOSCOPY        COLONOSCOPY N/A 2016     Procedure: COLONOSCOPY;  Surgeon: Alex Corral MD;  Location: [facility];  Service: Endoscopy;  Laterality: N/A;    COLONOSCOPY N/A 2016     Performed by Alex Corral MD at [facility] ENDO    CTR         BILAT    ESOPHAGOGASTRODUODENOSCOPY (EGD) N/A 2016     Performed by [facility]ral [facility] at [facility]    EYE SURGERY         RIGHT  - LASER MELANOMA; BILAT CATARACT    HYSTERECTOMY         BSO    JOINT REPLACEMENT         RIGHT KNEE    MANDIBLE FRACTURE SURGERY        MENISCECTOMY         MEDIAL AND LATERAL REPAIR, BAKERS CYST    TENDON REPAIR         LEFT THUMB    TONSILLECTOMY         T AND A            Family History   Problem Relation Age of Onset    Coronary artery disease Other      Hypertension Other      Stroke Other      Diabetes Other      COPD Other        Social History            Socioeconomic History    Marital status:        Spouse name: Not on file    Number of children: Not on file    Years of education: Not on file    Highest education level: Not on file   Social Needs    Financial resource strain: Not on file    Food insecurity - worry: Not on file    Food insecurity - inability: Not on file    Transportation needs - medical: Not on file    Transportation needs - non-medical: Not on file   Occupational History    Not on file   Tobacco Use    Smoking status: Former Smoker       Packs/day: 1.00       Years: 18.00       Pack years: 18.00       Last attempt to quit: 1973       Years since quittin.6    Smokeless tobacco: Never Used   Substance and Sexual Activity    Alcohol use: Yes       Comment: WINE once per day    Drug use: No    Sexual activity: No   Other Topics Concern    Not on file   Social History Narrative    Not on file               Review of patient's allergies indicates:   Allergen Reactions    Flagyl [metronidazole hcl] Diarrhea        SEVERE DIARRHEA    Latex, natural rubber Rash       ITCHING    Levaquin [levofloxacin] Diarrhea       MOOD CHANGES, WEAKNESS    Morphine Other (See Comments)       HALLUCINATES    Augmentin [amoxicillin-pot clavulanate] Other (See Comments)       AGITATION    Ciprofloxacin Anxiety    Codeine Nausea And Vomiting    Demerol [meperidine] Nausea And Vomiting       CAN TAKE WITH PHENERGAN    Dilaudid [hydromorphone (bulk)] Itching       OK WITH BENADRYL    Fentanyl Itching       OK WITH BENADRYL    Fosfomycin         diarrhia    Naproxen Other (See Comments)       HURT STOMACH    Nsaids (non-steroidal anti-inflammatory drug) Other (See Comments)       TOLD NOT TO TAKE AS HAD AN ULCER. HAD BLEEDING    Percocet [oxycodone-acetaminophen] Rash    Prednisone Other (See Comments)       AGITATION    Reglan [metoclopramide hcl] Anxiety    Sulfa (sulfonamide antibiotics) Rash    Vicodin [hydrocodone-acetaminophen] Rash    Zosyn [piperacillin-tazobactam] Rash    Ambien [zolpidem]      Ibuprofen      Macrobid [nitrofurantoin monohyd/m-cryst]      Pcn [penicillins]      Vasotec [enalapril maleate] Other (See Comments)       DRY COUGH         Current Outpatient Medications:     acetaminophen (TYLENOL) 500 MG tablet, Take 1,000 mg by mouth every 6 (six) hours as needed., Disp: , Rfl:     albuterol-ipratropium (DUO-NEB) 2.5 mg-0.5 mg/3 mL nebulizer solution, USE 1 VIAL IN NEBULIZER Q 6 H, Disp: , Rfl: 0    ARMOUR THYROID 300 mg Tab, TAKE ONE-HALF TABLET BY MOUTH ONCE DAILY, Disp: 45 tablet, Rfl: 3    aspirin 81 MG Chew, Take 1 tablet (81 mg total) by mouth once daily., Disp: , Rfl:     baclofen (LIORESAL) 10 MG tablet, Take 10 mg by mouth every evening., Disp: , Rfl:     cefUROXime (CEFTIN) 500 MG tablet, Take 1 tablet (500 mg total) by mouth 2 (two) times daily., Disp: 14 tablet, Rfl: 0    colchicine 0.6 mg tablet, Take 1 tablet (0.6 mg total) by mouth once daily. The first day take 2 at once then 1  by mouth an hour later, Disp: 10 tablet, Rfl: 0    cyanocobalamin 1,000 mcg/mL injection, INJECT 2ML IMTRAMUSCULAR EVERY MONTH, Disp: 10 mL, Rfl: 11    diltiaZEM (TIAZAC) 240 MG Cs24, TAKE 1 CAPSULE ONE TIME DAILY, Disp: 90 capsule, Rfl: 3    fish oil-omega-3 fatty acids 300-1,000 mg capsule, Take 2 g by mouth 2 (two) times daily., Disp: , Rfl:     indomethacin (INDOCIN) 25 MG capsule, Take 1 capsule (25 mg total) by mouth 2 (two) times daily with meals., Disp: 20 capsule, Rfl: 0    levothyroxine (SYNTHROID, LEVOTHROID) 175 MCG tablet, Take 1 tablet (175 mcg total) by mouth once daily. Needs to branded., Disp: 30 tablet, Rfl: 11    loperamide (IMODIUM) 2 mg capsule, Take 2 mg by mouth 4 (four) times daily as needed for Diarrhea., Disp: , Rfl:     magnesium oxide (MAG-OX) 400 mg tablet, Take 400 mg by mouth once daily. , Disp: , Rfl:     metoprolol succinate (TOPROL-XL) 50 MG 24 hr tablet, Take 1 tablet (50 mg total) by mouth once daily., Disp: 90 tablet, Rfl: 1    nitroGLYCERIN (NITROSTAT) 0.4 MG SL tablet, DISSOLVE 1 TABLET UNDER THE TONGUE EVERY 5 MINUTES AS NEEDED FOR CHEST PAIN, Disp: 275 tablet, Rfl: 1    omeprazole (PRILOSEC) 20 MG capsule, TAKE 1 CAPSULE EVERY DAY, Disp: 90 capsule, Rfl: 1    paroxetine (PAXIL) 20 MG tablet, TAKE 1 TABLET ONE TIME DAILY (Patient taking differently: Take 20 mg by mouth once daily. Pt is taking 1/2 tab qd), Disp: 90 tablet, Rfl: 3    ranitidine (ZANTAC) 150 MG capsule, Take 1 capsule (150 mg total) by mouth 2 (two) times daily., Disp: 60 capsule, Rfl: 11    thyroid, pork, (ARMOUR THYROID) 300 mg Tab, Take 150 mg by mouth once daily., Disp: 45 tablet, Rfl: 1    VIT C/E/ZN/COPPR/LUTEIN/ZEAXAN (PRESERVISION AREDS 2 ORAL), Take by mouth 2 (two) times daily., Disp: , Rfl:     vitamin D 1000 units Tab, Take 1,000 Units by mouth once daily., Disp: , Rfl:         Review of Systems:  No nausea, vomiting, fevers, chills , contipation, diarrhea or sweats,no weight change,   no chest pain, no sob, no change of bowel or bladder habits,no coordination issues           Physical Exam:          Vitals:     10/04/18 0925   BP: 132/78   Pulse: 79      alert and oriented ×4 follows commands answers all questions appropriately,affect wnl  Manual muscle test 5 out of 5 except for left shoulder abduction and fwd flexion sensation to light touch grossly intact  Full  rom left shoulder all directions  Nl gait  No C/C/E  Palpable nodule at base of left index finger     Assessment:  Shoulder strain w. Probable underlying partial tear- pain now resolved after physical therapy  Left index trigger finger     Plan:  Recommended cortisone injection for left index trigger finger but pt. Declined - it does not occur often enough  Her shoulder pain has resolved w. Therapy, she does not wish any further intervention  rtc prn

## 2018-11-08 DIAGNOSIS — M13.141 MONOARTHRITIS OF RIGHT HAND: ICD-10-CM

## 2018-11-08 RX ORDER — COLCHICINE 0.6 MG/1
1 TABLET ORAL DAILY
Qty: 10 TABLET | Refills: 0 | Status: SHIPPED | OUTPATIENT
Start: 2018-11-08 | End: 2019-06-21

## 2018-11-08 NOTE — TELEPHONE ENCOUNTER
----- Message from Matt Burgos sent at 11/8/2018 11:25 AM CST -----  Type: Needs Medical Advice    Who Called:  Patient    Pharmacy name and phone #:    [facility] - [place], [place] - [address]  [address] [address]  Phone: [phone] Fax: [phone]      Best Call Back Number: [phone]  Additional Information: Patient calling.  States that her pharmacy has sent several emails regarding the prescription for colchicine (COLCRYS) 0.6 mg tablet with no response.  Please call to advise

## 2018-11-26 ENCOUNTER — PES CALL (OUTPATIENT)
Dept: ADMINISTRATIVE | Facility: CLINIC | Age: 83
End: 2018-11-26

## 2018-11-30 ENCOUNTER — OFFICE VISIT (OUTPATIENT)
Dept: FAMILY MEDICINE | Facility: CLINIC | Age: 83
End: 2018-11-30
Payer: MEDICARE

## 2018-11-30 ENCOUNTER — TELEPHONE (OUTPATIENT)
Dept: FAMILY MEDICINE | Facility: CLINIC | Age: 83
End: 2018-11-30

## 2018-11-30 ENCOUNTER — DOCUMENTATION ONLY (OUTPATIENT)
Dept: FAMILY MEDICINE | Facility: CLINIC | Age: 83
End: 2018-11-30

## 2018-11-30 VITALS
OXYGEN SATURATION: 94 % | HEIGHT: 68 IN | BODY MASS INDEX: 20.34 KG/M2 | HEART RATE: 66 BPM | DIASTOLIC BLOOD PRESSURE: 76 MMHG | WEIGHT: 134.25 LBS | RESPIRATION RATE: 16 BRPM | SYSTOLIC BLOOD PRESSURE: 130 MMHG

## 2018-11-30 DIAGNOSIS — B35.4 TINEA CORPORIS: ICD-10-CM

## 2018-11-30 DIAGNOSIS — N30.01 ACUTE CYSTITIS WITH HEMATURIA: Primary | ICD-10-CM

## 2018-11-30 PROCEDURE — 3075F SYST BP GE 130 - 139MM HG: CPT | Mod: CPTII,S$GLB,, | Performed by: INTERNAL MEDICINE

## 2018-11-30 PROCEDURE — 3078F DIAST BP <80 MM HG: CPT | Mod: CPTII,S$GLB,, | Performed by: INTERNAL MEDICINE

## 2018-11-30 PROCEDURE — 87086 URINE CULTURE/COLONY COUNT: CPT | Mod: HCWC

## 2018-11-30 PROCEDURE — 87088 URINE BACTERIA CULTURE: CPT | Mod: HCWC

## 2018-11-30 PROCEDURE — 87186 SC STD MICRODIL/AGAR DIL: CPT | Mod: HCWC

## 2018-11-30 PROCEDURE — 81002 URINALYSIS NONAUTO W/O SCOPE: CPT | Mod: HCWC,S$GLB,, | Performed by: INTERNAL MEDICINE

## 2018-11-30 PROCEDURE — 99213 OFFICE O/P EST LOW 20 MIN: CPT | Mod: 25,S$GLB,, | Performed by: INTERNAL MEDICINE

## 2018-11-30 PROCEDURE — 87077 CULTURE AEROBIC IDENTIFY: CPT | Mod: HCWC

## 2018-11-30 PROCEDURE — 1101F PT FALLS ASSESS-DOCD LE1/YR: CPT | Mod: CPTII,S$GLB,, | Performed by: INTERNAL MEDICINE

## 2018-11-30 RX ORDER — CLOTRIMAZOLE AND BETAMETHASONE DIPROPIONATE 10; .64 MG/G; MG/G
CREAM TOPICAL 2 TIMES DAILY
Qty: 45 G | Refills: 1 | Status: SHIPPED | OUTPATIENT
Start: 2018-11-30 | End: 2019-06-21

## 2018-11-30 RX ORDER — CEFUROXIME AXETIL 500 MG/1
500 TABLET ORAL 2 TIMES DAILY
Qty: 20 TABLET | Refills: 0 | Status: SHIPPED | OUTPATIENT
Start: 2018-11-30 | End: 2019-04-05

## 2018-11-30 RX ORDER — PHENAZOPYRIDINE HYDROCHLORIDE 200 MG/1
200 TABLET, FILM COATED ORAL 3 TIMES DAILY PRN
Qty: 9 TABLET | Refills: 0 | Status: SHIPPED | OUTPATIENT
Start: 2018-11-30 | End: 2018-12-03

## 2018-11-30 NOTE — TELEPHONE ENCOUNTER
----- Message from Lacy Haywood sent at 11/30/2018  8:42 AM CST -----  Contact: self  Patient need to speak to nurse regarding patient will like to be seen today due to having a bladder infection per patient     Epic has no openings for today       Please call to advice  [phone] (home)

## 2018-11-30 NOTE — PROGRESS NOTES
Subjective:       Patient ID: Lorena Vallejo is a 85 y.o. female.    Chief Complaint: Dysuria and Rash (on back)    HPI          CHIEF COMPLAINT: Bladder/UTI(+).  HPI:     ONSET/TIMING: Onset   1 d   ago. Sudden:: no .     DURATION:  continuous    QUALITY/COURSE:   unchanged     LOCATION: pain/pressure: suprapubic    .     INTENSITY/SEVERITY: # 5   /10 (on a 1 to 10 scale).    CONTEXT/WHEN: --Similar problems: yes. .  Past_treatments: no . Past_evaluations: no    MODIFIERS/TREATMENTS:  .     The following symptoms are positive if BOLD, negative otherwise.       REVIEW OF SYMPTOMS:Urine_Frequency . Urine_Urgency . Dysuria . Suprapubic_Pain . Hematuria . Urine_Incontinence . . Fever . Chills . Nausea . Vomiting . Perineal Pain .  Sharp_pain . Dull_pain . Burning_pain .Tenesmus . Back_pain . Vaginal_Discharge . Vaginal_Itching . Vaginal_Burning . Dyspareunia .               CHIEF COMPLAINT: Rash  HPI:     ONSET/TIMING: Onset         1 wk  ago. Sudden: no.. Work related: no. Similar_problems_in_the_past: no.    DURATION:  Continuous..    QUALITY/COURSE:   worse.     LOCATION:     .     INTENSITY/SEVERITY:  Severity is #   6   (10 point scale).    CONTEXT/WHEN: .--Similar problems: no . .  Past treatments: none  . Exposure_to_others_with_similar_symptoms: no . . Exposure_to_poison _ivy: no. .   New exposures (soaps, lotions, laundry detergents, foods, medications, plants, insects or animals). no    SYMPTOMS/RELATED: .--Possible medication side effect:    The following symptoms are positive if BOLD, negative otherwise.     REVIEW OF SYMPTOMS:  Itching.  Pain. Sharp_pain. Dull_pain. Burning_pain.  Erythema-Skin. Hypopigmentation.  hyperpigmentation . Inflammation. Herald_Patch.. fixed . evanescent.  Blisters. Purulence. Fever. Fatigue. Tick_Bites.                              Review of Systems      Objective:      Vitals:    11/30/18 1528   BP: 130/76   Pulse: 66   Resp: 16   SpO2: (!) 94%   Weight: 60.9 kg (134 lb 4.2 oz)  "  Height: 5' 8" (1.727 m)   PainSc: 0-No pain     Physical Exam   Constitutional: She appears well-developed and well-nourished.   Cardiovascular: Normal rate, regular rhythm and normal heart sounds.   Pulmonary/Chest: Effort normal and breath sounds normal.   Abdominal: Soft. There is tenderness (Suprapubic).   Neurological: She is alert.   Skin:        Psychiatric: She has a normal mood and affect. Her behavior is normal. Thought content normal.   Nursing note and vitals reviewed.   Urinalysis shows 2+ leukocytes and 1+ blood with no nitrites      Assessment:       1. Acute cystitis with hematuria    2. Tinea corporis          Plan:       Acute cystitis with hematuria  -     cefUROXime (CEFTIN) 500 MG tablet; Take 1 tablet (500 mg total) by mouth 2 (two) times daily.  Dispense: 20 tablet; Refill: 0  -     phenazopyridine (PYRIDIUM) 200 MG tablet; Take 1 tablet (200 mg total) by mouth 3 (three) times daily as needed for Pain.  Dispense: 9 tablet; Refill: 0  -     Urine culture  -     Ambulatory consult to Urology    Tinea corporis  -     clotrimazole-betamethasone 1-0.05% (LOTRISONE) cream; Apply topically 2 (two) times daily.  Dispense: 45 g; Refill: 1      Follow-up in about 1 month (around 12/30/2018) for if you are not better return in one week.      "

## 2018-12-03 LAB
BACTERIA UR CULT: NORMAL
BILIRUB SERPL-MCNC: NORMAL MG/DL
BLOOD URINE, POC: 50
COLOR, POC UA: YELLOW
GLUCOSE UR QL STRIP: NORMAL
KETONES UR QL STRIP: NORMAL
LEUKOCYTE ESTERASE URINE, POC: NORMAL
NITRITE, POC UA: NORMAL
PH, POC UA: 5
PROTEIN, POC: NORMAL
SPECIFIC GRAVITY, POC UA: 1.01
UROBILINOGEN, POC UA: NORMAL

## 2018-12-12 ENCOUNTER — OFFICE VISIT (OUTPATIENT)
Dept: FAMILY MEDICINE | Facility: CLINIC | Age: 83
End: 2018-12-12
Payer: MEDICARE

## 2018-12-12 VITALS
HEIGHT: 68 IN | DIASTOLIC BLOOD PRESSURE: 72 MMHG | WEIGHT: 133.38 LBS | SYSTOLIC BLOOD PRESSURE: 123 MMHG | HEART RATE: 81 BPM | BODY MASS INDEX: 20.21 KG/M2

## 2018-12-12 DIAGNOSIS — E03.9 HYPOTHYROIDISM, UNSPECIFIED TYPE: ICD-10-CM

## 2018-12-12 DIAGNOSIS — F33.9 EPISODE OF RECURRENT MAJOR DEPRESSIVE DISORDER, UNSPECIFIED DEPRESSION EPISODE SEVERITY: ICD-10-CM

## 2018-12-12 DIAGNOSIS — H35.30 MACULAR DEGENERATION (SENILE) OF RETINA: ICD-10-CM

## 2018-12-12 DIAGNOSIS — E78.5 HYPERLIPIDEMIA, UNSPECIFIED HYPERLIPIDEMIA TYPE: ICD-10-CM

## 2018-12-12 DIAGNOSIS — G62.9 PERIPHERAL POLYNEUROPATHY: ICD-10-CM

## 2018-12-12 DIAGNOSIS — Z00.00 ENCOUNTER FOR PREVENTIVE HEALTH EXAMINATION: Primary | ICD-10-CM

## 2018-12-12 DIAGNOSIS — J84.10 CALCIFIED GRANULOMA OF LUNG: ICD-10-CM

## 2018-12-12 PROCEDURE — 3074F SYST BP LT 130 MM HG: CPT | Mod: CPTII,S$GLB,, | Performed by: NURSE PRACTITIONER

## 2018-12-12 PROCEDURE — G0439 PPPS, SUBSEQ VISIT: HCPCS | Mod: S$GLB,,, | Performed by: NURSE PRACTITIONER

## 2018-12-12 PROCEDURE — 3078F DIAST BP <80 MM HG: CPT | Mod: CPTII,S$GLB,, | Performed by: NURSE PRACTITIONER

## 2018-12-12 PROCEDURE — 99999 PR PBB SHADOW E&M-EST. PATIENT-LVL V: CPT | Mod: PBBFAC,,, | Performed by: NURSE PRACTITIONER

## 2018-12-12 PROCEDURE — 99499 UNLISTED E&M SERVICE: CPT | Mod: S$GLB,,, | Performed by: NURSE PRACTITIONER

## 2018-12-12 NOTE — Clinical Note
Primary Care Providers:Stephon Cates MD, MD (General)Your patient was seen today for a HRA visit. Gap(s) in care (HEDIS gaps) have been identified during this visit that require additional testing and possible follow up.No orders of the defined types were placed in this encounter.These orders were placed using [facility] approved protocol and any results will be forwarded to your office for appropriate follow up. I have included a copy of my visit note; please review the note and feel free to contact me with any questions. Thank you for allowing me to participate in the care of your patients.Mell Gaspar NP

## 2018-12-12 NOTE — PATIENT INSTRUCTIONS
Counseling and Referral of Other Preventative  (Italic type indicates deductible and co-insurance are waived)    Patient Name: Lorena Vallejo  Today's Date: 12/12/2018    Health Maintenance       Date Due Completion Date    TETANUS VACCINE 08/09/2021 8/9/2011    DEXA SCAN 10/29/2021 10/29/2018    Lipid Panel 07/04/2023 7/4/2018        No orders of the defined types were placed in this encounter.    The following information is provided to all patients.  This information is to help you find resources for any of the problems found today that may be affecting your health:                Living healthy guide: www.[facility].[email or web address].[facility]      Understanding Diabetes: www.diabetes.org      Eating healthy: www.cdc.gov/healthyweight      CDC home safety checklist: www.cdc.gov/steadi/patient.html      Agency on Aging: www.goea.[place].[facility]      Alcoholics anonymous (AA): www.aa.org      Physical Activity: www.donya.nih.gov/mf9pmye      Tobacco use: www.quitwithusla.org

## 2018-12-17 NOTE — PROGRESS NOTES
"Lorena Vallejo presented for a  Medicare AWV and comprehensive Health Risk Assessment today. The following components were reviewed and updated:    · Medical history  · Family History  · Social history  · Allergies and Current Medications  · Health Risk Assessment  · Health Maintenance  · Care Team     ** See Completed Assessments for Annual Wellness Visit within the encounter summary.**       The following assessments were completed:  · Living Situation  · CAGE  · Depression Screening  · Timed Get Up and Go  · Whisper Test  · Cognitive Function Screening  · Nutrition Screening  · ADL Screening  · PAQ Screening    Vitals:    12/12/18 1017   BP: 123/72   Pulse: 81   Weight: 60.5 kg (133 lb 6.1 oz)   Height: 5' 8" (1.727 m)     Body mass index is 20.28 kg/m².  Physical Exam   Constitutional: She is oriented to person, place, and time. She appears well-developed.   HENT:   Head: Normocephalic and atraumatic.   Eyes: Pupils are equal, round, and reactive to light.   Neck: Normal range of motion.   Cardiovascular: Normal rate, regular rhythm and normal heart sounds.   No murmur heard.  Pulmonary/Chest: Effort normal and breath sounds normal.   Abdominal: Soft. Bowel sounds are normal. She exhibits no mass.   Musculoskeletal: Normal range of motion. She exhibits no edema.   Neurological: She is alert and oriented to person, place, and time. She exhibits normal muscle tone.   Skin: Skin is warm and dry.   Psychiatric: She has a normal mood and affect. Her behavior is normal.   Nursing note and vitals reviewed.            Diagnoses and health risks identified today and associated recommendations/orders:    Encounter for preventive health examination    Calcified granuloma of lung  Comments:  stable; continue to monitor    Episode of recurrent major depressive disorder, unspecified depression episode severity  Comments:  stable; continue medication regimen    Hyperlipidemia, unspecified hyperlipidemia " type  Comments:  controlled; continue to monitor    Macular degeneration (senile) of retina  Comments:  stable; continue to monitor - followed by ophtho    Peripheral polyneuropathy  Comments:  stable; continue to monitor    Hypothyroidism, unspecified type  Comments:  stable; continue medication regimen    BMI 20.0-20.9, adult  Comments:  uncontrolled; encouraged nutritional supplements          Provided Lorena with a 5-10 year written screening schedule and personal prevention plan. Recommendations were developed using the USPSTF age appropriate recommendations. Education, counseling, and referrals were provided as needed. After Visit Summary printed and given to patient which includes a list of additional screenings\tests needed.    F/U with PCP in 3-4 months    Mell Gaspar NP     Patient readiness: acceptance and barriers:none    During the course of the visit the patient was educated and counseled about the following:     Underweight:  Nutritional supplements    Goals: Underweight: Increase calorie intake and BMI      Did patient meet goals/outcomes: No    The following self management tools provided: declined    Patient Instructions (the written plan) was given to the patient/family.     Time spent with patient: 55 minutes    Barriers to medications present (no )    Adverse reactions to current medications (no)    Over the counter medications reviewed (Yes)

## 2019-01-16 ENCOUNTER — LAB VISIT (OUTPATIENT)
Dept: LAB | Facility: HOSPITAL | Age: 84
End: 2019-01-16
Attending: INTERNAL MEDICINE
Payer: MEDICARE

## 2019-01-16 DIAGNOSIS — D64.9 ANEMIA, UNSPECIFIED TYPE: ICD-10-CM

## 2019-01-16 DIAGNOSIS — I10 ESSENTIAL HYPERTENSION: ICD-10-CM

## 2019-01-16 LAB
ALBUMIN SERPL BCP-MCNC: 4 G/DL
ALP SERPL-CCNC: 77 U/L
ALT SERPL W/O P-5'-P-CCNC: 15 U/L
ANION GAP SERPL CALC-SCNC: 6 MMOL/L
AST SERPL-CCNC: 19 U/L
BASOPHILS # BLD AUTO: 0.07 K/UL
BASOPHILS NFR BLD: 1.2 %
BILIRUB SERPL-MCNC: 0.7 MG/DL
BUN SERPL-MCNC: 16 MG/DL
CALCIUM SERPL-MCNC: 9.6 MG/DL
CHLORIDE SERPL-SCNC: 109 MMOL/L
CO2 SERPL-SCNC: 27 MMOL/L
CREAT SERPL-MCNC: 0.8 MG/DL
DIFFERENTIAL METHOD: ABNORMAL
EOSINOPHIL # BLD AUTO: 0.2 K/UL
EOSINOPHIL NFR BLD: 3.3 %
ERYTHROCYTE [DISTWIDTH] IN BLOOD BY AUTOMATED COUNT: 14.9 %
EST. GFR  (AFRICAN AMERICAN): >60 ML/MIN/1.73 M^2
EST. GFR  (NON AFRICAN AMERICAN): >60 ML/MIN/1.73 M^2
GLUCOSE SERPL-MCNC: 84 MG/DL
HCT VFR BLD AUTO: 38.8 %
HGB BLD-MCNC: 12.1 G/DL
IMM GRANULOCYTES # BLD AUTO: 0.01 K/UL
IMM GRANULOCYTES NFR BLD AUTO: 0.2 %
LYMPHOCYTES # BLD AUTO: 2.1 K/UL
LYMPHOCYTES NFR BLD: 36.5 %
MCH RBC QN AUTO: 31.4 PG
MCHC RBC AUTO-ENTMCNC: 31.2 G/DL
MCV RBC AUTO: 101 FL
MONOCYTES # BLD AUTO: 0.9 K/UL
MONOCYTES NFR BLD: 15.9 %
NEUTROPHILS # BLD AUTO: 2.5 K/UL
NEUTROPHILS NFR BLD: 42.9 %
NRBC BLD-RTO: 0 /100 WBC
PLATELET # BLD AUTO: 269 K/UL
PMV BLD AUTO: 9.5 FL
POTASSIUM SERPL-SCNC: 4.8 MMOL/L
PROT SERPL-MCNC: 7.5 G/DL
RBC # BLD AUTO: 3.85 M/UL
SODIUM SERPL-SCNC: 142 MMOL/L
WBC # BLD AUTO: 5.84 K/UL

## 2019-01-16 PROCEDURE — 80053 COMPREHEN METABOLIC PANEL: CPT

## 2019-01-16 PROCEDURE — 36415 COLL VENOUS BLD VENIPUNCTURE: CPT | Mod: PO

## 2019-01-16 PROCEDURE — 85025 COMPLETE CBC W/AUTO DIFF WBC: CPT

## 2019-01-17 ENCOUNTER — TELEPHONE (OUTPATIENT)
Dept: CARDIOLOGY | Facility: CLINIC | Age: 84
End: 2019-01-17

## 2019-01-17 NOTE — TELEPHONE ENCOUNTER
Called and left message for Mrs. Vallejo to reschedule appointment 3/18/19 due [facility]

## 2019-01-23 ENCOUNTER — OFFICE VISIT (OUTPATIENT)
Dept: FAMILY MEDICINE | Facility: CLINIC | Age: 84
End: 2019-01-23
Payer: MEDICARE

## 2019-01-23 ENCOUNTER — DOCUMENTATION ONLY (OUTPATIENT)
Dept: FAMILY MEDICINE | Facility: CLINIC | Age: 84
End: 2019-01-23

## 2019-01-23 VITALS
WEIGHT: 136.25 LBS | RESPIRATION RATE: 16 BRPM | BODY MASS INDEX: 20.65 KG/M2 | HEIGHT: 68 IN | OXYGEN SATURATION: 96 % | SYSTOLIC BLOOD PRESSURE: 128 MMHG | HEART RATE: 75 BPM | DIASTOLIC BLOOD PRESSURE: 72 MMHG

## 2019-01-23 DIAGNOSIS — R19.7 DIARRHEA, UNSPECIFIED TYPE: Primary | ICD-10-CM

## 2019-01-23 DIAGNOSIS — Z86.2 HISTORY OF ANEMIA: ICD-10-CM

## 2019-01-23 DIAGNOSIS — E78.5 HYPERLIPIDEMIA, UNSPECIFIED HYPERLIPIDEMIA TYPE: ICD-10-CM

## 2019-01-23 DIAGNOSIS — F41.9 ANXIETY AND DEPRESSION: ICD-10-CM

## 2019-01-23 DIAGNOSIS — F32.A ANXIETY AND DEPRESSION: ICD-10-CM

## 2019-01-23 DIAGNOSIS — E03.9 HYPOTHYROIDISM, UNSPECIFIED TYPE: ICD-10-CM

## 2019-01-23 PROCEDURE — 3074F PR MOST RECENT SYSTOLIC BLOOD PRESSURE < 130 MM HG: ICD-10-PCS | Mod: CPTII,S$GLB,, | Performed by: INTERNAL MEDICINE

## 2019-01-23 PROCEDURE — 99214 OFFICE O/P EST MOD 30 MIN: CPT | Mod: S$GLB,,, | Performed by: INTERNAL MEDICINE

## 2019-01-23 PROCEDURE — 3074F SYST BP LT 130 MM HG: CPT | Mod: CPTII,S$GLB,, | Performed by: INTERNAL MEDICINE

## 2019-01-23 PROCEDURE — 99499 RISK ADDL DX/OHS AUDIT: ICD-10-PCS | Mod: S$GLB,,, | Performed by: INTERNAL MEDICINE

## 2019-01-23 PROCEDURE — 3078F PR MOST RECENT DIASTOLIC BLOOD PRESSURE < 80 MM HG: ICD-10-PCS | Mod: CPTII,S$GLB,, | Performed by: INTERNAL MEDICINE

## 2019-01-23 PROCEDURE — 99499 UNLISTED E&M SERVICE: CPT | Mod: S$GLB,,, | Performed by: INTERNAL MEDICINE

## 2019-01-23 PROCEDURE — 1101F PT FALLS ASSESS-DOCD LE1/YR: CPT | Mod: CPTII,S$GLB,, | Performed by: INTERNAL MEDICINE

## 2019-01-23 PROCEDURE — 1101F PR PT FALLS ASSESS DOC 0-1 FALLS W/OUT INJ PAST YR: ICD-10-PCS | Mod: CPTII,S$GLB,, | Performed by: INTERNAL MEDICINE

## 2019-01-23 PROCEDURE — 3078F DIAST BP <80 MM HG: CPT | Mod: CPTII,S$GLB,, | Performed by: INTERNAL MEDICINE

## 2019-01-23 PROCEDURE — 99214 PR OFFICE/OUTPT VISIT, EST, LEVL IV, 30-39 MIN: ICD-10-PCS | Mod: S$GLB,,, | Performed by: INTERNAL MEDICINE

## 2019-01-23 RX ORDER — DIAZEPAM 10 MG/1
10 TABLET ORAL
COMMUNITY
End: 2019-04-05 | Stop reason: CLARIF

## 2019-01-23 RX ORDER — LOPERAMIDE HYDROCHLORIDE 2 MG/1
2 CAPSULE ORAL 4 TIMES DAILY PRN
Qty: 100 CAPSULE | Refills: 0 | Status: SHIPPED | OUTPATIENT
Start: 2019-01-23 | End: 2019-02-02

## 2019-01-23 RX ORDER — PAROXETINE HYDROCHLORIDE 20 MG/1
20 TABLET, FILM COATED ORAL EVERY MORNING
Qty: 90 TABLET | Refills: 3 | Status: SHIPPED | OUTPATIENT
Start: 2019-01-23 | End: 2020-01-07

## 2019-01-23 NOTE — PROGRESS NOTES
"Subjective:       Patient ID: Lorena Vallejo is a 85 y.o. female.    Chief Complaint: Hyperlipidemia (labs)    HPI   Stop Paxil after weaning her herself down and had a rough half the time with depression.  She is back on the Paxil and feeling well    Has intermittent diarrhea due to short bowel syndrome.  She is having trouble finding over-the-counter Imodium      CHIEF COMPLAINT: Hyperlipidemia. cholesterol screening: no.   HPI:     ONSET:    MODIFIERS/TREATMENTS: . Taking medications: yes. . Non-compliance with following diet: no. .     SYMPTOMS/RELATED:Possible medication side effects include:   Myalgia: no.  .     REVIEW OF SYMPTOMS: past weights:   Wt Readings from Last 1 Encounters:   01/23/19 1325 61.8 kg (136 lb 3.9 oz)                                                     Last lipids: total   Lab Results   Component Value Date    CHOL 141 04/04/2018    CHOL 140 09/27/2017    CHOL 99 (L) 05/16/2017                                                                     HDL   Lab Results   Component Value Date    HDL 64 04/04/2018    HDL 64 09/27/2017    HDL 55 05/16/2017                                                                     LDL   Lab Results   Component Value Date    LDLCALC 48.8 (L) 04/04/2018    LDLCALC 56.2 (L) 09/27/2017    LDLCALC 18.8 (L) 05/16/2017                                                                     TRIG   Lab Results   Component Value Date    TRIG 141 04/04/2018    TRIG 99 09/27/2017    TRIG 126 05/16/2017                                                                     Rash went away.     No dysuria. Didn't go to urology as she had been to one 2 y ago and nothing done.     Review of Systems      Objective:      Vitals:    01/23/19 1325   BP: 128/72   Pulse: 75   Resp: 16   SpO2: 96%   Weight: 61.8 kg (136 lb 3.9 oz)   Height: 5' 8" (1.727 m)   PainSc: 0-No pain     Physical Exam   Constitutional: She appears well-developed and well-nourished.   Cardiovascular: Normal rate, " regular rhythm and normal heart sounds.   Pulmonary/Chest: Effort normal and breath sounds normal.   Abdominal: Soft. There is no tenderness.   Neurological: She is alert.   Psychiatric: She has a normal mood and affect. Her behavior is normal. Thought content normal.   Nursing note and vitals reviewed.      no longer has anemia.  Assessment:       1. Diarrhea, unspecified type    2. Anxiety and depression    3. History of anemia    4. Hyperlipidemia, unspecified hyperlipidemia type    5. Hypothyroidism, unspecified type          Plan:       Diarrhea, unspecified type  -     loperamide (IMODIUM) 2 mg capsule; Take 1 capsule (2 mg total) by mouth 4 (four) times daily as needed for Diarrhea.  Dispense: 100 capsule; Refill: 0    Anxiety and depression  -     paroxetine (PAXIL) 20 MG tablet; Take 1 tablet (20 mg total) by mouth every morning.  Dispense: 90 tablet; Refill: 3    History of anemia  -     CBC auto differential; Future; Expected date: 01/23/2019    Hyperlipidemia, unspecified hyperlipidemia type  -     Comprehensive metabolic panel; Future; Expected date: 01/23/2019  -     Lipid panel; Future; Expected date: 01/23/2019    Hypothyroidism, unspecified type  -     TSH; Future; Expected date: 01/23/2019      Follow-up in about 6 months (around 7/23/2019).

## 2019-01-25 ENCOUNTER — TELEPHONE (OUTPATIENT)
Dept: FAMILY MEDICINE | Facility: CLINIC | Age: 84
End: 2019-01-25

## 2019-02-07 ENCOUNTER — TELEPHONE (OUTPATIENT)
Dept: CARDIOLOGY | Facility: CLINIC | Age: 84
End: 2019-02-07

## 2019-02-23 DIAGNOSIS — F41.8 DEPRESSION WITH ANXIETY: ICD-10-CM

## 2019-02-25 RX ORDER — PAROXETINE HYDROCHLORIDE 20 MG/1
TABLET, FILM COATED ORAL
Qty: 90 TABLET | Refills: 0 | Status: SHIPPED | OUTPATIENT
Start: 2019-02-25 | End: 2019-06-21 | Stop reason: SDUPTHER

## 2019-03-01 ENCOUNTER — TELEPHONE (OUTPATIENT)
Dept: CARDIOLOGY | Facility: CLINIC | Age: 84
End: 2019-03-01

## 2019-03-01 NOTE — TELEPHONE ENCOUNTER
Call placed to Ms. Carrillo in regards to rescheduling her appt that she has with Dr. Bragg on 3/18/19. I spoke with Her  since she was sleeping. I advised Dr. Bragg is no longer here, so her appt needs to be rescheduled. He verbalized understanding. I offered him an appt on Friday May 31, 2019 @ 1p. He accepted. No further issues noted.

## 2019-03-15 DIAGNOSIS — I47.10 PSVT (PAROXYSMAL SUPRAVENTRICULAR TACHYCARDIA): ICD-10-CM

## 2019-03-15 RX ORDER — METOPROLOL SUCCINATE 50 MG/1
50 TABLET, EXTENDED RELEASE ORAL DAILY
Qty: 90 TABLET | Refills: 1 | Status: SHIPPED | OUTPATIENT
Start: 2019-03-15 | End: 2019-09-16 | Stop reason: SDUPTHER

## 2019-03-15 NOTE — TELEPHONE ENCOUNTER
After Visit Summary   9/13/2017    Gema Coombs    MRN: [id number]           Patient Information     Date Of Birth          1952        Visit Information        Provider Department      9/13/2017 12:30 PM Iveth Veras PA-C [facility]        Today's Diagnoses     Seizure disorder (H)    -  1    Essential hypertension, benign        Paroxysmal atrial fibrillation (H)        Mild persistent asthma without complication        ACP (advance care planning)        Need for hepatitis C screening test        Morbid obesity, unspecified obesity type (H)           Follow-ups after your visit        Follow-up notes from your care team     Return in about 6 months (around 3/13/2018) for Lab Work (fasting), Routine Visit.      Who to contact     If you have questions or need follow up information about today's clinic visit or your schedule please contact [facility] directly at [phone].  Normal or non-critical lab and imaging results will be communicated to you by [employer], letter or phone within 4 business days after the clinic has received the results. If you do not hear from us within 7 days, please contact the clinic through [employer] or phone. If you have a critical or abnormal lab result, we will notify you by phone as soon as possible.  Submit refill requests through [employer] or call your pharmacy and they will forward the refill request to us. Please allow 3 business days for your refill to be completed.          Additional Information About Your Visit        MyChart Information     [employer] gives you secure access to your electronic health record. If you see a primary care provider, you can also send messages to your care team and make appointments. If you have questions, please call your primary care clinic.  If you do not have a primary care provider, please call [phone] and they will assist you.        Care EveryWhere ID     This is your  ----- Message from Lacy May sent at 3/15/2019 11:48 AM CDT -----  Contact: self  Patient need refill on following medication metoprolol succinate (TOPROL-XL) 50 MG 24 hr tablet   Patient only enough for another     Patient states pharmacy has tried to contact office per patient       Please call to advice [phone] (home) patient requesting a kathryn when refill please        [id number] - [place]OL[place] - [address] AT [facility] OF [address]  [facility] [place] [address]  Phone: [phone] Fax: [phone]    Thank you     "Care EveryWhere ID. This could be used by other organizations to access your [place] medical records  [id number]        Your Vitals Were     Pulse Temperature Height Pulse Oximetry Breastfeeding? BMI (Body Mass Index)    82 98.1  F (36.7  C) (Oral) 1.626 m (5' 4\") 97% No 54.07 kg/m2       Blood Pressure from Last 3 Encounters:   09/13/17 146/88   03/24/17 134/80   12/19/16 134/64    Weight from Last 3 Encounters:   09/13/17 (!) 142.9 kg (315 lb)   03/24/17 (!) 142.7 kg (314 lb 9.6 oz)   12/19/16 (!) 140.6 kg (310 lb)              We Performed the Following     Asthma Action Plan (AAP)     BASIC METABOLIC PANEL (QUEST)     Hepatits C antibody (QUEST)     VENOUS COLLECTION          Today's Medication Changes          These changes are accurate as of: 9/13/17 11:59 PM.  If you have any questions, ask your nurse or doctor.               These medicines have changed or have updated prescriptions.        Dose/Directions    primidone 250 MG tablet   Commonly known as:  MYSOLINE   This may have changed:  See the new instructions.   Used for:  Seizure disorder (H)   Changed by:  Iveth Veras PA-C        TAKE 1 TABLET(250 MG) BY MOUTH TWICE DAILY   Quantity:  180 tablet   Refills:  1         Stop taking these medicines if you haven't already. Please contact your care team if you have questions.     aspirin  MG EC tablet   Stopped by:  Iveth Veras PA-C                Where to get your medicines      These medications were sent to [id number]  [facility] - [address] AT [address]  [address], [place] [address]     Phone:  [phone]     albuterol (2.5 MG/3ML) 0.083% neb solution    diltiazem 120 MG 24 hr ER beaded capsule    lisinopril 10 MG tablet    primidone 250 MG tablet                Primary Care Provider Office Phone # Fax #    Flavia Alberts [phone] [address] [address]      "   Equal Access to Services     [facility] : Hadii leah liu roger Sobarry, waaxda luqadaha, qaybta kaalmarita danjoslynrita, simba hernandez. So [facility] [phone].    ATENCIÓN: Si habla clarisse, tiene a teresa disposición servicios gratuitos de asistencia lingüística. Llame al [phone].    We comply with applicable federal civil rights laws and [place] laws. We do not discriminate on the basis of race, color, national origin, age, disability sex, sexual orientation or gender identity.            Thank you!     Thank you for choosing [facility]  for your care. Our goal is always to provide you with excellent care. Hearing back from our patients is one way we can continue to improve our services. Please take a few minutes to complete the written survey that you may receive in the mail after your visit with us. Thank you!             Your Updated Medication List - Protect others around you: Learn how to safely use, store and throw away your medicines at www.disposemymeds.org.          This list is accurate as of: 9/13/17 11:59 PM.  Always use your most recent med list.                   Brand Name Dispense Instructions for use Diagnosis    diltiazem 120 MG 24 hr ER beaded capsule    TIAZAC    90 capsule    Take 1 capsule (120 mg) by mouth daily    Paroxysmal atrial fibrillation (H)       fluticasone-salmeterol 250-50 MCG/DOSE diskus inhaler    ADVAIR    3 Inhaler    Inhale 1 puff into the lungs 2 times daily    Mild persistent asthma without complication       ibuprofen 600 MG tablet    ADVIL/MOTRIN    30 tablet    Take 1 tablet (600 mg) by mouth every 6 hours as needed for moderate pain        lisinopril 10 MG tablet    PRINIVIL/ZESTRIL    270 tablet    TAKE 2 TABLETS BY MOUTH EVERY MORNING AND 1 TABLET EVERY EVENING    Essential hypertension, benign       primidone 250 MG tablet    MYSOLINE    180 tablet    TAKE 1 TABLET(250 MG) BY MOUTH TWICE DAILY    Seizure disorder  (H)       * PROAIR  (90 BASE) MCG/ACT Inhaler   Generic drug:  albuterol     8.5 g    INHALE ONE TO TWO PUFFS INTO THE LUNGS THREE TIMES DAILY AS NEEDED    Mild persistent asthma without complication       * albuterol (2.5 MG/3ML) 0.083% neb solution     1 Box    Take 1 vial (2.5 mg) by nebulization every 6 hours as needed for shortness of breath / dyspnea    Mild persistent asthma without complication       ranitidine HCl 300 MG Caps     90 capsule    TAKE ONE CAPSULE BY MOUTH ONCE DAILY    Gastroesophageal reflux disease without esophagitis       * Notice:  This list has 2 medication(s) that are the same as other medications prescribed for you. Read the directions carefully, and ask your doctor or other care provider to review them with you.

## 2019-03-26 DIAGNOSIS — K29.50 CHRONIC GASTRITIS, PRESENCE OF BLEEDING UNSPECIFIED, UNSPECIFIED GASTRITIS TYPE: ICD-10-CM

## 2019-04-04 ENCOUNTER — TELEPHONE (OUTPATIENT)
Dept: FAMILY MEDICINE | Facility: CLINIC | Age: 84
End: 2019-04-04

## 2019-04-04 NOTE — TELEPHONE ENCOUNTER
----- Message from Lacy Haywood sent at 4/4/2019  3:19 PM CDT -----  Contact: Patient  Patient has an UTI and the earliest appointment in [facility] is 04/08/19 and patient is requesting to be seen sooner.    Please contact to advise [phone] (home)

## 2019-04-05 ENCOUNTER — OFFICE VISIT (OUTPATIENT)
Dept: FAMILY MEDICINE | Facility: CLINIC | Age: 84
End: 2019-04-05
Payer: MEDICARE

## 2019-04-05 VITALS
HEART RATE: 77 BPM | OXYGEN SATURATION: 97 % | HEIGHT: 68 IN | BODY MASS INDEX: 20.45 KG/M2 | WEIGHT: 134.94 LBS | TEMPERATURE: 98 F | SYSTOLIC BLOOD PRESSURE: 120 MMHG | DIASTOLIC BLOOD PRESSURE: 80 MMHG | RESPIRATION RATE: 16 BRPM

## 2019-04-05 DIAGNOSIS — N30.00 ACUTE CYSTITIS WITHOUT HEMATURIA: Primary | ICD-10-CM

## 2019-04-05 PROCEDURE — 87077 CULTURE AEROBIC IDENTIFY: CPT | Mod: HCNC

## 2019-04-05 PROCEDURE — 81002 POCT URINE DIPSTICK WITHOUT MICROSCOPE: ICD-10-PCS | Mod: HCNC,S$GLB,, | Performed by: INTERNAL MEDICINE

## 2019-04-05 PROCEDURE — 99213 PR OFFICE/OUTPT VISIT, EST, LEVL III, 20-29 MIN: ICD-10-PCS | Mod: 25,S$GLB,, | Performed by: INTERNAL MEDICINE

## 2019-04-05 PROCEDURE — 99213 OFFICE O/P EST LOW 20 MIN: CPT | Mod: 25,S$GLB,, | Performed by: INTERNAL MEDICINE

## 2019-04-05 PROCEDURE — 87088 URINE BACTERIA CULTURE: CPT | Mod: HCNC

## 2019-04-05 PROCEDURE — 87086 URINE CULTURE/COLONY COUNT: CPT | Mod: HCNC

## 2019-04-05 PROCEDURE — 87186 SC STD MICRODIL/AGAR DIL: CPT | Mod: HCNC

## 2019-04-05 PROCEDURE — 1101F PT FALLS ASSESS-DOCD LE1/YR: CPT | Mod: CPTII,S$GLB,, | Performed by: INTERNAL MEDICINE

## 2019-04-05 PROCEDURE — 81002 URINALYSIS NONAUTO W/O SCOPE: CPT | Mod: HCNC,S$GLB,, | Performed by: INTERNAL MEDICINE

## 2019-04-05 PROCEDURE — 1101F PR PT FALLS ASSESS DOC 0-1 FALLS W/OUT INJ PAST YR: ICD-10-PCS | Mod: CPTII,S$GLB,, | Performed by: INTERNAL MEDICINE

## 2019-04-05 RX ORDER — CEFUROXIME AXETIL 250 MG/1
250 TABLET ORAL 2 TIMES DAILY
Qty: 10 TABLET | Refills: 0 | Status: CANCELLED | OUTPATIENT
Start: 2019-04-05

## 2019-04-05 RX ORDER — TRIMETHOPRIM 100 MG/1
100 TABLET ORAL EVERY 12 HOURS
Qty: 30 TABLET | Refills: 1 | Status: SHIPPED | OUTPATIENT
Start: 2019-04-05 | End: 2019-07-18

## 2019-04-05 RX ORDER — NITROFURANTOIN 25; 75 MG/1; MG/1
100 CAPSULE ORAL 2 TIMES DAILY
Qty: 10 CAPSULE | Refills: 0 | Status: SHIPPED | OUTPATIENT
Start: 2019-04-05 | End: 2019-04-10

## 2019-04-05 NOTE — PATIENT INSTRUCTIONS
Put fluids.  Need to be seen if you run a fever.  When you are having diarrhea use a tampon to protect the urethra from contamination.  Careful hand washing

## 2019-04-05 NOTE — PROGRESS NOTES
"Subjective:       Patient ID: Lorena Vallejo is a 86 y.o. female.    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  HPI: shaking chill 2 d ago.     ONSET/TIMING: Onset     14 d ago. Sudden:: no .     DURATION:  continuous    QUALITY/COURSE:   unchanged     LOCATION: pain/pressure: suprapubic    .     INTENSITY/SEVERITY: # 5   /10 (on a 1 to 10 scale).    CONTEXT/WHEN: --Similar problems: yes. .  Past_treatments:yes . Past_evaluations: yes    MODIFIERS/TREATMENTS:  .     The following symptoms are positive if BOLD, negative otherwise.       REVIEW OF SYMPTOMS:Urine_Frequency . Urine_Urgency . Dysuria . Suprapubic_Pain . Hematuria . Urine_Incontinence . . Fever . Chills . Nausea . Vomiting . Perineal Pain .  Sharp_pain . Dull_pain . Burning_pain .Tenesmus . Back_pain . Vaginal_Discharge . Vaginal_Itching . Vaginal_Burning . Dyspareunia .                            Review of Systems      Objective:      Vitals:    04/05/19 1037   BP: 120/80   Pulse: 77   Resp: 16   Temp: 98.1 °F (36.7 °C)   TempSrc: Oral   SpO2: 97%   Weight: 61.2 kg (134 lb 14.7 oz)   Height: 5' 8" (1.727 m)   PainSc:   2   PainLoc: Abdomen     Physical Exam   Constitutional: She appears well-developed and well-nourished.   Cardiovascular: Normal rate, regular rhythm and normal heart sounds.   Pulmonary/Chest: Effort normal and breath sounds normal.   Abdominal: Soft. There is tenderness (Suprapubic).   Musculoskeletal:   No CVA tenderness   Neurological: She is alert.   Psychiatric: She has a normal mood and affect. Her behavior is normal. Thought content normal.   Nursing note and vitals reviewed.        Assessment:       1. Acute cystitis without hematuria          Plan:   Patient has an appointment with her urologist in May,     Acute cystitis without hematuria  -     nitrofurantoin, macrocrystal-monohydrate, (MACROBID) 100 MG capsule; Take " 1 capsule (100 mg total) by mouth 2 (two) times daily. for 5 days  Dispense: 10 capsule; Refill: 0  -     trimethoprim (TRIMPEX) 100 mg Tab; Take 1 tablet (100 mg total) by mouth every 12 (twelve) hours. Start after 04/10/2019  Dispense: 30 tablet; Refill: 1  -     POCT urine dipstick without microscope  -     Urine culture      Follow up for if you are not better return in one week.

## 2019-04-08 LAB
BACTERIA UR CULT: NORMAL
BILIRUB SERPL-MCNC: NORMAL MG/DL
BLOOD URINE, POC: NORMAL
COLOR, POC UA: NORMAL
GLUCOSE UR QL STRIP: NORMAL
KETONES UR QL STRIP: NORMAL
LEUKOCYTE ESTERASE URINE, POC: NORMAL
NITRITE, POC UA: NORMAL
PH, POC UA: 5
PROTEIN, POC: NORMAL
SPECIFIC GRAVITY, POC UA: 1
UROBILINOGEN, POC UA: NORMAL

## 2019-04-08 RX ORDER — LOPERAMIDE HYDROCHLORIDE 2 MG/1
2 CAPSULE ORAL 4 TIMES DAILY PRN
Qty: 30 CAPSULE | Refills: 5 | Status: SHIPPED | OUTPATIENT
Start: 2019-04-08 | End: 2021-03-30 | Stop reason: SDUPTHER

## 2019-05-06 DIAGNOSIS — M25.562 LEFT KNEE PAIN, UNSPECIFIED CHRONICITY: Primary | ICD-10-CM

## 2019-05-07 ENCOUNTER — HOSPITAL ENCOUNTER (OUTPATIENT)
Dept: RADIOLOGY | Facility: HOSPITAL | Age: 84
Discharge: HOME | End: 2019-05-07
Attending: ORTHOPAEDIC SURGERY
Payer: MEDICARE

## 2019-05-07 ENCOUNTER — OFFICE VISIT (OUTPATIENT)
Dept: ORTHOPEDICS | Facility: CLINIC | Age: 84
End: 2019-05-07
Payer: MEDICARE

## 2019-05-07 VITALS
WEIGHT: 134 LBS | SYSTOLIC BLOOD PRESSURE: 139 MMHG | DIASTOLIC BLOOD PRESSURE: 65 MMHG | BODY MASS INDEX: 20.31 KG/M2 | HEIGHT: 68 IN

## 2019-05-07 DIAGNOSIS — M17.12 PRIMARY OSTEOARTHRITIS OF LEFT KNEE: Primary | ICD-10-CM

## 2019-05-07 DIAGNOSIS — M17.12 ARTHRITIS OF KNEE, LEFT: Primary | ICD-10-CM

## 2019-05-07 DIAGNOSIS — M25.562 LEFT KNEE PAIN, UNSPECIFIED CHRONICITY: ICD-10-CM

## 2019-05-07 PROCEDURE — 73562 XR KNEE ORTHO LEFT WITH FLEXION: ICD-10-PCS | Mod: 26,HCNC,59,LT | Performed by: RADIOLOGY

## 2019-05-07 PROCEDURE — 99213 OFFICE O/P EST LOW 20 MIN: CPT | Mod: 25,HCNC,S$GLB, | Performed by: ORTHOPAEDIC SURGERY

## 2019-05-07 PROCEDURE — 73564 X-RAY EXAM KNEE 4 OR MORE: CPT | Mod: 26,HCNC,LT, | Performed by: RADIOLOGY

## 2019-05-07 PROCEDURE — 20610 LARGE JOINT ASPIRATION/INJECTION: L KNEE: ICD-10-PCS | Mod: HCNC,LT,S$GLB, | Performed by: ORTHOPAEDIC SURGERY

## 2019-05-07 PROCEDURE — 73562 X-RAY EXAM OF KNEE 3: CPT | Mod: 26,HCNC,59,LT | Performed by: RADIOLOGY

## 2019-05-07 PROCEDURE — 73562 X-RAY EXAM OF KNEE 3: CPT | Mod: TC,HCNC,PN,LT

## 2019-05-07 PROCEDURE — 99999 PR PBB SHADOW E&M-EST. PATIENT-LVL III: CPT | Mod: PBBFAC,HCNC,, | Performed by: ORTHOPAEDIC SURGERY

## 2019-05-07 PROCEDURE — 1101F PT FALLS ASSESS-DOCD LE1/YR: CPT | Mod: HCNC,CPTII,S$GLB, | Performed by: ORTHOPAEDIC SURGERY

## 2019-05-07 PROCEDURE — 20610 DRAIN/INJ JOINT/BURSA W/O US: CPT | Mod: HCNC,LT,S$GLB, | Performed by: ORTHOPAEDIC SURGERY

## 2019-05-07 PROCEDURE — 99213 PR OFFICE/OUTPT VISIT, EST, LEVL III, 20-29 MIN: ICD-10-PCS | Mod: 25,HCNC,S$GLB, | Performed by: ORTHOPAEDIC SURGERY

## 2019-05-07 PROCEDURE — 73564 XR KNEE ORTHO LEFT WITH FLEXION: ICD-10-PCS | Mod: 26,HCNC,LT, | Performed by: RADIOLOGY

## 2019-05-07 PROCEDURE — 1101F PR PT FALLS ASSESS DOC 0-1 FALLS W/OUT INJ PAST YR: ICD-10-PCS | Mod: HCNC,CPTII,S$GLB, | Performed by: ORTHOPAEDIC SURGERY

## 2019-05-07 PROCEDURE — 99999 PR PBB SHADOW E&M-EST. PATIENT-LVL III: ICD-10-PCS | Mod: PBBFAC,HCNC,, | Performed by: ORTHOPAEDIC SURGERY

## 2019-05-07 RX ORDER — TRIAMCINOLONE ACETONIDE 40 MG/ML
40 INJECTION, SUSPENSION INTRA-ARTICULAR; INTRAMUSCULAR
Status: DISCONTINUED | OUTPATIENT
Start: 2019-05-07 | End: 2019-05-08 | Stop reason: HOSPADM

## 2019-05-07 RX ADMIN — TRIAMCINOLONE ACETONIDE 40 MG: 40 INJECTION, SUSPENSION INTRA-ARTICULAR; INTRAMUSCULAR at 10:05

## 2019-05-08 NOTE — PROCEDURES
Large Joint Aspiration/Injection: L knee  Date/Time: 5/7/2019 10:53 PM  Performed by: Juanito Ivory MD  Authorized by: Juanito Ivory MD     Consent Done?:  Yes (Verbal)  Indications:  Pain  Timeout: Prior to procedure the correct patient, procedure, and site was verified      Location:  Knee  Site:  L knee  Prep: Patient was prepped and draped in usual sterile fashion    Approach:  Anteromedial  Medications:  40 mg triamcinolone acetonide 40 mg/mL

## 2019-05-08 NOTE — PROGRESS NOTES
Past Medical History:   Diagnosis Date    Arthritis     Blood transfusion     Bowel obstruction     X 3    Cancer     MELANOMA RIGHT EYE    Cancer of eyeball, except conjunctiva, cornea, retina, and choroid right    Diverticulitis     GERD (gastroesophageal reflux disease)     Hypertension     Peripheral neuropathy     PSVT (paroxysmal supraventricular tachycardia)     HAD X 3 OVER 10 YRS. LAST 7-25-12. HAD ADENOSINE IN [facility] ER. WAS ON INCREASED THYROID MED. NO PROBLEM SINCE    Salmonella     Short bowel syndrome     Thyroid disease     Transient ischemic attack     UTI (urinary tract infection)        Past Surgical History:   Procedure Laterality Date    ABDOMINAL SURGERY      X 3. ILEUM REMOVED. OSTOMY PLACED AND CLOSED.    ADENOIDECTOMY      ARTHROPLASTY, KNEE Right 4/24/2013    Per[facility]ider [facility] OR    BILATERAL OOPHORECTOMY      COLONOSCOPY      COLONOSCOPY N/A 8/24/2016    Performed by Alex Corral MD at [facility] ENDO    CTR      BILAT    ESOPHAGOGASTRODUODENOSCOPY (EGD) N/A 7/11/2016    Performed by Alex Corral MD at [facility] ENDO    EYE SURGERY      RIGHT  - LASER MELANOMA; BILAT CATARACT    HYSTERECTOMY      BSO    JOINT REPLACEMENT      RIGHT KNEE    MANDIBLE FRACTURE SURGERY      MENISCECTOMY      MEDIAL AND LATERAL REPAIR, BAKERS CYST    TENDON REPAIR      LEFT THUMB    TONSILLECTOMY      T AND A       Current Outpatient Medications   Medication Sig    acetaminophen (TYLENOL) 500 MG tablet Take 1,000 mg by mouth every 6 (six) hours as needed.    albuterol-ipratropium (DUO-NEB) 2.5 mg-0.5 mg/3 mL nebulizer solution USE 1 VIAL IN NEBULIZER Q 6 H    ARMOUR THYROID 300 mg Tab TAKE ONE-HALF TABLET BY MOUTH ONCE DAILY    aspirin 81 MG Chew Take 1 tablet (81 mg total) by mouth once daily.    baclofen (LIORESAL) 10 MG tablet Take 10 mg by mouth every evening.    clotrimazole-betamethasone 1-0.05% (LOTRISONE) cream Apply topically 2 (two) times daily.     colchicine (COLCRYS) 0.6 mg tablet Take 2 tablets (1.2 mg total) by mouth once daily. The first day take 2 at once then 1 by mouth an hour later    cyanocobalamin 1,000 mcg/mL injection INJECT 2ML IMTRAMUSCULAR EVERY MONTH    diclofenac sodium (VOLTAREN) 1 % Gel Apply 4 g topically 4 (four) times daily.    diltiaZEM (TIAZAC) 240 MG Cs24 Take 1 capsule (240 mg total) by mouth once daily.    fish oil-omega-3 fatty acids 300-1,000 mg capsule Take 2 g by mouth 2 (two) times daily.    fluticasone (FLONASE) 50 mcg/actuation nasal spray 1 spray (50 mcg total) by Each Nare route once daily.    indomethacin (INDOCIN) 25 MG capsule Take 1 capsule (25 mg total) by mouth 2 (two) times daily with meals.    levothyroxine (SYNTHROID, LEVOTHROID) 175 MCG tablet Take 1 tablet (175 mcg total) by mouth once daily. Needs to branded.    lidocaine-prilocaine (EMLA) cream     loperamide (IMODIUM) 2 mg capsule Take 1 capsule (2 mg total) by mouth 4 (four) times daily as needed for Diarrhea.    loratadine (CLARITIN) 10 mg tablet Take 1 tablet (10 mg total) by mouth once daily.    magnesium oxide (MAG-OX) 400 mg tablet Take 400 mg by mouth once daily.     metoprolol succinate (TOPROL-XL) 50 MG 24 hr tablet Take 1 tablet (50 mg total) by mouth once daily.    nitroGLYCERIN (NITROSTAT) 0.4 MG SL tablet DISSOLVE 1 TABLET UNDER THE TONGUE EVERY 5 MINUTES AS NEEDED FOR CHEST PAIN    omeprazole (PRILOSEC) 20 MG capsule Take 1 capsule (20 mg total) by mouth once daily.    paroxetine (PAXIL) 20 MG tablet Take 1 tablet (20 mg total) by mouth every morning.    paroxetine (PAXIL) 20 MG tablet TAKE 1 TABLET BY MOUTH EVERY DAY    ranitidine (ZANTAC) 150 MG capsule TAKE 1 CAPSULE TWICE DAILY    thyroid, pork, (ARMOUR THYROID) 300 mg Tab Take 150 mg by mouth once daily.    trimethoprim (TRIMPEX) 100 mg Tab Take 1 tablet (100 mg total) by mouth every 12 (twelve) hours. Start after 04/10/2019    VIT C/E/ZN/COPPR/LUTEIN/ZEAXAN (PRESERVISION  AREDS 2 ORAL) Take by mouth 2 (two) times daily.    vitamin D 1000 units Tab Take 1,000 Units by mouth once daily.     No current facility-administered medications for this visit.        Review of patient's allergies indicates:   Allergen Reactions    Flagyl [metronidazole hcl] Diarrhea     SEVERE DIARRHEA    Latex, natural rubber Rash     ITCHING    Levaquin [levofloxacin] Diarrhea     MOOD CHANGES, WEAKNESS    Morphine Other (See Comments)     HALLUCINATES    Augmentin [amoxicillin-pot clavulanate] Other (See Comments)     AGITATION    Ciprofloxacin Anxiety    Codeine Nausea And Vomiting    Demerol [meperidine] Nausea And Vomiting     CAN TAKE WITH PHENERGAN    Dilaudid [hydromorphone (bulk)] Itching     OK WITH BENADRYL    Fentanyl Itching     OK WITH BENADRYL    Fosfomycin      diarrhia    Naproxen Other (See Comments)     HURT STOMACH    Nsaids (non-steroidal anti-inflammatory drug) Other (See Comments)     TOLD NOT TO TAKE AS HAD AN ULCER. HAD BLEEDING    Percocet [oxycodone-acetaminophen] Rash    Prednisone Other (See Comments)     AGITATION    Reglan [metoclopramide hcl] Anxiety    Sulfa (sulfonamide antibiotics) Rash    Vicodin [hydrocodone-acetaminophen] Rash    Zosyn [piperacillin-tazobactam] Rash    Ambien [zolpidem]     Ibuprofen     Macrobid [nitrofurantoin monohyd/m-cryst]     Pcn [penicillins]     Vasotec [enalapril maleate] Other (See Comments)     DRY COUGH       Family History   Problem Relation Age of Onset    Diabetes Mother     Heart disease Mother         MI at 52    Stroke Father     Cancer Father         colon cancer    Rheum arthritis Father     COPD Father     Heart disease Brother     Stroke Brother     Diabetes Brother     Arthritis Daughter     Hypertension Daughter     Cancer Son         Leukemia    Hypertension Son     Heart disease Son     Diabetes Maternal Grandmother     Asthma Maternal Grandfather     Cancer Paternal Grandmother          melanoma    Cancer Paternal Grandfather         lip cancer - smoked pipe    Sleep apnea Son     Hypertension Son     Sinus disease Son     Hypertension Son     Heart disease Son     Obesity Son        Social History     Socioeconomic History    Marital status:      Spouse name: Not on file    Number of children: Not on file    Years of education: Not on file    Highest education level: Not on file   Occupational History    Not on file   Social Needs    Financial resource strain: Not on file    Food insecurity:     Worry: Not on file     Inability: Not on file    Transportation needs:     Medical: Not on file     Non-medical: Not on file   Tobacco Use    Smoking status: Former Smoker     Packs/day: 1.00     Years: 18.00     Pack years: 18.00     Last attempt to quit: 1973     Years since quittin.2    Smokeless tobacco: Never Used   Substance and Sexual Activity    Alcohol use: Yes     Alcohol/week: 4.2 oz     Types: 7 Glasses of wine per week     Comment: WINE once per day    Drug use: No    Sexual activity: Never   Lifestyle    Physical activity:     Days per week: Not on file     Minutes per session: Not on file    Stress: Not on file   Relationships    Social connections:     Talks on phone: Not on file     Gets together: Not on file     Attends [employer] service: Not on file     Active member of club or organization: Not on file     Attends meetings of clubs or organizations: Not on file     Relationship status: Not on file   Other Topics Concern    Not on file   Social History Narrative    Not on file       Chief Complaint:   Chief Complaint   Patient presents with    Left Knee - Pain       Consulting Physician: No ref. provider found    History of present illness:    This is a 86 y.o. year old female who complains of left knee pain on and off for years. Worse last 2 months. Pain 2/10 and worse with use.    Review of Systems:    Constitution: Denies chills, fever, and  "sweats.  HENT: Denies headaches or blurry vision.  Cardiovascular: Denies chest pain or irregular heart beat.  Respiratory: Denies cough or shortness of breath.  Gastrointestinal: Denies abdominal pain, nausea, or vomiting.  Musculoskeletal:  Denies muscle cramps.  Neurological: Denies dizziness or focal weakness.  Psychiatric/Behavioral: Normal mental status.  Hematologic/Lymphatic: Denies bleeding problem or easy bruising/bleeding.  Skin: Denies rash or suspicious lesions.    Examination:    Vital Signs:    Vitals:    05/07/19 1358   BP: 139/65   Weight: 60.8 kg (134 lb)   Height: 5' 8" (1.727 m)   PainSc:   2       Body mass index is 20.37 kg/m².    This a well-developed, well nourished patient in no acute distress.    Alert and oriented x 3 and cooperative to examination.       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       Assessment: Arthritis of knee, left  -     Large Joint Aspiration/Injection: L knee        Plan: Will start with steroid today and then Euflexxa series. Also reports left hip pain so will XR next visit.    DISCLAIMER: This note may have been dictated using voice recognition software and may contain grammatical errors.     NOTE: Consult report sent to referring provider via [employer] EMR.  "

## 2019-05-21 ENCOUNTER — OFFICE VISIT (OUTPATIENT)
Dept: ORTHOPEDICS | Facility: CLINIC | Age: 84
End: 2019-05-21
Payer: MEDICARE

## 2019-05-21 VITALS — WEIGHT: 134.06 LBS | BODY MASS INDEX: 20.32 KG/M2 | HEIGHT: 68 IN

## 2019-05-21 DIAGNOSIS — M17.12 PRIMARY OSTEOARTHRITIS OF LEFT KNEE: Primary | ICD-10-CM

## 2019-05-21 PROCEDURE — 20610 LARGE JOINT ASPIRATION/INJECTION: L KNEE: ICD-10-PCS | Mod: HCNC,LT,S$GLB, | Performed by: ORTHOPAEDIC SURGERY

## 2019-05-21 PROCEDURE — 99999 PR PBB SHADOW E&M-EST. PATIENT-LVL III: ICD-10-PCS | Mod: PBBFAC,HCNC,, | Performed by: ORTHOPAEDIC SURGERY

## 2019-05-21 PROCEDURE — 99499 NO LOS: ICD-10-PCS | Mod: HCNC,S$GLB,, | Performed by: ORTHOPAEDIC SURGERY

## 2019-05-21 PROCEDURE — 99499 UNLISTED E&M SERVICE: CPT | Mod: HCNC,S$GLB,, | Performed by: ORTHOPAEDIC SURGERY

## 2019-05-21 PROCEDURE — 99999 PR PBB SHADOW E&M-EST. PATIENT-LVL III: CPT | Mod: PBBFAC,HCNC,, | Performed by: ORTHOPAEDIC SURGERY

## 2019-05-21 PROCEDURE — 20610 DRAIN/INJ JOINT/BURSA W/O US: CPT | Mod: HCNC,LT,S$GLB, | Performed by: ORTHOPAEDIC SURGERY

## 2019-05-21 NOTE — PROGRESS NOTES
Past Medical History:   Diagnosis Date    Arthritis     Blood transfusion     Bowel obstruction     X 3    Cancer     MELANOMA RIGHT EYE    Cancer of eyeball, except conjunctiva, cornea, retina, and choroid right    Diverticulitis     GERD (gastroesophageal reflux disease)     Hypertension     Peripheral neuropathy     PSVT (paroxysmal supraventricular tachycardia)     HAD X 3 OVER 10 YRS. LAST 7-25-12. HAD ADENOSINE IN [facility] ER. WAS ON INCREASED THYROID MED. NO PROBLEM SINCE    Salmonella     Short bowel syndrome     Thyroid disease     Transient ischemic attack     UTI (urinary tract infection)        Past Surgical History:   Procedure Laterality Date    ABDOMINAL SURGERY      X 3. ILEUM REMOVED. OSTOMY PLACED AND CLOSED.    ADENOIDECTOMY      ARTHROPLASTY, KNEE Right 4/24/2013    [facility]formed [facility]    BILATERAL OOPHORECTOMY      COLONOSCOPY      COLONOSCOPY N/A 8/24/2016    [facility] ENDO    CTR      BILAT    ESOPHAGOGASTRODUODENOSCOPY (EGD) N/A 7/11/2016    [facility] ENDO    EYE SURGERY      RIGHT  - LASER MELANOMA; BILAT CATARACT    HYSTERECTOMY      BSO    JOINT REPLACEMENT      RIGHT KNEE    MANDIBLE FRACTURE SURGERY      MENISCECTOMY      MEDIAL AND LATERAL REPAIR, BAKERS CYST    TENDON REPAIR      LEFT THUMB    TONSILLECTOMY      T AND A       Current Outpatient Medications   Medication Sig    acetaminophen (TYLENOL) 500 MG tablet Take 1,000 mg by mouth every 6 (six) hours as needed.    albuterol-ipratropium (DUO-NEB) 2.5 mg-0.5 mg/3 mL nebulizer solution USE 1 VIAL IN NEBULIZER Q 6 H    ARMOUR THYROID 300 mg Tab TAKE ONE-HALF TABLET BY MOUTH ONCE DAILY    aspirin 81 MG Chew Take 1 tablet (81 mg total) by mouth once daily.    baclofen (LIORESAL) 10 MG tablet Take 10 mg by mouth every evening.    clotrimazole-betamethasone 1-0.05% (LOTRISONE) cream Apply topically 2 (two) times daily.     colchicine (COLCRYS) 0.6 mg tablet Take 2 tablets (1.2 mg total) by mouth once daily. The first day take 2 at once then 1 by mouth an hour later    cyanocobalamin 1,000 mcg/mL injection INJECT 2ML IMTRAMUSCULAR EVERY MONTH    diclofenac sodium (VOLTAREN) 1 % Gel Apply 4 g topically 4 (four) times daily.    diltiaZEM (TIAZAC) 240 MG Cs24 Take 1 capsule (240 mg total) by mouth once daily.    fish oil-omega-3 fatty acids 300-1,000 mg capsule Take 2 g by mouth 2 (two) times daily.    fluticasone (FLONASE) 50 mcg/actuation nasal spray 1 spray (50 mcg total) by Each Nare route once daily.    indomethacin (INDOCIN) 25 MG capsule Take 1 capsule (25 mg total) by mouth 2 (two) times daily with meals.    lidocaine-prilocaine (EMLA) cream     loperamide (IMODIUM) 2 mg capsule Take 1 capsule (2 mg total) by mouth 4 (four) times daily as needed for Diarrhea.    loratadine (CLARITIN) 10 mg tablet Take 1 tablet (10 mg total) by mouth once daily.    magnesium oxide (MAG-OX) 400 mg tablet Take 400 mg by mouth once daily.     metoprolol succinate (TOPROL-XL) 50 MG 24 hr tablet Take 1 tablet (50 mg total) by mouth once daily.    nitroGLYCERIN (NITROSTAT) 0.4 MG SL tablet DISSOLVE 1 TABLET UNDER THE TONGUE EVERY 5 MINUTES AS NEEDED FOR CHEST PAIN    omeprazole (PRILOSEC) 20 MG capsule Take 1 capsule (20 mg total) by mouth once daily.    paroxetine (PAXIL) 20 MG tablet Take 1 tablet (20 mg total) by mouth every morning.    paroxetine (PAXIL) 20 MG tablet TAKE 1 TABLET BY MOUTH EVERY DAY    ranitidine (ZANTAC) 150 MG capsule TAKE 1 CAPSULE TWICE DAILY    thyroid, pork, (ARMOUR THYROID) 300 mg Tab Take 150 mg by mouth once daily.    trimethoprim (TRIMPEX) 100 mg Tab Take 1 tablet (100 mg total) by mouth every 12 (twelve) hours. Start after 04/10/2019    VIT C/E/ZN/COPPR/LUTEIN/ZEAXAN (PRESERVISION AREDS 2 ORAL) Take by mouth 2 (two) times daily.    vitamin D 1000 units Tab Take 1,000 Units by mouth once daily.     levothyroxine (SYNTHROID, LEVOTHROID) 175 MCG tablet Take 1 tablet (175 mcg total) by mouth once daily. Needs to branded.     No current facility-administered medications for this visit.        Review of patient's allergies indicates:   Allergen Reactions    Flagyl [metronidazole hcl] Diarrhea     SEVERE DIARRHEA    Latex, natural rubber Rash     ITCHING    Levaquin [levofloxacin] Diarrhea     MOOD CHANGES, WEAKNESS    Morphine Other (See Comments)     HALLUCINATES    Augmentin [amoxicillin-pot clavulanate] Other (See Comments)     AGITATION    Ciprofloxacin Anxiety    Codeine Nausea And Vomiting    Demerol [meperidine] Nausea And Vomiting     CAN TAKE WITH PHENERGAN    Dilaudid [hydromorphone (bulk)] Itching     OK WITH BENADRYL    Fentanyl Itching     OK WITH BENADRYL    Fosfomycin      diarrhia    Naproxen Other (See Comments)     HURT STOMACH    Nsaids (non-steroidal anti-inflammatory drug) Other (See Comments)     TOLD NOT TO TAKE AS HAD AN ULCER. HAD BLEEDING    Percocet [oxycodone-acetaminophen] Rash    Prednisone Other (See Comments)     AGITATION    Reglan [metoclopramide hcl] Anxiety    Sulfa (sulfonamide antibiotics) Rash    Vicodin [hydrocodone-acetaminophen] Rash    Zosyn [piperacillin-tazobactam] Rash    Ambien [zolpidem]     Ibuprofen     Macrobid [nitrofurantoin monohyd/m-cryst]     Pcn [penicillins]     Vasotec [enalapril maleate] Other (See Comments)     DRY COUGH       Family History   Problem Relation Age of Onset    Diabetes Mother     Heart disease Mother         MI at 52    Stroke Father     Cancer Father         colon cancer    Rheum arthritis Father     COPD Father     Heart disease Brother     Stroke Brother     Diabetes Brother     Arthritis Daughter     Hypertension Daughter     Cancer Son         Leukemia    Hypertension Son     Heart disease Son     Diabetes Maternal Grandmother     Asthma Maternal Grandfather     Cancer Paternal Grandmother          melanoma    Cancer Paternal Grandfather         lip cancer - smoked pipe    Sleep apnea Son     Hypertension Son     Sinus disease Son     Hypertension Son     Heart disease Son     Obesity Son        Social History     Socioeconomic History    Marital status:      Spouse name: Not on file    Number of children: Not on file    Years of education: Not on file    Highest education level: Not on file   Occupational History    Not on file   Social Needs    Financial resource strain: Not on file    Food insecurity:     Worry: Not on file     Inability: Not on file    Transportation needs:     Medical: Not on file     Non-medical: Not on file   Tobacco Use    Smoking status: Former Smoker     Packs/day: 1.00     Years: 18.00     Pack years: 18.00     Last attempt to quit: 1973     Years since quittin.2    Smokeless tobacco: Never Used   Substance and Sexual Activity    Alcohol use: Yes     Alcohol/week: 4.2 oz     Types: 7 Glasses of wine per week     Comment: WINE once per day    Drug use: No    Sexual activity: Never   Lifestyle    Physical activity:     Days per week: Not on file     Minutes per session: Not on file    Stress: Not on file   Relationships    Social connections:     Talks on phone: Not on file     Gets together: Not on file     Attends [employer] service: Not on file     Active member of club or organization: Not on file     Attends meetings of clubs or organizations: Not on file     Relationship status: Not on file   Other Topics Concern    Not on file   Social History Narrative    Not on file       Chief Complaint:   Chief Complaint   Patient presents with    Injections     EUFLEXXA 1/3 LEFT KNEE       Consulting Physician: Juanito Ivory MD    History of present illness:    This is a 86 y.o. year old female who complains of left knee pain on and off for years. Worse last 2 months. Pain 6/10 and worse with use.    Review of Systems:    Constitution:  "Denies chills, fever, and sweats.  HENT: Denies headaches or blurry vision.  Cardiovascular: Denies chest pain or irregular heart beat.  Respiratory: Denies cough or shortness of breath.  Gastrointestinal: Denies abdominal pain, nausea, or vomiting.  Musculoskeletal:  Denies muscle cramps.  Neurological: Denies dizziness or focal weakness.  Psychiatric/Behavioral: Normal mental status.  Hematologic/Lymphatic: Denies bleeding problem or easy bruising/bleeding.  Skin: Denies rash or suspicious lesions.    Examination:    Vital Signs:    Vitals:    05/21/19 1442   Weight: 60.8 kg (134 lb 0.6 oz)   Height: 5' 8" (1.727 m)   PainSc:   6   PainLoc: Knee       Body mass index is 20.38 kg/m².    This a well-developed, well nourished patient in no acute distress.    Alert and oriented x 3 and cooperative to examination.       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       Assessment: Primary osteoarthritis of left knee  -     Large Joint Aspiration/Injection: L knee        Plan: Euflexxa series.    DISCLAIMER: This note may have been dictated using voice recognition software and may contain grammatical errors.     NOTE: Consult report sent to referring provider via EPIC EMR.  "

## 2019-05-21 NOTE — PROCEDURES
Large Joint Aspiration/Injection: L knee  Date/Time: 5/21/2019 3:13 PM  Performed by: Juanito Ivory MD  Authorized by: Juanito Ivroy MD     Consent Done?:  Yes (Verbal)  Indications:  Pain  Timeout: Prior to procedure the correct patient, procedure, and site was verified      Location:  Knee  Site:  L knee  Prep: Patient was prepped and draped in usual sterile fashion    Approach:  Anteromedial  Medications:  20 mg sodium hyaluronate (EUFLEXXA) 10 mg/mL(mw 2.4 -3.6 million)

## 2019-05-28 ENCOUNTER — OFFICE VISIT (OUTPATIENT)
Dept: ORTHOPEDICS | Facility: CLINIC | Age: 84
End: 2019-05-28
Payer: MEDICARE

## 2019-05-28 VITALS — WEIGHT: 134.06 LBS | BODY MASS INDEX: 20.32 KG/M2 | HEIGHT: 68 IN

## 2019-05-28 DIAGNOSIS — M25.562 LEFT KNEE PAIN, UNSPECIFIED CHRONICITY: Primary | ICD-10-CM

## 2019-05-28 DIAGNOSIS — M17.12 ARTHRITIS OF KNEE, LEFT: ICD-10-CM

## 2019-05-28 PROCEDURE — 99499 NO LOS: ICD-10-PCS | Mod: HCNC,S$GLB,, | Performed by: ORTHOPAEDIC SURGERY

## 2019-05-28 PROCEDURE — 99999 PR PBB SHADOW E&M-EST. PATIENT-LVL III: ICD-10-PCS | Mod: PBBFAC,HCNC,, | Performed by: ORTHOPAEDIC SURGERY

## 2019-05-28 PROCEDURE — 20610 LARGE JOINT ASPIRATION/INJECTION: L KNEE: ICD-10-PCS | Mod: HCNC,LT,S$GLB, | Performed by: ORTHOPAEDIC SURGERY

## 2019-05-28 PROCEDURE — 99499 UNLISTED E&M SERVICE: CPT | Mod: HCNC,S$GLB,, | Performed by: ORTHOPAEDIC SURGERY

## 2019-05-28 PROCEDURE — 20610 DRAIN/INJ JOINT/BURSA W/O US: CPT | Mod: HCNC,LT,S$GLB, | Performed by: ORTHOPAEDIC SURGERY

## 2019-05-28 PROCEDURE — 99999 PR PBB SHADOW E&M-EST. PATIENT-LVL III: CPT | Mod: PBBFAC,HCNC,, | Performed by: ORTHOPAEDIC SURGERY

## 2019-05-29 NOTE — PROCEDURES
Large Joint Aspiration/Injection: L knee  Date/Time: 5/28/2019 4:37 PM  Performed by: Juanito Ivory MD  Authorized by: Juanito Ivory MD     Consent Done?:  Yes (Verbal)  Indications:  Pain  Timeout: Prior to procedure the correct patient, procedure, and site was verified      Location:  Knee  Site:  L knee  Prep: Patient was prepped and draped in usual sterile fashion    Approach:  Anteromedial  Medications:  20 mg sodium hyaluronate (EUFLEXXA) 10 mg/mL(mw 2.4 -3.6 million)

## 2019-05-29 NOTE — PROGRESS NOTES
Past Medical History:   Diagnosis Date    Arthritis     Blood transfusion     Bowel obstruction     X 3    Cancer     MELANOMA RIGHT EYE    Cancer of eyeball, except conjunctiva, cornea, retina, and choroid right    Diverticulitis     GERD (gastroesophageal reflux disease)     Hypertension     Peripheral neuropathy     PSVT (paroxysmal supraventricular tachycardia)     HAD X 3 OVER 10 YRS. LAST 7-25-12. HAD ADENOSINE IN [facility] ER. WAS ON INCREASED THYROID MED. NO PROBLEM SINCE    Salmonella     Short bowel syndrome     Thyroid disease     Transient ischemic attack     UTI (urinary tract infection)        Past Surgical History:   Procedure Laterality Date    ABDOMINAL SURGERY      X 3. ILEUM REMOVED. OSTOMY PLACED AND CLOSED.    ADENOIDECTOMY      ARTHROPLASTY, KNEE Right 4/24/2013    Performed by [facility]    BILATERAL OOPHORECTOMY      COLONOSCOPY      COLONOSCOPY N/A 8/24/2016    [facility]ral [facility] at [facility] ENDO    CTR      BILAT    ESOPHAGOGASTRODUODENOSCOPY (EGD) N/A 7/11/2016    [facility] at [facility] ENDO    EYE SURGERY      RIGHT  - LASER MELANOMA; BILAT CATARACT    HYSTERECTOMY      BSO    JOINT REPLACEMENT      RIGHT KNEE    MANDIBLE FRACTURE SURGERY      MENISCECTOMY      MEDIAL AND LATERAL REPAIR, BAKERS CYST    TENDON REPAIR      LEFT THUMB    TONSILLECTOMY      T AND A       Current Outpatient Medications   Medication Sig    acetaminophen (TYLENOL) 500 MG tablet Take 1,000 mg by mouth every 6 (six) hours as needed.    albuterol-ipratropium (DUO-NEB) 2.5 mg-0.5 mg/3 mL nebulizer solution USE 1 VIAL IN NEBULIZER Q 6 H    ARMOUR THYROID 300 mg Tab TAKE ONE-HALF TABLET BY MOUTH ONCE DAILY    aspirin 81 MG Chew Take 1 tablet (81 mg total) by mouth once daily.    baclofen (LIORESAL) 10 MG tablet Take 10 mg by mouth every evening.    clotrimazole-betamethasone 1-0.05% (LOTRISONE) cream Apply topically 2 (two) times daily.     colchicine (COLCRYS) 0.6 mg tablet Take 2 tablets (1.2 mg total) by mouth once daily. The first day take 2 at once then 1 by mouth an hour later    cyanocobalamin 1,000 mcg/mL injection INJECT 2ML IMTRAMUSCULAR EVERY MONTH    diclofenac sodium (VOLTAREN) 1 % Gel Apply 4 g topically 4 (four) times daily.    diltiaZEM (TIAZAC) 240 MG Cs24 Take 1 capsule (240 mg total) by mouth once daily.    fish oil-omega-3 fatty acids 300-1,000 mg capsule Take 2 g by mouth 2 (two) times daily.    fluticasone (FLONASE) 50 mcg/actuation nasal spray 1 spray (50 mcg total) by Each Nare route once daily.    indomethacin (INDOCIN) 25 MG capsule Take 1 capsule (25 mg total) by mouth 2 (two) times daily with meals.    levothyroxine (SYNTHROID, LEVOTHROID) 175 MCG tablet Take 1 tablet (175 mcg total) by mouth once daily. Needs to branded.    lidocaine-prilocaine (EMLA) cream     loperamide (IMODIUM) 2 mg capsule Take 1 capsule (2 mg total) by mouth 4 (four) times daily as needed for Diarrhea.    loratadine (CLARITIN) 10 mg tablet Take 1 tablet (10 mg total) by mouth once daily.    magnesium oxide (MAG-OX) 400 mg tablet Take 400 mg by mouth once daily.     metoprolol succinate (TOPROL-XL) 50 MG 24 hr tablet Take 1 tablet (50 mg total) by mouth once daily.    nitroGLYCERIN (NITROSTAT) 0.4 MG SL tablet DISSOLVE 1 TABLET UNDER THE TONGUE EVERY 5 MINUTES AS NEEDED FOR CHEST PAIN    omeprazole (PRILOSEC) 20 MG capsule Take 1 capsule (20 mg total) by mouth once daily.    paroxetine (PAXIL) 20 MG tablet Take 1 tablet (20 mg total) by mouth every morning.    paroxetine (PAXIL) 20 MG tablet TAKE 1 TABLET BY MOUTH EVERY DAY    ranitidine (ZANTAC) 150 MG capsule TAKE 1 CAPSULE TWICE DAILY    thyroid, pork, (ARMOUR THYROID) 300 mg Tab Take 150 mg by mouth once daily.    trimethoprim (TRIMPEX) 100 mg Tab Take 1 tablet (100 mg total) by mouth every 12 (twelve) hours. Start after 04/10/2019    VIT C/E/ZN/COPPR/LUTEIN/ZEAXAN (PRESERVISION  AREDS 2 ORAL) Take by mouth 2 (two) times daily.    vitamin D 1000 units Tab Take 1,000 Units by mouth once daily.     No current facility-administered medications for this visit.        Review of patient's allergies indicates:   Allergen Reactions    Flagyl [metronidazole hcl] Diarrhea     SEVERE DIARRHEA    Latex, natural rubber Rash     ITCHING    Levaquin [levofloxacin] Diarrhea     MOOD CHANGES, WEAKNESS    Morphine Other (See Comments)     HALLUCINATES    Augmentin [amoxicillin-pot clavulanate] Other (See Comments)     AGITATION    Ciprofloxacin Anxiety    Codeine Nausea And Vomiting    Demerol [meperidine] Nausea And Vomiting     CAN TAKE WITH PHENERGAN    Dilaudid [hydromorphone (bulk)] Itching     OK WITH BENADRYL    Fentanyl Itching     OK WITH BENADRYL    Fosfomycin      diarrhia    Naproxen Other (See Comments)     HURT STOMACH    Nsaids (non-steroidal anti-inflammatory drug) Other (See Comments)     TOLD NOT TO TAKE AS HAD AN ULCER. HAD BLEEDING    Percocet [oxycodone-acetaminophen] Rash    Prednisone Other (See Comments)     AGITATION    Reglan [metoclopramide hcl] Anxiety    Sulfa (sulfonamide antibiotics) Rash    Vicodin [hydrocodone-acetaminophen] Rash    Zosyn [piperacillin-tazobactam] Rash    Ambien [zolpidem]     Ibuprofen     Macrobid [nitrofurantoin monohyd/m-cryst]     Pcn [penicillins]     Vasotec [enalapril maleate] Other (See Comments)     DRY COUGH       Family History   Problem Relation Age of Onset    Diabetes Mother     Heart disease Mother         MI at 52    Stroke Father     Cancer Father         colon cancer    Rheum arthritis Father     COPD Father     Heart disease Brother     Stroke Brother     Diabetes Brother     Arthritis Daughter     Hypertension Daughter     Cancer Son         Leukemia    Hypertension Son     Heart disease Son     Diabetes Maternal Grandmother     Asthma Maternal Grandfather     Cancer Paternal Grandmother          melanoma    Cancer Paternal Grandfather         lip cancer - smoked pipe    Sleep apnea Son     Hypertension Son     Sinus disease Son     Hypertension Son     Heart disease Son     Obesity Son        Social History     Socioeconomic History    Marital status:      Spouse name: Not on file    Number of children: Not on file    Years of education: Not on file    Highest education level: Not on file   Occupational History    Not on file   Social Needs    Financial resource strain: Not on file    Food insecurity:     Worry: Not on file     Inability: Not on file    Transportation needs:     Medical: Not on file     Non-medical: Not on file   Tobacco Use    Smoking status: Former Smoker     Packs/day: 1.00     Years: 18.00     Pack years: 18.00     Last attempt to quit: 1973     Years since quittin.3    Smokeless tobacco: Never Used   Substance and Sexual Activity    Alcohol use: Yes     Alcohol/week: 4.2 oz     Types: 7 Glasses of wine per week     Comment: WINE once per day    Drug use: No    Sexual activity: Never   Lifestyle    Physical activity:     Days per week: Not on file     Minutes per session: Not on file    Stress: Not on file   Relationships    Social connections:     Talks on phone: Not on file     Gets together: Not on file     Attends Yazidi service: Not on file     Active member of club or organization: Not on file     Attends meetings of clubs or organizations: Not on file     Relationship status: Not on file   Other Topics Concern    Not on file   Social History Narrative    Not on file       Chief Complaint:   Chief Complaint   Patient presents with    Injections     EUFLEXXA 2/3 LEFT KNEE       Consulting Physician: No ref. provider found    History of present illness:    This is a 86 y.o. year old female who complains of left knee pain on and off for years. Worse last 2 months. Pain 6/10 and worse with use.    Review of Systems:    Constitution: Denies  "chills, fever, and sweats.  HENT: Denies headaches or blurry vision.  Cardiovascular: Denies chest pain or irregular heart beat.  Respiratory: Denies cough or shortness of breath.  Gastrointestinal: Denies abdominal pain, nausea, or vomiting.  Musculoskeletal:  Denies muscle cramps.  Neurological: Denies dizziness or focal weakness.  Psychiatric/Behavioral: Normal mental status.  Hematologic/Lymphatic: Denies bleeding problem or easy bruising/bleeding.  Skin: Denies rash or suspicious lesions.    Examination:    Vital Signs:    Vitals:    05/28/19 1515   Weight: 60.8 kg (134 lb 0.6 oz)   Height: 5' 8" (1.727 m)   PainSc: 0-No pain   PainLoc: Knee       Body mass index is 20.38 kg/m².    This a well-developed, well nourished patient in no acute distress.    Alert and oriented x 3 and cooperative to examination.       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       Assessment: Left knee pain, unspecified chronicity    Arthritis of knee, left  -     Large Joint Aspiration/Injection: L knee        Plan: Euflexxa series.    DISCLAIMER: This note may have been dictated using voice recognition software and may contain grammatical errors.     NOTE: Consult report sent to referring provider via EP[employer] EMR.  "

## 2019-06-04 ENCOUNTER — OFFICE VISIT (OUTPATIENT)
Dept: ORTHOPEDICS | Facility: CLINIC | Age: 84
End: 2019-06-04
Payer: MEDICARE

## 2019-06-04 VITALS — HEIGHT: 68 IN | WEIGHT: 134.06 LBS | BODY MASS INDEX: 20.32 KG/M2

## 2019-06-04 DIAGNOSIS — M17.12 ARTHRITIS OF KNEE, LEFT: Primary | ICD-10-CM

## 2019-06-04 PROCEDURE — 99999 PR PBB SHADOW E&M-EST. PATIENT-LVL III: ICD-10-PCS | Mod: PBBFAC,HCNC,, | Performed by: ORTHOPAEDIC SURGERY

## 2019-06-04 PROCEDURE — 20610 LARGE JOINT ASPIRATION/INJECTION: L KNEE: ICD-10-PCS | Mod: HCNC,LT,S$GLB, | Performed by: ORTHOPAEDIC SURGERY

## 2019-06-04 PROCEDURE — 99999 PR PBB SHADOW E&M-EST. PATIENT-LVL III: CPT | Mod: PBBFAC,HCNC,, | Performed by: ORTHOPAEDIC SURGERY

## 2019-06-04 PROCEDURE — 99499 NO LOS: ICD-10-PCS | Mod: HCNC,S$GLB,, | Performed by: ORTHOPAEDIC SURGERY

## 2019-06-04 PROCEDURE — 99499 UNLISTED E&M SERVICE: CPT | Mod: HCNC,S$GLB,, | Performed by: ORTHOPAEDIC SURGERY

## 2019-06-04 PROCEDURE — 20610 DRAIN/INJ JOINT/BURSA W/O US: CPT | Mod: HCNC,LT,S$GLB, | Performed by: ORTHOPAEDIC SURGERY

## 2019-06-12 NOTE — PROCEDURES
Large Joint Aspiration/Injection: L knee  Date/Time: 6/4/2019 3:05 PM  Performed by: Juanito Ivory MD  Authorized by: Juanito Ivory MD     Consent Done?:  Yes (Verbal)  Indications:  Pain  Timeout: Prior to procedure the correct patient, procedure, and site was verified      Location:  Knee  Site:  L knee  Prep: Patient was prepped and draped in usual sterile fashion    Approach:  Anteromedial  Medications:  20 mg sodium hyaluronate (EUFLEXXA) 10 mg/mL(mw 2.4 -3.6 million)

## 2019-06-12 NOTE — PROGRESS NOTES
Past Medical History:   Diagnosis Date    Arthritis     Blood transfusion     Bowel obstruction     X 3    Cancer     MELANOMA RIGHT EYE    Cancer of eyeball, except conjunctiva, cornea, retina, and choroid right    Diverticulitis     GERD (gastroesophageal reflux disease)     Hypertension     Peripheral neuropathy     PSVT (paroxysmal supraventricular tachycardia)     HAD X 3 OVER 10 YRS. LAST 7-25-12. HAD ADENOSINE IN [facility] ER. WAS ON INCREASED THYROID MED. NO PROBLEM SINCE    Salmonella     Short bowel syndrome     Thyroid disease     Transient ischemic attack     UTI (urinary tract infection)        Past Surgical History:   Procedure Laterality Date    ABDOMINAL SURGERY      X 3. ILEUM REMOVED. OSTOMY PLACED AND CLOSED.    ADENOIDECTOMY      ARTHROPLASTY, KNEE Right 4/24/2013    Performed by Eduardo Haider MD at [facility] OR    BILATERAL OOPHORECTOMY      COLONOSCOPY      COLONOSCOPY N/A 8/24/2016    Performed by Alex Corral MD at [facility] ENDO    CTR      BILAT    ESOPHAGOGASTRODUODENOSCOPY (EGD) N/A 7/11/2016    Performed by Alex Corral MD at [facility] ENDO    EYE SURGERY      RIGHT  - LASER MELANOMA; BILAT CATARACT    HYSTERECTOMY      BSO    JOINT REPLACEMENT      RIGHT KNEE    MANDIBLE FRACTURE SURGERY      MENISCECTOMY      MEDIAL AND LATERAL REPAIR, BAKERS CYST    TENDON REPAIR      LEFT THUMB    TONSILLECTOMY      T AND A       Current Outpatient Medications   Medication Sig    acetaminophen (TYLENOL) 500 MG tablet Take 1,000 mg by mouth every 6 (six) hours as needed.    albuterol-ipratropium (DUO-NEB) 2.5 mg-0.5 mg/3 mL nebulizer solution USE 1 VIAL IN NEBULIZER Q 6 H    ARMOUR THYROID 300 mg Tab TAKE ONE-HALF TABLET BY MOUTH ONCE DAILY    aspirin 81 MG Chew Take 1 tablet (81 mg total) by mouth once daily.    baclofen (LIORESAL) 10 MG tablet Take 10 mg by mouth every evening.    clotrimazole-betamethasone 1-0.05% (LOTRISONE) cream Apply topically 2 (two) times daily.     colchicine (COLCRYS) 0.6 mg tablet Take 2 tablets (1.2 mg total) by mouth once daily. The first day take 2 at once then 1 by mouth an hour later    cyanocobalamin 1,000 mcg/mL injection INJECT 2ML IMTRAMUSCULAR EVERY MONTH    diclofenac sodium (VOLTAREN) 1 % Gel Apply 4 g topically 4 (four) times daily.    diltiaZEM (TIAZAC) 240 MG Cs24 Take 1 capsule (240 mg total) by mouth once daily.    fish oil-omega-3 fatty acids 300-1,000 mg capsule Take 2 g by mouth 2 (two) times daily.    fluticasone (FLONASE) 50 mcg/actuation nasal spray 1 spray (50 mcg total) by Each Nare route once daily.    indomethacin (INDOCIN) 25 MG capsule Take 1 capsule (25 mg total) by mouth 2 (two) times daily with meals.    lidocaine-prilocaine (EMLA) cream     loperamide (IMODIUM) 2 mg capsule Take 1 capsule (2 mg total) by mouth 4 (four) times daily as needed for Diarrhea.    loratadine (CLARITIN) 10 mg tablet Take 1 tablet (10 mg total) by mouth once daily.    magnesium oxide (MAG-OX) 400 mg tablet Take 400 mg by mouth once daily.     metoprolol succinate (TOPROL-XL) 50 MG 24 hr tablet Take 1 tablet (50 mg total) by mouth once daily.    nitroGLYCERIN (NITROSTAT) 0.4 MG SL tablet DISSOLVE 1 TABLET UNDER THE TONGUE EVERY 5 MINUTES AS NEEDED FOR CHEST PAIN    omeprazole (PRILOSEC) 20 MG capsule Take 1 capsule (20 mg total) by mouth once daily.    paroxetine (PAXIL) 20 MG tablet Take 1 tablet (20 mg total) by mouth every morning.    paroxetine (PAXIL) 20 MG tablet TAKE 1 TABLET BY MOUTH EVERY DAY    ranitidine (ZANTAC) 150 MG capsule TAKE 1 CAPSULE TWICE DAILY    thyroid, pork, (ARMOUR THYROID) 300 mg Tab Take 150 mg by mouth once daily.    trimethoprim (TRIMPEX) 100 mg Tab Take 1 tablet (100 mg total) by mouth every 12 (twelve) hours. Start after 04/10/2019    VIT C/E/ZN/COPPR/LUTEIN/ZEAXAN (PRESERVISION AREDS 2 ORAL) Take by mouth 2 (two) times daily.    vitamin D 1000 units Tab Take 1,000 Units by mouth once daily.     levothyroxine (SYNTHROID, LEVOTHROID) 175 MCG tablet Take 1 tablet (175 mcg total) by mouth once daily. Needs to branded.     No current facility-administered medications for this visit.        Review of patient's allergies indicates:   Allergen Reactions    Flagyl [metronidazole hcl] Diarrhea     SEVERE DIARRHEA    Latex, natural rubber Rash     ITCHING    Levaquin [levofloxacin] Diarrhea     MOOD CHANGES, WEAKNESS    Morphine Other (See Comments)     HALLUCINATES    Augmentin [amoxicillin-pot clavulanate] Other (See Comments)     AGITATION    Ciprofloxacin Anxiety    Codeine Nausea And Vomiting    Demerol [meperidine] Nausea And Vomiting     CAN TAKE WITH PHENERGAN    Dilaudid [hydromorphone (bulk)] Itching     OK WITH BENADRYL    Fentanyl Itching     OK WITH BENADRYL    Fosfomycin      diarrhia    Naproxen Other (See Comments)     HURT STOMACH    Nsaids (non-steroidal anti-inflammatory drug) Other (See Comments)     TOLD NOT TO TAKE AS HAD AN ULCER. HAD BLEEDING    Percocet [oxycodone-acetaminophen] Rash    Prednisone Other (See Comments)     AGITATION    Reglan [metoclopramide hcl] Anxiety    Sulfa (sulfonamide antibiotics) Rash    Vicodin [hydrocodone-acetaminophen] Rash    Zosyn [piperacillin-tazobactam] Rash    Ambien [zolpidem]     Ibuprofen     Macrobid [nitrofurantoin monohyd/m-cryst]     Pcn [penicillins]     Vasotec [enalapril maleate] Other (See Comments)     DRY COUGH       Family History   Problem Relation Age of Onset    Diabetes Mother     Heart disease Mother         MI at 52    Stroke Father     Cancer Father         colon cancer    Rheum arthritis Father     COPD Father     Heart disease Brother     Stroke Brother     Diabetes Brother     Arthritis Daughter     Hypertension Daughter     Cancer Son         Leukemia    Hypertension Son     Heart disease Son     Diabetes Maternal Grandmother     Asthma Maternal Grandfather     Cancer Paternal Grandmother          melanoma    Cancer Paternal Grandfather         lip cancer - smoked pipe    Sleep apnea Son     Hypertension Son     Sinus disease Son     Hypertension Son     Heart disease Son     Obesity Son        Social History     Socioeconomic History    Marital status:      Spouse name: Not on file    Number of children: Not on file    Years of education: Not on file    Highest education level: Not on file   Occupational History    Not on file   Social Needs    Financial resource strain: Not on file    Food insecurity:     Worry: Not on file     Inability: Not on file    Transportation needs:     Medical: Not on file     Non-medical: Not on file   Tobacco Use    Smoking status: Former Smoker     Packs/day: 1.00     Years: 18.00     Pack years: 18.00     Last attempt to quit: 1973     Years since quittin.3    Smokeless tobacco: Never Used   Substance and Sexual Activity    Alcohol use: Yes     Alcohol/week: 4.2 oz     Types: 7 Glasses of wine per week     Comment: WINE once per day    Drug use: No    Sexual activity: Never   Lifestyle    Physical activity:     Days per week: Not on file     Minutes per session: Not on file    Stress: Not on file   Relationships    Social connections:     Talks on phone: Not on file     Gets together: Not on file     Attends Hindu service: Not on file     Active member of club or organization: Not on file     Attends meetings of clubs or organizations: Not on file     Relationship status: Not on file   Other Topics Concern    Not on file   Social History Narrative    Not on file       Chief Complaint:   Chief Complaint   Patient presents with    Injections     EUFLEXXA 3/3 LEFT KNEE       Consulting Physician: No ref. provider found    History of present illness:    This is a 86 y.o. year old female who complains of left knee pain on and off for years. Worse last 2 months. Pain 6/10 and worse with use.    Review of Systems:    Constitution:  "Denies chills, fever, and sweats.  HENT: Denies headaches or blurry vision.  Cardiovascular: Denies chest pain or irregular heart beat.  Respiratory: Denies cough or shortness of breath.  Gastrointestinal: Denies abdominal pain, nausea, or vomiting.  Musculoskeletal:  Denies muscle cramps.  Neurological: Denies dizziness or focal weakness.  Psychiatric/Behavioral: Normal mental status.  Hematologic/Lymphatic: Denies bleeding problem or easy bruising/bleeding.  Skin: Denies rash or suspicious lesions.    Examination:    Vital Signs:    Vitals:    06/04/19 1527   Weight: 60.8 kg (134 lb 0.6 oz)   Height: 5' 8" (1.727 m)   PainSc: 0-No pain   PainLoc: Knee       Body mass index is 20.38 kg/m².    This a well-developed, well nourished patient in no acute distress.    Alert and oriented x 3 and cooperative to examination.       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       Assessment: Arthritis of knee, left  -     Large Joint Aspiration/Injection: L knee        Plan: Euflexxa series.    DISCLAIMER: This note may have been dictated using voice recognition software and may contain grammatical errors.     NOTE: Consult report sent to referring provider via EP[employer] EMR.  "

## 2019-06-13 ENCOUNTER — PATIENT OUTREACH (OUTPATIENT)
Dept: ADMINISTRATIVE | Facility: HOSPITAL | Age: 84
End: 2019-06-13

## 2019-06-19 ENCOUNTER — TELEPHONE (OUTPATIENT)
Dept: FAMILY MEDICINE | Facility: CLINIC | Age: 84
End: 2019-06-19

## 2019-06-19 NOTE — TELEPHONE ENCOUNTER
----- Message from Mary Short sent at 6/19/2019 11:05 AM CDT -----  Contact: patient  Type:  Sooner Apoointment Request    Caller is requesting a sooner appointment.  Caller declined first available appointment listed below.  Caller will not accept being placed on the waitlist and is requesting a message be sent to doctor.    Name of Caller:  fang  When is the first available appointment?  7/24  Symptoms:  na  Best Call Back Number: [phone]   Additional Information:  Patient has apt for the 24th but would like a sooner apt.  Please call to advise. Thanks!

## 2019-06-21 ENCOUNTER — OFFICE VISIT (OUTPATIENT)
Dept: FAMILY MEDICINE | Facility: CLINIC | Age: 84
End: 2019-06-21
Payer: MEDICARE

## 2019-06-21 ENCOUNTER — DOCUMENTATION ONLY (OUTPATIENT)
Dept: FAMILY MEDICINE | Facility: CLINIC | Age: 84
End: 2019-06-21

## 2019-06-21 VITALS
WEIGHT: 133.38 LBS | DIASTOLIC BLOOD PRESSURE: 80 MMHG | TEMPERATURE: 98 F | SYSTOLIC BLOOD PRESSURE: 130 MMHG | RESPIRATION RATE: 16 BRPM | HEIGHT: 68 IN | OXYGEN SATURATION: 97 % | HEART RATE: 69 BPM | BODY MASS INDEX: 20.21 KG/M2

## 2019-06-21 DIAGNOSIS — R25.2 FOOT SPASMS: Primary | ICD-10-CM

## 2019-06-21 DIAGNOSIS — E03.9 HYPOTHYROIDISM, UNSPECIFIED TYPE: ICD-10-CM

## 2019-06-21 DIAGNOSIS — J84.10 CALCIFIED GRANULOMA OF LUNG: ICD-10-CM

## 2019-06-21 PROCEDURE — 99499 UNLISTED E&M SERVICE: CPT | Mod: S$GLB,,, | Performed by: INTERNAL MEDICINE

## 2019-06-21 PROCEDURE — 99213 OFFICE O/P EST LOW 20 MIN: CPT | Mod: S$GLB,,, | Performed by: INTERNAL MEDICINE

## 2019-06-21 PROCEDURE — 99213 PR OFFICE/OUTPT VISIT, EST, LEVL III, 20-29 MIN: ICD-10-PCS | Mod: S$GLB,,, | Performed by: INTERNAL MEDICINE

## 2019-06-21 PROCEDURE — 1100F PR PT FALLS ASSESS DOC 2+ FALLS/FALL W/INJURY/YR: ICD-10-PCS | Mod: CPTII,S$GLB,, | Performed by: INTERNAL MEDICINE

## 2019-06-21 PROCEDURE — 99499 RISK ADDL DX/OHS AUDIT: ICD-10-PCS | Mod: S$GLB,,, | Performed by: INTERNAL MEDICINE

## 2019-06-21 PROCEDURE — 1100F PTFALLS ASSESS-DOCD GE2>/YR: CPT | Mod: CPTII,S$GLB,, | Performed by: INTERNAL MEDICINE

## 2019-06-21 PROCEDURE — 3288F FALL RISK ASSESSMENT DOCD: CPT | Mod: CPTII,S$GLB,, | Performed by: INTERNAL MEDICINE

## 2019-06-21 PROCEDURE — 3288F PR FALLS RISK ASSESSMENT DOCUMENTED: ICD-10-PCS | Mod: CPTII,S$GLB,, | Performed by: INTERNAL MEDICINE

## 2019-06-21 RX ORDER — THYROID, PORCINE 300 MG/1
TABLET ORAL
Qty: 45 TABLET | Refills: 3 | Status: SHIPPED | OUTPATIENT
Start: 2019-06-21 | End: 2020-06-15 | Stop reason: SDUPTHER

## 2019-06-21 NOTE — PROGRESS NOTES
"Subjective:       Patient ID: Lorena Vallejo is a 86 y.o. female.    Chief Complaint: foot spasms (refills) and Fall (hit shoulder)    HPI         Lower_Extremity_Problems(+). Pain: yes. . Injury: no.   HPI:  Cramping.  The patient has short bowel syndrome.  Last week for the 1st time spasms occurred in both feet and because of that she fell.  She has to stand up to make the pain go away    ONSET/TIMING: . Onset   years   ago.     DURATION:   Intermittent 3 times a week at night        QUALITY/COURSE:    unchanged ,. .     LOCATION:   Foot spasm either side     . Radiation: no.      INTENSITY/SEVERITY:. Severity is #   8  (10 point scale).        MODIFIERS/TREATMENTS: Current_Limitations_are:  none..   Taking medications: no    Physical_Therapy: no.  Chiropractor: no.     SYMPTOMS/RELATED: . --Possible medication side effects include:.     The following symptoms/statements  are positive if BOLD, negative otherwise.      CONTEXT/WHEN: . Activity . weight bearing. Inactivity.  Sudden  Work related.  Similar_problems_in past.   . Litigation_pending . X-rays. CT . MRI      REVIEW OF SYMPTOMS:   Numbness. Weakness. Muscle_Problems. Fever. Joint_Problems. Swelling. Erythema. Weight_loss. Instability.      .   The patient presently denies depression.    She has a history of a calcified granuloma on her chest x-ray            Review of Systems      Objective:      Vitals:    06/21/19 1024   BP: 130/80   Pulse: 69   Resp: 16   Temp: 98.2 °F (36.8 °C)   TempSrc: Oral   SpO2: 97%   Weight: 60.5 kg (133 lb 6.1 oz)   Height: 5' 8" (1.727 m)   PainSc: 0-No pain     Physical Exam   Constitutional: She appears well-developed and well-nourished.   Cardiovascular: Normal rate, regular rhythm and normal heart sounds.   Pulmonary/Chest: Effort normal and breath sounds normal.   Abdominal: Soft. There is no tenderness.   Neurological: She is alert.   Psychiatric: She has a normal mood and affect. Her behavior is normal. Thought content " normal.   Nursing note and vitals reviewed.        Assessment:       1. Foot spasms    2. Calcified granuloma of lung          Plan:       Foot spasms    Calcified granuloma of lung      Follow up for if you are not better return in 2 weeks.

## 2019-07-17 ENCOUNTER — LAB VISIT (OUTPATIENT)
Dept: LAB | Facility: HOSPITAL | Age: 84
End: 2019-07-17
Attending: INTERNAL MEDICINE
Payer: MEDICARE

## 2019-07-17 DIAGNOSIS — Z86.2 HISTORY OF ANEMIA: ICD-10-CM

## 2019-07-17 DIAGNOSIS — E78.5 HYPERLIPIDEMIA, UNSPECIFIED HYPERLIPIDEMIA TYPE: ICD-10-CM

## 2019-07-17 DIAGNOSIS — E03.9 HYPOTHYROIDISM, UNSPECIFIED TYPE: ICD-10-CM

## 2019-07-17 LAB
ALBUMIN SERPL BCP-MCNC: 3.6 G/DL (ref 3.5–5.2)
ALP SERPL-CCNC: 72 U/L (ref 55–135)
ALT SERPL W/O P-5'-P-CCNC: 18 U/L (ref 10–44)
ANION GAP SERPL CALC-SCNC: 6 MMOL/L (ref 8–16)
AST SERPL-CCNC: 16 U/L (ref 10–40)
BASOPHILS # BLD AUTO: 0.05 K/UL (ref 0–0.2)
BASOPHILS NFR BLD: 0.8 % (ref 0–1.9)
BILIRUB SERPL-MCNC: 0.6 MG/DL (ref 0.1–1)
BUN SERPL-MCNC: 19 MG/DL (ref 8–23)
CALCIUM SERPL-MCNC: 9.4 MG/DL (ref 8.7–10.5)
CHLORIDE SERPL-SCNC: 110 MMOL/L (ref 95–110)
CHOLEST SERPL-MCNC: 137 MG/DL (ref 120–199)
CHOLEST/HDLC SERPL: 2.3 {RATIO} (ref 2–5)
CO2 SERPL-SCNC: 26 MMOL/L (ref 23–29)
CREAT SERPL-MCNC: 0.9 MG/DL (ref 0.5–1.4)
DIFFERENTIAL METHOD: ABNORMAL
EOSINOPHIL # BLD AUTO: 0.2 K/UL (ref 0–0.5)
EOSINOPHIL NFR BLD: 3.1 % (ref 0–8)
ERYTHROCYTE [DISTWIDTH] IN BLOOD BY AUTOMATED COUNT: 13.5 % (ref 11.5–14.5)
EST. GFR  (AFRICAN AMERICAN): >60 ML/MIN/1.73 M^2
EST. GFR  (NON AFRICAN AMERICAN): 58.1 ML/MIN/1.73 M^2
GLUCOSE SERPL-MCNC: 81 MG/DL (ref 70–110)
HCT VFR BLD AUTO: 37.9 % (ref 37–48.5)
HDLC SERPL-MCNC: 59 MG/DL (ref 40–75)
HDLC SERPL: 43.1 % (ref 20–50)
HGB BLD-MCNC: 11.7 G/DL (ref 12–16)
IMM GRANULOCYTES # BLD AUTO: 0.03 K/UL (ref 0–0.04)
IMM GRANULOCYTES NFR BLD AUTO: 0.5 % (ref 0–0.5)
LDLC SERPL CALC-MCNC: 51.6 MG/DL (ref 63–159)
LYMPHOCYTES # BLD AUTO: 2.2 K/UL (ref 1–4.8)
LYMPHOCYTES NFR BLD: 36.4 % (ref 18–48)
MCH RBC QN AUTO: 32.8 PG (ref 27–31)
MCHC RBC AUTO-ENTMCNC: 30.9 G/DL (ref 32–36)
MCV RBC AUTO: 106 FL (ref 82–98)
MONOCYTES # BLD AUTO: 0.8 K/UL (ref 0.3–1)
MONOCYTES NFR BLD: 12.7 % (ref 4–15)
NEUTROPHILS # BLD AUTO: 2.8 K/UL (ref 1.8–7.7)
NEUTROPHILS NFR BLD: 46.5 % (ref 38–73)
NONHDLC SERPL-MCNC: 78 MG/DL
NRBC BLD-RTO: 0 /100 WBC
PLATELET # BLD AUTO: 244 K/UL (ref 150–350)
PMV BLD AUTO: 9.3 FL (ref 9.2–12.9)
POTASSIUM SERPL-SCNC: 4.6 MMOL/L (ref 3.5–5.1)
PROT SERPL-MCNC: 6.7 G/DL (ref 6–8.4)
RBC # BLD AUTO: 3.57 M/UL (ref 4–5.4)
SODIUM SERPL-SCNC: 142 MMOL/L (ref 136–145)
T4 FREE SERPL-MCNC: 0.7 NG/DL (ref 0.71–1.51)
TRIGL SERPL-MCNC: 132 MG/DL (ref 30–150)
TSH SERPL DL<=0.005 MIU/L-ACNC: 4.19 UIU/ML (ref 0.4–4)
WBC # BLD AUTO: 6.05 K/UL (ref 3.9–12.7)

## 2019-07-17 PROCEDURE — 36415 COLL VENOUS BLD VENIPUNCTURE: CPT | Mod: HCNC,PO

## 2019-07-17 PROCEDURE — 84443 ASSAY THYROID STIM HORMONE: CPT | Mod: HCNC

## 2019-07-17 PROCEDURE — 84439 ASSAY OF FREE THYROXINE: CPT | Mod: HCNC

## 2019-07-17 PROCEDURE — 80053 COMPREHEN METABOLIC PANEL: CPT | Mod: HCNC

## 2019-07-17 PROCEDURE — 80061 LIPID PANEL: CPT | Mod: HCNC

## 2019-07-17 PROCEDURE — 85025 COMPLETE CBC W/AUTO DIFF WBC: CPT | Mod: HCNC

## 2019-07-18 ENCOUNTER — OFFICE VISIT (OUTPATIENT)
Dept: FAMILY MEDICINE | Facility: CLINIC | Age: 84
End: 2019-07-18
Payer: MEDICARE

## 2019-07-18 ENCOUNTER — DOCUMENTATION ONLY (OUTPATIENT)
Dept: FAMILY MEDICINE | Facility: CLINIC | Age: 84
End: 2019-07-18

## 2019-07-18 VITALS
RESPIRATION RATE: 18 BRPM | BODY MASS INDEX: 20.25 KG/M2 | TEMPERATURE: 98 F | HEART RATE: 64 BPM | HEIGHT: 68 IN | WEIGHT: 133.63 LBS | SYSTOLIC BLOOD PRESSURE: 116 MMHG | DIASTOLIC BLOOD PRESSURE: 66 MMHG

## 2019-07-18 DIAGNOSIS — J84.10 CALCIFIED GRANULOMA OF LUNG: Chronic | ICD-10-CM

## 2019-07-18 DIAGNOSIS — N18.30 CHRONIC KIDNEY DISEASE, STAGE III (MODERATE): Chronic | ICD-10-CM

## 2019-07-18 DIAGNOSIS — K90.829 SHORT BOWEL SYNDROME: Chronic | ICD-10-CM

## 2019-07-18 DIAGNOSIS — K62.5 RECTAL BLEEDING: ICD-10-CM

## 2019-07-18 DIAGNOSIS — M46.99 INFLAMMATORY SPONDYLOPATHY OF MULTIPLE SITES IN SPINE: Chronic | ICD-10-CM

## 2019-07-18 DIAGNOSIS — I47.10 PSVT (PAROXYSMAL SUPRAVENTRICULAR TACHYCARDIA): Chronic | ICD-10-CM

## 2019-07-18 DIAGNOSIS — F33.9 EPISODE OF RECURRENT MAJOR DEPRESSIVE DISORDER, UNSPECIFIED DEPRESSION EPISODE SEVERITY: Chronic | ICD-10-CM

## 2019-07-18 DIAGNOSIS — Z00.00 ENCOUNTER FOR PREVENTIVE HEALTH EXAMINATION: Primary | ICD-10-CM

## 2019-07-18 PROCEDURE — G0439 PPPS, SUBSEQ VISIT: HCPCS | Mod: S$GLB,,, | Performed by: NURSE PRACTITIONER

## 2019-07-18 PROCEDURE — G0439 PR MEDICARE ANNUAL WELLNESS SUBSEQUENT VISIT: ICD-10-PCS | Mod: S$GLB,,, | Performed by: NURSE PRACTITIONER

## 2019-07-18 NOTE — PROGRESS NOTES
"Lorena Vallejo presented for a  Medicare AWV and comprehensive Health Risk Assessment today. The following components were reviewed and updated:    · Medical history  · Family History  · Social history  · Allergies and Current Medications  · Health Risk Assessment  · Health Maintenance  · Care Team     ** See Completed Assessments for Annual Wellness Visit within the encounter summary.**       The following assessments were completed:  · Living Situation  · CAGE  · Depression Screening  · Timed Get Up and Go  · Whisper Test  · Cognitive Function Screening  · Nutrition Screening  · ADL Screening  · PAQ Screening    Vitals:    07/18/19 1308   BP: 116/66   BP Location: Left arm   Patient Position: Sitting   BP Method: Medium (Manual)   Pulse: 64   Resp: 18   Temp: 98.1 °F (36.7 °C)   TempSrc: Oral   Weight: 60.6 kg (133 lb 9.6 oz)   Height: 5' 8" (1.727 m)     Body mass index is 20.31 kg/m².  Physical Exam   Constitutional: She is oriented to person, place, and time. She appears well-developed and well-nourished. No distress.   HENT:   Head: Normocephalic and atraumatic.   Right Ear: External ear normal.   Left Ear: External ear normal.   TMs pearly grey with light reflex bilaterally.      Eyes: Conjunctivae are normal. Right eye exhibits no discharge. Left eye exhibits no discharge. No scleral icterus.   Cardiovascular: Normal rate, regular rhythm and normal heart sounds. Exam reveals no gallop and no friction rub.   No murmur heard.  Pulmonary/Chest: Effort normal and breath sounds normal. No stridor. No respiratory distress. She has no wheezes. She has no rales.   Musculoskeletal: She exhibits no edema.   Neurological: She is alert and oriented to person, place, and time.   Skin: Skin is warm and dry. No rash noted. She is not diaphoretic. No pallor.   Psychiatric: She has a normal mood and affect. Her behavior is normal.   Nursing note and vitals reviewed.            Diagnoses and health risks identified today and " associated recommendations/orders:  Encounter for preventive health examination    Episode of recurrent major depressive disorder, unspecified depression episode severity  Comments:  Stable on Paxil, will continue to monitor    Calcified granuloma of lung  Comments:  Stable, will continue to monitor.     Rectal bleeding  Comments:  New problem found during screening. Referral to GI done.   Orders:  -     Ambulatory referral to Gastroenterology    BMI 20.0-20.9, adult  Comments:  Continues to have low BMI, supplements encouraged. Will continue to monitor.     Chronic kidney disease, stage III (moderate)  Comments:  Stable, will continue to monitor.     Short bowel syndrome  Comments:  Continues to take B12 SL, will continue to monitor for malabsorption.     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 Recommendations were developed using the USPSTF age appropriate recommendations. Education, counseling, and referrals were provided as needed. After Visit Summary printed and given to patient which includes a list of additional screenings\tests needed.    No follow-ups on file.    ALBERT Hoskins offered to discuss end of life issues, including information on how to make advance directives that the patient could use to name someone who would make medical decisions on their behalf if they became too ill to make themselves. Patient has paperwork filled out and signed, but has not brought it in for us to put in the chart yet.    ___Patient declined  _X_Patient has the paperwork filled out. Asked her to bring it in and we will copy it and put it in the chart.

## 2019-07-18 NOTE — Clinical Note
Health risk assessment was done on your patient, she was reminded to get chest CT. She opted to get it at [facility] and will follow up with you afterward. She continues to take B12 SL daily, but would like her level checked in the future. She was found to have new onset change in bowel habit with rectal bleeding and referred to Dr. Mathews (GI).

## 2019-07-18 NOTE — PATIENT INSTRUCTIONS
Counseling and Referral of Other Preventative  (Italic type indicates deductible and co-insurance are waived)    Patient Name: Lorena Vallejo  Today's Date: 7/18/2019    Health Maintenance       Date Due Completion Date    Shingles Vaccine (3 of 3) 07/31/2019 6/5/2019    Influenza Vaccine 08/01/2019 9/18/2018    TETANUS VACCINE 08/09/2021 8/9/2011    Lipid Panel 07/17/2024 7/17/2019        No orders of the defined types were placed in this encounter.    The following information is provided to all patients.  This information is to help you find resources for any of the problems found today that may be affecting your health:                Living healthy guide: www.[facility].[place].[facility]      Understanding Diabetes: www.diabetes.org      Eating healthy: www.cdc.gov/healthyweight      CDC home safety checklist: www.cdc.gov/steadi/patient.html      Agency on Aging: www.[place]      Alcoholics anonymous (AA): www.aa.org      Physical Activity: www.donya.nih.gov/ns7fash      Tobacco use: www.quitwithusla.org     Counseling and Referral of Other Preventative  (Italic type indicates deductible and co-insurance are waived)    Patient Name: Lorena Vallejo  Today's Date: 7/18/2019    Health Maintenance       Date Due Completion Date    Shingles Vaccine (3 of 3) 07/31/2019 6/5/2019    Influenza Vaccine 08/01/2019 9/18/2018    TETANUS VACCINE 08/09/2021 8/9/2011    Lipid Panel 07/17/2024 7/17/2019        Orders Placed This Encounter   Procedures    Ambulatory referral to Gastroenterology     The following information is provided to all patients.  This information is to help you find resources for any of the problems found today that may be affecting your health:                Living healthy guide: www.[employer].[place]      Understanding Diabetes: www.diabetes.org      Eating healthy: www.cdc.gov/healthyweight      CDC home safety checklist: www.cdc.gov/steadi/patient.html      Agency on Aging: www.[place]       Alcoholics anonymous (AA): www.aa.org      Physical Activity: www.donya.nih.gov/ot8ofkp      Tobacco use: www.quitwithusla.org

## 2019-07-19 ENCOUNTER — TELEPHONE (OUTPATIENT)
Dept: FAMILY MEDICINE | Facility: CLINIC | Age: 84
End: 2019-07-19

## 2019-07-19 NOTE — TELEPHONE ENCOUNTER
I spoke with patient she said she went to [facility] imaging, and they needed a diagnosis code for her CT scan of her chest. I gave her the code for her Pneumonia.   Pt verbally understood.

## 2019-07-25 ENCOUNTER — PATIENT OUTREACH (OUTPATIENT)
Dept: ADMINISTRATIVE | Facility: HOSPITAL | Age: 84
End: 2019-07-25

## 2019-08-14 ENCOUNTER — OFFICE VISIT (OUTPATIENT)
Dept: GASTROENTEROLOGY | Facility: CLINIC | Age: 84
End: 2019-08-14
Payer: MEDICARE

## 2019-08-14 VITALS
DIASTOLIC BLOOD PRESSURE: 60 MMHG | WEIGHT: 133.63 LBS | SYSTOLIC BLOOD PRESSURE: 124 MMHG | HEART RATE: 67 BPM | BODY MASS INDEX: 20.31 KG/M2

## 2019-08-14 DIAGNOSIS — K22.70 BARRETT'S ESOPHAGUS WITHOUT DYSPLASIA: ICD-10-CM

## 2019-08-14 DIAGNOSIS — K21.9 GASTROESOPHAGEAL REFLUX DISEASE WITHOUT ESOPHAGITIS: ICD-10-CM

## 2019-08-14 DIAGNOSIS — R19.4 CHANGE IN BOWEL HABITS: Primary | ICD-10-CM

## 2019-08-14 DIAGNOSIS — E53.8 VITAMIN B12 DEFICIENCY: ICD-10-CM

## 2019-08-14 DIAGNOSIS — K62.5 RECTAL BLEEDING: ICD-10-CM

## 2019-08-14 PROCEDURE — 1101F PR PT FALLS ASSESS DOC 0-1 FALLS W/OUT INJ PAST YR: ICD-10-PCS | Mod: CPTII,S$GLB,, | Performed by: INTERNAL MEDICINE

## 2019-08-14 PROCEDURE — 99999 PR PBB SHADOW E&M-EST. PATIENT-LVL III: CPT | Mod: PBBFAC,HCNC,, | Performed by: INTERNAL MEDICINE

## 2019-08-14 PROCEDURE — 1101F PT FALLS ASSESS-DOCD LE1/YR: CPT | Mod: CPTII,S$GLB,, | Performed by: INTERNAL MEDICINE

## 2019-08-14 PROCEDURE — 99999 PR PBB SHADOW E&M-EST. PATIENT-LVL III: ICD-10-PCS | Mod: PBBFAC,HCNC,, | Performed by: INTERNAL MEDICINE

## 2019-08-14 PROCEDURE — 99205 OFFICE O/P NEW HI 60 MIN: CPT | Mod: S$GLB,,, | Performed by: INTERNAL MEDICINE

## 2019-08-14 PROCEDURE — 99205 PR OFFICE/OUTPT VISIT, NEW, LEVL V, 60-74 MIN: ICD-10-PCS | Mod: S$GLB,,, | Performed by: INTERNAL MEDICINE

## 2019-08-14 NOTE — LETTER
August 15, 2019      Grace Palacio, APRN  [address] [place] [address]  [place] [address]           [facility] Gastroenterology  [address]  [place] [facility] [place] [address]  Phone: [phone]          Patient: Lorena Vallejo   MR Number: [id number]   YOB: 1933   Date of Visit: 8/14/2019       Dear Grace Palacio:    Thank you for referring Lorena Vallejo to me for evaluation. Attached you will find relevant portions of my assessment and plan of care.    If you have questions, please do not hesitate to call me. I look forward to following Lorena Vallejo along with you.    Sincerely,    Tiffanie Vázquez MD    Enclosure  CC:  No Recipients    If you would like to receive this communication electronically, please contact [email or web address] or [phone] to request more information on [employer] Link access.    For providers and/or their staff who would like to refer a patient to [facility], please contact us through our one-stop-shop provider referral line, [facility], at [phone].    If you feel you have received this communication in error or would no longer like to receive these types of communications, please e-mail [email or web address]
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
## 2019-08-22 ENCOUNTER — LAB VISIT (OUTPATIENT)
Dept: LAB | Facility: HOSPITAL | Age: 84
End: 2019-08-22
Attending: INTERNAL MEDICINE
Payer: MEDICARE

## 2019-08-22 DIAGNOSIS — E53.8 VITAMIN B12 DEFICIENCY: ICD-10-CM

## 2019-08-22 LAB
FOLATE SERPL-MCNC: 10.1 NG/ML (ref 4–24)
VIT B12 SERPL-MCNC: 1930 PG/ML (ref 210–950)

## 2019-08-22 PROCEDURE — 82746 ASSAY OF FOLIC ACID SERUM: CPT | Mod: HCNC

## 2019-08-22 PROCEDURE — 36415 COLL VENOUS BLD VENIPUNCTURE: CPT | Mod: HCNC,PO

## 2019-08-22 PROCEDURE — 82607 VITAMIN B-12: CPT | Mod: HCNC

## 2019-08-26 ENCOUNTER — PATIENT MESSAGE (OUTPATIENT)
Dept: GASTROENTEROLOGY | Facility: CLINIC | Age: 84
End: 2019-08-26

## 2019-08-29 ENCOUNTER — ANESTHESIA EVENT (OUTPATIENT)
Dept: ENDOSCOPY | Facility: HOSPITAL | Age: 84
End: 2019-08-29
Payer: MEDICARE

## 2019-08-29 ENCOUNTER — HOSPITAL ENCOUNTER (OUTPATIENT)
Facility: HOSPITAL | Age: 84
Discharge: HOME | End: 2019-08-29
Attending: INTERNAL MEDICINE | Admitting: INTERNAL MEDICINE
Payer: MEDICARE

## 2019-08-29 ENCOUNTER — ANESTHESIA (OUTPATIENT)
Dept: ENDOSCOPY | Facility: HOSPITAL | Age: 84
End: 2019-08-29
Payer: MEDICARE

## 2019-08-29 VITALS
DIASTOLIC BLOOD PRESSURE: 82 MMHG | BODY MASS INDEX: 20.88 KG/M2 | WEIGHT: 133 LBS | HEIGHT: 67 IN | OXYGEN SATURATION: 98 % | SYSTOLIC BLOOD PRESSURE: 126 MMHG | TEMPERATURE: 97 F | HEART RATE: 62 BPM | RESPIRATION RATE: 16 BRPM

## 2019-08-29 DIAGNOSIS — K62.5 RECTAL BLEEDING: ICD-10-CM

## 2019-08-29 PROCEDURE — 88305 TISSUE EXAM BY PATHOLOGIST: CPT | Mod: HCNC | Performed by: PATHOLOGY

## 2019-08-29 PROCEDURE — 45380 COLONOSCOPY AND BIOPSY: CPT | Mod: 59,HCNC,, | Performed by: INTERNAL MEDICINE

## 2019-08-29 PROCEDURE — 43239 PR EGD, FLEX, W/BIOPSY, SGL/MULTI: ICD-10-PCS | Mod: 59,HCNC,, | Performed by: INTERNAL MEDICINE

## 2019-08-29 PROCEDURE — 43248 PR EGD, FLEX, W/DILATION OVER GUIDEWIRE: ICD-10-PCS | Mod: 51,HCNC,, | Performed by: INTERNAL MEDICINE

## 2019-08-29 PROCEDURE — 27201089 HC SNARE, DISP (ANY): Mod: HCNC | Performed by: INTERNAL MEDICINE

## 2019-08-29 PROCEDURE — 37000008 HC ANESTHESIA 1ST 15 MINUTES: Mod: HCNC | Performed by: INTERNAL MEDICINE

## 2019-08-29 PROCEDURE — 63600175 PHARM REV CODE 636 W HCPCS: Mod: HCNC | Performed by: NURSE ANESTHETIST, CERTIFIED REGISTERED

## 2019-08-29 PROCEDURE — 37000009 HC ANESTHESIA EA ADD 15 MINS: Mod: HCNC | Performed by: INTERNAL MEDICINE

## 2019-08-29 PROCEDURE — 45385 COLONOSCOPY W/LESION REMOVAL: CPT | Mod: HCNC,,, | Performed by: INTERNAL MEDICINE

## 2019-08-29 PROCEDURE — 43248 EGD GUIDE WIRE INSERTION: CPT | Mod: HCNC | Performed by: INTERNAL MEDICINE

## 2019-08-29 PROCEDURE — 43248 EGD GUIDE WIRE INSERTION: CPT | Mod: 51,HCNC,, | Performed by: INTERNAL MEDICINE

## 2019-08-29 PROCEDURE — 45380 COLONOSCOPY AND BIOPSY: CPT | Mod: HCNC | Performed by: INTERNAL MEDICINE

## 2019-08-29 PROCEDURE — 63600175 PHARM REV CODE 636 W HCPCS: Mod: HCNC | Performed by: INTERNAL MEDICINE

## 2019-08-29 PROCEDURE — 27201012 HC FORCEPS, HOT/COLD, DISP: Mod: HCNC | Performed by: INTERNAL MEDICINE

## 2019-08-29 PROCEDURE — 45381 PR COLONOSCPY,FLEX,W/DIR SUBMUC INJECT: ICD-10-PCS | Mod: 51,HCNC,, | Performed by: INTERNAL MEDICINE

## 2019-08-29 PROCEDURE — 43239 EGD BIOPSY SINGLE/MULTIPLE: CPT | Mod: HCNC | Performed by: INTERNAL MEDICINE

## 2019-08-29 PROCEDURE — 88305 TISSUE SPECIMEN TO PATHOLOGY - SURGERY: ICD-10-PCS | Mod: 26,HCNC,, | Performed by: PATHOLOGY

## 2019-08-29 PROCEDURE — 43239 EGD BIOPSY SINGLE/MULTIPLE: CPT | Mod: 59,HCNC,, | Performed by: INTERNAL MEDICINE

## 2019-08-29 PROCEDURE — 45381 COLONOSCOPY SUBMUCOUS NJX: CPT | Mod: HCNC | Performed by: INTERNAL MEDICINE

## 2019-08-29 PROCEDURE — 45385 COLONOSCOPY W/LESION REMOVAL: CPT | Mod: HCNC | Performed by: INTERNAL MEDICINE

## 2019-08-29 PROCEDURE — D9220A PRA ANESTHESIA: Mod: HCNC,ANES,, | Performed by: ANESTHESIOLOGY

## 2019-08-29 PROCEDURE — 45380 PR COLONOSCOPY,BIOPSY: ICD-10-PCS | Mod: 59,HCNC,, | Performed by: INTERNAL MEDICINE

## 2019-08-29 PROCEDURE — 45381 COLONOSCOPY SUBMUCOUS NJX: CPT | Mod: 51,HCNC,, | Performed by: INTERNAL MEDICINE

## 2019-08-29 PROCEDURE — 45385 PR COLONOSCOPY,REMV LESN,SNARE: ICD-10-PCS | Mod: HCNC,,, | Performed by: INTERNAL MEDICINE

## 2019-08-29 PROCEDURE — 27201028 HC NEEDLE, SCLERO: Mod: HCNC | Performed by: INTERNAL MEDICINE

## 2019-08-29 PROCEDURE — D9220A PRA ANESTHESIA: ICD-10-PCS | Mod: HCNC,ANES,, | Performed by: ANESTHESIOLOGY

## 2019-08-29 RX ORDER — LIDOCAINE HCL/PF 100 MG/5ML
SYRINGE (ML) INTRAVENOUS
Status: DISCONTINUED | OUTPATIENT
Start: 2019-08-29 | End: 2019-08-29

## 2019-08-29 RX ORDER — SODIUM CHLORIDE 9 MG/ML
INJECTION, SOLUTION INTRAVENOUS CONTINUOUS
Status: DISCONTINUED | OUTPATIENT
Start: 2019-08-29 | End: 2019-08-29 | Stop reason: HOSPADM

## 2019-08-29 RX ORDER — PHENYLEPHRINE HYDROCHLORIDE 10 MG/ML
INJECTION INTRAVENOUS
Status: DISCONTINUED | OUTPATIENT
Start: 2019-08-29 | End: 2019-08-29

## 2019-08-29 RX ORDER — PROPOFOL 10 MG/ML
VIAL (ML) INTRAVENOUS
Status: DISCONTINUED | OUTPATIENT
Start: 2019-08-29 | End: 2019-08-29

## 2019-08-29 RX ADMIN — LIDOCAINE HYDROCHLORIDE 50 MG: 20 INJECTION, SOLUTION INTRAVENOUS at 10:08

## 2019-08-29 RX ADMIN — PROPOFOL 50 MG: 10 INJECTION, EMULSION INTRAVENOUS at 10:08

## 2019-08-29 RX ADMIN — PHENYLEPHRINE HYDROCHLORIDE 100 MCG: 10 INJECTION INTRAVENOUS at 10:08

## 2019-08-29 RX ADMIN — PHENYLEPHRINE HYDROCHLORIDE 100 MCG: 10 INJECTION INTRAVENOUS at 11:08

## 2019-08-29 RX ADMIN — PHENYLEPHRINE HYDROCHLORIDE 100 MCG: 10 INJECTION INTRAVENOUS at 09:08

## 2019-08-29 RX ADMIN — PROPOFOL 50 MG: 10 INJECTION, EMULSION INTRAVENOUS at 11:08

## 2019-08-29 RX ADMIN — SODIUM CHLORIDE: 0.9 INJECTION, SOLUTION INTRAVENOUS at 11:08

## 2019-08-29 RX ADMIN — SODIUM CHLORIDE: 0.9 INJECTION, SOLUTION INTRAVENOUS at 09:08

## 2019-08-29 NOTE — [employer] PATIENT INSTRUCTIONS
Discharge Summary/Instructions after an Endoscopic Procedure  Patient Name: Lorena Vallejo  Patient MRN: [id number]  Patient YOB: 1933  Thursday, August 29, 2019  Tiffanie Vázquez MD  RESTRICTIONS:  During your procedure today, you received medications for sedation.  These   medications may affect your judgment, balance and coordination.  Therefore,   for 24 hours, you have the following restrictions:   - DO NOT drive a car, operate machinery, make legal/financial decisions,   sign important papers or drink alcohol.    ACTIVITY:  Today: no heavy lifting, straining or running due to procedural   sedation/anesthesia.  The following day: return to full activity including work.  DIET:  Eat and drink normally unless instructed otherwise.     TREATMENT FOR COMMON SIDE EFFECTS:  - Mild abdominal pain, nausea, belching, bloating or excessive gas:  rest,   eat lightly and use a heating pad.  - Sore Throat: treat with throat lozenges and/or gargle with warm salt   water.  - Because air was used during the procedure, expelling large amounts of air   from your rectum or belching is normal.  - If a bowel prep was taken, you may not have a bowel movement for 1-3 days.    This is normal.  SYMPTOMS TO WATCH FOR AND REPORT TO YOUR PHYSICIAN:  1. Abdominal pain or bloating, other than gas cramps.  2. Chest pain.  3. Back pain.  4. Signs of infection such as: chills or fever occurring within 24 hours   after the procedure.  5. Rectal bleeding, which would show as bright red, maroon, or black stools.   (A tablespoon of blood from the rectum is not serious, especially if   hemorrhoids are present.)  6. Vomiting.  7. Weakness or dizziness.  GO DIRECTLY TO THE NEAREST EMERGENCY ROOM IF YOU HAVE ANY OF THE FOLLOWING:      Difficulty breathing              Chills and/or fever over 101 F   Persistent vomiting and/or vomiting blood   Severe abdominal pain   Severe chest pain   Black, tarry stools   Bleeding- more than one  tablespoon   Any other symptom or condition that you feel may need urgent attention  Your doctor recommends these additional instructions:  If any biopsies were taken, your doctors clinic will contact you in 1 to 2   weeks with any results.  - Await pathology results.   - Discharge patient to home (with spouse).   - Patient has a contact number available for emergencies.  The signs and   symptoms of potential delayed complications were discussed with the   patient.  Return to normal activities tomorrow.  Written discharge   instructions were provided to the patient.   - Resume previous diet.   - Continue present medications.  For questions, problems or results please call your physician - Tiffanie Vázquez MD at Work:  [phone].  [facility], EMERGENCY ROOM PHONE NUMBER: [phone]  IF A COMPLICATION OR EMERGENCY SITUATION ARISES AND YOU ARE UNABLE TO REACH   YOUR PHYSICIAN - GO DIRECTLY TO THE EMERGENCY ROOM.  Tiffanie Vázquez MD  8/29/2019 11:32:11 AM  This report has been verified and signed electronically.  [employer]

## 2019-08-29 NOTE — PROVATION PATIENT INSTRUCTIONS
Discharge Summary/Instructions after an Endoscopic Procedure  Patient Name: Lorena Vallejo  Patient MRN: [id number]  Patient YOB: 1933  Thursday, August 29, 2019  Tiffanie Vázquez MD  RESTRICTIONS:  During your procedure today, you received medications for sedation.  These   medications may affect your judgment, balance and coordination.  Therefore,   for 24 hours, you have the following restrictions:   - DO NOT drive a car, operate machinery, make legal/financial decisions,   sign important papers or drink alcohol.    ACTIVITY:  Today: no heavy lifting, straining or running due to procedural   sedation/anesthesia.  The following day: return to full activity including work.  DIET:  Eat and drink normally unless instructed otherwise.     TREATMENT FOR COMMON SIDE EFFECTS:  - Mild abdominal pain, nausea, belching, bloating or excessive gas:  rest,   eat lightly and use a heating pad.  - Sore Throat: treat with throat lozenges and/or gargle with warm salt   water.  - Because air was used during the procedure, expelling large amounts of air   from your rectum or belching is normal.  - If a bowel prep was taken, you may not have a bowel movement for 1-3 days.    This is normal.  SYMPTOMS TO WATCH FOR AND REPORT TO YOUR PHYSICIAN:  1. Abdominal pain or bloating, other than gas cramps.  2. Chest pain.  3. Back pain.  4. Signs of infection such as: chills or fever occurring within 24 hours   after the procedure.  5. Rectal bleeding, which would show as bright red, maroon, or black stools.   (A tablespoon of blood from the rectum is not serious, especially if   hemorrhoids are present.)  6. Vomiting.  7. Weakness or dizziness.  GO DIRECTLY TO THE NEAREST EMERGENCY ROOM IF YOU HAVE ANY OF THE FOLLOWING:      Difficulty breathing              Chills and/or fever over 101 F   Persistent vomiting and/or vomiting blood   Severe abdominal pain   Severe chest pain   Black, tarry stools   Bleeding- more than one  tablespoon   Any other symptom or condition that you feel may need urgent attention  Your doctor recommends these additional instructions:  If any biopsies were taken, your doctors clinic will contact you in 1 to 2   weeks with any results.  - Discharge patient to home (with spouse).   - Patient has a contact number available for emergencies.  The signs and   symptoms of potential delayed complications were discussed with the   patient.  Return to normal activities tomorrow.  Written discharge   instructions were provided to the patient.   - Resume previous diet.   - Continue present medications.   - Await pathology results.   - Repeat colonoscopy in 2 months for surveillance after piecemeal   polypectomy.   - Return to my office PRN.   -Add fiber supplement for hemorrhoidal disease + stool softener daily  For questions, problems or results please call your physician - Tiffanie Vázquez MD at Work:  [phone].  [facility], EMERGENCY ROOM PHONE NUMBER: [phone]  IF A COMPLICATION OR EMERGENCY SITUATION ARISES AND YOU ARE UNABLE TO REACH   YOUR PHYSICIAN - GO DIRECTLY TO THE EMERGENCY ROOM.  Tiffanie Vázquez MD  8/29/2019 11:30:00 AM  This report has been verified and signed electronically.  [employer]

## 2019-08-29 NOTE — ANESTHESIA PREPROCEDURE EVALUATION
08/29/2019  Lorena Vallejo is a 86 y.o., female.    Anesthesia Evaluation    I have reviewed the Patient Summary Reports.    I have reviewed the Nursing Notes.   I have reviewed the Medications.     Review of Systems  Anesthesia Hx:  No problems with previous Anesthesia    Social:  Former Smoker    Hematology/Oncology:         -- Cancer in past history (eyeball):   Cardiovascular:   Hypertension, well controlled Dysrhythmias (PSVT)    Pulmonary:  Pulmonary Normal    Renal/:   Chronic Renal Disease, CRI    Hepatic/GI:   GERD, well controlled    Musculoskeletal:   Arthritis     Neurological:   TIA, Neuromuscular Disease,   Peripheral Neuropathy    Endocrine:   Hypothyroidism        Physical Exam  General:  Well nourished    Airway/Jaw/Neck:  Airway Findings: Mouth Opening: Normal Tongue: Normal  General Airway Assessment: Adult  Oropharynx Findings:  Mallampati: II  Jaw/Neck Findings:  Neck ROM: Normal ROM     Eyes/Ears/Nose:  Eyes/Ears/Nose Findings:    Dental:  Dental Findings:   Chest/Lungs:  Chest/Lungs Findings: Normal Respiratory Rate     Heart/Vascular:  Heart Findings: Rate: Normal  Rhythm: Regular Rhythm        Mental Status:  Mental Status Findings:  Cooperative, Alert and Oriented         Anesthesia Plan  Type of Anesthesia, risks & benefits discussed:  Anesthesia Type:  general  Patient's Preference:   Intra-op Monitoring Plan: standard ASA monitors  Intra-op Monitoring Plan Comments:   Post Op Pain Control Plan: multimodal analgesia  Post Op Pain Control Plan Comments:   Induction:   IV  Beta Blocker:  Patient is on a Beta-Blocker and has received one dose within the past 24 hours (No further documentation required).       Informed Consent: Patient understands risks and agrees with Anesthesia plan.  Questions answered. Anesthesia consent signed with patient.  ASA Score: 3     Day of Surgery Review  of History & Physical:  There are no significant changes.   H&P completed by Anesthesiologist.       Ready For Surgery From Anesthesia Perspective.

## 2019-08-29 NOTE — TRANSFER OF CARE
"Anesthesia Transfer of Care Note    Patient: Lorena Vallejo    Procedure(s) Performed: Procedure(s) (LRB):  EGD (ESOPHAGOGASTRODUODENOSCOPY) (N/A)  COLONOSCOPY (N/A)    Patient location: GI    Anesthesia Type: general    Transport from OR: Transported from OR on room air with adequate spontaneous ventilation    Post pain: adequate analgesia    Post assessment: no apparent anesthetic complications    Post vital signs: stable    Level of consciousness: sedated    Nausea/Vomiting: no nausea/vomiting    Complications: none    Transfer of care protocol was followed      Last vitals:   Visit Vitals  BP (!) 146/76   Pulse (!) 55   Temp 36.5 °C (97.7 °F) (Skin)   Resp 15   Ht 5' 7" (1.702 m)   Wt 60.3 kg (133 lb)   SpO2 98%   Breastfeeding? No   BMI 20.83 kg/m²     "

## 2019-08-29 NOTE — ANESTHESIA POSTPROCEDURE EVALUATION
Anesthesia Post Evaluation    Patient: Lorena Vallejo    Procedure(s) Performed: Procedure(s) (LRB):  EGD (ESOPHAGOGASTRODUODENOSCOPY) (N/A)  COLONOSCOPY (N/A)    Final Anesthesia Type: general  Patient location during evaluation: PACU  Patient participation: Yes- Able to Participate  Level of consciousness: awake and alert and oriented  Post-procedure vital signs: reviewed and stable  Pain management: adequate  Airway patency: patent  PONV status at discharge: No PONV  Anesthetic complications: no      Cardiovascular status: blood pressure returned to baseline and stable  Respiratory status: unassisted and spontaneous ventilation  Hydration status: euvolemic  Follow-up not needed.          Vitals Value Taken Time   /82 8/29/2019 11:46 AM   Temp 36.3 °C (97.3 °F) 8/29/2019 11:46 AM   Pulse 62 8/29/2019 11:46 AM   Resp 16 8/29/2019 11:46 AM   SpO2 98 % 8/29/2019 11:46 AM         Event Time     Out of Recovery 12:15:38          Pain/Brandie Score: Brandie Score: 10 (8/29/2019 11:42 AM)

## 2019-09-10 ENCOUNTER — PATIENT MESSAGE (OUTPATIENT)
Dept: GASTROENTEROLOGY | Facility: CLINIC | Age: 84
End: 2019-09-10

## 2019-09-13 ENCOUNTER — LAB VISIT (OUTPATIENT)
Dept: LAB | Facility: HOSPITAL | Age: 84
End: 2019-09-13
Attending: INTERNAL MEDICINE
Payer: MEDICARE

## 2019-09-13 DIAGNOSIS — K90.829 SHORT BOWEL SYNDROME: ICD-10-CM

## 2019-09-13 DIAGNOSIS — J18.9 PNEUMONIA OF RIGHT LOWER LOBE DUE TO INFECTIOUS ORGANISM: ICD-10-CM

## 2019-09-13 LAB
CREAT SERPL-MCNC: 0.8 MG/DL (ref 0.5–1.4)
EST. GFR  (AFRICAN AMERICAN): >60 ML/MIN/1.73 M^2
EST. GFR  (NON AFRICAN AMERICAN): >60 ML/MIN/1.73 M^2

## 2019-09-13 PROCEDURE — 82565 ASSAY OF CREATININE: CPT | Mod: HCNC

## 2019-09-13 PROCEDURE — 36415 COLL VENOUS BLD VENIPUNCTURE: CPT | Mod: HCNC,PO

## 2019-09-16 DIAGNOSIS — I47.10 PSVT (PAROXYSMAL SUPRAVENTRICULAR TACHYCARDIA): ICD-10-CM

## 2019-09-16 RX ORDER — METOPROLOL SUCCINATE 50 MG/1
TABLET, EXTENDED RELEASE ORAL
Qty: 90 TABLET | Refills: 0 | Status: SHIPPED | OUTPATIENT
Start: 2019-09-16 | End: 2019-12-11 | Stop reason: SDUPTHER

## 2019-09-19 ENCOUNTER — PATIENT OUTREACH (OUTPATIENT)
Dept: ADMINISTRATIVE | Facility: HOSPITAL | Age: 84
End: 2019-09-19

## 2019-10-16 ENCOUNTER — OFFICE VISIT (OUTPATIENT)
Dept: FAMILY MEDICINE | Facility: CLINIC | Age: 84
End: 2019-10-16
Payer: MEDICARE

## 2019-10-16 ENCOUNTER — DOCUMENTATION ONLY (OUTPATIENT)
Dept: FAMILY MEDICINE | Facility: CLINIC | Age: 84
End: 2019-10-16

## 2019-10-16 ENCOUNTER — HOSPITAL ENCOUNTER (OUTPATIENT)
Dept: RADIOLOGY | Facility: CLINIC | Age: 84
Discharge: HOME | End: 2019-10-16
Attending: INTERNAL MEDICINE
Payer: MEDICARE

## 2019-10-16 ENCOUNTER — TELEPHONE (OUTPATIENT)
Dept: FAMILY MEDICINE | Facility: CLINIC | Age: 84
End: 2019-10-16

## 2019-10-16 VITALS
SYSTOLIC BLOOD PRESSURE: 128 MMHG | OXYGEN SATURATION: 95 % | RESPIRATION RATE: 16 BRPM | TEMPERATURE: 99 F | WEIGHT: 139.13 LBS | HEART RATE: 80 BPM | DIASTOLIC BLOOD PRESSURE: 84 MMHG | BODY MASS INDEX: 21.84 KG/M2 | HEIGHT: 67 IN

## 2019-10-16 DIAGNOSIS — R06.09 DYSPNEA ON EXERTION: ICD-10-CM

## 2019-10-16 DIAGNOSIS — E03.9 HYPOTHYROIDISM, UNSPECIFIED TYPE: ICD-10-CM

## 2019-10-16 DIAGNOSIS — G47.10 HYPERSOMNIA, UNSPECIFIED: ICD-10-CM

## 2019-10-16 DIAGNOSIS — R53.82 CHRONIC FATIGUE AND MALAISE: Primary | ICD-10-CM

## 2019-10-16 DIAGNOSIS — D64.9 ANEMIA, UNSPECIFIED TYPE: ICD-10-CM

## 2019-10-16 DIAGNOSIS — R53.81 CHRONIC FATIGUE AND MALAISE: Primary | ICD-10-CM

## 2019-10-16 PROCEDURE — 3288F PR FALLS RISK ASSESSMENT DOCUMENTED: ICD-10-PCS | Mod: CPTII,S$GLB,, | Performed by: INTERNAL MEDICINE

## 2019-10-16 PROCEDURE — 3288F FALL RISK ASSESSMENT DOCD: CPT | Mod: CPTII,S$GLB,, | Performed by: INTERNAL MEDICINE

## 2019-10-16 PROCEDURE — 71046 X-RAY EXAM CHEST 2 VIEWS: CPT | Mod: TC,HCNC,FY,PO

## 2019-10-16 PROCEDURE — 71046 XR CHEST PA AND LATERAL: ICD-10-PCS | Mod: 26,HCNC,, | Performed by: RADIOLOGY

## 2019-10-16 PROCEDURE — 99214 OFFICE O/P EST MOD 30 MIN: CPT | Mod: S$GLB,,, | Performed by: INTERNAL MEDICINE

## 2019-10-16 PROCEDURE — 99214 PR OFFICE/OUTPT VISIT, EST, LEVL IV, 30-39 MIN: ICD-10-PCS | Mod: S$GLB,,, | Performed by: INTERNAL MEDICINE

## 2019-10-16 PROCEDURE — 1100F PR PT FALLS ASSESS DOC 2+ FALLS/FALL W/INJURY/YR: ICD-10-PCS | Mod: CPTII,S$GLB,, | Performed by: INTERNAL MEDICINE

## 2019-10-16 PROCEDURE — 1100F PTFALLS ASSESS-DOCD GE2>/YR: CPT | Mod: CPTII,S$GLB,, | Performed by: INTERNAL MEDICINE

## 2019-10-16 PROCEDURE — 71046 X-RAY EXAM CHEST 2 VIEWS: CPT | Mod: 26,HCNC,, | Performed by: RADIOLOGY

## 2019-10-16 RX ORDER — SERTRALINE HYDROCHLORIDE 100 MG/1
100 TABLET, FILM COATED ORAL DAILY
Qty: 30 TABLET | Refills: 11 | Status: SHIPPED | OUTPATIENT
Start: 2019-10-16 | End: 2020-10-09

## 2019-10-16 RX ORDER — THYROID 15 MG/1
1 TABLET ORAL
Qty: 90 TABLET | Refills: 3 | Status: SHIPPED | OUTPATIENT
Start: 2019-10-16 | End: 2020-07-13

## 2019-10-16 RX ORDER — THYROID 15 MG/1
1 TABLET ORAL
Qty: 30 TABLET | Refills: 11 | Status: SHIPPED | OUTPATIENT
Start: 2019-10-16 | End: 2019-10-16 | Stop reason: SDUPTHER

## 2019-10-16 RX ORDER — LEVOTHYROXINE SODIUM 25 UG/1
25 TABLET ORAL DAILY
Qty: 30 TABLET | Refills: 11 | Status: SHIPPED | OUTPATIENT
Start: 2019-10-16 | End: 2019-10-16

## 2019-10-16 NOTE — PATIENT INSTRUCTIONS
For 1 week take a half a pack stool and half of the sertraline.        Thank you for choosing [facility].     Please fill out the patient experience survey.

## 2019-10-16 NOTE — PROGRESS NOTES
"Subjective:      11:07 AM     Patient ID: Lorena Vallejo is a 86 y.o. female.    Chief Complaint: Fatigue (labs,refills)    HPI     Had some rectal bleeding microscopically and had a polyp partially removed.  She has to have another colonoscopy.  Patient also had a dilatation of esophageal stricture.    CHIEF COMPLAINT: Fatigue(+).  HPI:     ONSET/TIMING: Onset    2 months  ago. Sudden: .no . .Similar problems in past? yes     DURATION:       QUALITY/COURSE:      INTENSITY/SEVERITY:.Severity is #       8 (10 point scale)    SYMPTOMS/RELATED:  Possible medication side effects include:     The following symptoms/statements are positive if BOLD, negative otherwise.        CONTEXT/WHEN:.--Fatigued_after_good_night's_rest. Worse_in_ afternoons.  Worse_after_taking_a_medication.. Similar_problems.    Exposure_to_others_with_similar_symptoms.    MODIFIERS/TREATMENTS:    Exercise.    meds:     REVIEW OF SYMPTOMS:  Anorexia. Depression. Stress. Fever. Headache. Neck_Pain. Lymphadenopathy. Sore_Throat. Rhinorrhea. Coryza. Cough. Chest_Pain.  Back pain.  Abdomen_Pain. Nausea. Diarrhea chronic due to short-bowel syndrome. Urinary_Problems. Rash. Tick_Bites. Weight-gain.  Weight-loss.   Arthralgias . Loss-of-concentration. Loss-of-interests. Disordered-sleep. Cold-intolerance.  Heat-intolerance.  polyuria. Polydipsia.  The patient is short of breath on minor exertion accompanied by diaphoresis and has been having some minor leg edema.        Review of Systems      Objective:      Vitals:    10/16/19 1027   BP: 128/84   Pulse: 80   Resp: 16   Temp: 98.5 °F (36.9 °C)   TempSrc: Oral   SpO2: 95%   Weight: 63.1 kg (139 lb 1.8 oz)   Height: 5' 7" (1.702 m)   PainSc: 0-No pain     Physical Exam   Constitutional: She appears well-developed and well-nourished.   Cardiovascular: Normal rate, regular rhythm and normal heart sounds.   Pulmonary/Chest: Effort normal and breath sounds normal.   Abdominal: Soft. There is no tenderness. "   Neurological: She is alert.   Psychiatric: She has a normal mood and affect. Her behavior is normal. Thought content normal.   Nursing note and vitals reviewed.        Assessment:       1. Chronic fatigue and malaise    2. Anemia, unspecified type    3. Hypothyroidism, unspecified type    4. Dyspnea on exertion    5. Hypersomnia, unspecified           Plan:       Chronic fatigue and malaise  -     CBC auto differential; Future; Expected date: 10/16/2019  -     Comprehensive metabolic panel; Future; Expected date: 10/16/2019  -     TSH; Future; Expected date: 10/16/2019  -     sertraline (ZOLOFT) 100 MG tablet; Take 1 tablet (100 mg total) by mouth once daily.  Dispense: 30 tablet; Refill: 11  -     Pulse oximetry overnight; Future    Anemia, unspecified type  -     CBC auto differential; Future; Expected date: 10/16/2019    Hypothyroidism, unspecified type  -     Discontinue: levothyroxine (SYNTHROID) 25 MCG tablet; Take 1 tablet (25 mcg total) by mouth once daily. In addition to the 150 mg  Dispense: 30 tablet; Refill: 11    Dyspnea on exertion  -     EKG 12-lead; Future  -     X-Ray Chest PA And Lateral; Future; Expected date: 10/16/2019  -     Brain natriuretic peptide; Future; Expected date: 10/16/2019  -     D dimer, quantitative; Future; Expected date: 10/16/2019    Hypersomnia, unspecified   -     Pulse oximetry overnight; Future    Other orders  -     Discontinue: thyroid, pork, (ARMOUR THYROID) 15 mg Tab; Take 1 tablet (15 mg total) by mouth before breakfast. Take with 150 mg arm her thyroid  Dispense: 30 tablet; Refill: 11  -     thyroid, pork, (ARMOUR THYROID) 15 mg Tab; Take 1 tablet (15 mg total) by mouth before breakfast. Take with 150 mg arm her thyroid  Dispense: 90 tablet; Refill: 3      Follow up in about 6 weeks (around 11/27/2019).

## 2019-10-18 ENCOUNTER — TELEPHONE (OUTPATIENT)
Dept: FAMILY MEDICINE | Facility: CLINIC | Age: 84
End: 2019-10-18

## 2019-10-18 DIAGNOSIS — R06.00 DYSPNEA, UNSPECIFIED TYPE: ICD-10-CM

## 2019-10-18 NOTE — TELEPHONE ENCOUNTER
----- Message from Mckenzie Rey sent at 10/18/2019 12:50 PM CDT -----  Contact: patient  Please call patient in regards to tests that were ordered, she seems incredibly confused....  Please call to discuss what is needed and why and when this needs to be done.   Very adamant about ONLY speaking with the office to explain and schedule...    Please call after 2:30- [phone]

## 2019-10-21 ENCOUNTER — TELEPHONE (OUTPATIENT)
Dept: FAMILY MEDICINE | Facility: CLINIC | Age: 84
End: 2019-10-21

## 2019-10-21 DIAGNOSIS — R06.00 DYSPNEA, UNSPECIFIED TYPE: Primary | ICD-10-CM

## 2019-10-22 ENCOUNTER — PATIENT MESSAGE (OUTPATIENT)
Dept: FAMILY MEDICINE | Facility: CLINIC | Age: 84
End: 2019-10-22

## 2019-10-22 ENCOUNTER — TELEPHONE (OUTPATIENT)
Dept: FAMILY MEDICINE | Facility: CLINIC | Age: 84
End: 2019-10-22

## 2019-10-22 NOTE — TELEPHONE ENCOUNTER
----- Message from Alma Schaeffer sent at 10/22/2019 11:35 AM CDT -----  Contact: [phone]/self  Patient is requesting to speak with you regarding her thyroid medication. Please advise.

## 2019-10-23 ENCOUNTER — HOSPITAL ENCOUNTER (OUTPATIENT)
Dept: RADIOLOGY | Facility: HOSPITAL | Age: 84
Discharge: HOME | End: 2019-10-23
Attending: INTERNAL MEDICINE
Payer: MEDICARE

## 2019-10-23 ENCOUNTER — TELEPHONE (OUTPATIENT)
Dept: FAMILY MEDICINE | Facility: CLINIC | Age: 84
End: 2019-10-23

## 2019-10-23 DIAGNOSIS — R06.00 DYSPNEA, UNSPECIFIED TYPE: ICD-10-CM

## 2019-10-23 PROCEDURE — 71046 XR CHEST PA AND LATERAL: ICD-10-PCS | Mod: 26,HCNC,, | Performed by: RADIOLOGY

## 2019-10-23 PROCEDURE — 71046 X-RAY EXAM CHEST 2 VIEWS: CPT | Mod: TC,HCNC,FY

## 2019-10-23 PROCEDURE — 93970 EXTREMITY STUDY: CPT | Mod: TC,HCNC

## 2019-10-23 PROCEDURE — 78582 LUNG VENTILAT&PERFUS IMAGING: CPT | Mod: 26,HCNC,, | Performed by: RADIOLOGY

## 2019-10-23 PROCEDURE — 93970 EXTREMITY STUDY: CPT | Mod: 26,HCNC,, | Performed by: RADIOLOGY

## 2019-10-23 PROCEDURE — A9540 TC99M MAA: HCPCS | Mod: HCNC

## 2019-10-23 PROCEDURE — 78582 NM LUNG VENTILATION AND PERFUSION IMAGING: ICD-10-PCS | Mod: 26,HCNC,, | Performed by: RADIOLOGY

## 2019-10-23 PROCEDURE — 71046 X-RAY EXAM CHEST 2 VIEWS: CPT | Mod: 26,HCNC,, | Performed by: RADIOLOGY

## 2019-10-23 PROCEDURE — 93970 US LOWER EXTREMITY VEINS BILATERAL: ICD-10-PCS | Mod: 26,HCNC,, | Performed by: RADIOLOGY

## 2019-10-23 NOTE — PROGRESS NOTES
Normal study    This was reviewed by Dr. Cates.  If you have any questions needs or problems let us know

## 2019-10-23 NOTE — PROGRESS NOTES
Normal study.  You do not have a blood clot in your lungs    This was reviewed by Dr. Cates.  If you have any questions needs or problems let us know

## 2019-10-23 NOTE — TELEPHONE ENCOUNTER
----- Message from Ofelia Beth sent at 10/23/2019 12:38 PM CDT -----  Orders were sent to Dr on 10/21 st / asking to have Dr sign them / Over night  oximetry /    ... [place] [phone]  With Audra

## 2019-10-23 NOTE — PROGRESS NOTES
Normal study, no DVT    This was reviewed by Dr. Cates.  If you have any questions needs or problems let us know

## 2019-10-25 RX ORDER — DILTIAZEM HYDROCHLORIDE 240 MG/1
CAPSULE, EXTENDED RELEASE ORAL
Qty: 90 CAPSULE | Refills: 3 | Status: SHIPPED | OUTPATIENT
Start: 2019-10-25 | End: 2020-01-07

## 2019-10-28 ENCOUNTER — TELEPHONE (OUTPATIENT)
Dept: FAMILY MEDICINE | Facility: CLINIC | Age: 84
End: 2019-10-28

## 2019-10-28 NOTE — TELEPHONE ENCOUNTER
----- Message from Jolene Carl sent at 10/28/2019  9:36 AM CDT -----  Contact: mark with [facility] [phone]   mark with [facility] [phone]   See overnight pulse ox order, please have MD sign and date and fax back

## 2019-10-29 ENCOUNTER — TELEPHONE (OUTPATIENT)
Dept: FAMILY MEDICINE | Facility: CLINIC | Age: 84
End: 2019-10-29

## 2019-10-29 DIAGNOSIS — G47.34 NOCTURNAL HYPOXIA: Primary | ICD-10-CM

## 2019-10-29 NOTE — TELEPHONE ENCOUNTER
You have fairly severe hypoxia at night.  Need to get short oxygen and IV see a pulmonary specialist to see why this is happening.

## 2019-10-31 ENCOUNTER — TELEPHONE (OUTPATIENT)
Dept: FAMILY MEDICINE | Facility: CLINIC | Age: 84
End: 2019-10-31

## 2019-11-05 ENCOUNTER — TELEPHONE (OUTPATIENT)
Dept: GASTROENTEROLOGY | Facility: CLINIC | Age: 84
End: 2019-11-05

## 2019-11-05 NOTE — TELEPHONE ENCOUNTER
----- Message from Jolene Carl sent at 11/5/2019  3:16 PM CST -----  Contact: [phone]  Patient is requesting a call back from the nurse to schedule colonoscopy.    Please call the patient upon request at phone number [phone].

## 2019-11-05 NOTE — TELEPHONE ENCOUNTER
Patient has questions on prep change for repeat colon. Scheduled Nurse Visit to discuss and make appointment for procedure; 11/14/18 1030.

## 2019-11-05 NOTE — TELEPHONE ENCOUNTER
----- Message from Jolene Carl sent at 11/5/2019  3:16 PM CST -----  Contact: [phone]  Patient is requesting a call back from the nurse to schedule colonoscopy.    Please call the patient upon request at phone number [phone].

## 2019-11-07 ENCOUNTER — TELEPHONE (OUTPATIENT)
Dept: FAMILY MEDICINE | Facility: CLINIC | Age: 84
End: 2019-11-07

## 2019-11-07 NOTE — TELEPHONE ENCOUNTER
Called [facility] and advised that the patient is to only take armour thyroid not levothyroxine. Pharmacist verbalized understanding.

## 2019-11-07 NOTE — TELEPHONE ENCOUNTER
----- Message from Neha Garibay sent at 11/7/2019  8:31 AM CST -----  Contact: Winter   .Type:  Pharmacy Calling to Clarify an RX    Name of Caller: Winter   Pharmacy Name:  [facility]   Prescription Name:  levothyroxine and armour thyroid  What do they need to clarify?: clarification   Best Call Back Number: [phone] (pharmacist)  Additional Information:

## 2019-11-14 DIAGNOSIS — K63.5: Primary | ICD-10-CM

## 2019-11-20 ENCOUNTER — DOCUMENTATION ONLY (OUTPATIENT)
Dept: FAMILY MEDICINE | Facility: CLINIC | Age: 84
End: 2019-11-20

## 2019-11-20 ENCOUNTER — OFFICE VISIT (OUTPATIENT)
Dept: FAMILY MEDICINE | Facility: CLINIC | Age: 84
End: 2019-11-20
Payer: MEDICARE

## 2019-11-20 VITALS
WEIGHT: 136.69 LBS | RESPIRATION RATE: 16 BRPM | SYSTOLIC BLOOD PRESSURE: 142 MMHG | HEART RATE: 77 BPM | BODY MASS INDEX: 21.45 KG/M2 | TEMPERATURE: 98 F | DIASTOLIC BLOOD PRESSURE: 76 MMHG | OXYGEN SATURATION: 77 % | HEIGHT: 67 IN

## 2019-11-20 DIAGNOSIS — R60.0 LEG EDEMA: ICD-10-CM

## 2019-11-20 DIAGNOSIS — R00.0 TACHYCARDIA: ICD-10-CM

## 2019-11-20 DIAGNOSIS — G47.00 INSOMNIA, UNSPECIFIED TYPE: Primary | ICD-10-CM

## 2019-11-20 DIAGNOSIS — K21.9 GASTROESOPHAGEAL REFLUX DISEASE, ESOPHAGITIS PRESENCE NOT SPECIFIED: ICD-10-CM

## 2019-11-20 DIAGNOSIS — G47.00 INSOMNIA, UNSPECIFIED TYPE: ICD-10-CM

## 2019-11-20 PROCEDURE — 1159F MED LIST DOCD IN RCRD: CPT | Mod: S$GLB,,, | Performed by: INTERNAL MEDICINE

## 2019-11-20 PROCEDURE — 93005 ELECTROCARDIOGRAM TRACING: CPT | Mod: S$GLB,,, | Performed by: INTERNAL MEDICINE

## 2019-11-20 PROCEDURE — 3288F PR FALLS RISK ASSESSMENT DOCUMENTED: ICD-10-PCS | Mod: CPTII,S$GLB,, | Performed by: INTERNAL MEDICINE

## 2019-11-20 PROCEDURE — 93005 EKG 12-LEAD: ICD-10-PCS | Mod: S$GLB,,, | Performed by: INTERNAL MEDICINE

## 2019-11-20 PROCEDURE — 3288F FALL RISK ASSESSMENT DOCD: CPT | Mod: CPTII,S$GLB,, | Performed by: INTERNAL MEDICINE

## 2019-11-20 PROCEDURE — 99214 OFFICE O/P EST MOD 30 MIN: CPT | Mod: S$GLB,,, | Performed by: INTERNAL MEDICINE

## 2019-11-20 PROCEDURE — 1126F PR PAIN SEVERITY QUANTIFIED, NO PAIN PRESENT: ICD-10-PCS | Mod: S$GLB,,, | Performed by: INTERNAL MEDICINE

## 2019-11-20 PROCEDURE — 1159F PR MEDICATION LIST DOCUMENTED IN MEDICAL RECORD: ICD-10-PCS | Mod: S$GLB,,, | Performed by: INTERNAL MEDICINE

## 2019-11-20 PROCEDURE — 99214 PR OFFICE/OUTPT VISIT, EST, LEVL IV, 30-39 MIN: ICD-10-PCS | Mod: S$GLB,,, | Performed by: INTERNAL MEDICINE

## 2019-11-20 PROCEDURE — 1100F PR PT FALLS ASSESS DOC 2+ FALLS/FALL W/INJURY/YR: ICD-10-PCS | Mod: CPTII,S$GLB,, | Performed by: INTERNAL MEDICINE

## 2019-11-20 PROCEDURE — 1126F AMNT PAIN NOTED NONE PRSNT: CPT | Mod: S$GLB,,, | Performed by: INTERNAL MEDICINE

## 2019-11-20 PROCEDURE — 93010 ELECTROCARDIOGRAM REPORT: CPT | Mod: S$GLB,,, | Performed by: INTERNAL MEDICINE

## 2019-11-20 PROCEDURE — 93010 EKG 12-LEAD: ICD-10-PCS | Mod: S$GLB,,, | Performed by: INTERNAL MEDICINE

## 2019-11-20 PROCEDURE — 1100F PTFALLS ASSESS-DOCD GE2>/YR: CPT | Mod: CPTII,S$GLB,, | Performed by: INTERNAL MEDICINE

## 2019-11-20 RX ORDER — DOXEPIN HYDROCHLORIDE 10 MG/1
10 CAPSULE ORAL NIGHTLY
Qty: 30 CAPSULE | Refills: 11 | Status: SHIPPED | OUTPATIENT
Start: 2019-11-20 | End: 2020-01-07

## 2019-11-20 RX ORDER — DOXEPIN HYDROCHLORIDE 10 MG/1
10 CAPSULE ORAL NIGHTLY
Qty: 45 CAPSULE | Refills: 1 | Status: SHIPPED | OUTPATIENT
Start: 2019-11-20 | End: 2019-11-20 | Stop reason: SDUPTHER

## 2019-11-20 RX ORDER — ETHACRYNIC ACID 25 MG/1
TABLET ORAL
Qty: 45 TABLET | Refills: 1 | Status: SHIPPED | OUTPATIENT
Start: 2019-11-20 | End: 2019-11-20 | Stop reason: SDUPTHER

## 2019-11-20 RX ORDER — OMEPRAZOLE 20 MG/1
20 CAPSULE, DELAYED RELEASE ORAL DAILY
Qty: 90 CAPSULE | Refills: 1 | Status: SHIPPED | OUTPATIENT
Start: 2019-11-20 | End: 2019-11-20 | Stop reason: SDUPTHER

## 2019-11-20 RX ORDER — OMEPRAZOLE 20 MG/1
20 CAPSULE, DELAYED RELEASE ORAL DAILY
Qty: 90 CAPSULE | Refills: 1 | Status: SHIPPED | OUTPATIENT
Start: 2019-11-20 | End: 2020-04-20

## 2019-11-20 RX ORDER — DOXEPIN HYDROCHLORIDE 10 MG/1
10 CAPSULE ORAL NIGHTLY
Qty: 30 CAPSULE | Refills: 11 | Status: SHIPPED | OUTPATIENT
Start: 2019-11-20 | End: 2019-11-20 | Stop reason: SDUPTHER

## 2019-11-20 RX ORDER — ETHACRYNIC ACID 25 MG/1
TABLET ORAL
Qty: 15 TABLET | Refills: 11 | Status: SHIPPED | OUTPATIENT
Start: 2019-11-20 | End: 2019-11-20 | Stop reason: SDUPTHER

## 2019-11-20 RX ORDER — ETHACRYNIC ACID 25 MG/1
TABLET ORAL
Qty: 15 TABLET | Refills: 11 | Status: SHIPPED | OUTPATIENT
Start: 2019-11-20 | End: 2020-01-07

## 2019-11-20 NOTE — TELEPHONE ENCOUNTER
----- Message from Arnaud King sent at 11/20/2019  3:48 PM CST -----  Contact: pt  Type:  Pharmacy Calling to Clarify an RX    Name of Caller:  pt  Pharmacy Name:    [id number] - [place] - [address] AT [facility] [address]  [address] [address]  Phone: [phone] Fax: [phone]    Prescription Name:  ethacrynic acid (EDECRIN) 25 mg Tab and doxepin (SINEQUAN) 10 MG capsule  What do they need to clarify?:  Pt understood this was being sent to this pharmcy  Best Call Back Number:  [phone]  Additional Information:  Pt was told that this was being sent to [facility] and the local pharmacy. The local pharmacy has not yet received the Rx. Please call to advise

## 2019-11-20 NOTE — PROGRESS NOTES
"eSubjective:      11:00 AM     Patient ID: Lorena Vallejo is a 86 y.o. female.    Chief Complaint: Hyperlipidemia (labs)    HPI   Shortness of breath is much better.  V/Q scan was low probability and ultrasound the legs was negative.  D-dimer was positive.      CHIEF COMPLAINT: Fatigue(+).  HPI:  The patient stopped the paxil due to fatigue but was having trouble sleeping so she started taking Benadryl .  She notes increased confusion.    ONSET/TIMING: Onset   1 year   ago. Sudden: .no . .Similar problems in past? yes     DURATION:       QUALITY/COURSE:  Better    INTENSITY/SEVERITY:.Severity is #       5 (10 point scale)    SYMPTOMS/RELATED:  Possible medication side effects include:     The following symptoms/statements are positive if BOLD, negative otherwise.        CONTEXT/WHEN:.--Fatigued_after_good_night's_rest. Worse_in_evenings.  Worse_after_taking_a_medication.. Similar_problems.    Exposure_to_others_with_similar_symptoms.    MODIFIERS/TREATMENTS:    Exercise.    meds:     REVIEW OF SYMPTOMS:  Anorexia. Depression. Stress. Fever. Headache. Neck_Pain. Lymphadenopathy. Sore_Throat. Rhinorrhea. Coryza. Cough. Chest_Pain. Abdomen_Pain. Nausea. Diarrhea. Urinary_Problems. Rash. Tick_Bites. Weight-gain.  Weight-loss.   Arthralgias . Loss-of-concentration. Loss-of-interests. Disordered-sleep. Cold-intolerance.  Heat-intolerance.  polyuria. Polydipsia.    Patient has a history of PSVT.  A week ago she felt like she could pass out and felt her pulse and was going so fast she could not count it.  She did carotid massage and after about 5 min at stopped.  The she is not go to the ER    Review of Systems      Objective:      Vitals:    11/20/19 1043   BP: (!) 142/76   Pulse: 77   Resp: 16   Temp: 98.2 °F (36.8 °C)   TempSrc: Oral   SpO2: (!) 77%   Weight: 62 kg (136 lb 11 oz)   Height: 5' 7" (1.702 m)   PainSc: 0-No pain     Physical Exam   Constitutional: She appears well-developed and well-nourished.   Cardiovascular: " Normal rate, regular rhythm and normal heart sounds.   Pulmonary/Chest: Effort normal and breath sounds normal.   Abdominal: Soft. There is no tenderness.   Neurological: She is alert.   Psychiatric: She has a normal mood and affect. Her behavior is normal. Thought content normal.   Nursing note and vitals reviewed.        Assessment:       1. Insomnia, unspecified type    2. Gastroesophageal reflux disease, esophagitis presence not specified    3. Leg edema    4. Tachycardia          Plan:       Insomnia, unspecified type  -     Discontinue: doxepin (SINEQUAN) 10 MG capsule; Take 1 capsule (10 mg total) by mouth every evening.  Dispense: 30 capsule; Refill: 11  -     doxepin (SINEQUAN) 10 MG capsule; Take 1 capsule (10 mg total) by mouth every evening.  Dispense: 45 capsule; Refill: 1    Gastroesophageal reflux disease, esophagitis presence not specified  -     Discontinue: omeprazole (PRILOSEC) 20 MG capsule; Take 1 capsule (20 mg total) by mouth once daily.  Dispense: 90 capsule; Refill: 1  -     omeprazole (PRILOSEC) 20 MG capsule; Take 1 capsule (20 mg total) by mouth once daily.  Dispense: 90 capsule; Refill: 1    Leg edema  -     Discontinue: ethacrynic acid (EDECRIN) 25 mg Tab; 1/2 p.o. q.d.  Dispense: 15 tablet; Refill: 11  -     ethacrynic acid (EDECRIN) 25 mg Tab; 1/2 p.o. q.d.  Dispense: 45 tablet; Refill: 1    Tachycardia  -     Holter Monitor - 3-14 Day Adult; Future  -     EKG 12-lead; Future      Follow up in about 3 months (around 2/20/2020).

## 2019-11-20 NOTE — PATIENT INSTRUCTIONS
Stop Benadryl    [email or web address][employer] has a website that has a course that teaches you how to sleep.  I highly recommend it.     Use cane  in the right hand.    Thank you for choosing [facility].     Please fill out the patient experience survey.

## 2019-11-22 ENCOUNTER — HOSPITAL ENCOUNTER (OUTPATIENT)
Facility: HOSPITAL | Age: 84
Discharge: HOME | End: 2019-11-22
Attending: INTERNAL MEDICINE | Admitting: INTERNAL MEDICINE
Payer: MEDICARE

## 2019-11-22 ENCOUNTER — ANESTHESIA EVENT (OUTPATIENT)
Dept: ENDOSCOPY | Facility: HOSPITAL | Age: 84
End: 2019-11-22
Payer: MEDICARE

## 2019-11-22 ENCOUNTER — ANESTHESIA (OUTPATIENT)
Dept: ENDOSCOPY | Facility: HOSPITAL | Age: 84
End: 2019-11-22
Payer: MEDICARE

## 2019-11-22 DIAGNOSIS — Z86.010 HX OF COLONIC POLYPS: ICD-10-CM

## 2019-11-22 PROCEDURE — 37000008 HC ANESTHESIA 1ST 15 MINUTES: Mod: HCNC | Performed by: INTERNAL MEDICINE

## 2019-11-22 PROCEDURE — 45380 PR COLONOSCOPY,BIOPSY: ICD-10-PCS | Mod: PT,HCNC,, | Performed by: INTERNAL MEDICINE

## 2019-11-22 PROCEDURE — 63600175 PHARM REV CODE 636 W HCPCS: Mod: HCNC | Performed by: INTERNAL MEDICINE

## 2019-11-22 PROCEDURE — D9220A PRA ANESTHESIA: Mod: PT,HCNC,CRNA, | Performed by: NURSE ANESTHETIST, CERTIFIED REGISTERED

## 2019-11-22 PROCEDURE — 88305 TISSUE EXAM BY PATHOLOGIST: CPT | Mod: HCNC | Performed by: PATHOLOGY

## 2019-11-22 PROCEDURE — 45380 COLONOSCOPY AND BIOPSY: CPT | Mod: HCNC | Performed by: INTERNAL MEDICINE

## 2019-11-22 PROCEDURE — D9220A PRA ANESTHESIA: Mod: PT,HCNC,ANES, | Performed by: ANESTHESIOLOGY

## 2019-11-22 PROCEDURE — 27201012 HC FORCEPS, HOT/COLD, DISP: Mod: HCNC | Performed by: INTERNAL MEDICINE

## 2019-11-22 PROCEDURE — 88305 TISSUE EXAM BY PATHOLOGIST: ICD-10-PCS | Mod: 26,HCNC,, | Performed by: PATHOLOGY

## 2019-11-22 PROCEDURE — D9220A PRA ANESTHESIA: ICD-10-PCS | Mod: PT,HCNC,ANES, | Performed by: ANESTHESIOLOGY

## 2019-11-22 PROCEDURE — 37000009 HC ANESTHESIA EA ADD 15 MINS: Mod: HCNC | Performed by: INTERNAL MEDICINE

## 2019-11-22 PROCEDURE — 45380 COLONOSCOPY AND BIOPSY: CPT | Mod: PT,HCNC,, | Performed by: INTERNAL MEDICINE

## 2019-11-22 PROCEDURE — 88305 TISSUE EXAM BY PATHOLOGIST: CPT | Mod: 26,HCNC,, | Performed by: PATHOLOGY

## 2019-11-22 PROCEDURE — 63600175 PHARM REV CODE 636 W HCPCS: Mod: HCNC | Performed by: NURSE ANESTHETIST, CERTIFIED REGISTERED

## 2019-11-22 PROCEDURE — D9220A PRA ANESTHESIA: ICD-10-PCS | Mod: PT,HCNC,CRNA, | Performed by: NURSE ANESTHETIST, CERTIFIED REGISTERED

## 2019-11-22 RX ORDER — LIDOCAINE HCL/PF 100 MG/5ML
SYRINGE (ML) INTRAVENOUS
Status: DISCONTINUED | OUTPATIENT
Start: 2019-11-22 | End: 2019-11-22

## 2019-11-22 RX ORDER — PROPOFOL 10 MG/ML
VIAL (ML) INTRAVENOUS
Status: DISCONTINUED | OUTPATIENT
Start: 2019-11-22 | End: 2019-11-22

## 2019-11-22 RX ORDER — SODIUM CHLORIDE 9 MG/ML
INJECTION, SOLUTION INTRAVENOUS CONTINUOUS
Status: DISCONTINUED | OUTPATIENT
Start: 2019-11-22 | End: 2019-11-22 | Stop reason: HOSPADM

## 2019-11-22 RX ADMIN — PROPOFOL 80 MG: 10 INJECTION, EMULSION INTRAVENOUS at 08:11

## 2019-11-22 RX ADMIN — LIDOCAINE HYDROCHLORIDE 50 MG: 20 INJECTION, SOLUTION INTRAVENOUS at 08:11

## 2019-11-22 RX ADMIN — PROPOFOL 20 MG: 10 INJECTION, EMULSION INTRAVENOUS at 08:11

## 2019-11-22 RX ADMIN — PROPOFOL 30 MG: 10 INJECTION, EMULSION INTRAVENOUS at 08:11

## 2019-11-22 RX ADMIN — SODIUM CHLORIDE 1000 ML: 0.9 INJECTION, SOLUTION INTRAVENOUS at 08:11

## 2019-11-22 NOTE — [employer] PATIENT INSTRUCTIONS
Discharge Summary/Instructions after an Endoscopic Procedure  Patient Name: Lorena Vallejo  Patient MRN: [id number]  Patient YOB: 1933  Friday, November 22, 2019  Tiffanie Vázquez MD  RESTRICTIONS:  During your procedure today, you received medications for sedation.  These   medications may affect your judgment, balance and coordination.  Therefore,   for 24 hours, you have the following restrictions:   - DO NOT drive a car, operate machinery, make legal/financial decisions,   sign important papers or drink alcohol.    ACTIVITY:  Today: no heavy lifting, straining or running due to procedural   sedation/anesthesia.  The following day: return to full activity including work.  DIET:  Eat and drink normally unless instructed otherwise.     TREATMENT FOR COMMON SIDE EFFECTS:  - Mild abdominal pain, nausea, belching, bloating or excessive gas:  rest,   eat lightly and use a heating pad.  - Sore Throat: treat with throat lozenges and/or gargle with warm salt   water.  - Because air was used during the procedure, expelling large amounts of air   from your rectum or belching is normal.  - If a bowel prep was taken, you may not have a bowel movement for 1-3 days.    This is normal.  SYMPTOMS TO WATCH FOR AND REPORT TO YOUR PHYSICIAN:  1. Abdominal pain or bloating, other than gas cramps.  2. Chest pain.  3. Back pain.  4. Signs of infection such as: chills or fever occurring within 24 hours   after the procedure.  5. Rectal bleeding, which would show as bright red, maroon, or black stools.   (A tablespoon of blood from the rectum is not serious, especially if   hemorrhoids are present.)  6. Vomiting.  7. Weakness or dizziness.  GO DIRECTLY TO THE NEAREST EMERGENCY ROOM IF YOU HAVE ANY OF THE FOLLOWING:      Difficulty breathing              Chills and/or fever over 101 F   Persistent vomiting and/or vomiting blood   Severe abdominal pain   Severe chest pain   Black, tarry stools   Bleeding- more than one  tablespoon   Any other symptom or condition that you feel may need urgent attention  Your doctor recommends these additional instructions:  If any biopsies were taken, your doctors clinic will contact you in 1 to 2   weeks with any results.  - Discharge patient to home (with spouse).   - Patient has a contact number available for emergencies.  The signs and   symptoms of potential delayed complications were discussed with the   patient.  Return to normal activities tomorrow.  Written discharge   instructions were provided to the patient.   - Resume previous diet.   - Continue present medications.   - Await pathology results.   - Repeat colonoscopy in 3 years for surveillance.   - Return to my office PRN.  For questions, problems or results please call your physician - Tiffanie Vázquez MD at Work:  [phone].  [facility], EMERGENCY ROOM PHONE NUMBER: [phone]  IF A COMPLICATION OR EMERGENCY SITUATION ARISES AND YOU ARE UNABLE TO REACH   YOUR PHYSICIAN - GO DIRECTLY TO THE EMERGENCY ROOM.  Tiffanie Vázquez MD  11/22/2019 9:04:54 AM  This report has been verified and signed electronically.  [employer]

## 2019-11-22 NOTE — ANESTHESIA POSTPROCEDURE EVALUATION
Anesthesia Post Evaluation    Patient: Lorena Vallejo    Procedure(s) Performed: Procedure(s) (LRB):  COLONOSCOPY (N/A)    Final Anesthesia Type: general    Patient location during evaluation: PACU  Patient participation: Yes- Able to Participate  Level of consciousness: awake and alert and oriented  Post-procedure vital signs: reviewed and stable  Pain management: adequate  Airway patency: patent    PONV status at discharge: No PONV  Anesthetic complications: no      Cardiovascular status: blood pressure returned to baseline  Respiratory status: unassisted, spontaneous ventilation and room air  Hydration status: euvolemic  Follow-up not needed.          Vitals Value Taken Time   /58 11/22/2019  9:30 AM   Temp 36.7 °C (98 °F) 11/22/2019  9:10 AM   Pulse 79 11/22/2019  9:30 AM   Resp 15 11/22/2019  9:30 AM   SpO2 99 % 11/22/2019  9:30 AM         No case tracking events are documented in the log.      Pain/Brandie Score: Brandie Score: 10 (11/22/2019  9:30 AM)

## 2019-11-22 NOTE — TRANSFER OF CARE
"Anesthesia Transfer of Care Note    Patient: Lorena Vallejo    Procedure(s) Performed: Procedure(s) (LRB):  COLONOSCOPY (N/A)    Patient location: GI    Anesthesia Type: general    Transport from OR: Transported from OR on 2-3 L/min O2 by NC with adequate spontaneous ventilation    Post pain: adequate analgesia    Post assessment: no apparent anesthetic complications    Post vital signs: stable    Level of consciousness: awake    Nausea/Vomiting: no nausea/vomiting    Complications: none    Transfer of care protocol was followed      Last vitals:   Visit Vitals  /67 (BP Location: Left arm, Patient Position: Lying)   Pulse 73   Temp 36.7 °C (98.1 °F) (Skin)   Resp 14   Ht 5' 9" (1.753 m)   Wt 62.1 kg (137 lb)   SpO2 98%   Breastfeeding? No   BMI 20.23 kg/m²     "

## 2019-11-22 NOTE — OR NURSING
Sedation, oxygen delivery, and monitoring per anesthesia for endoscopic procedure.    Have you had a colonoscopy LESS THAN 3 years ago?   * If YES, answer these questions*:Yes    1. Did patient have a prior colonic polyp in a previous surveillance/diagnostic colonoscopy and is 18 years or older on date of encounter? Yes  2. Documentation of < 3 year interval since the patients last colonoscopy due to medical reasons (eg., last colonoscopy incomplete, last colonoscopy had inadequate prep, piecemeal removal of adenomas, or last colonoscopy found > 10 adenomas) ? Piecemeal polyp

## 2019-11-22 NOTE — H&P
"[facility] Note    CC: Piecemeal polypectomy     HPI 86 y.o. female presents to follow up piecemeal polypectomy of large adenoma 2-3 months ago    Past Medical History:   Diagnosis Date    Arthritis     Blood transfusion     Bowel obstruction     X 3    Cancer     MELANOMA RIGHT EYE    Cancer of eyeball, except conjunctiva, cornea, retina, and choroid right    Diverticulitis     GERD (gastroesophageal reflux disease)     Hypertension     Peripheral neuropathy     PSVT (paroxysmal supraventricular tachycardia)     HAD X 3 OVER 10 YRS. LAST 7-25-12. HAD ADENOSINE IN [facility]. WAS ON INCREASED THYROID MED. NO PROBLEM SINCE    Salmonella     Short bowel syndrome     Thyroid disease     Transient ischemic attack     UTI (urinary tract infection)          Review of Systems  General ROS: negative for - chills, fever or weight loss  Cardiovascular ROS: no chest pain or dyspnea on exertion  Gastrointestinal ROS: no vomiting    Physical Examination  /67 (BP Location: Left arm, Patient Position: Lying)   Pulse 73   Temp 98.1 °F (36.7 °C) (Skin)   Resp 14   Ht 5' 9" (1.753 m)   Wt 62.1 kg (137 lb)   SpO2 98%   Breastfeeding? No   BMI 20.23 kg/m²   General appearance: alert, cooperative, no distress  HENT: Normocephalic, atraumatic, neck symmetrical, no nasal discharge, sclera anicteric   Lungs: clear to auscultation bilaterally, symmetric chest wall expansion bilaterally  Heart: regular rate and rhythm without rub; no displacement of the PMI   Abdomen: soft, nontender  Extremities: extremities symmetric; no clubbing, cyanosis, or edema      Assessment:   86 y.o. female presents to follow up piecemeal polypectomy of large adenoma    Plan:  -Proceed to colonoscopy     Tiffanie Vázquez MD  [facility]  [address], [address]  [place] [address]  Office: [phone]  Fax: [phone]  "

## 2019-11-22 NOTE — ANESTHESIA PREPROCEDURE EVALUATION
11/22/2019  Lorena Vallejo is a 86 y.o., female.    Pre-op Assessment    I have reviewed the Patient Summary Reports.     I have reviewed the Nursing Notes.   I have reviewed the Medications.     Review of Systems  Anesthesia Hx:  No problems with previous Anesthesia    Social:  Former Smoker    Hematology/Oncology:         -- Cancer in past history (eyeball):   Cardiovascular:   Hypertension, well controlled Dysrhythmias (PSVT)    Pulmonary:  Pulmonary Normal    Renal/:   Chronic Renal Disease, CRI    Hepatic/GI:   GERD, well controlled    Musculoskeletal:   Arthritis     Neurological:   TIA, Neuromuscular Disease,   Peripheral Neuropathy    Endocrine:   Hypothyroidism        Physical Exam  General:  Well nourished    Airway/Jaw/Neck:  Airway Findings: Mouth Opening: Normal Tongue: Normal  General Airway Assessment: Adult  Oropharynx Findings:  Mallampati: II  Jaw/Neck Findings:  Neck ROM: Normal ROM     Eyes/Ears/Nose:  Eyes/Ears/Nose Findings:    Dental:  Dental Findings:   Chest/Lungs:  Chest/Lungs Findings: Normal Respiratory Rate     Heart/Vascular:  Heart Findings: Rate: Normal  Rhythm: Regular Rhythm        Mental Status:  Mental Status Findings:  Cooperative, Alert and Oriented         Anesthesia Plan  Type of Anesthesia, risks & benefits discussed:  Anesthesia Type:  general  Patient's Preference:   Intra-op Monitoring Plan: standard ASA monitors  Intra-op Monitoring Plan Comments:   Post Op Pain Control Plan: multimodal analgesia  Post Op Pain Control Plan Comments:   Induction:   IV  Beta Blocker:  Patient is on a Beta-Blocker and has received one dose within the past 24 hours (No further documentation required).       Informed Consent: Patient understands risks and agrees with Anesthesia plan.  Questions answered. Anesthesia consent signed with patient.  ASA Score: 3     Day of Surgery Review of  History & Physical:  There are no significant changes.   H&P completed by Anesthesiologist.       Ready For Surgery From Anesthesia Perspective.

## 2019-11-22 NOTE — DISCHARGE INSTRUCTIONS
Diverticulosis    Diverticulosis means that small pouches have formed in the wall of your large intestine (colon). Most often, this problem causes no symptoms and is common as people age. But the pouches in the colon are at risk of becoming infected. When this happens, the condition is called diverticulitis. Although most people with diverticulosis never develop diverticulitis, it is still not uncommon. Rectal bleeding can also occur and in less common situations, a type of colon inflammation called colitis.  While most people do not have symptoms, some people with diverticulosis may have:  · Abdominal cramps and pain  · Bloating  · Constipation  · Change in bowel habits  Causes  The exact cause of diverticulosis (and diverticulitis) has not been proved, but a few things are associated with the condition:  · Low-fiber diet  · Constipation  · Lack of exercise  Your healthcare provider will talk with you about how to manage your condition. Diet changes may be all that are needed to help control diverticulosis and prevent progression to diverticulitis. If you develop diverticulitis, you will likely need other treatments.  Home care  You may be told to take fiber supplements daily. Fiber adds bulk to the stool so that it passes through the colon more easily. Stool softeners may be recommended. You may also be given medications for pain relief. Be sure to take all medications as directed.  In the past, people were told to avoid corn, nuts, and seeds. This is no longer necessary.  Follow these guidelines when caring for yourself at home:  · Eat unprocessed foods that are high in fiber. Whole grains, fruits, and vegetables are good choices.  · Drink 6 to 8 glasses of water every day unless your healthcare provider has you limit how much fluid you should have.  · Watch for changes in your bowel movements. Tell your provider if you notice any changes.  · Begin an exercise program. Ask your provider how to get started.  Generally, walking is the best.  · Get plenty of rest and sleep.  Follow-up care  Follow up with your healthcare provider, or as advised. Regular visits may be needed to check on your health. Sometimes special procedures such as colonoscopy, are needed after an episode of diverticulitis or blooding. Be sure to keep all your appointments.  If a stool sample was taken, or cultures were done, you should be told if they are positive, or if your treatment needs to be changed. You can call as directed for the results.  If X-rays were done, a radiologist will look at them. You will be told if there is a change in your treatment.  If antibiotics were prescribed, be sure to finish them all.  When to seek medical advice  Call your healthcare provider right away if any of these occur:  · Fever of 100.4°F (38°C) or higher, or as directed by your healthcare provider  · Severe cramps in the lower left side of the abdomen or pain that is getting worse  · Tenderness in the lower left side of the abdomen or worsening pain throughout the abdomen  · Diarrhea or constipation that doesn't get better within 24 hours  · Nausea and vomiting  · Bleeding from the rectum  Call 911  Call emergency services if any of the following occur:  · Trouble breathing  · Confusion  · Very drowsy or trouble awakening  · Fainting or loss of consciousness  · Rapid heart rate  · Chest pain  Date Last Reviewed: 12/30/2015 © 2000-2017 [employer]. [address] [place] [id number]. All rights reserved. This information is not intended as a substitute for professional medical care. Always follow your healthcare professional's instructions.        Understanding Colon and Rectal Polyps    The colon (also called the large intestine) is a muscular tube that forms the last part of the digestive tract. It absorbs water and stores food waste. The colon is about 4 to 6 feet long. The rectum is the last 6 inches of the colon. The colon and rectum  have a smooth lining composed of millions of cells. Changes in these cells can lead to growths in the colon that can become cancerous and should be removed. Multiple tests are available to screen for colon cancer, but the colonoscopy is the most recommended test. During colonoscopy, these polyps can be removed. How often you need this test depends on many things including your condition, your family history, symptoms, and what the findings were at the previous colonoscopy.   When the colon lining changes  Changes that happen in the cells that line the colon or rectum can lead to growths called polyps. Over a period of years, polyps can turn cancerous. Removing polyps early may prevent cancer from ever forming.  Polyps  Polyps are fleshy clumps of tissue that form on the lining of the colon or rectum. Small polyps are usually benign (not cancerous). However, over time, cells in a polyp can change and become cancerous. Certain types of polyps known as adenomatous polyps are premalignant. The risk for invasive cancer increases with the size of the polyp and certain cell and gene features. This means that they can become cancerous if they're not removed. Hyperplastic polyps are benign. They can grow quite large and not turn cancerous.   Cancer  Almost all colorectal cancers start when polyp cells begin growing abnormally. As a cancerous tumor grows, it may involve more and more of the colon or rectum. In time, cancer can also grow beyond the colon or rectum and spread to nearby organs or to glands called lymph nodes. The cells can also travel to other parts of the body. This is known as metastasis. The earlier a cancerous tumor is removed, the better the chance of preventing its spread.    Date Last Reviewed: 8/1/2016  © 9732-7118 [employer]. [address] [place] [address], [place] [address]. All rights reserved. This information is not intended as a substitute for professional medical care. Always follow your  healthcare professional's instructions.

## 2019-11-25 ENCOUNTER — OFFICE VISIT (OUTPATIENT)
Dept: ORTHOPEDICS | Facility: CLINIC | Age: 84
End: 2019-11-25
Payer: MEDICARE

## 2019-11-25 ENCOUNTER — HOSPITAL ENCOUNTER (OUTPATIENT)
Dept: RADIOLOGY | Facility: HOSPITAL | Age: 84
Discharge: HOME | End: 2019-11-25
Attending: ORTHOPAEDIC SURGERY
Payer: MEDICARE

## 2019-11-25 VITALS
HEART RATE: 79 BPM | SYSTOLIC BLOOD PRESSURE: 123 MMHG | WEIGHT: 137 LBS | RESPIRATION RATE: 15 BRPM | HEIGHT: 69 IN | OXYGEN SATURATION: 99 % | BODY MASS INDEX: 20.29 KG/M2 | DIASTOLIC BLOOD PRESSURE: 58 MMHG | TEMPERATURE: 98 F

## 2019-11-25 VITALS
WEIGHT: 137 LBS | BODY MASS INDEX: 20.29 KG/M2 | SYSTOLIC BLOOD PRESSURE: 138 MMHG | DIASTOLIC BLOOD PRESSURE: 78 MMHG | HEIGHT: 69 IN

## 2019-11-25 DIAGNOSIS — M25.562 LEFT KNEE PAIN, UNSPECIFIED CHRONICITY: Primary | ICD-10-CM

## 2019-11-25 DIAGNOSIS — M25.562 LEFT KNEE PAIN, UNSPECIFIED CHRONICITY: ICD-10-CM

## 2019-11-25 PROCEDURE — 99999 PR PBB SHADOW E&M-EST. PATIENT-LVL III: ICD-10-PCS | Mod: PBBFAC,HCNC,, | Performed by: ORTHOPAEDIC SURGERY

## 2019-11-25 PROCEDURE — 99999 PR PBB SHADOW E&M-EST. PATIENT-LVL III: CPT | Mod: PBBFAC,HCNC,, | Performed by: ORTHOPAEDIC SURGERY

## 2019-11-25 PROCEDURE — 1125F AMNT PAIN NOTED PAIN PRSNT: CPT | Mod: HCNC,S$GLB,, | Performed by: ORTHOPAEDIC SURGERY

## 2019-11-25 PROCEDURE — 1101F PT FALLS ASSESS-DOCD LE1/YR: CPT | Mod: HCNC,CPTII,S$GLB, | Performed by: ORTHOPAEDIC SURGERY

## 2019-11-25 PROCEDURE — 20610 LARGE JOINT ASPIRATION/INJECTION: L KNEE: ICD-10-PCS | Mod: HCNC,LT,S$GLB, | Performed by: ORTHOPAEDIC SURGERY

## 2019-11-25 PROCEDURE — 20610 DRAIN/INJ JOINT/BURSA W/O US: CPT | Mod: HCNC,LT,S$GLB, | Performed by: ORTHOPAEDIC SURGERY

## 2019-11-25 PROCEDURE — 73564 X-RAY EXAM KNEE 4 OR MORE: CPT | Mod: 26,HCNC,LT, | Performed by: RADIOLOGY

## 2019-11-25 PROCEDURE — 1125F PR PAIN SEVERITY QUANTIFIED, PAIN PRESENT: ICD-10-PCS | Mod: HCNC,S$GLB,, | Performed by: ORTHOPAEDIC SURGERY

## 2019-11-25 PROCEDURE — 73562 XR KNEE ORTHO LEFT WITH FLEXION: ICD-10-PCS | Mod: 26,HCNC,XS,RT | Performed by: RADIOLOGY

## 2019-11-25 PROCEDURE — 99213 OFFICE O/P EST LOW 20 MIN: CPT | Mod: 25,HCNC,S$GLB, | Performed by: ORTHOPAEDIC SURGERY

## 2019-11-25 PROCEDURE — 73564 XR KNEE ORTHO LEFT WITH FLEXION: ICD-10-PCS | Mod: 26,HCNC,LT, | Performed by: RADIOLOGY

## 2019-11-25 PROCEDURE — 99213 PR OFFICE/OUTPT VISIT, EST, LEVL III, 20-29 MIN: ICD-10-PCS | Mod: 25,HCNC,S$GLB, | Performed by: ORTHOPAEDIC SURGERY

## 2019-11-25 PROCEDURE — 73562 X-RAY EXAM OF KNEE 3: CPT | Mod: TC,HCNC,PN,RT,59

## 2019-11-25 PROCEDURE — 1159F PR MEDICATION LIST DOCUMENTED IN MEDICAL RECORD: ICD-10-PCS | Mod: HCNC,S$GLB,, | Performed by: ORTHOPAEDIC SURGERY

## 2019-11-25 PROCEDURE — 1159F MED LIST DOCD IN RCRD: CPT | Mod: HCNC,S$GLB,, | Performed by: ORTHOPAEDIC SURGERY

## 2019-11-25 PROCEDURE — 1101F PR PT FALLS ASSESS DOC 0-1 FALLS W/OUT INJ PAST YR: ICD-10-PCS | Mod: HCNC,CPTII,S$GLB, | Performed by: ORTHOPAEDIC SURGERY

## 2019-11-25 PROCEDURE — 73562 X-RAY EXAM OF KNEE 3: CPT | Mod: 26,HCNC,XS,RT | Performed by: RADIOLOGY

## 2019-11-25 RX ADMIN — TRIAMCINOLONE ACETONIDE 40 MG: 40 INJECTION, SUSPENSION INTRA-ARTICULAR; INTRAMUSCULAR at 01:11

## 2019-11-27 RX ORDER — TRIAMCINOLONE ACETONIDE 40 MG/ML
40 INJECTION, SUSPENSION INTRA-ARTICULAR; INTRAMUSCULAR
Status: DISCONTINUED | OUTPATIENT
Start: 2019-11-25 | End: 2019-11-27 | Stop reason: HOSPADM

## 2019-11-27 NOTE — PROCEDURES
Large Joint Aspiration/Injection: L knee  Date/Time: 11/25/2019 1:15 PM  Performed by: Juanito Ivory MD  Authorized by: Juanito Ivory MD     Consent Done?:  Yes (Verbal)  Indications:  Pain  Timeout: Prior to procedure the correct patient, procedure, and site was verified      Location:  Knee  Site:  L knee  Prep: Patient was prepped and draped in usual sterile fashion    Approach:  Anteromedial  Medications:  40 mg triamcinolone acetonide 40 mg/mL

## 2019-11-27 NOTE — PROGRESS NOTES
Past Medical History:   Diagnosis Date    Arthritis     Blood transfusion     Bowel obstruction     X 3    Cancer     MELANOMA RIGHT EYE    Cancer of eyeball, except conjunctiva, cornea, retina, and choroid right    Diverticulitis     GERD (gastroesophageal reflux disease)     Hypertension     Peripheral neuropathy     PSVT (paroxysmal supraventricular tachycardia)     HAD X 3 OVER 10 YRS. LAST 7-25-12. HAD ADENOSINE IN [facility] ER. WAS ON INCREASED THYROID MED. NO PROBLEM SINCE    Salmonella     Short bowel syndrome     Thyroid disease     Transient ischemic attack     UTI (urinary tract infection)        Past Surgical History:   Procedure Laterality Date    ABDOMINAL SURGERY      X 3. ILEUM REMOVED. OSTOMY PLACED AND CLOSED.    ADENOIDECTOMY      BILATERAL OOPHORECTOMY      COLONOSCOPY      COLONOSCOPY N/A 8/24/2016    Procedure: COLONOSCOPY;  Surgeon: Alex Corral MD;  Location: [facility];  Service: Endoscopy;  Laterality: N/A;    COLONOSCOPY N/A 8/29/2019    Procedure: COLONOSCOPY;  Surgeon: Tiffanie Vázquez MD;  Location: Health system ENDO;  Service: Endoscopy;  Laterality: N/A;    COLONOSCOPY N/A 11/22/2019    Procedure: COLONOSCOPY;  Surgeon: Tiffanie Vázquez MD;  Location: Health system ENDO;  Service: Endoscopy;  Laterality: N/A;    CTR      BILAT    ESOPHAGOGASTRODUODENOSCOPY N/A 8/29/2019    Procedure: EGD (ESOPHAGOGASTRODUODENOSCOPY);  Surgeon: Tiffanie Vázquez MD;  Location: Health system ENDO;  Service: Endoscopy;  Laterality: N/A;    EYE SURGERY      RIGHT  - LASER MELANOMA; BILAT CATARACT    HYSTERECTOMY      BSO    JOINT REPLACEMENT      RIGHT KNEE    MANDIBLE FRACTURE SURGERY      MENISCECTOMY      MEDIAL AND LATERAL REPAIR, BAKERS CYST    TENDON REPAIR      LEFT THUMB    TONSILLECTOMY      T AND A       Current Outpatient Medications   Medication Sig    acetaminophen (TYLENOL) 500 MG tablet Take 1,000 mg by mouth every 6 (six) hours as needed.    ARMOUR THYROID 300 mg Tab TAKE ONE-HALF  TABLET BY MOUTH ONCE DAILY    CALCIUM ORAL Take 1,000 mg by mouth 2 (two) times daily.    cyanocobalamin/cobamamide (B12 SL) Place under the tongue once daily.    diclofenac sodium (VOLTAREN) 1 % Gel Apply 4 g topically 4 (four) times daily.    diltiaZEM (TIAZAC) 240 MG Cs24 Take 1 capsule (240 mg total) by mouth once daily.    diltiaZEM (TIAZAC) 240 MG Cs24 TAKE 1 CAPSULE ONE TIME DAILY    doxepin (SINEQUAN) 10 MG capsule Take 1 capsule (10 mg total) by mouth every evening.    ethacrynic acid (EDECRIN) 25 mg Tab 1/2 p.o. q.d.    fish oil-omega-3 fatty acids 300-1,000 mg capsule Take 2 g by mouth 2 (two) times daily.    FLUAD [id number], 65 YR UP,,PF, 45 mcg (15 mcg x 3)/0.5 mL Syrg PHARMACIST ADMINISTERED IMMUNIZATION ADMINISTERED AT TIME OF DISPENSING    fluticasone (FLONASE) 50 mcg/actuation nasal spray 1 spray (50 mcg total) by Each Nare route once daily.    loperamide (IMODIUM) 2 mg capsule Take 1 capsule (2 mg total) by mouth 4 (four) times daily as needed for Diarrhea.    loratadine (CLARITIN) 10 mg tablet Take 1 tablet (10 mg total) by mouth once daily.    magnesium oxide (MAG-OX) 400 mg tablet Take 400 mg by mouth once daily.     metoprolol succinate (TOPROL-XL) 50 MG 24 hr tablet TAKE 1 TABLET BY MOUTH ONCE DAILY    nitroGLYCERIN (NITROSTAT) 0.4 MG SL tablet DISSOLVE 1 TABLET UNDER THE TONGUE EVERY 5 MINUTES AS NEEDED FOR CHEST PAIN    omeprazole (PRILOSEC) 20 MG capsule Take 1 capsule (20 mg total) by mouth once daily.    paroxetine (PAXIL) 20 MG tablet Take 1 tablet (20 mg total) by mouth every morning.    ranitidine (ZANTAC) 150 MG capsule TAKE 1 CAPSULE TWICE DAILY (Patient not taking: Reported on 11/20/2019)    sertraline (ZOLOFT) 100 MG tablet Take 1 tablet (100 mg total) by mouth once daily.    thyroid, pork, (ARMOUR THYROID) 15 mg Tab Take 1 tablet (15 mg total) by mouth before breakfast. Take with 150 mg arm her thyroid    VIT C/E/ZN/COPPR/LUTEIN/ZEAXAN (PRESERVISION AREDS 2  ORAL) Take by mouth 2 (two) times daily.    vitamin D 1000 units Tab Take 1,000 Units by mouth once daily.     No current facility-administered medications for this visit.        Review of patient's allergies indicates:   Allergen Reactions    Flagyl [metronidazole hcl] Diarrhea     SEVERE DIARRHEA    Latex, natural rubber Rash     ITCHING    Levaquin [levofloxacin] Diarrhea     MOOD CHANGES, WEAKNESS    Morphine Other (See Comments)     HALLUCINATES    Augmentin [amoxicillin-pot clavulanate] Other (See Comments)     AGITATION    Ciprofloxacin Anxiety    Codeine Nausea And Vomiting    Demerol [meperidine] Nausea And Vomiting     CAN TAKE WITH PHENERGAN    Dilaudid [hydromorphone (bulk)] Itching     OK WITH BENADRYL    Fentanyl Itching     OK WITH BENADRYL    Fosfomycin      diarrhia    Naproxen Other (See Comments)     HURT STOMACH    Nsaids (non-steroidal anti-inflammatory drug) Other (See Comments)     TOLD NOT TO TAKE AS HAD AN ULCER. HAD BLEEDING    Percocet [oxycodone-acetaminophen] Rash    Prednisone Other (See Comments)     AGITATION    Reglan [metoclopramide hcl] Anxiety    Sulfa (sulfonamide antibiotics) Rash    Vicodin [hydrocodone-acetaminophen] Rash    Zosyn [piperacillin-tazobactam] Rash    Ambien [zolpidem]     Ibuprofen     Macrobid [nitrofurantoin monohyd/m-cryst]     Pcn [penicillins]     Vasotec [enalapril maleate] Other (See Comments)     DRY COUGH       Family History   Problem Relation Age of Onset    Diabetes Mother     Heart disease Mother         MI at 52    Stroke Father     Cancer Father         colon cancer    Rheum arthritis Father     COPD Father     Heart disease Brother     Stroke Brother     Diabetes Brother     Arthritis Daughter     Hypertension Daughter     Cancer Son         Leukemia    Hypertension Son     Heart disease Son     Diabetes Maternal Grandmother     Asthma Maternal Grandfather     Cancer Paternal Grandmother         melanoma     Cancer Paternal Grandfather         lip cancer - smoked pipe    Sleep apnea Son     Hypertension Son     Sinus disease Son     Hypertension Son     Heart disease Son     Obesity Son        Social History     Socioeconomic History    Marital status:      Spouse name: Not on file    Number of children: Not on file    Years of education: Not on file    Highest education level: Not on file   Occupational History    Not on file   Social Needs    Financial resource strain: Not on file    Food insecurity:     Worry: Not on file     Inability: Not on file    Transportation needs:     Medical: Not on file     Non-medical: Not on file   Tobacco Use    Smoking status: Former Smoker     Packs/day: 1.00     Years: 18.00     Pack years: 18.00     Last attempt to quit: 1973     Years since quittin.8    Smokeless tobacco: Never Used   Substance and Sexual Activity    Alcohol use: Yes     Alcohol/week: 7.0 standard drinks     Types: 7 Glasses of wine per week     Comment: WINE once per day    Drug use: No    Sexual activity: Never   Lifestyle    Physical activity:     Days per week: Not on file     Minutes per session: Not on file    Stress: Not on file   Relationships    Social connections:     Talks on phone: Not on file     Gets together: Not on file     Attends [employer] service: Not on file     Active member of club or organization: Not on file     Attends meetings of clubs or organizations: Not on file     Relationship status: Not on file   Other Topics Concern    Not on file   Social History Narrative    Not on file       Chief Complaint:   Chief Complaint   Patient presents with    Left Knee - Pain, Follow-up, Injury     Fell around 2019 DOI          Consulting Physician: No ref. provider found    History of present illness:    This is a 86 y.o. year old female who complains of left knee pain since a fall on 2019.  She has a history of knee pain on and off for years.  "Pain 5/10 and worse with use.    Review of Systems:    Constitution: Denies chills, fever, and sweats.  HENT: Denies headaches or blurry vision.  Cardiovascular: Denies chest pain or irregular heart beat.  Respiratory: Denies cough or shortness of breath.  Gastrointestinal: Denies abdominal pain, nausea, or vomiting.  Musculoskeletal:  Denies muscle cramps.  Neurological: Denies dizziness or focal weakness.  Psychiatric/Behavioral: Normal mental status.  Hematologic/Lymphatic: Denies bleeding problem or easy bruising/bleeding.  Skin: Denies rash or suspicious lesions.    Examination:    Vital Signs:    Vitals:    11/25/19 1306   BP: 138/78   Weight: 62.1 kg (137 lb)   Height: 5' 9" (1.753 m)   PainSc:   5   PainLoc: Knee       Body mass index is 20.23 kg/m².    This a well-developed, well nourished patient in no acute distress.    Alert and oriented x 3 and cooperative to examination.       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       Assessment: Left knee pain, unspecified chronicity  -     X-ray Knee Ortho Left with Flexion; Future; Expected date: 11/25/2019  -     Large Joint Aspiration/Injection: L knee        Plan:  She was doing well with her knee prior to her fall.  We will give her a steroid injection today and see how she responds to that.    DISCLAIMER: This note may have been dictated using voice recognition software and may contain grammatical errors.     NOTE: Consult report sent to referring provider via [facility] EMR.  "

## 2019-12-06 LAB
FINAL PATHOLOGIC DIAGNOSIS: NORMAL
GROSS: NORMAL

## 2019-12-10 ENCOUNTER — TELEPHONE (OUTPATIENT)
Dept: GASTROENTEROLOGY | Facility: CLINIC | Age: 84
End: 2019-12-10

## 2019-12-10 NOTE — TELEPHONE ENCOUNTER
----- Message from Tiffanie Vázquez MD sent at 12/10/2019  3:36 PM CST -----  Please let patient know her colon polyp is a benign adenoma. We should consider repeating her colonoscopy in 3 years, however due to advanced age she should be seen in clinic prior to scheduling procedure.

## 2019-12-11 DIAGNOSIS — I47.10 PSVT (PAROXYSMAL SUPRAVENTRICULAR TACHYCARDIA): ICD-10-CM

## 2019-12-12 RX ORDER — METOPROLOL SUCCINATE 50 MG/1
50 TABLET, EXTENDED RELEASE ORAL DAILY
Qty: 90 TABLET | Refills: 0 | Status: ON HOLD | OUTPATIENT
Start: 2019-12-12 | End: 2020-01-09 | Stop reason: HOSPADM

## 2019-12-13 ENCOUNTER — TELEPHONE (OUTPATIENT)
Dept: FAMILY MEDICINE | Facility: CLINIC | Age: 84
End: 2019-12-13

## 2019-12-13 NOTE — TELEPHONE ENCOUNTER
----- Message from Mary Short sent at 12/13/2019 11:45 AM CST -----  Contact: patient  Type: Needs Medical Advice  Who Called:  patient  Best Call Back Number: [phone]  Additional Information: Patient states she has no information on Holter monitor that was suppose to be given to her , Patient is very concerned why no one has called to her to set this up.  Please call to advise.Thanks!

## 2019-12-19 ENCOUNTER — HOSPITAL ENCOUNTER (OUTPATIENT)
Dept: CARDIOLOGY | Facility: HOSPITAL | Age: 84
Discharge: HOME | End: 2019-12-19
Attending: INTERNAL MEDICINE
Payer: MEDICARE

## 2019-12-19 DIAGNOSIS — R00.0 TACHYCARDIA: ICD-10-CM

## 2020-01-02 ENCOUNTER — TELEPHONE (OUTPATIENT)
Dept: FAMILY MEDICINE | Facility: CLINIC | Age: 85
End: 2020-01-02

## 2020-01-07 ENCOUNTER — HOSPITAL ENCOUNTER (OUTPATIENT)
Facility: HOSPITAL | Age: 85
Discharge: HOME | End: 2020-01-09
Attending: EMERGENCY MEDICINE | Admitting: INTERNAL MEDICINE
Payer: MEDICARE

## 2020-01-07 DIAGNOSIS — R07.9 CHEST PAIN: ICD-10-CM

## 2020-01-07 DIAGNOSIS — I47.10 SVT (SUPRAVENTRICULAR TACHYCARDIA): Primary | ICD-10-CM

## 2020-01-07 DIAGNOSIS — I47.10 SUPRAVENTRICULAR TACHYCARDIA: ICD-10-CM

## 2020-01-07 DIAGNOSIS — E03.9 HYPOTHYROIDISM, UNSPECIFIED TYPE: ICD-10-CM

## 2020-01-07 LAB
ALBUMIN SERPL BCP-MCNC: 4.2 G/DL (ref 3.5–5.2)
ALP SERPL-CCNC: 54 U/L (ref 55–135)
ALT SERPL W/O P-5'-P-CCNC: 23 U/L (ref 10–44)
ANION GAP SERPL CALC-SCNC: 13 MMOL/L (ref 8–16)
APTT PPP: 118 SEC (ref 23.6–33.3)
AST SERPL-CCNC: 25 U/L (ref 10–40)
BASOPHILS # BLD AUTO: 0.07 K/UL (ref 0–0.2)
BASOPHILS NFR BLD: 0.8 % (ref 0–1.9)
BILIRUB SERPL-MCNC: 0.8 MG/DL (ref 0.1–1)
BUN SERPL-MCNC: 19 MG/DL (ref 8–23)
CALCIUM SERPL-MCNC: 9.5 MG/DL (ref 8.7–10.5)
CHLORIDE SERPL-SCNC: 103 MMOL/L (ref 95–110)
CK MB SERPL-MCNC: 3.1 NG/ML (ref 0.1–6.5)
CK MB SERPL-MCNC: 3.5 NG/ML (ref 0.1–6.5)
CK SERPL-CCNC: 85 U/L (ref 20–180)
CK SERPL-CCNC: 86 U/L (ref 20–180)
CO2 SERPL-SCNC: 24 MMOL/L (ref 23–29)
CREAT SERPL-MCNC: 0.9 MG/DL (ref 0.5–1.4)
DIFFERENTIAL METHOD: ABNORMAL
EOSINOPHIL # BLD AUTO: 0.2 K/UL (ref 0–0.5)
EOSINOPHIL NFR BLD: 1.8 % (ref 0–8)
ERYTHROCYTE [DISTWIDTH] IN BLOOD BY AUTOMATED COUNT: 14 % (ref 11.5–14.5)
EST. GFR  (AFRICAN AMERICAN): >60 ML/MIN/1.73 M^2
EST. GFR  (NON AFRICAN AMERICAN): 58.1 ML/MIN/1.73 M^2
GLUCOSE SERPL-MCNC: 145 MG/DL (ref 70–110)
HCT VFR BLD AUTO: 38.9 % (ref 37–48.5)
HGB BLD-MCNC: 12.7 G/DL (ref 12–16)
IMM GRANULOCYTES # BLD AUTO: 0.02 K/UL (ref 0–0.04)
IMM GRANULOCYTES NFR BLD AUTO: 0.2 % (ref 0–0.5)
INR PPP: 1
LYMPHOCYTES # BLD AUTO: 4.4 K/UL (ref 1–4.8)
LYMPHOCYTES NFR BLD: 51.1 % (ref 18–48)
MAGNESIUM SERPL-MCNC: 1.9 MG/DL (ref 1.6–2.6)
MCH RBC QN AUTO: 32.4 PG (ref 27–31)
MCHC RBC AUTO-ENTMCNC: 32.6 G/DL (ref 32–36)
MCV RBC AUTO: 99 FL (ref 82–98)
MONOCYTES # BLD AUTO: 1.1 K/UL (ref 0.3–1)
MONOCYTES NFR BLD: 12.4 % (ref 4–15)
NEUTROPHILS # BLD AUTO: 2.9 K/UL (ref 1.8–7.7)
NEUTROPHILS NFR BLD: 33.7 % (ref 38–73)
NRBC BLD-RTO: 0 /100 WBC
PLATELET # BLD AUTO: 259 K/UL (ref 150–350)
PMV BLD AUTO: 9.1 FL (ref 9.2–12.9)
POTASSIUM SERPL-SCNC: 3.8 MMOL/L (ref 3.5–5.1)
PROT SERPL-MCNC: 7.1 G/DL (ref 6–8.4)
PROTHROMBIN TIME: 12.3 SEC (ref 10.6–14.8)
RBC # BLD AUTO: 3.92 M/UL (ref 4–5.4)
SODIUM SERPL-SCNC: 140 MMOL/L (ref 136–145)
T4 FREE SERPL-MCNC: 0.68 NG/DL (ref 0.71–1.51)
TROPONIN I SERPL DL<=0.01 NG/ML-MCNC: 0.05 NG/ML
TROPONIN I SERPL DL<=0.01 NG/ML-MCNC: 0.05 NG/ML
TROPONIN I SERPL DL<=0.01 NG/ML-MCNC: 0.06 NG/ML
TROPONIN I SERPL DL<=0.01 NG/ML-MCNC: <0.03 NG/ML
TSH SERPL DL<=0.005 MIU/L-ACNC: 12.93 UIU/ML (ref 0.34–5.6)
WBC # BLD AUTO: 8.54 K/UL (ref 3.9–12.7)

## 2020-01-07 PROCEDURE — 85610 PROTHROMBIN TIME: CPT

## 2020-01-07 PROCEDURE — 93005 ELECTROCARDIOGRAM TRACING: CPT

## 2020-01-07 PROCEDURE — G0378 HOSPITAL OBSERVATION PER HR: HCPCS

## 2020-01-07 PROCEDURE — 82550 ASSAY OF CK (CPK): CPT | Mod: 91

## 2020-01-07 PROCEDURE — 82550 ASSAY OF CK (CPK): CPT

## 2020-01-07 PROCEDURE — 84484 ASSAY OF TROPONIN QUANT: CPT | Mod: 91

## 2020-01-07 PROCEDURE — 83735 ASSAY OF MAGNESIUM: CPT

## 2020-01-07 PROCEDURE — 80053 COMPREHEN METABOLIC PANEL: CPT

## 2020-01-07 PROCEDURE — 85730 THROMBOPLASTIN TIME PARTIAL: CPT

## 2020-01-07 PROCEDURE — 63600175 PHARM REV CODE 636 W HCPCS: Performed by: EMERGENCY MEDICINE

## 2020-01-07 PROCEDURE — 85025 COMPLETE CBC W/AUTO DIFF WBC: CPT

## 2020-01-07 PROCEDURE — 99291 CRITICAL CARE FIRST HOUR: CPT | Mod: 25

## 2020-01-07 PROCEDURE — 25000003 PHARM REV CODE 250: Performed by: SPECIALIST

## 2020-01-07 PROCEDURE — 36415 COLL VENOUS BLD VENIPUNCTURE: CPT

## 2020-01-07 PROCEDURE — 82553 CREATINE MB FRACTION: CPT | Mod: 91

## 2020-01-07 PROCEDURE — 25000003 PHARM REV CODE 250: Performed by: INTERNAL MEDICINE

## 2020-01-07 PROCEDURE — 25000003 PHARM REV CODE 250: Performed by: EMERGENCY MEDICINE

## 2020-01-07 PROCEDURE — 84439 ASSAY OF FREE THYROXINE: CPT

## 2020-01-07 PROCEDURE — 84443 ASSAY THYROID STIM HORMONE: CPT

## 2020-01-07 PROCEDURE — 82553 CREATINE MB FRACTION: CPT

## 2020-01-07 PROCEDURE — 96374 THER/PROPH/DIAG INJ IV PUSH: CPT

## 2020-01-07 RX ORDER — ONDANSETRON 2 MG/ML
4 INJECTION INTRAMUSCULAR; INTRAVENOUS EVERY 8 HOURS PRN
Status: DISCONTINUED | OUTPATIENT
Start: 2020-01-07 | End: 2020-01-09 | Stop reason: HOSPADM

## 2020-01-07 RX ORDER — SERTRALINE HYDROCHLORIDE 50 MG/1
100 TABLET, FILM COATED ORAL DAILY
Status: CANCELLED | OUTPATIENT
Start: 2020-01-08

## 2020-01-07 RX ORDER — SERTRALINE HYDROCHLORIDE 50 MG/1
100 TABLET, FILM COATED ORAL DAILY
Status: DISCONTINUED | OUTPATIENT
Start: 2020-01-08 | End: 2020-01-09 | Stop reason: HOSPADM

## 2020-01-07 RX ORDER — LANOLIN ALCOHOL/MO/W.PET/CERES
400 CREAM (GRAM) TOPICAL NIGHTLY
Status: DISCONTINUED | OUTPATIENT
Start: 2020-01-07 | End: 2020-01-09 | Stop reason: HOSPADM

## 2020-01-07 RX ORDER — NITROGLYCERIN 0.4 MG/1
0.4 TABLET SUBLINGUAL EVERY 5 MIN PRN
Status: DISCONTINUED | OUTPATIENT
Start: 2020-01-07 | End: 2020-01-09 | Stop reason: HOSPADM

## 2020-01-07 RX ORDER — ACETAMINOPHEN 500 MG
1000 TABLET ORAL EVERY 6 HOURS PRN
Status: DISCONTINUED | OUTPATIENT
Start: 2020-01-07 | End: 2020-01-09 | Stop reason: HOSPADM

## 2020-01-07 RX ORDER — THYROID 15 MG/1
1 TABLET ORAL
Status: DISCONTINUED | OUTPATIENT
Start: 2020-01-08 | End: 2020-01-09 | Stop reason: HOSPADM

## 2020-01-07 RX ORDER — CALCIUM CARBONATE 500(1250)
500 TABLET ORAL 2 TIMES DAILY
Status: DISCONTINUED | OUTPATIENT
Start: 2020-01-07 | End: 2020-01-09 | Stop reason: HOSPADM

## 2020-01-07 RX ORDER — MV/FA/DHA/EPA/FISH OIL/SAW/GNK 400MCG-200
500 COMBINATION PACKAGE (EA) ORAL NIGHTLY
COMMUNITY

## 2020-01-07 RX ORDER — CALCIUM CARBONATE 500(1250)
1 TABLET ORAL 2 TIMES DAILY
COMMUNITY
End: 2022-05-27

## 2020-01-07 RX ORDER — CHOLECALCIFEROL (VITAMIN D3) 25 MCG
1000 TABLET ORAL DAILY
Status: DISCONTINUED | OUTPATIENT
Start: 2020-01-08 | End: 2020-01-09 | Stop reason: HOSPADM

## 2020-01-07 RX ORDER — SODIUM CHLORIDE 0.9 % (FLUSH) 0.9 %
10 SYRINGE (ML) INJECTION
Status: DISCONTINUED | OUTPATIENT
Start: 2020-01-07 | End: 2020-01-09 | Stop reason: HOSPADM

## 2020-01-07 RX ORDER — METOPROLOL SUCCINATE 25 MG/1
50 TABLET, EXTENDED RELEASE ORAL NIGHTLY
Status: DISCONTINUED | OUTPATIENT
Start: 2020-01-07 | End: 2020-01-08

## 2020-01-07 RX ORDER — ACETAMINOPHEN, DIPHENHYDRAMINE HCL, PHENYLEPHRINE HCL 325; 25; 5 MG/1; MG/1; MG/1
5000 TABLET ORAL
COMMUNITY

## 2020-01-07 RX ORDER — EPINEPHRINE 0.22MG
100 AEROSOL WITH ADAPTER (ML) INHALATION DAILY
COMMUNITY
End: 2021-01-04

## 2020-01-07 RX ORDER — DILTIAZEM HYDROCHLORIDE 120 MG/1
240 CAPSULE, COATED, EXTENDED RELEASE ORAL DAILY
Status: DISCONTINUED | OUTPATIENT
Start: 2020-01-08 | End: 2020-01-09 | Stop reason: HOSPADM

## 2020-01-07 RX ORDER — LOPERAMIDE HYDROCHLORIDE 2 MG/1
2 CAPSULE ORAL 4 TIMES DAILY PRN
Status: DISCONTINUED | OUTPATIENT
Start: 2020-01-07 | End: 2020-01-09 | Stop reason: HOSPADM

## 2020-01-07 RX ORDER — ADENOSINE 3 MG/ML
6 INJECTION, SOLUTION INTRAVENOUS
Status: COMPLETED | OUTPATIENT
Start: 2020-01-07 | End: 2020-01-07

## 2020-01-07 RX ORDER — PROPAFENONE HYDROCHLORIDE 150 MG/1
150 TABLET, COATED ORAL EVERY 8 HOURS
Status: DISCONTINUED | OUTPATIENT
Start: 2020-01-07 | End: 2020-01-09 | Stop reason: HOSPADM

## 2020-01-07 RX ORDER — SERTRALINE HYDROCHLORIDE 50 MG/1
100 TABLET, FILM COATED ORAL NIGHTLY
Status: DISCONTINUED | OUTPATIENT
Start: 2020-01-07 | End: 2020-01-07

## 2020-01-07 RX ORDER — PANTOPRAZOLE SODIUM 40 MG/1
40 TABLET, DELAYED RELEASE ORAL DAILY
Status: DISCONTINUED | OUTPATIENT
Start: 2020-01-07 | End: 2020-01-09 | Stop reason: HOSPADM

## 2020-01-07 RX ORDER — ASPIRIN 325 MG
325 TABLET ORAL
Status: COMPLETED | OUTPATIENT
Start: 2020-01-07 | End: 2020-01-07

## 2020-01-07 RX ADMIN — MAGNESIUM OXIDE 400 MG: 400 TABLET ORAL at 09:01

## 2020-01-07 RX ADMIN — PROPAFENONE HYDROCHLORIDE 150 MG: 150 TABLET, FILM COATED ORAL at 09:01

## 2020-01-07 RX ADMIN — METOPROLOL SUCCINATE 50 MG: 25 TABLET, FILM COATED, EXTENDED RELEASE ORAL at 08:01

## 2020-01-07 RX ADMIN — ADENOSINE 6 MG: 3 INJECTION, SOLUTION INTRAVENOUS at 09:01

## 2020-01-07 RX ADMIN — ASPIRIN 325 MG ORAL TABLET 325 MG: 325 PILL ORAL at 09:01

## 2020-01-07 RX ADMIN — CALCIUM 500 MG: 500 TABLET ORAL at 09:01

## 2020-01-07 NOTE — H&P
[facility]  History & Physical    Patient Name: Lorena Vallejo  MRN: [id number]  Admission Date: 1/7/2020  Attending Physician: Prince Avila MD   Primary Care Provider: Stephon Cates MD  DOS: 1/7/2020    Patient information was obtained from patient, spouse/SO and ER records.     Subjective:     Principal Problem:SVT (supraventricular tachycardia)    Chief Complaint:   Chief Complaint   Patient presents with    Back Pain     ONSET THIS AM    Chest Pain    Shortness of Breath        HPI: 86-year-old female with history of hypothyroidism, paroxysmal SVT, chronic kidney disease stage 3, osteoarthritis, hyperlipidemia, peripheral neuropathy, and chronic anemia who presents the hospital with chest pain. The patient reports an episode of chest pain with sudden onset this morning.  The patient was sitting drinking coffee when she had sudden onset of severe intensity chest pain. The chest pain was located mid sternum and radiated to the back.  The pain was heaviness and crushing quality.  Patient admitted to some associated nausea but no vomiting, diaphoresis, or shortness of breath.  She had some associated palpitations.  No pleuritic chest pain.  No productive cough or hemoptysis.  The patient denies having previous chest pain symptoms of a similar nature.  No abdominal pain, headache, bleeding, dysuria, syncope or confusion.  Based on her symptoms she presented to the emergency department for evaluation.  She was found to be in supraventricular tachycardia.  She was given IV adenosine and subsequently converted.  Her chest pain symptoms resolved shortly thereafter.  She has had no recurrence of chest pain up to this point. The patient reports that her last 90 day supply of [facility]onson Thyroid that she filled in December was a different type of medication than her previous [facility]onson Thyroid.  She reports full compliance.    Past Medical History:   Diagnosis Date    Arthritis     Blood  transfusion     Bowel obstruction     X 3    Cancer     MELANOMA RIGHT EYE    Cancer of eyeball, except conjunctiva, cornea, retina, and choroid right    Diverticulitis     GERD (gastroesophageal reflux disease)     Hypertension     Peripheral neuropathy     PSVT (paroxysmal supraventricular tachycardia)     HAD X 3 OVER 10 YRS. LAST 7-25-12. HAD ADENOSINE IN [facility] ER. WAS ON INCREASED THYROID MED. NO PROBLEM SINCE    Salmonella     Short bowel syndrome     Thyroid disease     Transient ischemic attack     UTI (urinary tract infection)        Past Surgical History:   Procedure Laterality Date    ABDOMINAL SURGERY      X 3. ILEUM REMOVED. OSTOMY PLACED AND CLOSED.    ADENOIDECTOMY      BILATERAL OOPHORECTOMY      COLONOSCOPY      COLONOSCOPY N/A 8/24/2016    Procedure: COLONOSCOPY;  Surgeon: Alex Corral MD;  Location: [facility] ENDO;  Service: Endoscopy;  Laterality: N/A;    COLONOSCOPY N/A 8/29/2019    Procedure: COLONOSCOPY;  Surgeon: Tiffanie Vázquez MD;  Location: [facility] ENDO;  Service: Endoscopy;  Laterality: N/A;    COLONOSCOPY N/A 11/22/2019    Procedure: COLONOSCOPY;  Surgeon: Tiffanie Vázquez MD;  Location: [facility] ENDO;  Service: Endoscopy;  Laterality: N/A;    CTR      BILAT    ESOPHAGOGASTRODUODENOSCOPY N/A 8/29/2019    Procedure: EGD (ESOPHAGOGASTRODUODENOSCOPY);  Surgeon: Tiffanie Vázquez MD;  Location: [facility] ENDO;  Service: Endoscopy;  Laterality: N/A;    EYE SURGERY      RIGHT  - LASER MELANOMA; BILAT CATARACT    HYSTERECTOMY      BSO    JOINT REPLACEMENT      RIGHT KNEE    MANDIBLE FRACTURE SURGERY      MENISCECTOMY      MEDIAL AND LATERAL REPAIR, BAKERS CYST    TENDON REPAIR      LEFT THUMB    TONSILLECTOMY      T AND A       Review of patient's allergies indicates:   Allergen Reactions    Flagyl [metronidazole hcl] Diarrhea     SEVERE DIARRHEA    Latex, natural rubber Rash     ITCHING    Levaquin [levofloxacin] Diarrhea     MOOD CHANGES, WEAKNESS    Morphine Other (See  Comments)     HALLUCINATES    Augmentin [amoxicillin-pot clavulanate] Other (See Comments)     AGITATION    Ciprofloxacin Anxiety    Codeine Nausea And Vomiting    Demerol [meperidine] Nausea And Vomiting     CAN TAKE WITH PHENERGAN    Dilaudid [hydromorphone (bulk)] Itching     OK WITH BENADRYL    Fentanyl Itching     OK WITH BENADRYL    Fosfomycin      diarrhia    Naproxen Other (See Comments)     HURT STOMACH    Nsaids (non-steroidal anti-inflammatory drug) Other (See Comments)     TOLD NOT TO TAKE AS HAD AN ULCER. HAD BLEEDING    Percocet [oxycodone-acetaminophen] Rash    Prednisone Other (See Comments)     AGITATION    Reglan [metoclopramide hcl] Anxiety    Sulfa (sulfonamide antibiotics) Rash    Vicodin [hydrocodone-acetaminophen] Rash    Zosyn [piperacillin-tazobactam] Rash    Ambien [zolpidem]     Ibuprofen     Pcn [penicillins]     Vasotec [enalapril maleate] Other (See Comments)     DRY COUGH       No current facility-administered medications on file prior to encounter.      Current Outpatient Medications on File Prior to Encounter   Medication Sig    acetaminophen (TYLENOL) 500 MG tablet Take 1,000 mg by mouth every 6 (six) hours as needed.    ARMOUR THYROID 300 mg Tab TAKE ONE-HALF TABLET BY MOUTH ONCE DAILY (Patient taking differently: Take 150 mg by mouth once daily. TAKE ONE-HALF TABLET BY MOUTH ONCE DAILY)    Bifidobacterium infantis (ALIGN) 4 mg Cap Take 4 mg by mouth once daily.    calcium carbonate (OS-RAMONITA) 500 mg calcium (1,250 mg) tablet Take 1 tablet by mouth 2 (two) times daily.    coenzyme Q10 100 mg capsule Take 100 mg by mouth once daily.    cyanocobalamin, vitamin B-12, 5,000 mcg Subl Place 5,000 mcg under the tongue every Mon, Wed, Fri.    diltiaZEM (TIAZAC) 240 MG Cs24 Take 1 capsule (240 mg total) by mouth once daily.    krill oil 500 mg Cap Take 500 mg by mouth every evening.    magnesium oxide (MAG-OX) 400 mg tablet Take 400 mg by mouth every evening.      metoprolol succinate (TOPROL-XL) 50 MG 24 hr tablet Take 1 tablet (50 mg total) by mouth once daily. (Patient taking differently: Take 50 mg by mouth every evening. )    nitroGLYCERIN (NITROSTAT) 0.4 MG SL tablet DISSOLVE 1 TABLET UNDER THE TONGUE EVERY 5 MINUTES AS NEEDED FOR CHEST PAIN (Patient taking differently: Place 0.4 mg under the tongue every 5 (five) minutes as needed for Chest pain. )    omeprazole (PRILOSEC) 20 MG capsule Take 1 capsule (20 mg total) by mouth once daily.    sertraline (ZOLOFT) 100 MG tablet Take 1 tablet (100 mg total) by mouth once daily. (Patient taking differently: Take 100 mg by mouth every evening. )    thyroid, pork, (ARMOUR THYROID) 15 mg Tab Take 1 tablet (15 mg total) by mouth before breakfast. Take with 150 mg arm her thyroid    VIT C/E/ZN/COPPR/LUTEIN/ZEAXAN (PRESERVISION AREDS 2 ORAL) Take 1 capsule by mouth 2 (two) times daily.     vitamin D 1000 units Tab Take 1,000 Units by mouth once daily.    loperamide (IMODIUM) 2 mg capsule Take 1 capsule (2 mg total) by mouth 4 (four) times daily as needed for Diarrhea.    [DISCONTINUED] CALCIUM ORAL Take 1,000 mg by mouth 2 (two) times daily.    [DISCONTINUED] cyanocobalamin/cobamamide (B12 SL) Place under the tongue once daily.    [DISCONTINUED] diclofenac sodium (VOLTAREN) 1 % Gel Apply 4 g topically 4 (four) times daily.    [DISCONTINUED] diltiaZEM (TIAZAC) 240 MG Cs24 TAKE 1 CAPSULE ONE TIME DAILY    [DISCONTINUED] doxepin (SINEQUAN) 10 MG capsule Take 1 capsule (10 mg total) by mouth every evening.    [DISCONTINUED] ethacrynic acid (EDECRIN) 25 mg Tab 1/2 p.o. q.d.    [DISCONTINUED] fish oil-omega-3 fatty acids 300-1,000 mg capsule Take 2 g by mouth 2 (two) times daily.    [DISCONTINUED] FLUAD 3144-2443, 65 YR UP,,PF, 45 mcg (15 mcg x 3)/0.5 mL Syrg PHARMACIST ADMINISTERED IMMUNIZATION ADMINISTERED AT TIME OF DISPENSING    [DISCONTINUED] fluticasone (FLONASE) 50 mcg/actuation nasal spray 1 spray (50 mcg total) by  Each Nare route once daily.    [DISCONTINUED] loratadine (CLARITIN) 10 mg tablet Take 1 tablet (10 mg total) by mouth once daily.    [DISCONTINUED] paroxetine (PAXIL) 20 MG tablet Take 1 tablet (20 mg total) by mouth every morning.    [DISCONTINUED] ranitidine (ZANTAC) 150 MG capsule TAKE 1 CAPSULE TWICE DAILY (Patient not taking: Reported on 2019)     Family History     Problem Relation (Age of Onset)    Arthritis Daughter    Asthma Maternal Grandfather    COPD Father    Cancer Father, Son, Paternal Grandmother, Paternal Grandfather    Diabetes Mother, Brother, Maternal Grandmother    Heart disease Mother, Brother, Son, Son    Hypertension Daughter, Son, Son, Son    Obesity Son    Rheum arthritis Father    Sinus disease Son    Sleep apnea Son    Stroke Father, Brother        Tobacco Use    Smoking status: Former Smoker     Packs/day: 1.00     Years: 18.00     Pack years: 18.00     Last attempt to quit: 1973     Years since quittin.9    Smokeless tobacco: Never Used   Substance and Sexual Activity    Alcohol use: Yes     Alcohol/week: 7.0 standard drinks     Types: 7 Glasses of wine per week     Comment: WINE once per day    Drug use: No    Sexual activity: Never     Review of Systems complete 10 point review of systems negative except as per HPI  Objective:     Vital Signs (Most Recent):  Temp: 98.1 °F (36.7 °C) (20 0915)  Pulse: 74 (20 1300)  Resp: 20 (20 1300)  BP: (!) 108/52 (20 1300)  SpO2: 97 % (20 1300) Vital Signs (24h Range):  Temp:  [98.1 °F (36.7 °C)] 98.1 °F (36.7 °C)  Pulse:  [] 74  Resp:  [20-38] 20  SpO2:  [96 %-100 %] 97 %  BP: (106-141)/(52-94) 108/52     Weight: 62.1 kg (137 lb)  Body mass index is 20.23 kg/m².    Physical Exam  General:  Comfortable appearing, nontoxic, no apparent distress  Head and eyes:  Anicteric sclerae, no conjunctival discharge  ENT:  Moist mucous membranes  Skin:  Dry and warm no jaundice, normal skin  turgor  Psych:  Mood is calm, affect normal, insight good  Cardiovascular:  2+ radial pulses bilaterally, regular rate and rhythm, no murmurs appreciated, extremities are warm and well perfused  GI:  Abdomen soft and nontender, nondistended  Pulmonary:  Comfortable work of breathing, lungs are clear to auscultation bilaterally  Neuro:  Nonfocal motor exam, fluent speech, alert and oriented    Significant Labs:   BMP:   Recent Labs   Lab 01/07/20 0921   *      K 3.8      CO2 24   BUN 19   CREATININE 0.9   CALCIUM 9.5   MG 1.9     CBC:   Recent Labs   Lab 01/07/20 0921   WBC 8.54   HGB 12.7   HCT 38.9        CMP:   Recent Labs   Lab 01/07/20 0921      K 3.8      CO2 24   *   BUN 19   CREATININE 0.9   CALCIUM 9.5   PROT 7.1   ALBUMIN 4.2   BILITOT 0.8   ALKPHOS 54*   AST 25   ALT 23   ANIONGAP 13   EGFRNONAA 58.1*     Cardiac Markers: No results for input(s): CKMB, MYOGLOBIN, BNP, TROPISTAT in the last 48 hours.  TSH:   Recent Labs   Lab 01/07/20 0921   TSH 12.930*     All pertinent labs within the past 24 hours have been reviewed.    Chest x-ray personally reviewed:  Lung fields are clear without focal consolidation or pulmonary edema    EKG:  Sinus rhythm with 1st degree A-V block  Otherwise normal ECG  When compared with ECG of 07-JAN-2020 09:15,  WV interval has increased  Vent. rate has decreased BY  73 BPM  ST no longer depressed in Inferior leads  ST no longer depressed in Lateral leads  Nonspecific T wave abnormality no longer evident in Inferior leads  Nonspecific T wave abnormality no longer evident in Lateral leads    Assessment/Plan:     Assessment:  Acute SVT in the setting of chronic paroxysmal SVT  Chest pain  Poorly controlled hypothyroidism  First-degree AV block  Hypertension  GERD  Hypothyroidism  CKD 3  Osteoarthritis  Hyperlipidemia  Chronic anemia  Peripheral neuropathy    Plan:  Observation on telemetry  Cardiology consultation  Serial  troponin  Adenosine as needed for recurrent episodic SVT  Repeat AM TSH level - may need dose increase?  Continue medical management with aspirin.  Continue home medications for chronic issues.  Repeat a.m. Labs.  VTE prophylaxis with SCDs and consider Lovenox if hospitalized greater than 24 hr  Plan of care discussed with patient and spouse at bedside  High risk secondary to severe exacerbation of chronic illness      VTE Risk Mitigation (From admission, onward)         Ordered     Place sequential compression device  Until discontinued      01/07/20 1225     IP VTE HIGH RISK PATIENT  Once      01/07/20 1225                 Prince Avila MD  Department of Hospital Medicine   [facility]

## 2020-01-07 NOTE — HPI
86-year-old female with history of hypothyroidism, paroxysmal SVT, chronic kidney disease stage 3, osteoarthritis, hyperlipidemia, peripheral neuropathy, and chronic anemia who presents the hospital with chest pain. The patient reports an episode of chest pain with sudden onset this morning.  The patient was sitting drinking coffee when she had sudden onset of severe intensity chest pain. The chest pain was located mid sternum and radiated to the back.  The pain was heaviness and crushing quality.  Patient admitted to some associated nausea but no vomiting, diaphoresis, or shortness of breath.  She had some associated palpitations.  No pleuritic chest pain.  No productive cough or hemoptysis.  The patient denies having previous chest pain symptoms of a similar nature.  No abdominal pain, headache, bleeding, dysuria, syncope or confusion.  Based on her symptoms she presented to the emergency department for evaluation.  She was found to be in supraventricular tachycardia.  She was given IV adenosine and subsequently converted.  Her chest pain symptoms resolved shortly thereafter.  She has had no recurrence of chest pain up to this point.

## 2020-01-07 NOTE — NURSING
notified MD Austin of troponin 0.058. Advised to notify cardiology. Will call consulting physicianPaul.

## 2020-01-07 NOTE — SUBJECTIVE & OBJECTIVE
Past Medical History:   Diagnosis Date    Arthritis     Blood transfusion     Bowel obstruction     X 3    Cancer     MELANOMA RIGHT EYE    Cancer of eyeball, except conjunctiva, cornea, retina, and choroid right    Diverticulitis     GERD (gastroesophageal reflux disease)     Hypertension     Peripheral neuropathy     PSVT (paroxysmal supraventricular tachycardia)     HAD X 3 OVER 10 YRS. LAST 7-25-12. HAD ADENOSINE IN [facility] ER. WAS ON INCREASED THYROID MED. NO PROBLEM SINCE    Salmonella     Short bowel syndrome     Thyroid disease     Transient ischemic attack     UTI (urinary tract infection)        Past Surgical History:   Procedure Laterality Date    ABDOMINAL SURGERY      X 3. ILEUM REMOVED. OSTOMY PLACED AND CLOSED.    ADENOIDECTOMY      BILATERAL OOPHORECTOMY      COLONOSCOPY      COLONOSCOPY N/A 8/24/2016    Procedure: COLONOSCOPY;  Surgeon: Alex Corral MD;  Location: [facility] ENDO;  Service: Endoscopy;  Laterality: N/A;    COLONOSCOPY N/A 8/29/2019    Procedure: COLONOSCOPY;  Surgeon: Tiffanie Vázquez MD;  Location: [facility] ENDO;  Service: Endoscopy;  Laterality: N/A;    COLONOSCOPY N/A 11/22/2019    Procedure: COLONOSCOPY;  Surgeon: Tiffanie Vázquez MD;  Location: [facility] ENDO;  Service: Endoscopy;  Laterality: N/A;    CTR      BILAT    ESOPHAGOGASTRODUODENOSCOPY N/A 8/29/2019    Procedure: EGD (ESOPHAGOGASTRODUODENOSCOPY);  Surgeon: Tiffanie Vázquez MD;  Location: [facility] ENDO;  Service: Endoscopy;  Laterality: N/A;    EYE SURGERY      RIGHT  - LASER MELANOMA; BILAT CATARACT    HYSTERECTOMY      BSO    JOINT REPLACEMENT      RIGHT KNEE    MANDIBLE FRACTURE SURGERY      MENISCECTOMY      MEDIAL AND LATERAL REPAIR, BAKERS CYST    TENDON REPAIR      LEFT THUMB    TONSILLECTOMY      T AND A       Review of patient's allergies indicates:   Allergen Reactions    Flagyl [metronidazole hcl] Diarrhea     SEVERE DIARRHEA    Latex, natural rubber Rash     ITCHING    Levaquin  [levofloxacin] Diarrhea     MOOD CHANGES, WEAKNESS    Morphine Other (See Comments)     HALLUCINATES    Augmentin [amoxicillin-pot clavulanate] Other (See Comments)     AGITATION    Ciprofloxacin Anxiety    Codeine Nausea And Vomiting    Demerol [meperidine] Nausea And Vomiting     CAN TAKE WITH PHENERGAN    Dilaudid [hydromorphone (bulk)] Itching     OK WITH BENADRYL    Fentanyl Itching     OK WITH BENADRYL    Fosfomycin      diarrhia    Naproxen Other (See Comments)     HURT STOMACH    Nsaids (non-steroidal anti-inflammatory drug) Other (See Comments)     TOLD NOT TO TAKE AS HAD AN ULCER. HAD BLEEDING    Percocet [oxycodone-acetaminophen] Rash    Prednisone Other (See Comments)     AGITATION    Reglan [metoclopramide hcl] Anxiety    Sulfa (sulfonamide antibiotics) Rash    Vicodin [hydrocodone-acetaminophen] Rash    Zosyn [piperacillin-tazobactam] Rash    Ambien [zolpidem]     Ibuprofen     Pcn [penicillins]     Vasotec [enalapril maleate] Other (See Comments)     DRY COUGH       No current facility-administered medications on file prior to encounter.      Current Outpatient Medications on File Prior to Encounter   Medication Sig    acetaminophen (TYLENOL) 500 MG tablet Take 1,000 mg by mouth every 6 (six) hours as needed.    ARMOUR THYROID 300 mg Tab TAKE ONE-HALF TABLET BY MOUTH ONCE DAILY (Patient taking differently: Take 150 mg by mouth once daily. TAKE ONE-HALF TABLET BY MOUTH ONCE DAILY)    Bifidobacterium infantis (ALIGN) 4 mg Cap Take 4 mg by mouth once daily.    calcium carbonate (OS-RAMONITA) 500 mg calcium (1,250 mg) tablet Take 1 tablet by mouth 2 (two) times daily.    coenzyme Q10 100 mg capsule Take 100 mg by mouth once daily.    cyanocobalamin, vitamin B-12, 5,000 mcg Subl Place 5,000 mcg under the tongue every Mon, Wed, Fri.    diltiaZEM (TIAZAC) 240 MG Cs24 Take 1 capsule (240 mg total) by mouth once daily.    krill oil 500 mg Cap Take 500 mg by mouth every evening.     magnesium oxide (MAG-OX) 400 mg tablet Take 400 mg by mouth every evening.     metoprolol succinate (TOPROL-XL) 50 MG 24 hr tablet Take 1 tablet (50 mg total) by mouth once daily. (Patient taking differently: Take 50 mg by mouth every evening. )    nitroGLYCERIN (NITROSTAT) 0.4 MG SL tablet DISSOLVE 1 TABLET UNDER THE TONGUE EVERY 5 MINUTES AS NEEDED FOR CHEST PAIN (Patient taking differently: Place 0.4 mg under the tongue every 5 (five) minutes as needed for Chest pain. )    omeprazole (PRILOSEC) 20 MG capsule Take 1 capsule (20 mg total) by mouth once daily.    sertraline (ZOLOFT) 100 MG tablet Take 1 tablet (100 mg total) by mouth once daily. (Patient taking differently: Take 100 mg by mouth every evening. )    thyroid, pork, (ARMOUR THYROID) 15 mg Tab Take 1 tablet (15 mg total) by mouth before breakfast. Take with 150 mg arm her thyroid    VIT C/E/ZN/COPPR/LUTEIN/ZEAXAN (PRESERVISION AREDS 2 ORAL) Take 1 capsule by mouth 2 (two) times daily.     vitamin D 1000 units Tab Take 1,000 Units by mouth once daily.    loperamide (IMODIUM) 2 mg capsule Take 1 capsule (2 mg total) by mouth 4 (four) times daily as needed for Diarrhea.    [DISCONTINUED] CALCIUM ORAL Take 1,000 mg by mouth 2 (two) times daily.    [DISCONTINUED] cyanocobalamin/cobamamide (B12 SL) Place under the tongue once daily.    [DISCONTINUED] diclofenac sodium (VOLTAREN) 1 % Gel Apply 4 g topically 4 (four) times daily.    [DISCONTINUED] diltiaZEM (TIAZAC) 240 MG Cs24 TAKE 1 CAPSULE ONE TIME DAILY    [DISCONTINUED] doxepin (SINEQUAN) 10 MG capsule Take 1 capsule (10 mg total) by mouth every evening.    [DISCONTINUED] ethacrynic acid (EDECRIN) 25 mg Tab 1/2 p.o. q.d.    [DISCONTINUED] fish oil-omega-3 fatty acids 300-1,000 mg capsule Take 2 g by mouth 2 (two) times daily.    [DISCONTINUED] FLUAD 8344-2190, 65 YR UP,,PF, 45 mcg (15 mcg x 3)/0.5 mL Syrg PHARMACIST ADMINISTERED IMMUNIZATION ADMINISTERED AT TIME OF DISPENSING     [DISCONTINUED] fluticasone (FLONASE) 50 mcg/actuation nasal spray 1 spray (50 mcg total) by Each Nare route once daily.    [DISCONTINUED] loratadine (CLARITIN) 10 mg tablet Take 1 tablet (10 mg total) by mouth once daily.    [DISCONTINUED] paroxetine (PAXIL) 20 MG tablet Take 1 tablet (20 mg total) by mouth every morning.    [DISCONTINUED] ranitidine (ZANTAC) 150 MG capsule TAKE 1 CAPSULE TWICE DAILY (Patient not taking: Reported on 2019)     Family History     Problem Relation (Age of Onset)    Arthritis Daughter    Asthma Maternal Grandfather    COPD Father    Cancer Father, Son, Paternal Grandmother, Paternal Grandfather    Diabetes Mother, Brother, Maternal Grandmother    Heart disease Mother, Brother, Son, Son    Hypertension Daughter, Son, Son, Son    Obesity Son    Rheum arthritis Father    Sinus disease Son    Sleep apnea Son    Stroke Father, Brother        Tobacco Use    Smoking status: Former Smoker     Packs/day: 1.00     Years: 18.00     Pack years: 18.00     Last attempt to quit: 1973     Years since quittin.9    Smokeless tobacco: Never Used   Substance and Sexual Activity    Alcohol use: Yes     Alcohol/week: 7.0 standard drinks     Types: 7 Glasses of wine per week     Comment: WINE once per day    Drug use: No    Sexual activity: Never     Review of Systems complete 10 point review of systems negative except as per HPI  Objective:     Vital Signs (Most Recent):  Temp: 98.1 °F (36.7 °C) (20 0915)  Pulse: 74 (20 1300)  Resp: 20 (20 1300)  BP: (!) 108/52 (20 1300)  SpO2: 97 % (20 1300) Vital Signs (24h Range):  Temp:  [98.1 °F (36.7 °C)] 98.1 °F (36.7 °C)  Pulse:  [] 74  Resp:  [20-38] 20  SpO2:  [96 %-100 %] 97 %  BP: (106-141)/(52-94) 108/52     Weight: 62.1 kg (137 lb)  Body mass index is 20.23 kg/m².    Physical Exam  General:  Comfortable appearing, nontoxic, no apparent distress  Head and eyes:  Anicteric sclerae, no conjunctival  discharge  ENT:  Moist mucous membranes  Skin:  Dry and warm no jaundice, normal skin turgor  Psych:  Mood is calm, affect normal, insight good  Cardiovascular:  2+ radial pulses bilaterally, regular rate and rhythm, no murmurs appreciated, extremities are warm and well perfused  GI:  Abdomen soft and nontender, nondistended  Pulmonary:  Comfortable work of breathing, lungs are clear to auscultation bilaterally  Neuro:  Nonfocal motor exam, fluent speech, alert and oriented    Significant Labs:   BMP:   Recent Labs   Lab 01/07/20 0921   *      K 3.8      CO2 24   BUN 19   CREATININE 0.9   CALCIUM 9.5   MG 1.9     CBC:   Recent Labs   Lab 01/07/20 0921   WBC 8.54   HGB 12.7   HCT 38.9        CMP:   Recent Labs   Lab 01/07/20 0921      K 3.8      CO2 24   *   BUN 19   CREATININE 0.9   CALCIUM 9.5   PROT 7.1   ALBUMIN 4.2   BILITOT 0.8   ALKPHOS 54*   AST 25   ALT 23   ANIONGAP 13   EGFRNONAA 58.1*     Cardiac Markers: No results for input(s): CKMB, MYOGLOBIN, BNP, TROPISTAT in the last 48 hours.  TSH:   Recent Labs   Lab 01/07/20 0921   TSH 12.930*     All pertinent labs within the past 24 hours have been reviewed.

## 2020-01-07 NOTE — CONSULTS
This is Dr. Sepulveda dictating a consultation.  This 86-year-old patient is well known to me.  This morning she developed chest pain in the upper back and anterior chest associated with the severe palpitations.  This entire episode lasted for approximately 45 min she was given IV adenosine in the emergency room which aborted episode no further arrhythmias since then.  She has history of PSVT for many years some o  f her recent recent EKGs show some irregularity raising the possibility of paroxysmal atrial fibr as listed in the H&P illation.  Her coronary arteriogram in 2009 showed minimal disease in LAD she had a negative nuclear stress test in 2018.  History of hypertension.    History of multiple of surgeries and allergies..    Her current medications include Cardizem CD 2 40 mg daily metoprolol 50 mg daily  which was increased to 75 mg..  Dr. Cates her PCP recently changed her thyroid preparation ( [employer] thyroid)  Patient is a smoker       Physical exams shows thin built female patient.  No JVD  .  Carotids without bruit  .  Cardiac exam regular rhythm no murmur gallop rub lungs clear  .  Abdomen negative bowel sounds actively present  No edema feet to calf tenderness  EKG sinus rhythm with minimal ST-T changes with minimal changes after cardioversion with adenosine  ..  Baseline EKG shows PSVT versus PAF heart rate 180 beats per minute prior  Laboratory WBC 8.5 hemoglobin 12.7 hematocrit 38.9 platelets 259 sodium 140 potassium 3.8 chloride 103 CO2 of 24.  creatinine 0.9 her liver  function tests normal CPK 85 she can be 3.1 troponin 0.058.  TSH is 12.9 T4 0.6 8    Impression 1.  Paroxysmal supraventricular tachycardia versus paroxysmal atrial fibrillation with rapid ventricular response, symptomatic.  2.  Hypertension  Thyroid disorder  .  From cardiac standpoint with monitor cardiac enzymes.  Depending upon the changes she may need further evaluation.  Will also add  Rythmol  150 mg q.8 hours may need to adjust the  dose of beta-blockers and calcium channel blockers.  This has been discussed in detail with the patient.

## 2020-01-07 NOTE — ED NOTES
Dr. Starkey at bedside, adenosine administered, pt tolerated well, HR improved post administration, pt is awake and alert with family at bedside

## 2020-01-07 NOTE — ED PROVIDER NOTES
Encounter Date: 1/7/2020       History     Chief Complaint   Patient presents with    Back Pain     ONSET THIS AM    Chest Pain    Shortness of Breath     Patient with a history of supraventricular tachycardia in the past.  Patient with no recent episodes.  Patient presents with chest pain radiating to the back and right neck.  No nausea vomiting. No shortness of breath. Patient is unsure if this is similar to previous episodes of supraventricular tachycardia.  Patient is compliant with medications.  There is no pleurisy hemoptysis.  No trauma. Patient woke up normal and symptoms started approximately 1 hr prior to arrival.  Symptoms continue in the emergency room.  No relieving factors at home.        Review of patient's allergies indicates:   Allergen Reactions    Flagyl [metronidazole hcl] Diarrhea     SEVERE DIARRHEA    Latex, natural rubber Rash     ITCHING    Levaquin [levofloxacin] Diarrhea     MOOD CHANGES, WEAKNESS    Morphine Other (See Comments)     HALLUCINATES    Augmentin [amoxicillin-pot clavulanate] Other (See Comments)     AGITATION    Ciprofloxacin Anxiety    Codeine Nausea And Vomiting    Demerol [meperidine] Nausea And Vomiting     CAN TAKE WITH PHENERGAN    Dilaudid [hydromorphone (bulk)] Itching     OK WITH BENADRYL    Fentanyl Itching     OK WITH BENADRYL    Fosfomycin      diarrhia    Naproxen Other (See Comments)     HURT STOMACH    Nsaids (non-steroidal anti-inflammatory drug) Other (See Comments)     TOLD NOT TO TAKE AS HAD AN ULCER. HAD BLEEDING    Percocet [oxycodone-acetaminophen] Rash    Prednisone Other (See Comments)     AGITATION    Reglan [metoclopramide hcl] Anxiety    Sulfa (sulfonamide antibiotics) Rash    Vicodin [hydrocodone-acetaminophen] Rash    Zosyn [piperacillin-tazobactam] Rash    Ambien [zolpidem]     Ibuprofen     Pcn [penicillins]     Vasotec [enalapril maleate] Other (See Comments)     DRY COUGH     Past Medical History:   Diagnosis Date     Arthritis     Blood transfusion     Bowel obstruction     X 3    Cancer     MELANOMA RIGHT EYE    Cancer of eyeball, except conjunctiva, cornea, retina, and choroid right    Diverticulitis     GERD (gastroesophageal reflux disease)     Hypertension     Peripheral neuropathy     PSVT (paroxysmal supraventricular tachycardia)     HAD X 3 OVER 10 YRS. LAST 7-25-12. HAD ADENOSINE IN [facility] ER. WAS ON INCREASED THYROID MED. NO PROBLEM SINCE    Salmonella     Short bowel syndrome     Thyroid disease     Transient ischemic attack     UTI (urinary tract infection)      Past Surgical History:   Procedure Laterality Date    ABDOMINAL SURGERY      X 3. ILEUM REMOVED. OSTOMY PLACED AND CLOSED.    ADENOIDECTOMY      BILATERAL OOPHORECTOMY      COLONOSCOPY      COLONOSCOPY N/A 8/24/2016    Procedure: COLONOSCOPY;  Surgeon: Alex Corral MD;  Location: [facility] [place] ENDO;  Service: Endoscopy;  Laterality: N/A;    COLONOSCOPY N/A 8/29/2019    Procedure: COLONOSCOPY;  Surgeon: Tiffanie Vázquez MD;  Location: [facility] [place] ENDO;  Service: Endoscopy;  Laterality: N/A;    COLONOSCOPY N/A 11/22/2019    Procedure: COLONOSCOPY;  Surgeon: Tiffanie Vázquez MD;  Location: [facility] [place] ENDO;  Service: Endoscopy;  Laterality: N/A;    CTR      BILAT    ESOPHAGOGASTRODUODENOSCOPY N/A 8/29/2019    Procedure: EGD (ESOPHAGOGASTRODUODENOSCOPY);  Surgeon: Tiffanie Vázquez MD;  Location: [facility] [place] ENDO;  Service: Endoscopy;  Laterality: N/A;    EYE SURGERY      RIGHT  - LASER MELANOMA; BILAT CATARACT    HYSTERECTOMY      BSO    JOINT REPLACEMENT      RIGHT KNEE    MANDIBLE FRACTURE SURGERY      MENISCECTOMY      MEDIAL AND LATERAL REPAIR, BAKERS CYST    TENDON REPAIR      LEFT THUMB    TONSILLECTOMY      T AND A     Family History   Problem Relation Age of Onset    Diabetes Mother     Heart disease Mother         MI at 52    Stroke Father     Cancer Father         colon cancer    Rheum arthritis Father     COPD Father     Heart disease  Brother     Stroke Brother     Diabetes Brother     Arthritis Daughter     Hypertension Daughter     Cancer Son         Leukemia    Hypertension Son     Heart disease Son     Diabetes Maternal Grandmother     Asthma Maternal Grandfather     Cancer Paternal Grandmother         melanoma    Cancer Paternal Grandfather         lip cancer - smoked pipe    Sleep apnea Son     Hypertension Son     Sinus disease Son     Hypertension Son     Heart disease Son     Obesity Son      Social History     Tobacco Use    Smoking status: Former Smoker     Packs/day: 1.00     Years: 18.00     Pack years: 18.00     Last attempt to quit: 1973     Years since quittin.9    Smokeless tobacco: Never Used   Substance Use Topics    Alcohol use: Yes     Alcohol/week: 7.0 standard drinks     Types: 7 Glasses of wine per week     Comment: WINE once per day    Drug use: No     Review of Systems   Constitutional: Negative for chills and fever.   HENT: Negative for congestion.    Eyes: Negative for visual disturbance.   Respiratory: Negative for cough and wheezing.    Cardiovascular: Positive for chest pain. Negative for palpitations.   Gastrointestinal: Negative for abdominal pain and vomiting.   Genitourinary: Negative for dysuria.   Musculoskeletal: Negative for joint swelling.   Neurological: Negative for headaches.   Psychiatric/Behavioral: Negative for confusion.       Physical Exam     Initial Vitals [20 0915]   BP Pulse Resp Temp SpO2   106/82 (!) 166 (!) 22 -- 98 %      MAP       --         Physical Exam    Nursing note and vitals reviewed.  Constitutional: She is not diaphoretic. No distress.   HENT:   Head: Normocephalic and atraumatic.   Eyes: Conjunctivae are normal.   Neck: Normal range of motion.   Cardiovascular: Regular rhythm.   Tachycardic rhythm   Pulmonary/Chest: Breath sounds normal.   Abdominal: Soft. There is no tenderness.   Musculoskeletal: Normal range of motion.   Neurological: She is  alert.   No gross deficits   Skin: No rash noted.   Psychiatric:   Alert anxious         ED Course   Critical Care  Date/Time: 1/7/2020 10:18 AM  Performed by: Quang Starkey MD  Authorized by: Quang Starkey MD   Direct patient critical care time: 20 minutes  Additional history critical care time: 5 minutes  Ordering / reviewing critical care time: 5 minutes  Documentation critical care time: 5 minutes  Consulting other physicians critical care time: 5 minutes  Total critical care time (exclusive of procedural time) : 40 minutes        Labs Reviewed   PROTIME-INR   APTT   CBC W/ AUTO DIFFERENTIAL   COMPREHENSIVE METABOLIC PANEL   TROPONIN I   CK-MB   CK   MAGNESIUM   URINALYSIS, REFLEX TO URINE CULTURE        ECG Results          EKG 12-lead (In process)  Result time 01/07/20 09:19:29    In process by Interface, Lab In [facility] (01/07/20 09:19:29)                 Narrative:    Test Reason : R07.9,    Vent. Rate : 166 BPM     Atrial Rate : 127 BPM     P-R Int : 000 ms          QRS Dur : 074 ms      QT Int : 276 ms       P-R-T Axes : 000 072 -78 degrees     QTc Int : 458 ms    Supraventricular tachycardia  Marked ST abnormality, possible inferior subendocardial injury  Abnormal ECG  When compared with ECG of 20-NOV-2019 12:37,  Vent. rate has increased BY  95 BPM  ST now depressed in Inferior leads  ST now depressed in Anterior-lateral leads  Nonspecific T wave abnormality now evident in Inferior leads  Nonspecific T wave abnormality now evident in Lateral leads    Referred By:             Confirmed By:                             Imaging Results          X-Ray Chest AP Portable (In process)                  Medical Decision Making:   History:   Old Medical Records: I decided to obtain old medical records.  Clinical Tests:   Lab Tests: Reviewed  Radiological Study: Reviewed  Medical Tests: Reviewed  ED Management:  Patient presents with chest pain associated with SVT.  Patient was converted with adenosine 6 mg  IV in to normal sinus rhythm.  Originally patient presents with marked ST depression on EKG as well.  Patient continued with some chest pain after cardioversion.  This subsequently resolved.  Patient remained in normal sinus rhythm. Patient did have some vague sternal chest pressure sensation intermittently since being here.  Given continue chest pain with EKG changes during SVT feel patient does need serial cardiac enzymes to rule out cardiac event.  Discussed with Dr. bass any agrees with plan.  Aspirin given.                                 Clinical Impression:       ICD-10-CM ICD-9-CM   1. Chest pain R07.9 786.50   2. Supraventricular tachycardia I47.1 427.89                             Quang Starkey MD  01/07/20 1027

## 2020-01-07 NOTE — PLAN OF CARE
01/07/20 1325   FARAH Message   Medicare Outpatient and Observation Notification regarding financial responsibility Given to patient/caregiver;Explained to patient/caregiver;Signed/date by patient/caregiver   Date FARAH was signed 01/07/20   Time FARAH was signed 1321

## 2020-01-08 LAB
ANION GAP SERPL CALC-SCNC: 6 MMOL/L (ref 8–16)
BILIRUB UR QL STRIP: NEGATIVE
BUN SERPL-MCNC: 22 MG/DL (ref 8–23)
CALCIUM SERPL-MCNC: 9.1 MG/DL (ref 8.7–10.5)
CHLORIDE SERPL-SCNC: 108 MMOL/L (ref 95–110)
CHOLEST SERPL-MCNC: 138 MG/DL (ref 120–199)
CHOLEST/HDLC SERPL: 2.4 {RATIO} (ref 2–5)
CLARITY UR: CLEAR
CO2 SERPL-SCNC: 28 MMOL/L (ref 23–29)
COLOR UR: YELLOW
CREAT SERPL-MCNC: 0.9 MG/DL (ref 0.5–1.4)
EST. GFR  (AFRICAN AMERICAN): >60 ML/MIN/1.73 M^2
EST. GFR  (NON AFRICAN AMERICAN): 58.1 ML/MIN/1.73 M^2
GLUCOSE SERPL-MCNC: 92 MG/DL (ref 70–110)
GLUCOSE UR QL STRIP: NEGATIVE
HDLC SERPL-MCNC: 58 MG/DL (ref 40–75)
HDLC SERPL: 42 % (ref 20–50)
HGB UR QL STRIP: NEGATIVE
KETONES UR QL STRIP: NEGATIVE
LDLC SERPL CALC-MCNC: 59 MG/DL (ref 63–159)
LEUKOCYTE ESTERASE UR QL STRIP: NEGATIVE
MAGNESIUM SERPL-MCNC: 2 MG/DL (ref 1.6–2.6)
NITRITE UR QL STRIP: NEGATIVE
NONHDLC SERPL-MCNC: 80 MG/DL
PH UR STRIP: 6 [PH] (ref 5–8)
POTASSIUM SERPL-SCNC: 4.5 MMOL/L (ref 3.5–5.1)
PROT UR QL STRIP: NEGATIVE
SODIUM SERPL-SCNC: 142 MMOL/L (ref 136–145)
SP GR UR STRIP: 1.01 (ref 1–1.03)
TRIGL SERPL-MCNC: 105 MG/DL (ref 30–150)
TROPONIN I SERPL DL<=0.01 NG/ML-MCNC: 0.03 NG/ML
TROPONIN I SERPL DL<=0.01 NG/ML-MCNC: 0.03 NG/ML
TROPONIN I SERPL DL<=0.01 NG/ML-MCNC: 0.04 NG/ML
TSH SERPL DL<=0.005 MIU/L-ACNC: 4.97 UIU/ML (ref 0.34–5.6)
URN SPEC COLLECT METH UR: NORMAL
UROBILINOGEN UR STRIP-ACNC: NEGATIVE EU/DL

## 2020-01-08 PROCEDURE — 84443 ASSAY THYROID STIM HORMONE: CPT

## 2020-01-08 PROCEDURE — 80061 LIPID PANEL: CPT

## 2020-01-08 PROCEDURE — 81003 URINALYSIS AUTO W/O SCOPE: CPT

## 2020-01-08 PROCEDURE — 36415 COLL VENOUS BLD VENIPUNCTURE: CPT

## 2020-01-08 PROCEDURE — 25000003 PHARM REV CODE 250: Performed by: INTERNAL MEDICINE

## 2020-01-08 PROCEDURE — 84484 ASSAY OF TROPONIN QUANT: CPT | Mod: 91

## 2020-01-08 PROCEDURE — G0378 HOSPITAL OBSERVATION PER HR: HCPCS

## 2020-01-08 PROCEDURE — 25000003 PHARM REV CODE 250: Performed by: SPECIALIST

## 2020-01-08 PROCEDURE — 80048 BASIC METABOLIC PNL TOTAL CA: CPT

## 2020-01-08 PROCEDURE — 83735 ASSAY OF MAGNESIUM: CPT

## 2020-01-08 RX ORDER — METOPROLOL SUCCINATE 25 MG/1
25 TABLET, EXTENDED RELEASE ORAL NIGHTLY
Status: DISCONTINUED | OUTPATIENT
Start: 2020-01-08 | End: 2020-01-09 | Stop reason: HOSPADM

## 2020-01-08 RX ADMIN — CALCIUM 500 MG: 500 TABLET ORAL at 10:01

## 2020-01-08 RX ADMIN — LEVOTHYROXINE, LIOTHYRONINE 15 MG: 9.5; 2.25 TABLET ORAL at 06:01

## 2020-01-08 RX ADMIN — PROPAFENONE HYDROCHLORIDE 150 MG: 150 TABLET, FILM COATED ORAL at 02:01

## 2020-01-08 RX ADMIN — DILTIAZEM HYDROCHLORIDE 240 MG: 120 CAPSULE, COATED, EXTENDED RELEASE ORAL at 10:01

## 2020-01-08 RX ADMIN — Medication 1000 UNITS: at 10:01

## 2020-01-08 RX ADMIN — PROPAFENONE HYDROCHLORIDE 150 MG: 150 TABLET, FILM COATED ORAL at 05:01

## 2020-01-08 RX ADMIN — MAGNESIUM OXIDE 400 MG: 400 TABLET ORAL at 09:01

## 2020-01-08 RX ADMIN — PANTOPRAZOLE SODIUM 40 MG: 40 TABLET, DELAYED RELEASE ORAL at 10:01

## 2020-01-08 RX ADMIN — PROPAFENONE HYDROCHLORIDE 150 MG: 150 TABLET, FILM COATED ORAL at 09:01

## 2020-01-08 RX ADMIN — CALCIUM 500 MG: 500 TABLET ORAL at 09:01

## 2020-01-08 RX ADMIN — METOPROLOL SUCCINATE 25 MG: 25 TABLET, FILM COATED, EXTENDED RELEASE ORAL at 09:01

## 2020-01-08 RX ADMIN — SERTRALINE HYDROCHLORIDE 100 MG: 50 TABLET ORAL at 10:01

## 2020-01-08 NOTE — ASSESSMENT & PLAN NOTE
Borderline elevation of troponin   EKG with sinus rhythm with 1st degree AV block and no acute ST-T wave changes  Could be secondary to palpitations in the setting of paroxysmal SVT versus ACS  Stress test with [facility] [employer] tomorrow  Cardiology following

## 2020-01-08 NOTE — PROGRESS NOTES
[facility] Medicine  Progress Note    Patient Name: Lorena Vallejo  MRN: [id number]  Patient Class: OP- Observation   Admission Date: 1/7/2020  Length of Stay: 0 days  Attending Physician: Sohail Livingston MD  Primary Care Provider: Stephon Cates MD        Subjective:     Principal Problem:SVT (supraventricular tachycardia)        Interval History:  Patient admitted for chest pain in the setting of palpitations.  Patient has history of paroxysmal SVT.  No acute overnight events reported.  Reports improvement in palpitations after initiating propafenone last night.  Patient denies chest pain, shortness of breath or lightheadedness/dizziness.      Objective:     Vital Signs (Most Recent):  Temp: 98.3 °F (36.8 °C) (01/08/20 0715)  Pulse: 80 (01/08/20 0715)  Resp: 19 (01/08/20 0715)  BP: 138/67 (01/08/20 0715)  SpO2: (!) 94 % (01/08/20 0715) Vital Signs (24h Range):  Temp:  [97.5 °F (36.4 °C)-98.4 °F (36.9 °C)] 98.3 °F (36.8 °C)  Pulse:  [67-80] 80  Resp:  [16-20] 19  SpO2:  [91 %-96 %] 94 %  BP: (102-138)/(62-70) 138/67     Weight: 61.6 kg (135 lb 14.4 oz)  Body mass index is 20.07 kg/m².  No intake or output data in the 24 hours ending 01/08/20 1438   Physical Exam   Constitutional: She is oriented to person, place, and time. She appears well-developed and well-nourished. No distress.   HENT:   Head: Normocephalic and atraumatic.   Eyes: Conjunctivae are normal. No scleral icterus.   Neck: Neck supple. No thyromegaly present.   Cardiovascular: Normal rate, regular rhythm and normal heart sounds.   No murmur heard.  Pulmonary/Chest: Effort normal and breath sounds normal. No respiratory distress.   Abdominal: Soft. Bowel sounds are normal. There is no tenderness.   Musculoskeletal: She exhibits no edema or deformity.   Neurological: She is alert and oriented to person, place, and time. No sensory deficit.   Skin: Skin is warm and dry. Capillary refill takes less than 2 seconds. No erythema.    Psychiatric: She has a normal mood and affect. Her behavior is normal.   Nursing note and vitals reviewed.      Significant Labs:   CBC:   Recent Labs   Lab 01/07/20 0921   WBC 8.54   HGB 12.7   HCT 38.9        CMP:   Recent Labs   Lab 01/07/20 0921 01/08/20  0330    142   K 3.8 4.5    108   CO2 24 28   * 92   BUN 19 22   CREATININE 0.9 0.9   CALCIUM 9.5 9.1   PROT 7.1  --    ALBUMIN 4.2  --    BILITOT 0.8  --    ALKPHOS 54*  --    AST 25  --    ALT 23  --    ANIONGAP 13 6*   EGFRNONAA 58.1* 58.1*     Troponin:   Recent Labs   Lab 01/07/20 2148 01/08/20 0330 01/08/20 0912   TROPONINI 0.050*  0.050* 0.041* 0.034     TSH:   Recent Labs   Lab 01/08/20 0330   TSH 4.970       Significant Imaging: I have reviewed all pertinent imaging results/findings within the past 24 hours.     X-Ray Chest AP Portable [[id number]] Resulted: 01/07/20 0957   Order Status: Completed Updated: 01/07/20 1000   Narrative:     EXAMINATION:  XR CHEST AP PORTABLE    CLINICAL HISTORY:  Chest Pain; dyspnea.    FINDINGS:  Portable chest radiograph at 09:27 hours compared to multiple prior exams shows the cardiomediastinal silhouette and pulmonary vasculature are within normal limits.    There is unchanged asymmetric elevation of the right hemidiaphragm, with the lungs otherwise normally expanded.  No consolidation, pleural effusion, or evidence of pulmonary edema. No confluent infiltrates or pneumothorax. There are no significant osseous abnormalities.   Impression:       No evidence of active cardiopulmonary disease.      Electronically signed by: Adrián Hardy MD  Date: 01/07/2020  Time: 09:57           Assessment/Plan:      * SVT (supraventricular tachycardia)  Known history of paroxysmal SVT  Appreciate Cardiology input  She has been initiated on propafenone 150 mg q.8 hours  Decrease metoprolol succinate to 25 mg q.h.s Continue diltiazem  mg  Cardiology planning for stress test with [facility]  tomorrow which has been ordered  NPO from midnight     Chest pain  Borderline elevation of troponin   EKG with sinus rhythm with 1st degree AV block and no acute ST-T wave changes  Could be secondary to palpitations in the setting of paroxysmal SVT versus ACS  Stress test with [facility] tomorrow  Cardiology following      Chronic kidney disease, stage III (moderate)  Aware  Stable chronic issue   Daily BMP       Hypothyroidism, unspecified  Recent increase in [place] Thyroid dose by PCP after noting to have elevated TSH  Continue [place] Thyroid 165 mg daily  Will need outpt TSH in 4-6 weeks         VTE Risk Mitigation (From admission, onward)         Ordered     Place sequential compression device  Until discontinued      01/07/20 1225     IP VTE HIGH RISK PATIENT  Once      01/07/20 1225                      Sohail Livingstno MD  Department of Hospital Medicine   [facility]

## 2020-01-08 NOTE — PROGRESS NOTES
Progress Note  Cardiology    Admit Date: 1/7/2020   LOS: 0 days     Follow-up For:  Cardiology    Scheduled Meds:   calcium carbonate  500 mg Oral BID    diltiaZEM  240 mg Oral Daily    Lactobacillus acidoph-L.bulgar  1 tablet Oral TID WM    magnesium oxide  400 mg Oral QHS    metoprolol succinate  50 mg Oral QHS    pantoprazole  40 mg Oral Daily    propafenone  150 mg Oral Q8H    sertraline  100 mg Oral Daily    thyroid (pork)  1 each Oral Before breakfast    thyroid (pork)  150 mg Oral Daily    vitamin D  1,000 Units Oral Daily     Continuous Infusions:  PRN Meds:acetaminophen, loperamide, nitroGLYCERIN, ondansetron, sodium chloride 0.9%    Review of patient's allergies indicates:   Allergen Reactions    Flagyl [metronidazole hcl] Diarrhea     SEVERE DIARRHEA    Latex, natural rubber Rash     ITCHING    Levaquin [levofloxacin] Diarrhea     MOOD CHANGES, WEAKNESS    Morphine Other (See Comments)     HALLUCINATES    Augmentin [amoxicillin-pot clavulanate] Other (See Comments)     AGITATION    Ciprofloxacin Anxiety    Codeine Nausea And Vomiting    Demerol [meperidine] Nausea And Vomiting     CAN TAKE WITH PHENERGAN    Dilaudid [hydromorphone (bulk)] Itching     OK WITH BENADRYL    Fentanyl Itching     OK WITH BENADRYL    Fosfomycin      diarrhia    Naproxen Other (See Comments)     HURT STOMACH    Nsaids (non-steroidal anti-inflammatory drug) Other (See Comments)     TOLD NOT TO TAKE AS HAD AN ULCER. HAD BLEEDING    Percocet [oxycodone-acetaminophen] Rash    Prednisone Other (See Comments)     AGITATION    Reglan [metoclopramide hcl] Anxiety    Sulfa (sulfonamide antibiotics) Rash    Vicodin [hydrocodone-acetaminophen] Rash    Zosyn [piperacillin-tazobactam] Rash    Ambien [zolpidem]     Ibuprofen     Pcn [penicillins]     Vasotec [enalapril maleate] Other (See Comments)     DRY COUGH       SUBJECTIVE:     Interval History: Patient has no complaint of chest pain shortness of  breath..        OBJECTIVE:     Vital Signs (Most Recent)  Temp: 98.3 °F (36.8 °C) (01/08/20 0715)  Pulse: 80 (01/08/20 0715)  Resp: 19 (01/08/20 0715)  BP: 138/67 (01/08/20 0715)  SpO2: (!) 94 % (01/08/20 0715)    Vital Signs Range (Last 24H):  Temp:  [97.5 °F (36.4 °C)-98.4 °F (36.9 °C)]   Pulse:  [67-80]   Resp:  [16-20]   BP: (102-138)/(62-70)   SpO2:  [91 %-96 %]       Physical Exam:  Neck: no carotid bruit and no JVD  Lungs: clear to auscultation bilaterally, normal respiratory effort  Heart: regular rate and rhythm, S1, S2 normal, no murmur, click, rub or gallop  Extremities: Extremities normal, atraumatic, no cyanosis, clubbing, or edema    Recent Results (from the past 24 hour(s))   Troponin I    Collection Time: 01/07/20  4:03 PM   Result Value Ref Range    Troponin I 0.058 (HH) <=0.040 ng/mL   Troponin I    Collection Time: 01/07/20  9:48 PM   Result Value Ref Range    Troponin I 0.050 (H) <=0.040 ng/mL   CK    Collection Time: 01/07/20  9:48 PM   Result Value Ref Range    CPK 86 20 - 180 U/L   CK-MB    Collection Time: 01/07/20  9:48 PM   Result Value Ref Range    CPK MB 3.5 0.1 - 6.5 ng/mL   Troponin I    Collection Time: 01/07/20  9:48 PM   Result Value Ref Range    Troponin I 0.050 (H) <=0.040 ng/mL   Lipid panel    Collection Time: 01/08/20  3:30 AM   Result Value Ref Range    Cholesterol 138 120 - 199 mg/dL    Triglycerides 105 30 - 150 mg/dL    HDL 58 40 - 75 mg/dL    LDL Cholesterol 59.0 (L) 63.0 - 159.0 mg/dL    Hdl/Cholesterol Ratio 42.0 20.0 - 50.0 %    Total Cholesterol/HDL Ratio 2.4 2.0 - 5.0    Non-HDL Cholesterol 80 mg/dL   TSH    Collection Time: 01/08/20  3:30 AM   Result Value Ref Range    TSH 4.970 0.340 - 5.600 uIU/mL   Basic Metabolic Panel (BMP)    Collection Time: 01/08/20  3:30 AM   Result Value Ref Range    Sodium 142 136 - 145 mmol/L    Potassium 4.5 3.5 - 5.1 mmol/L    Chloride 108 95 - 110 mmol/L    CO2 28 23 - 29 mmol/L    Glucose 92 70 - 110 mg/dL    BUN, Bld 22 8 - 23 mg/dL     Creatinine 0.9 0.5 - 1.4 mg/dL    Calcium 9.1 8.7 - 10.5 mg/dL    Anion Gap 6 (L) 8 - 16 mmol/L    eGFR if African American >60.0 >60 mL/min/1.73 m^2    eGFR if non  58.1 (A) >60 mL/min/1.73 m^2   Magnesium    Collection Time: 01/08/20  3:30 AM   Result Value Ref Range    Magnesium 2.0 1.6 - 2.6 mg/dL   Troponin I    Collection Time: 01/08/20  3:30 AM   Result Value Ref Range    Troponin I 0.041 (H) <=0.040 ng/mL   Troponin I    Collection Time: 01/08/20  9:12 AM   Result Value Ref Range    Troponin I 0.034 <=0.040 ng/mL       Diagnostic Results:    ASSESSMENT/PLAN:   Patient had no arrhythmia on chest pain.  However she has significant chest pain and back pain yesterday today.  Continue with current medication decrease Lopressor to 25 q.p.m. continue with Rythmol and Cardizem at present doses.  Lexiscan Myoview stress test in a.m. discussed with the patient.    Plan: Continue Current.

## 2020-01-08 NOTE — ASSESSMENT & PLAN NOTE
Known history of paroxysmal SVT  Appreciate Cardiology input  She has been initiated on propafenone 150 mg q.8 hours  Decrease metoprolol succinate to 25 mg q.h.s Continue diltiazem  mg  Cardiology planning for stress test with [facility] tomorrow which has been ordered  NPO from midnight

## 2020-01-08 NOTE — ASSESSMENT & PLAN NOTE
Recent increase in [place] Thyroid dose by PCP after noting to have elevated TSH  Continue [place] Thyroid 165 mg daily  Will need outpt TSH in 4-6 weeks

## 2020-01-08 NOTE — SUBJECTIVE & OBJECTIVE
Interval History:  Patient admitted for chest pain in the setting of palpitations.  Patient has history of paroxysmal SVT.  No acute overnight events reported.  Reports improvement in palpitations after initiating propafenone last night.  Patient denies chest pain, shortness of breath or lightheadedness/dizziness.      Objective:     Vital Signs (Most Recent):  Temp: 98.3 °F (36.8 °C) (01/08/20 0715)  Pulse: 80 (01/08/20 0715)  Resp: 19 (01/08/20 0715)  BP: 138/67 (01/08/20 0715)  SpO2: (!) 94 % (01/08/20 0715) Vital Signs (24h Range):  Temp:  [97.5 °F (36.4 °C)-98.4 °F (36.9 °C)] 98.3 °F (36.8 °C)  Pulse:  [67-80] 80  Resp:  [16-20] 19  SpO2:  [91 %-96 %] 94 %  BP: (102-138)/(62-70) 138/67     Weight: 61.6 kg (135 lb 14.4 oz)  Body mass index is 20.07 kg/m².  No intake or output data in the 24 hours ending 01/08/20 1438   Physical Exam   Constitutional: She is oriented to person, place, and time. She appears well-developed and well-nourished. No distress.   HENT:   Head: Normocephalic and atraumatic.   Eyes: Conjunctivae are normal. No scleral icterus.   Neck: Neck supple. No thyromegaly present.   Cardiovascular: Normal rate, regular rhythm and normal heart sounds.   No murmur heard.  Pulmonary/Chest: Effort normal and breath sounds normal. No respiratory distress.   Abdominal: Soft. Bowel sounds are normal. There is no tenderness.   Musculoskeletal: She exhibits no edema or deformity.   Neurological: She is alert and oriented to person, place, and time. No sensory deficit.   Skin: Skin is warm and dry. Capillary refill takes less than 2 seconds. No erythema.   Psychiatric: She has a normal mood and affect. Her behavior is normal.   Nursing note and vitals reviewed.      Significant Labs:   CBC:   Recent Labs   Lab 01/07/20 0921   WBC 8.54   HGB 12.7   HCT 38.9        CMP:   Recent Labs   Lab 01/07/20 0921 01/08/20  0330    142   K 3.8 4.5    108   CO2 24 28   * 92   BUN 19 22    CREATININE 0.9 0.9   CALCIUM 9.5 9.1   PROT 7.1  --    ALBUMIN 4.2  --    BILITOT 0.8  --    ALKPHOS 54*  --    AST 25  --    ALT 23  --    ANIONGAP 13 6*   EGFRNONAA 58.1* 58.1*     Troponin:   Recent Labs   Lab 01/07/20  2148 01/08/20  0330 01/08/20  0912   TROPONINI 0.050*  0.050* 0.041* 0.034     TSH:   Recent Labs   Lab 01/08/20  0330   TSH 4.970       Significant Imaging: I have reviewed all pertinent imaging results/findings within the past 24 hours.     X-Ray Chest AP Portable [[id number]] Resulted: 01/07/20 0957   Order Status: Completed Updated: 01/07/20 1000   Narrative:     EXAMINATION:  XR CHEST AP PORTABLE    CLINICAL HISTORY:  Chest Pain; dyspnea.    FINDINGS:  Portable chest radiograph at 09:27 hours compared to multiple prior exams shows the cardiomediastinal silhouette and pulmonary vasculature are within normal limits.    There is unchanged asymmetric elevation of the right hemidiaphragm, with the lungs otherwise normally expanded.  No consolidation, pleural effusion, or evidence of pulmonary edema. No confluent infiltrates or pneumothorax. There are no significant osseous abnormalities.   Impression:       No evidence of active cardiopulmonary disease.      Electronically signed by: Adrián Hardy MD  Date: 01/07/2020  Time: 09:57

## 2020-01-09 ENCOUNTER — CLINICAL SUPPORT (OUTPATIENT)
Dept: CARDIOLOGY | Facility: HOSPITAL | Age: 85
End: 2020-01-09
Attending: INTERNAL MEDICINE
Payer: MEDICARE

## 2020-01-09 ENCOUNTER — HOSPITAL ENCOUNTER (OUTPATIENT)
Dept: RADIOLOGY | Facility: HOSPITAL | Age: 85
Discharge: HOME | End: 2020-01-09
Attending: INTERNAL MEDICINE
Payer: MEDICARE

## 2020-01-09 VITALS
DIASTOLIC BLOOD PRESSURE: 56 MMHG | RESPIRATION RATE: 18 BRPM | HEART RATE: 79 BPM | SYSTOLIC BLOOD PRESSURE: 119 MMHG | HEIGHT: 69 IN | TEMPERATURE: 98 F | BODY MASS INDEX: 20.12 KG/M2 | WEIGHT: 135.88 LBS | OXYGEN SATURATION: 97 %

## 2020-01-09 PROBLEM — R07.9 CHEST PAIN: Status: RESOLVED | Noted: 2020-01-07 | Resolved: 2020-01-09

## 2020-01-09 LAB
ANION GAP SERPL CALC-SCNC: 10 MMOL/L (ref 8–16)
BUN SERPL-MCNC: 26 MG/DL (ref 8–23)
CALCIUM SERPL-MCNC: 9.1 MG/DL (ref 8.7–10.5)
CHLORIDE SERPL-SCNC: 102 MMOL/L (ref 95–110)
CO2 SERPL-SCNC: 29 MMOL/L (ref 23–29)
CREAT SERPL-MCNC: 0.9 MG/DL (ref 0.5–1.4)
CV PHARM DOSE: 0.4 MG
CV STRESS BASE HR: 71 BPM
DIASTOLIC BLOOD PRESSURE: 72 MMHG
EST. GFR  (AFRICAN AMERICAN): >60 ML/MIN/1.73 M^2
EST. GFR  (NON AFRICAN AMERICAN): 58.1 ML/MIN/1.73 M^2
GLUCOSE SERPL-MCNC: 97 MG/DL (ref 70–110)
MAGNESIUM SERPL-MCNC: 2.1 MG/DL (ref 1.6–2.6)
OHS CV CPX 85 PERCENT MAX PREDICTED HEART RATE MALE: 111
OHS CV CPX MAX PREDICTED HEART RATE: 130
OHS CV CPX PATIENT IS FEMALE: 1
OHS CV CPX PATIENT IS MALE: 0
OHS CV CPX PEAK DIASTOLIC BLOOD PRESSURE: 80 MMHG
OHS CV CPX PEAK HEAR RATE: 97 BPM
OHS CV CPX PEAK RATE PRESSURE PRODUCT: NORMAL
OHS CV CPX PEAK SYSTOLIC BLOOD PRESSURE: 131 MMHG
OHS CV CPX PERCENT MAX PREDICTED HEART RATE ACHIEVED: 74
OHS CV CPX RATE PRESSURE PRODUCT PRESENTING: 7952
POTASSIUM SERPL-SCNC: 4.7 MMOL/L (ref 3.5–5.1)
SODIUM SERPL-SCNC: 141 MMOL/L (ref 136–145)
STRESS ECHO TARGET HR: 114 BPM
SYSTOLIC BLOOD PRESSURE: 112 MMHG

## 2020-01-09 PROCEDURE — 63600175 PHARM REV CODE 636 W HCPCS: Performed by: INTERNAL MEDICINE

## 2020-01-09 PROCEDURE — G0378 HOSPITAL OBSERVATION PER HR: HCPCS

## 2020-01-09 PROCEDURE — 83735 ASSAY OF MAGNESIUM: CPT

## 2020-01-09 PROCEDURE — 80048 BASIC METABOLIC PNL TOTAL CA: CPT

## 2020-01-09 PROCEDURE — 25000003 PHARM REV CODE 250: Performed by: INTERNAL MEDICINE

## 2020-01-09 PROCEDURE — 36415 COLL VENOUS BLD VENIPUNCTURE: CPT

## 2020-01-09 PROCEDURE — 93017 CV STRESS TEST TRACING ONLY: CPT

## 2020-01-09 PROCEDURE — A9502 TC99M TETROFOSMIN: HCPCS

## 2020-01-09 PROCEDURE — 25000003 PHARM REV CODE 250: Performed by: SPECIALIST

## 2020-01-09 RX ORDER — REGADENOSON 0.08 MG/ML
0.4 INJECTION, SOLUTION INTRAVENOUS ONCE
Status: COMPLETED | OUTPATIENT
Start: 2020-01-09 | End: 2020-01-09

## 2020-01-09 RX ORDER — METOPROLOL SUCCINATE 25 MG/1
25 TABLET, EXTENDED RELEASE ORAL NIGHTLY
Qty: 90 TABLET | Refills: 3 | Status: SHIPPED | OUTPATIENT
Start: 2020-01-09 | End: 2020-08-31 | Stop reason: SDUPTHER

## 2020-01-09 RX ORDER — PROPAFENONE HYDROCHLORIDE 150 MG/1
150 TABLET, COATED ORAL EVERY 8 HOURS
Qty: 270 TABLET | Refills: 3 | Status: SHIPPED | OUTPATIENT
Start: 2020-01-09 | End: 2020-08-31 | Stop reason: SDUPTHER

## 2020-01-09 RX ADMIN — DILTIAZEM HYDROCHLORIDE 240 MG: 120 CAPSULE, COATED, EXTENDED RELEASE ORAL at 09:01

## 2020-01-09 RX ADMIN — PANTOPRAZOLE SODIUM 40 MG: 40 TABLET, DELAYED RELEASE ORAL at 09:01

## 2020-01-09 RX ADMIN — PROPAFENONE HYDROCHLORIDE 150 MG: 150 TABLET, FILM COATED ORAL at 03:01

## 2020-01-09 RX ADMIN — LACTOBACILLUS TAB 1 TABLET: TAB at 09:01

## 2020-01-09 RX ADMIN — SERTRALINE HYDROCHLORIDE 100 MG: 50 TABLET ORAL at 09:01

## 2020-01-09 RX ADMIN — Medication 1000 UNITS: at 09:01

## 2020-01-09 RX ADMIN — LEVOTHYROXINE, LIOTHYRONINE 15 MG: 9.5; 2.25 TABLET ORAL at 05:01

## 2020-01-09 RX ADMIN — REGADENOSON 0.4 MG: 0.08 INJECTION, SOLUTION INTRAVENOUS at 10:01

## 2020-01-09 RX ADMIN — CALCIUM 500 MG: 500 TABLET ORAL at 09:01

## 2020-01-09 RX ADMIN — ACETAMINOPHEN 1000 MG: 500 TABLET, FILM COATED ORAL at 11:01

## 2020-01-09 NOTE — PROGRESS NOTES
@  12:25      OBTAINED STRESS IMAGES.    @ 13:30        NUCLEAR MEDICINE MYOPERFUSION STUDY COMPLETED.    FROILAN ZHOU

## 2020-01-09 NOTE — PROGRESS NOTES
@   10:56  PATIENT INJECTED WITH        26.3mCi Tc99m MYOVIEW FOR STRESS IMAGES.            M.H.    LEXISCAN STRESS        RELEASE NPO STATUS FROM NUCLEAR MEDICINE.

## 2020-01-09 NOTE — PROGRESS NOTES
@  07:22       PATIENT INJECTED WITH       10.5 mCi Tc99m MYOVIEW IV FOR RESTING IMAGES.    REMAIN NPO STATUS.                     S.C.,CNMT

## 2020-01-09 NOTE — PROGRESS NOTES
Limited 1 normal 1000 Patient has no complaint no significant arrhythmia reported on telemetry.  Stress Test uneventful.  New.  SPECT scan negative for ischemia/infarct.  Normal LVEF.  Findings explained to the patient.  Will continue with current medication and follow up in the office.  Cardizem  mg daily.  Metoprolol 25 mg q.p.m..  Propafenone 150 mg q.8 hours.  Resume other medications follow-up in the office call for any problems p.r.n..  Patient defers anticoagulation at this time, wants  to discuss again in during the office visit in one week.

## 2020-01-09 NOTE — NURSING
Patient received and verbalized understanding of discharge instructions. Pt transported via wheelchair downstairs to car. Discharged home with .

## 2020-01-09 NOTE — DISCHARGE SUMMARY
[facility] Medicine  Discharge Summary      Patient Name: Lorena Vallejo  MRN: [id number]  Admission Date: 1/7/2020  Hospital Length of Stay: 0 days  Discharge Date and Time: 1/9/2020  3:50 PM  Attending Physician: No att. providers found   Discharging Provider: Sohail Livingston MD  Primary Care Provider: Stephon Cates MD      HPI:   86-year-old female with history of hypothyroidism, paroxysmal SVT, chronic kidney disease stage 3, osteoarthritis, hyperlipidemia, peripheral neuropathy, and chronic anemia who presents the hospital with chest pain. The patient reports an episode of chest pain with sudden onset this morning.  The patient was sitting drinking coffee when she had sudden onset of severe intensity chest pain. The chest pain was located mid sternum and radiated to the back.  The pain was heaviness and crushing quality.  Patient admitted to some associated nausea but no vomiting, diaphoresis, or shortness of breath.  She had some associated palpitations.  No pleuritic chest pain.  No productive cough or hemoptysis.  The patient denies having previous chest pain symptoms of a similar nature.  No abdominal pain, headache, bleeding, dysuria, syncope or confusion.  Based on her symptoms she presented to the emergency department for evaluation.  She was found to be in supraventricular tachycardia.  She was given IV adenosine and subsequently converted.  Her chest pain symptoms resolved shortly thereafter.  She has had no recurrence of chest pain up to this point.    * No surgery found *      Hospital Course:   86-year-old  female with known history of paroxysmal supraventricular tachycardia presented to the ED with chief complaint of anterior chest and upper back pain associated with severe palpitations.  Symptoms resolved after she received IV adenosine in the ED.  Patient seen by Cardiology.  Concern for an element of pAF as well. She was initiated on propafenone 150 mg q.8 hours; to  accommodate this her home metoprolol dose has been halved.  Advised to continue calcium channel blocker as directed.  Underwent Lexiscan Myoview with no evidence of reversible ischemia.  Deemed stable for discharge to home.  Advised to follow up with Cardiology to discuss about anticoagulation.    Instructions provided to follow up with primary care physician as outpatient. Patient verbalized understanding and is aware to contact primary care physician or return to ED if new or worsening symptoms.    Physical exam on the day of discharge:  General: Patient resting comfortably in no acute distress.  Lungs: CTA. Good air entry.  Cor: Regular rate and rhythm. No murmurs. No pedal edema.  Abd: Soft. Nontender. Non-distended.  Neuro: A&O x3. Moving all 4 extremities equally  Ext: No clubbing. No cyanosis.        Consults:   Consults (From admission, onward)        Status Ordering Provider     Inpatient consult to Cardiology  Once     Provider:  Benjy Sepulveda MD    Acknowledged SAMANTHA LEON          No new Assessment & Plan notes have been filed under this hospital service since the last note was generated.  Service: Hospital Medicine    Final Active Diagnoses:    Diagnosis Date Noted POA    PRINCIPAL PROBLEM:  SVT (supraventricular tachycardia) [I47.1] 07/18/2019 Yes    Chronic kidney disease, stage III (moderate) [N18.3] 07/18/2019 Yes     Chronic    Hypothyroidism, unspecified [E03.9] 12/12/2018 Yes      Problems Resolved During this Admission:    Diagnosis Date Noted Date Resolved POA    Chest pain [R07.9] 01/07/2020 01/09/2020 Yes       Discharged Condition: fair    Disposition: Home or Self Care    Follow Up:  Follow-up Information     Stephon Cates MD In 2 weeks.    Specialties:  Family Medicine, Internal Medicine  Why:  As needed - hospital discharge follow-up  Contact information:  [address]  [facility] [address]  [phone]             Benjy Sepulveda MD. Schedule an appointment as soon as possible  for a visit in 2 weeks.    Specialty:  Cardiology  Why:  Paroxysmal SVT follow-up  Contact information:  [address] [facility] [address]  [place] [address]  [phone]                 Patient Instructions:      Diet Cardiac     Notify your health care provider if you experience any of the following:  temperature >100.4     Notify your health care provider if you experience any of the following:  persistent nausea and vomiting or diarrhea     Notify your health care provider if you experience any of the following:  persistent dizziness, light-headedness, or visual disturbances     Activity as tolerated       Significant Diagnostic Studies: Labs:   CMP   Recent Labs   Lab 01/08/20  0330 01/09/20  0329    141   K 4.5 4.7    102   CO2 28 29   GLU 92 97   BUN 22 26*   CREATININE 0.9 0.9   CALCIUM 9.1 9.1   ANIONGAP 6* 10   ESTGFRAFRICA >60.0 >60.0   EGFRNONAA 58.1* 58.1*    and CBC No results for input(s): WBC, HGB, HCT, PLT in the last 48 hours.    Pending Diagnostic Studies:     None         Medications:  Reconciled Home Medications:      Medication List      START taking these medications    propafenone 150 MG Tab  Commonly known as:  RHTHYMOL  Take 1 tablet (150 mg total) by mouth every 8 (eight) hours.        CHANGE how you take these medications    * [place] Thyroid 300 mg Tab  Generic drug:  thyroid (pork)  TAKE ONE-HALF TABLET BY MOUTH ONCE DAILY  What changed:    · how much to take  · how to take this  · when to take this     * thyroid (pork) 15 mg Tab  Commonly known as:  ARMOUR THYROID  Take 1 tablet (15 mg total) by mouth before breakfast. Take with 150 mg arm her thyroid  What changed:  Another medication with the same name was changed. Make sure you understand how and when to take each.     diltiaZEM 240 MG Cs24  Commonly known as:  TIAZAC  Take 1 capsule (240 mg total) by mouth once daily.  What changed:  Another medication with the same name was removed. Continue taking this medication, and follow  the directions you see here.     metoprolol succinate 25 MG 24 hr tablet  Commonly known as:  TOPROL-XL  Take 1 tablet (25 mg total) by mouth every evening.  What changed:    · medication strength  · how much to take  · when to take this     nitroGLYCERIN 0.4 MG SL tablet  Commonly known as:  NITROSTAT  DISSOLVE 1 TABLET UNDER THE TONGUE EVERY 5 MINUTES AS NEEDED FOR CHEST PAIN  What changed:  See the new instructions.     sertraline 100 MG tablet  Commonly known as:  ZOLOFT  Take 1 tablet (100 mg total) by mouth once daily.  What changed:  when to take this         * This list has 2 medication(s) that are the same as other medications prescribed for you. Read the directions carefully, and ask your doctor or other care provider to review them with you.            CONTINUE taking these medications    acetaminophen 500 MG tablet  Commonly known as:  TYLENOL  Take 1,000 mg by mouth every 6 (six) hours as needed.     Align 4 mg Cap  Generic drug:  Bifidobacterium infantis  Take 4 mg by mouth once daily.     calcium carbonate 500 mg calcium (1,250 mg) tablet  Commonly known as:  OS-RAMONITA  Take 1 tablet by mouth 2 (two) times daily.     coenzyme Q10 100 mg capsule  Take 100 mg by mouth once daily.     cyanocobalamin (vitamin B-12) 5,000 mcg Subl  Place 5,000 mcg under the tongue every Mon, Wed, Fri.     krill oil 500 mg Cap  Take 500 mg by mouth every evening.     loperamide 2 mg capsule  Commonly known as:  IMODIUM  Take 1 capsule (2 mg total) by mouth 4 (four) times daily as needed for Diarrhea.     magnesium oxide 400 mg (241.3 mg magnesium) tablet  Commonly known as:  MAG-OX  Take 400 mg by mouth every evening.     omeprazole 20 MG capsule  Commonly known as:  PRILOSEC  Take 1 capsule (20 mg total) by mouth once daily.     PRESERVISION AREDS 2 ORAL  Take 1 capsule by mouth 2 (two) times daily.     vitamin D 1000 units Tab  Commonly known as:  VITAMIN D3  Take 1,000 Units by mouth once daily.        STOP taking these  medications    diclofenac sodium 1 % Gel  Commonly known as:  Voltaren     doxepin 10 MG capsule  Commonly known as:  SINEQUAN     ethacrynic acid 25 mg Tab  Commonly known as:  EDECRIN     fluticasone propionate 50 mcg/actuation nasal spray  Commonly known as:  FLONASE     paroxetine 20 MG tablet  Commonly known as:  PAXIL     ranitidine 150 MG capsule  Commonly known as:  ZANTAC            Indwelling Lines/Drains at time of discharge:   Lines/Drains/Airways     None                 Time spent on the discharge of patient: 35 minutes  Patient was seen and examined on the date of discharge and determined to be suitable for discharge.         Sohail Livingston MD  Department of Hospital Medicine  [facility]

## 2020-01-09 NOTE — HOSPITAL COURSE
86-year-old  female with known history of paroxysmal supraventricular tachycardia presented to the ED with chief complaint of anterior chest and upper back pain associated with severe palpitations.  Symptoms resolved after she received IV adenosine in the ED.  Patient seen by Cardiology.  Concern for an element of pAF as well. She was initiated on propafenone 150 mg q.8 hours; to accommodate this her home metoprolol dose has been halved.  Advised to continue calcium channel blocker as directed.  Underwent Lexiscan Myoview with no evidence of reversible ischemia.  Deemed stable for discharge to home.  Advised to follow up with Cardiology to discuss about anticoagulation.    Instructions provided to follow up with primary care physician as outpatient. Patient verbalized understanding and is aware to contact primary care physician or return to ED if new or worsening symptoms.    Physical exam on the day of discharge:  General: Patient resting comfortably in no acute distress.  Lungs: CTA. Good air entry.  Cor: Regular rate and rhythm. No murmurs. No pedal edema.  Abd: Soft. Nontender. Non-distended.  Neuro: A&O x3. Moving all 4 extremities equally  Ext: No clubbing. No cyanosis.

## 2020-01-13 ENCOUNTER — TELEPHONE (OUTPATIENT)
Dept: FAMILY MEDICINE | Facility: CLINIC | Age: 85
End: 2020-01-13

## 2020-01-13 NOTE — TELEPHONE ENCOUNTER
----- Message from Patti Chowdhury sent at 1/13/2020 10:01 AM CST -----  Type:  Sooner Apoointment Request    Caller is requesting a sooner appointment.  Caller declined first available appointment listed below.  Caller will not accept being placed on the waitlist and is requesting a message be sent to doctor.    Name of Caller:  Patient   When is the first available appointment?  01/17/2020  Symptoms:  Hospital follow up due to thryoid and Heart issues, D/C form [facility] on 01/09/2020  Best Call Back Number:  [phone] (home)     Additional Information:

## 2020-01-14 ENCOUNTER — OFFICE VISIT (OUTPATIENT)
Dept: FAMILY MEDICINE | Facility: CLINIC | Age: 85
End: 2020-01-14
Payer: MEDICARE

## 2020-01-14 ENCOUNTER — DOCUMENTATION ONLY (OUTPATIENT)
Dept: FAMILY MEDICINE | Facility: CLINIC | Age: 85
End: 2020-01-14

## 2020-01-14 VITALS
HEIGHT: 69 IN | HEART RATE: 79 BPM | WEIGHT: 137.38 LBS | SYSTOLIC BLOOD PRESSURE: 122 MMHG | BODY MASS INDEX: 20.35 KG/M2 | OXYGEN SATURATION: 96 % | DIASTOLIC BLOOD PRESSURE: 82 MMHG | TEMPERATURE: 98 F | RESPIRATION RATE: 16 BRPM

## 2020-01-14 DIAGNOSIS — J84.10 CALCIFIED GRANULOMA OF LUNG: ICD-10-CM

## 2020-01-14 DIAGNOSIS — N18.30 CHRONIC KIDNEY DISEASE, STAGE 3: ICD-10-CM

## 2020-01-14 DIAGNOSIS — M46.99 INFLAMMATORY SPONDYLOPATHY OF MULTIPLE SITES IN SPINE: ICD-10-CM

## 2020-01-14 DIAGNOSIS — F33.9 EPISODE OF RECURRENT MAJOR DEPRESSIVE DISORDER, UNSPECIFIED DEPRESSION EPISODE SEVERITY: ICD-10-CM

## 2020-01-14 DIAGNOSIS — I47.10 PAROXYSMAL SUPRAVENTRICULAR TACHYCARDIA SEEN ON CARDIAC MONITOR: Primary | ICD-10-CM

## 2020-01-14 PROCEDURE — 1101F PT FALLS ASSESS-DOCD LE1/YR: CPT | Mod: CPTII,S$GLB,, | Performed by: INTERNAL MEDICINE

## 2020-01-14 PROCEDURE — 99214 PR OFFICE/OUTPT VISIT, EST, LEVL IV, 30-39 MIN: ICD-10-PCS | Mod: S$GLB,,, | Performed by: INTERNAL MEDICINE

## 2020-01-14 PROCEDURE — 1159F PR MEDICATION LIST DOCUMENTED IN MEDICAL RECORD: ICD-10-PCS | Mod: S$GLB,,, | Performed by: INTERNAL MEDICINE

## 2020-01-14 PROCEDURE — 99499 UNLISTED E&M SERVICE: CPT | Mod: S$GLB,,, | Performed by: INTERNAL MEDICINE

## 2020-01-14 PROCEDURE — 1159F MED LIST DOCD IN RCRD: CPT | Mod: S$GLB,,, | Performed by: INTERNAL MEDICINE

## 2020-01-14 PROCEDURE — 99499 RISK ADDL DX/OHS AUDIT: ICD-10-PCS | Mod: S$GLB,,, | Performed by: INTERNAL MEDICINE

## 2020-01-14 PROCEDURE — 99214 OFFICE O/P EST MOD 30 MIN: CPT | Mod: S$GLB,,, | Performed by: INTERNAL MEDICINE

## 2020-01-14 PROCEDURE — 1126F PR PAIN SEVERITY QUANTIFIED, NO PAIN PRESENT: ICD-10-PCS | Mod: S$GLB,,, | Performed by: INTERNAL MEDICINE

## 2020-01-14 PROCEDURE — 1101F PR PT FALLS ASSESS DOC 0-1 FALLS W/OUT INJ PAST YR: ICD-10-PCS | Mod: CPTII,S$GLB,, | Performed by: INTERNAL MEDICINE

## 2020-01-14 PROCEDURE — 1126F AMNT PAIN NOTED NONE PRSNT: CPT | Mod: S$GLB,,, | Performed by: INTERNAL MEDICINE

## 2020-01-14 RX ORDER — ASPIRIN 81 MG/1
81 TABLET ORAL DAILY
Status: ON HOLD | COMMUNITY
End: 2020-11-06 | Stop reason: HOSPADM

## 2020-01-14 NOTE — PROGRESS NOTES
"Subjective:      11:09 AM     Patient ID: Lorena Vallejo is a 86 y.o. female.    Chief Complaint: Hospital Follow Up    HPI   Patient was hospitalized with PSVT.  She was having chest pain and shortness of breath.  Cardiac enzymes were negative and she underwent a nuclear stress test which was negative for ischemic heart disease. She was put on Rythmol.  Since then her blood pressures are high before she takes the Cardizem and then go back down.    She saw her cardiologist who advised her to have ablation of the accessory tract causing the PSVT.  She is concerned about doing this.  He also advised her to go on Eliquis and she has not been willing to do this yet either.    The patient has chronic back pain from inflammatory spondylopathy.  It is at baseline.  Will follow.  She has had a calcified granuloma on chest x-ray.  Will follow  She has a history of depression.  She denies feeling depressed but she is anxious about the present health problems.  Will follow.        Review of Systems      Objective:      Vitals:    01/14/20 1106   BP: 122/82   Pulse: 79   Resp: 16   Temp: 98.2 °F (36.8 °C)   TempSrc: Oral   SpO2: 96%   Weight: 62.3 kg (137 lb 5.6 oz)   Height: 5' 9" (1.753 m)   PainSc: 0-No pain     Physical Exam   Constitutional: She appears well-developed and well-nourished.   Cardiovascular: Normal rate, regular rhythm and normal heart sounds.   Pulmonary/Chest: Effort normal and breath sounds normal.   Abdominal: Soft. There is no tenderness.   Neurological: She is alert.   Psychiatric: She has a normal mood and affect. Her behavior is normal. Thought content normal.   Nursing note and vitals reviewed.      estimated GFR 58  Assessment:       1. Paroxysmal supraventricular tachycardia seen on cardiac monitor    2. 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 Episode of recurrent major depressive disorder, unspecified depression episode severity    4. Calcified granuloma of lung    5. 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

## 2020-01-14 NOTE — PATIENT INSTRUCTIONS
Take Cardizem at bedtime and the Lopressor in the morning.  See the electrophysiologist and get his opinions about what the ablation would involve and with the risks and benefits are.  Also his opinion of whether you should be on anticoagulation.    Avoid alcohol and caffeine.    Thank you for choosing [facility].     Please fill out the patient experience survey.

## 2020-03-05 ENCOUNTER — OFFICE VISIT (OUTPATIENT)
Dept: FAMILY MEDICINE | Facility: CLINIC | Age: 85
End: 2020-03-05
Payer: MEDICARE

## 2020-03-05 VITALS
SYSTOLIC BLOOD PRESSURE: 112 MMHG | HEIGHT: 69 IN | HEART RATE: 72 BPM | OXYGEN SATURATION: 95 % | TEMPERATURE: 98 F | WEIGHT: 139.75 LBS | BODY MASS INDEX: 20.7 KG/M2 | DIASTOLIC BLOOD PRESSURE: 62 MMHG

## 2020-03-05 DIAGNOSIS — I47.10 SVT (SUPRAVENTRICULAR TACHYCARDIA): ICD-10-CM

## 2020-03-05 DIAGNOSIS — J84.10 CALCIFIED GRANULOMA OF LUNG: ICD-10-CM

## 2020-03-05 DIAGNOSIS — M46.99 INFLAMMATORY SPONDYLOPATHY OF MULTIPLE SITES IN SPINE: ICD-10-CM

## 2020-03-05 DIAGNOSIS — F33.9 EPISODE OF RECURRENT MAJOR DEPRESSIVE DISORDER, UNSPECIFIED DEPRESSION EPISODE SEVERITY: ICD-10-CM

## 2020-03-05 DIAGNOSIS — Z00.00 ENCOUNTER FOR PREVENTIVE HEALTH EXAMINATION: Primary | ICD-10-CM

## 2020-03-05 DIAGNOSIS — R26.9 ABNORMALITY OF GAIT AND MOBILITY: ICD-10-CM

## 2020-03-05 DIAGNOSIS — N18.30 CHRONIC KIDNEY DISEASE, STAGE III (MODERATE): Chronic | ICD-10-CM

## 2020-03-05 DIAGNOSIS — L57.0 ACTINIC KERATOSES: ICD-10-CM

## 2020-03-05 PROBLEM — K62.5 RECTAL BLEEDING: Status: RESOLVED | Noted: 2019-08-29 | Resolved: 2020-03-05

## 2020-03-05 PROBLEM — M25.612 DECREASED ROM OF LEFT SHOULDER: Status: RESOLVED | Noted: 2018-10-23 | Resolved: 2020-03-05

## 2020-03-05 PROBLEM — R07.89 OTHER CHEST PAIN: Status: RESOLVED | Noted: 2018-09-07 | Resolved: 2020-03-05

## 2020-03-05 PROCEDURE — G0439 PPPS, SUBSEQ VISIT: HCPCS | Mod: S$GLB,,, | Performed by: NURSE PRACTITIONER

## 2020-03-05 PROCEDURE — G0439 PR MEDICARE ANNUAL WELLNESS SUBSEQUENT VISIT: ICD-10-PCS | Mod: S$GLB,,, | Performed by: NURSE PRACTITIONER

## 2020-03-05 NOTE — PROGRESS NOTES
"Lorena Vallejo presented for a  Medicare AWV and comprehensive Health Risk Assessment today. The following components were reviewed and updated:    · Medical history  · Family History  · Social history  · Allergies and Current Medications  · Health Risk Assessment  · Health Maintenance  · Care Team     ** See Completed Assessments for Annual Wellness Visit within the encounter summary.**       The following assessments were completed:  · Living Situation  · CAGE  · Depression Screening  · Timed Get Up and Go  · Whisper Test  · Cognitive Function Screening  · Nutrition Screening  · ADL Screening  · PAQ Screening        Vitals:    03/05/20 1139   BP: 112/62   BP Location: Left arm   Patient Position: Sitting   BP Method: Medium (Manual)   Pulse: 72   Temp: 98.2 °F (36.8 °C)   TempSrc: Oral   SpO2: 95%   Weight: 63.4 kg (139 lb 12.4 oz)   Height: 5' 9" (1.753 m)     Body mass index is 20.64 kg/m².  Physical Exam   Constitutional: She is oriented to person, place, and time. She appears well-developed and well-nourished. No distress.   HENT:   Head: Normocephalic and atraumatic.   Eyes: Conjunctivae are normal. Right eye exhibits no discharge. Left eye exhibits no discharge. No scleral icterus.   Cardiovascular: Normal rate and intact distal pulses.   Pulmonary/Chest: No respiratory distress.   Neurological: She is alert and oriented to person, place, and time.   Skin: Skin is warm and dry. She is not diaphoretic.   Psychiatric: She has a normal mood and affect. Her behavior is normal.   Nursing note and vitals reviewed.        Diagnoses and health risks identified today and associated recommendations/orders:    Lorena was seen today for medicare awv.    Diagnoses and all orders for this visit:    Encounter for preventive health examination    Chronic kidney disease, stage III (moderate)  Comments:  Stable. Will continue to monitor.     Abnormality of gait and mobility  Comments:  Follow up with orthopedist and ask about " spinal anesthesia instead of general for knee replacement. Will continue to monitor.     SVT (supraventricular tachycardia)  Comments:  Continue diltiazem and rhthymol and follow up with cardiologist. Will continue to monitor.     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 Will continue to monitor.     Calcified granuloma of lung  Comments:  Stable. Will continue to monitor.     Episode of recurrent major depressive disorder, unspecified depression episode severity  Comments:  Do not stop sertaline. Stable. Will continue to monitor.     Actinic keratoses  -     Ambulatory referral/consult to Dermatology; Future        Provided Lorena with a 5-10 year written screening schedule and personal prevention plan. Recommendations were developed using the USPSTF age appropriate recommendations. Education, counseling, and referrals were provided as needed. After Visit Summary printed and given to patient which includes a list of additional screenings\tests needed.    Follow up with Dr. Cates as scheduled.     Grace Palacio, YUDITH  I offered to discuss end of life issues, including information on how to make advance directives that the patient could use to name someone who would make medical decisions on their behalf if they became too ill to make themselves.    _X_Patient declined  ___Patient is interested, I provided paper work and offered to discuss.

## 2020-03-05 NOTE — PATIENT INSTRUCTIONS
Continue B12, take 1-5 mg. Of folic acid (sold otc) once a day  Counseling and Referral of Other Preventative  (Italic type indicates deductible and co-insurance are waived)    Patient Name: Lorena Vallejo  Today's Date: 3/5/2020    Health Maintenance       Date Due Completion Date    TETANUS VACCINE 08/09/2021 8/9/2011    Lipid Panel 01/08/2025 1/8/2020        Orders Placed This Encounter   Procedures    Ambulatory referral/consult to Dermatology     The following information is provided to all patients.  This information is to help you find resources for any of the problems found today that may be affecting your health:                Living healthy guide: www.[facility].[place].[facility]      Understanding Diabetes: www.diabetes.org      Eating healthy: www.cdc.gov/healthyweight      CDC home safety checklist: www.cdc.gov/steadi/patient.html      Agency on Aging: www.goea.[place].[facility]      Alcoholics anonymous (AA): www.aa.org      Physical Activity: www.donya.nih.gov/gz9cbxr      Tobacco use: www.quitwithusla.org

## 2020-03-09 ENCOUNTER — OFFICE VISIT (OUTPATIENT)
Dept: ORTHOPEDICS | Facility: CLINIC | Age: 85
End: 2020-03-09
Payer: MEDICARE

## 2020-03-09 VITALS — RESPIRATION RATE: 20 BRPM | WEIGHT: 139.75 LBS | BODY MASS INDEX: 20.7 KG/M2 | HEIGHT: 69 IN

## 2020-03-09 DIAGNOSIS — M17.12 ARTHRITIS OF KNEE, LEFT: Primary | ICD-10-CM

## 2020-03-09 DIAGNOSIS — M17.12 PRIMARY OSTEOARTHRITIS OF LEFT KNEE: Primary | ICD-10-CM

## 2020-03-09 PROCEDURE — 99213 PR OFFICE/OUTPT VISIT, EST, LEVL III, 20-29 MIN: ICD-10-PCS | Mod: HCNC,25,S$GLB, | Performed by: ORTHOPAEDIC SURGERY

## 2020-03-09 PROCEDURE — 20610 DRAIN/INJ JOINT/BURSA W/O US: CPT | Mod: HCNC,LT,S$GLB, | Performed by: ORTHOPAEDIC SURGERY

## 2020-03-09 PROCEDURE — 99999 PR PBB SHADOW E&M-EST. PATIENT-LVL III: CPT | Mod: PBBFAC,HCNC,, | Performed by: ORTHOPAEDIC SURGERY

## 2020-03-09 PROCEDURE — 1101F PR PT FALLS ASSESS DOC 0-1 FALLS W/OUT INJ PAST YR: ICD-10-PCS | Mod: HCNC,CPTII,S$GLB, | Performed by: ORTHOPAEDIC SURGERY

## 2020-03-09 PROCEDURE — 1125F AMNT PAIN NOTED PAIN PRSNT: CPT | Mod: HCNC,S$GLB,, | Performed by: ORTHOPAEDIC SURGERY

## 2020-03-09 PROCEDURE — 99213 OFFICE O/P EST LOW 20 MIN: CPT | Mod: HCNC,25,S$GLB, | Performed by: ORTHOPAEDIC SURGERY

## 2020-03-09 PROCEDURE — 1125F PR PAIN SEVERITY QUANTIFIED, PAIN PRESENT: ICD-10-PCS | Mod: HCNC,S$GLB,, | Performed by: ORTHOPAEDIC SURGERY

## 2020-03-09 PROCEDURE — 99999 PR PBB SHADOW E&M-EST. PATIENT-LVL III: ICD-10-PCS | Mod: PBBFAC,HCNC,, | Performed by: ORTHOPAEDIC SURGERY

## 2020-03-09 PROCEDURE — 1159F PR MEDICATION LIST DOCUMENTED IN MEDICAL RECORD: ICD-10-PCS | Mod: HCNC,S$GLB,, | Performed by: ORTHOPAEDIC SURGERY

## 2020-03-09 PROCEDURE — 1101F PT FALLS ASSESS-DOCD LE1/YR: CPT | Mod: HCNC,CPTII,S$GLB, | Performed by: ORTHOPAEDIC SURGERY

## 2020-03-09 PROCEDURE — 1159F MED LIST DOCD IN RCRD: CPT | Mod: HCNC,S$GLB,, | Performed by: ORTHOPAEDIC SURGERY

## 2020-03-09 PROCEDURE — 20610 LARGE JOINT ASPIRATION/INJECTION: L KNEE: ICD-10-PCS | Mod: HCNC,LT,S$GLB, | Performed by: ORTHOPAEDIC SURGERY

## 2020-03-09 RX ORDER — TRIAMCINOLONE ACETONIDE 40 MG/ML
40 INJECTION, SUSPENSION INTRA-ARTICULAR; INTRAMUSCULAR
Status: DISCONTINUED | OUTPATIENT
Start: 2020-03-09 | End: 2020-03-09 | Stop reason: HOSPADM

## 2020-03-09 RX ADMIN — TRIAMCINOLONE ACETONIDE 40 MG: 40 INJECTION, SUSPENSION INTRA-ARTICULAR; INTRAMUSCULAR at 10:03

## 2020-03-09 NOTE — PROGRESS NOTES
Past Medical History:   Diagnosis Date    Arthritis     Blood transfusion     Bowel obstruction     X 3    Cancer     MELANOMA RIGHT EYE    Cancer of eyeball, except conjunctiva, cornea, retina, and choroid right    Depression     Diverticulitis     GERD (gastroesophageal reflux disease)     Hypertension     Melanoma     right eye    Peripheral neuropathy     PSVT (paroxysmal supraventricular tachycardia)     HAD X 3 OVER 10 YRS. LAST 7-25-12. HAD ADENOSINE IN [facility] ER. WAS ON INCREASED THYROID MED. NO PROBLEM SINCE    Salmonella     Short bowel syndrome     Thyroid disease     Transient ischemic attack     UTI (urinary tract infection)        Past Surgical History:   Procedure Laterality Date    ABDOMINAL SURGERY      X 3. ILEUM REMOVED. OSTOMY PLACED AND CLOSED.    ADENOIDECTOMY      BILATERAL OOPHORECTOMY      COLONOSCOPY      COLONOSCOPY N/A 8/24/2016    Procedure: COLONOSCOPY;  Surgeon: Alex Corral MD;  Location: [facility] ENDO;  Service: Endoscopy;  Laterality: N/A;    COLONOSCOPY N/A 8/29/2019    Procedure: COLONOSCOPY;  Surgeon: Tiffanie Vázquez MD;  Location: [facility] ENDO;  Service: Endoscopy;  Laterality: N/A;    COLONOSCOPY N/A 11/22/2019    Procedure: COLONOSCOPY;  Surgeon: Tiffanie Vázquez MD;  Location: [facility] ENDO;  Service: Endoscopy;  Laterality: N/A;    CTR      BILAT    ESOPHAGOGASTRODUODENOSCOPY N/A 8/29/2019    Procedure: EGD (ESOPHAGOGASTRODUODENOSCOPY);  Surgeon: Tiffanie Vázquez MD;  Location: [facility] ENDO;  Service: Endoscopy;  Laterality: N/A;    EYE SURGERY      RIGHT  - LASER MELANOMA; BILAT CATARACT    HYSTERECTOMY      BSO    JOINT REPLACEMENT      RIGHT KNEE    MANDIBLE FRACTURE SURGERY      MENISCECTOMY      MEDIAL AND LATERAL REPAIR, BAKERS CYST    TENDON REPAIR      LEFT THUMB    TONSILLECTOMY      T AND A       Current Outpatient Medications   Medication Sig    acetaminophen (TYLENOL) 500 MG tablet Take 1,000 mg by mouth every 6 (six) hours as needed.     ARMOUR THYROID 300 mg Tab TAKE ONE-HALF TABLET BY MOUTH ONCE DAILY (Patient taking differently: Take 150 mg by mouth once daily. TAKE ONE-HALF TABLET BY MOUTH ONCE DAILY)    aspirin (ECOTRIN) 81 MG EC tablet Take 81 mg by mouth once daily.    Bifidobacterium infantis (ALIGN) 4 mg Cap Take 4 mg by mouth once daily.    calcium carbonate (OS-RAMONITA) 500 mg calcium (1,250 mg) tablet Take 1 tablet by mouth 2 (two) times daily.    coenzyme Q10 100 mg capsule Take 100 mg by mouth once daily.    cyanocobalamin, vitamin B-12, 5,000 mcg Subl Place 5,000 mcg under the tongue every Mon, Wed, Fri.    diltiaZEM (TIAZAC) 240 MG Cs24 Take 1 capsule (240 mg total) by mouth once daily.    krill oil 500 mg Cap Take 500 mg by mouth every evening.    loperamide (IMODIUM) 2 mg capsule Take 1 capsule (2 mg total) by mouth 4 (four) times daily as needed for Diarrhea.    magnesium oxide (MAG-OX) 400 mg tablet Take 400 mg by mouth every evening.     metoprolol succinate (TOPROL-XL) 25 MG 24 hr tablet Take 1 tablet (25 mg total) by mouth every evening.    nitroGLYCERIN (NITROSTAT) 0.4 MG SL tablet DISSOLVE 1 TABLET UNDER THE TONGUE EVERY 5 MINUTES AS NEEDED FOR CHEST PAIN (Patient taking differently: Place 0.4 mg under the tongue every 5 (five) minutes as needed for Chest pain. )    omeprazole (PRILOSEC) 20 MG capsule Take 1 capsule (20 mg total) by mouth once daily.    propafenone (RHTHYMOL) 150 MG Tab Take 1 tablet (150 mg total) by mouth every 8 (eight) hours.    sertraline (ZOLOFT) 100 MG tablet Take 1 tablet (100 mg total) by mouth once daily. (Patient taking differently: Take 100 mg by mouth every evening. )    thyroid, pork, (ARMOUR THYROID) 15 mg Tab Take 1 tablet (15 mg total) by mouth before breakfast. Take with 150 mg arm her thyroid    thyroid,pork (ARMOUR THYROID ORAL) Take 150 mg by mouth.    VIT C/E/ZN/COPPR/LUTEIN/ZEAXAN (PRESERVISION AREDS 2 ORAL) Take 1 capsule by mouth 2 (two) times daily.     vitamin D 1000  units Tab Take 1,000 Units by mouth once daily.     No current facility-administered medications for this visit.        Review of patient's allergies indicates:   Allergen Reactions    Flagyl [metronidazole hcl] Diarrhea     SEVERE DIARRHEA    Latex, natural rubber Rash     ITCHING    Levaquin [levofloxacin] Diarrhea     MOOD CHANGES, WEAKNESS    Morphine Other (See Comments)     HALLUCINATES    Augmentin [amoxicillin-pot clavulanate] Other (See Comments)     AGITATION    Ciprofloxacin Anxiety    Codeine Nausea And Vomiting    Demerol [meperidine] Nausea And Vomiting     CAN TAKE WITH PHENERGAN    Dilaudid [hydromorphone (bulk)] Itching     OK WITH BENADRYL    Fentanyl Itching     OK WITH BENADRYL    Fosfomycin      diarrhia    Naproxen Other (See Comments)     HURT STOMACH    Nsaids (non-steroidal anti-inflammatory drug) Other (See Comments)     TOLD NOT TO TAKE AS HAD AN ULCER. HAD BLEEDING    Percocet [oxycodone-acetaminophen] Rash    Prednisone Other (See Comments)     AGITATION    Reglan [metoclopramide hcl] Anxiety    Sulfa (sulfonamide antibiotics) Rash    Vicodin [hydrocodone-acetaminophen] Rash    Zosyn [piperacillin-tazobactam] Rash    Ambien [zolpidem]     Ibuprofen     Macrobid [nitrofurantoin monohyd/m-cryst]     Pcn [penicillins]     Vasotec [enalapril maleate] Other (See Comments)     DRY COUGH       Family History   Problem Relation Age of Onset    Diabetes Mother     Heart disease Mother         MI at 52    Stroke Father     Cancer Father         colon cancer    Rheum arthritis Father     COPD Father     Heart disease Brother     Stroke Brother     Diabetes Brother     Arthritis Daughter     Hypertension Daughter     Cancer Son         Leukemia    Hypertension Son     Heart disease Son     Diabetes Maternal Grandmother     Asthma Maternal Grandfather     Cancer Paternal Grandmother         melanoma    Cancer Paternal Grandfather         lip cancer - smoked  pipe    Sleep apnea Son     Hypertension Son     Sinus disease Son     Hypertension Son     Heart disease Son     Obesity Son        Social History     Socioeconomic History    Marital status:      Spouse name: Not on file    Number of children: Not on file    Years of education: Not on file    Highest education level: Not on file   Occupational History    Not on file   Social Needs    Financial resource strain: Not on file    Food insecurity:     Worry: Not on file     Inability: Not on file    Transportation needs:     Medical: Not on file     Non-medical: Not on file   Tobacco Use    Smoking status: Former Smoker     Packs/day: 1.00     Years: 18.00     Pack years: 18.00     Start date:      Last attempt to quit: 1973     Years since quittin.0    Smokeless tobacco: Never Used   Substance and Sexual Activity    Alcohol use: Yes     Alcohol/week: 2.0 standard drinks     Types: 2 Glasses of wine per week     Comment: WINE once per day    Drug use: No    Sexual activity: Not Currently   Lifestyle    Physical activity:     Days per week: Not on file     Minutes per session: Not on file    Stress: Not on file   Relationships    Social connections:     Talks on phone: Not on file     Gets together: Not on file     Attends Baptism service: Not on file     Active member of club or organization: Not on file     Attends meetings of clubs or organizations: Not on file     Relationship status: Not on file   Other Topics Concern    Not on file   Social History Narrative    Not on file       Chief Complaint:   Chief Complaint   Patient presents with    Left Knee - Pain       Consulting Physician: No ref. provider found    History of present illness:    This is a 86 y.o. year old female who complains of left knee pain for several weeks..  She responded well to previous injection.  She has a history of knee pain on and off for years. Pain 3/10 and worse with use.    Review of  "Systems:    Constitution: Denies chills, fever, and sweats.  HENT: Denies headaches or blurry vision.  Cardiovascular: Denies chest pain or irregular heart beat.  Respiratory: Denies cough or shortness of breath.  Gastrointestinal: Denies abdominal pain, nausea, or vomiting.  Musculoskeletal:  Denies muscle cramps.  Neurological: Denies dizziness or focal weakness.  Psychiatric/Behavioral: Normal mental status.  Hematologic/Lymphatic: Denies bleeding problem or easy bruising/bleeding.  Skin: Denies rash or suspicious lesions.    Examination:    Vital Signs:    Vitals:    03/09/20 1110   Resp: 20   Weight: 63.4 kg (139 lb 12.4 oz)   Height: 5' 9" (1.753 m)   PainSc:   3   PainLoc: Knee       Body mass index is 20.64 kg/m².    This a well-developed, well nourished patient in no acute distress.    Alert and oriented x 3 and cooperative to examination.       Physical Exam: Left Knee Exam    Gait   normal    Skin  Rash:   None  Scars:   None    Inspection  Erythema:  None  Bruising:  None  Effusion:  mild  Masses:  None  Lymphadenopathy: None    Range of Motion: 0-120    Medial Joint : Mild  Lateral Joint : None    Patellofemoral Tenderness: yes    Varus Stress:  Stable  Valgus Stress:  Stable    Strength:  4+/5    Pulses:  Palpable distal    Sensation:  Intact          Imaging: X-ray studies demonstrate mild to moderate left knee DJD and CPPD.       Assessment: Arthritis of knee, left  -     Large Joint Aspiration/Injection: L knee        Plan:  The previous injections were helpful.  Will go ahead give her another steroid today and then plan to do a Orthovisc a series in 2 weeks.    DISCLAIMER: This note may have been dictated using voice recognition software and may contain grammatical errors.     NOTE: Consult report sent to referring provider via EPIC EMR.  "

## 2020-03-09 NOTE — PROCEDURES
Large Joint Aspiration/Injection: L knee  Performed by: Juanito Ivory MD  Authorized by: Juanito Ivory MD  Date/Time: 3/9/2020 10:45 AM    Consent Done?:  Yes (Verbal)  Indications:  pain  Timeout: Immediately prior to procedure a time out was called to verify the correct patient, procedure, equipment, support staff and site/side marked as required.        Details:  Approach: anteromedial  Location:  Knee  Site:  L knee    Medications: 40 mg triamcinolone acetonide 40 mg/mL

## 2020-04-20 DIAGNOSIS — K21.9 GASTROESOPHAGEAL REFLUX DISEASE, ESOPHAGITIS PRESENCE NOT SPECIFIED: ICD-10-CM

## 2020-04-20 RX ORDER — OMEPRAZOLE 20 MG/1
20 CAPSULE, DELAYED RELEASE ORAL DAILY
Qty: 90 CAPSULE | Refills: 1 | Status: SHIPPED | OUTPATIENT
Start: 2020-04-20 | End: 2020-09-04

## 2020-06-02 ENCOUNTER — TELEPHONE (OUTPATIENT)
Dept: ORTHOPEDICS | Facility: CLINIC | Age: 85
End: 2020-06-02

## 2020-06-02 NOTE — TELEPHONE ENCOUNTER
----- Message from Ivory Noe sent at 6/2/2020  2:18 PM CDT -----  Contact: patient  Type: Needs Medical Advice  Who Called:  patient  Best Call Back Number:   Additional Information: Per patient requesting a call back to schedule an appointment for injection-please advise-thank you

## 2020-06-02 NOTE — TELEPHONE ENCOUNTER
Spoke to patient. Appointment scheduled on Monday 6/8 at 10am for knee injection. Verbalized understanding.

## 2020-06-08 ENCOUNTER — OFFICE VISIT (OUTPATIENT)
Dept: ORTHOPEDICS | Facility: CLINIC | Age: 85
End: 2020-06-08
Payer: MEDICARE

## 2020-06-08 VITALS — TEMPERATURE: 97 F | BODY MASS INDEX: 20.59 KG/M2 | WEIGHT: 139 LBS | HEIGHT: 69 IN

## 2020-06-08 DIAGNOSIS — M17.12 PRIMARY OSTEOARTHRITIS OF LEFT KNEE: Primary | ICD-10-CM

## 2020-06-08 PROCEDURE — 99999 PR PBB SHADOW E&M-EST. PATIENT-LVL III: ICD-10-PCS | Mod: PBBFAC,HCNC,, | Performed by: ORTHOPAEDIC SURGERY

## 2020-06-08 PROCEDURE — 99999 PR PBB SHADOW E&M-EST. PATIENT-LVL III: CPT | Mod: PBBFAC,HCNC,, | Performed by: ORTHOPAEDIC SURGERY

## 2020-06-08 PROCEDURE — 20610 DRAIN/INJ JOINT/BURSA W/O US: CPT | Mod: HCNC,LT,S$GLB, | Performed by: ORTHOPAEDIC SURGERY

## 2020-06-08 PROCEDURE — 99499 UNLISTED E&M SERVICE: CPT | Mod: HCNC,S$GLB,, | Performed by: ORTHOPAEDIC SURGERY

## 2020-06-08 PROCEDURE — 99499 NO LOS: ICD-10-PCS | Mod: HCNC,S$GLB,, | Performed by: ORTHOPAEDIC SURGERY

## 2020-06-08 PROCEDURE — 20610 LARGE JOINT ASPIRATION/INJECTION: L KNEE: ICD-10-PCS | Mod: HCNC,LT,S$GLB, | Performed by: ORTHOPAEDIC SURGERY

## 2020-06-08 NOTE — PROCEDURES
Large Joint Aspiration/Injection: L knee  Date/Time: 6/8/2020 10:00 AM  Performed by: Juanito Ivory MD  Authorized by: Juanito Ivory MD     Consent Done?:  Yes (Verbal)  Indications:  Pain  Timeout: prior to procedure the correct patient, procedure, and site was verified    Approach:  Anteromedial  Location:  Knee  Site:  L knee  Medications:  15 mg sodium hyaluronate (orthovisc) 30 mg/2 mL

## 2020-06-08 NOTE — PROGRESS NOTES
Past Medical History:   Diagnosis Date    Arthritis     Blood transfusion     Bowel obstruction     X 3    Cancer     MELANOMA RIGHT EYE    Cancer of eyeball, except conjunctiva, cornea, retina, and choroid right    Depression     Diverticulitis     GERD (gastroesophageal reflux disease)     Hypertension     Melanoma     right eye    Peripheral neuropathy     PSVT (paroxysmal supraventricular tachycardia)     HAD X 3 OVER 10 YRS. LAST 7-25-12. HAD ADENOSINE IN [facility] ER. WAS ON INCREASED THYROID MED. NO PROBLEM SINCE    Salmonella     Short bowel syndrome     Thyroid disease     Transient ischemic attack     UTI (urinary tract infection)        Past Surgical History:   Procedure Laterality Date    ABDOMINAL SURGERY      X 3. ILEUM REMOVED. OSTOMY PLACED AND CLOSED.    ADENOIDECTOMY      BILATERAL OOPHORECTOMY      COLONOSCOPY      COLONOSCOPY N/A 8/24/2016    Procedure: COLONOSCOPY;  Surgeon: Alex Corral MD;  Location: [facility] ENDO;  Service: Endoscopy;  Laterality: N/A;    COLONOSCOPY N/A 8/29/2019    Procedure: COLONOSCOPY;  Surgeon: Tiffanie Vázquez MD;  Location: [facility] ENDO;  Service: Endoscopy;  Laterality: N/A;    COLONOSCOPY N/A 11/22/2019    Procedure: COLONOSCOPY;  Surgeon: Tiffanie Vázquez MD;  Location: [facility] ENDO;  Service: Endoscopy;  Laterality: N/A;    CTR      BILAT    ESOPHAGOGASTRODUODENOSCOPY N/A 8/29/2019    Procedure: EGD (ESOPHAGOGASTRODUODENOSCOPY);  Surgeon: Tiffanie Vázquez MD;  Location: [facility] ENDO;  Service: Endoscopy;  Laterality: N/A;    EYE SURGERY      RIGHT  - LASER MELANOMA; BILAT CATARACT    HYSTERECTOMY      BSO    JOINT REPLACEMENT      RIGHT KNEE    MANDIBLE FRACTURE SURGERY      MENISCECTOMY      MEDIAL AND LATERAL REPAIR, BAKERS CYST    TENDON REPAIR      LEFT THUMB    TONSILLECTOMY      T AND A       Current Outpatient Medications   Medication Sig    acetaminophen (TYLENOL) 500 MG tablet Take 1,000 mg by mouth every 6 (six) hours as needed.     ARMOUR THYROID 300 mg Tab TAKE ONE-HALF TABLET BY MOUTH ONCE DAILY (Patient taking differently: Take 150 mg by mouth once daily. TAKE ONE-HALF TABLET BY MOUTH ONCE DAILY)    aspirin (ECOTRIN) 81 MG EC tablet Take 81 mg by mouth once daily.    Bifidobacterium infantis (ALIGN) 4 mg Cap Take 4 mg by mouth once daily.    calcium carbonate (OS-RAMONITA) 500 mg calcium (1,250 mg) tablet Take 1 tablet by mouth 2 (two) times daily.    coenzyme Q10 100 mg capsule Take 100 mg by mouth once daily.    cyanocobalamin, vitamin B-12, 5,000 mcg Subl Place 5,000 mcg under the tongue every Mon, Wed, Fri.    diltiaZEM (TIAZAC) 240 MG Cs24 Take 1 capsule (240 mg total) by mouth once daily.    krill oil 500 mg Cap Take 500 mg by mouth every evening.    loperamide (IMODIUM) 2 mg capsule Take 1 capsule (2 mg total) by mouth 4 (four) times daily as needed for Diarrhea.    magnesium oxide (MAG-OX) 400 mg tablet Take 400 mg by mouth every evening.     metoprolol succinate (TOPROL-XL) 25 MG 24 hr tablet Take 1 tablet (25 mg total) by mouth every evening.    nitroGLYCERIN (NITROSTAT) 0.4 MG SL tablet DISSOLVE 1 TABLET UNDER THE TONGUE EVERY 5 MINUTES AS NEEDED FOR CHEST PAIN (Patient taking differently: Place 0.4 mg under the tongue every 5 (five) minutes as needed for Chest pain. )    omeprazole (PRILOSEC) 20 MG capsule TAKE 1 CAPSULE (20 MG TOTAL) BY MOUTH ONCE DAILY.    propafenone (RHTHYMOL) 150 MG Tab Take 1 tablet (150 mg total) by mouth every 8 (eight) hours.    sertraline (ZOLOFT) 100 MG tablet Take 1 tablet (100 mg total) by mouth once daily. (Patient taking differently: Take 100 mg by mouth every evening. )    thyroid, pork, (ARMOUR THYROID) 15 mg Tab Take 1 tablet (15 mg total) by mouth before breakfast. Take with 150 mg arm her thyroid    thyroid,pork (ARMOUR THYROID ORAL) Take 150 mg by mouth.    VIT C/E/ZN/COPPR/LUTEIN/ZEAXAN (PRESERVISION AREDS 2 ORAL) Take 1 capsule by mouth 2 (two) times daily.     vitamin D 1000  units Tab Take 1,000 Units by mouth once daily.     No current facility-administered medications for this visit.        Review of patient's allergies indicates:   Allergen Reactions    Flagyl [metronidazole hcl] Diarrhea     SEVERE DIARRHEA    Latex, natural rubber Rash     ITCHING    Levaquin [levofloxacin] Diarrhea     MOOD CHANGES, WEAKNESS    Morphine Other (See Comments)     HALLUCINATES    Augmentin [amoxicillin-pot clavulanate] Other (See Comments)     AGITATION    Ciprofloxacin Anxiety    Codeine Nausea And Vomiting    Demerol [meperidine] Nausea And Vomiting     CAN TAKE WITH PHENERGAN    Dilaudid [hydromorphone (bulk)] Itching     OK WITH BENADRYL    Fentanyl Itching     OK WITH BENADRYL    Fosfomycin      diarrhia    Naproxen Other (See Comments)     HURT STOMACH    Nsaids (non-steroidal anti-inflammatory drug) Other (See Comments)     TOLD NOT TO TAKE AS HAD AN ULCER. HAD BLEEDING    Percocet [oxycodone-acetaminophen] Rash    Prednisone Other (See Comments)     AGITATION    Reglan [metoclopramide hcl] Anxiety    Sulfa (sulfonamide antibiotics) Rash    Vicodin [hydrocodone-acetaminophen] Rash    Zosyn [piperacillin-tazobactam] Rash    Ambien [zolpidem]     Ibuprofen     Macrobid [nitrofurantoin monohyd/m-cryst]     Pcn [penicillins]     Vasotec [enalapril maleate] Other (See Comments)     DRY COUGH       Family History   Problem Relation Age of Onset    Diabetes Mother     Heart disease Mother         MI at 52    Stroke Father     Cancer Father         colon cancer    Rheum arthritis Father     COPD Father     Heart disease Brother     Stroke Brother     Diabetes Brother     Arthritis Daughter     Hypertension Daughter     Cancer Son         Leukemia    Hypertension Son     Heart disease Son     Diabetes Maternal Grandmother     Asthma Maternal Grandfather     Cancer Paternal Grandmother         melanoma    Cancer Paternal Grandfather         lip cancer - smoked  pipe    Sleep apnea Son     Hypertension Son     Sinus disease Son     Hypertension Son     Heart disease Son     Obesity Son        Social History     Socioeconomic History    Marital status:      Spouse name: Not on file    Number of children: Not on file    Years of education: Not on file    Highest education level: Not on file   Occupational History    Not on file   Social Needs    Financial resource strain: Not on file    Food insecurity:     Worry: Not on file     Inability: Not on file    Transportation needs:     Medical: Not on file     Non-medical: Not on file   Tobacco Use    Smoking status: Former Smoker     Packs/day: 1.00     Years: 18.00     Pack years: 18.00     Start date:      Last attempt to quit: 1973     Years since quittin.3    Smokeless tobacco: Never Used   Substance and Sexual Activity    Alcohol use: Yes     Alcohol/week: 2.0 standard drinks     Types: 2 Glasses of wine per week     Comment: WINE once per day    Drug use: No    Sexual activity: Not Currently   Lifestyle    Physical activity:     Days per week: Not on file     Minutes per session: Not on file    Stress: Not on file   Relationships    Social connections:     Talks on phone: Not on file     Gets together: Not on file     Attends [employer] service: Not on file     Active member of club or organization: Not on file     Attends meetings of clubs or organizations: Not on file     Relationship status: Not on file   Other Topics Concern    Not on file   Social History Narrative    Not on file       Chief Complaint:   Chief Complaint   Patient presents with    Left Knee - Pain, Follow-up, Injections     Orthovisc #1 Left Knee       Consulting Physician: No ref. provider found    History of present illness:    This is a 87 y.o. year old female who complains of left knee pain.  She responded well to previous injection.  She has a history of knee pain on and off for years. Pain 3/10 and  "worse with use.    Review of Systems:    Constitution: Denies chills, fever, and sweats.  HENT: Denies headaches or blurry vision.  Cardiovascular: Denies chest pain or irregular heart beat.  Respiratory: Denies cough or shortness of breath.  Gastrointestinal: Denies abdominal pain, nausea, or vomiting.  Musculoskeletal:  Denies muscle cramps.  Neurological: Denies dizziness or focal weakness.  Psychiatric/Behavioral: Normal mental status.  Hematologic/Lymphatic: Denies bleeding problem or easy bruising/bleeding.  Skin: Denies rash or suspicious lesions.    Examination:    Vital Signs:    Vitals:    06/08/20 0955   Temp: 97.1 °F (36.2 °C)   Weight: 63 kg (139 lb)   Height: 5' 9" (1.753 m)   PainSc:   6   PainLoc: Knee       Body mass index is 20.53 kg/m².    This a well-developed, well nourished patient in no acute distress.    Alert and oriented x 3 and cooperative to examination.       Physical Exam: Left Knee Exam    Gait   normal    Skin  Rash:   None  Scars:   None    Inspection  Erythema:  None  Bruising:  None  Effusion:  mild  Masses:  None  Lymphadenopathy: None    Range of Motion: 0-120    Medial Joint : Mild  Lateral Joint : None    Patellofemoral Tenderness: yes    Varus Stress:  Stable  Valgus Stress:  Stable    Strength:  4+/5    Pulses:  Palpable distal    Sensation:  Intact          Imaging: X-ray studies demonstrate mild to moderate left knee DJD and CPPD.       Assessment: Primary osteoarthritis of left knee  -     Large Joint Aspiration/Injection: L knee        Plan:   Orthovisc    DISCLAIMER: This note may have been dictated using voice recognition software and may contain grammatical errors.     NOTE: Consult report sent to referring provider via EP[employer] EMR.    "

## 2020-06-15 DIAGNOSIS — E03.9 HYPOTHYROIDISM, UNSPECIFIED TYPE: ICD-10-CM

## 2020-06-15 RX ORDER — THYROID, PORCINE 300 MG/1
150 TABLET ORAL DAILY
Qty: 90 TABLET | Refills: 1 | Status: SHIPPED | OUTPATIENT
Start: 2020-06-15 | End: 2021-01-04 | Stop reason: SDUPTHER

## 2020-06-16 ENCOUNTER — OFFICE VISIT (OUTPATIENT)
Dept: ORTHOPEDICS | Facility: CLINIC | Age: 85
End: 2020-06-16
Payer: MEDICARE

## 2020-06-16 VITALS — HEIGHT: 69 IN | TEMPERATURE: 98 F | WEIGHT: 139 LBS | BODY MASS INDEX: 20.59 KG/M2

## 2020-06-16 DIAGNOSIS — M17.12 PRIMARY OSTEOARTHRITIS OF LEFT KNEE: Primary | ICD-10-CM

## 2020-06-16 PROCEDURE — 99999 PR PBB SHADOW E&M-EST. PATIENT-LVL III: CPT | Mod: PBBFAC,HCNC,, | Performed by: ORTHOPAEDIC SURGERY

## 2020-06-16 PROCEDURE — 99999 PR PBB SHADOW E&M-EST. PATIENT-LVL III: ICD-10-PCS | Mod: PBBFAC,HCNC,, | Performed by: ORTHOPAEDIC SURGERY

## 2020-06-16 PROCEDURE — 20610 DRAIN/INJ JOINT/BURSA W/O US: CPT | Mod: HCNC,LT,S$GLB, | Performed by: ORTHOPAEDIC SURGERY

## 2020-06-16 PROCEDURE — 99499 NO LOS: ICD-10-PCS | Mod: HCNC,S$GLB,, | Performed by: ORTHOPAEDIC SURGERY

## 2020-06-16 PROCEDURE — 99499 UNLISTED E&M SERVICE: CPT | Mod: HCNC,S$GLB,, | Performed by: ORTHOPAEDIC SURGERY

## 2020-06-16 PROCEDURE — 20610 LARGE JOINT ASPIRATION/INJECTION: L KNEE: ICD-10-PCS | Mod: HCNC,LT,S$GLB, | Performed by: ORTHOPAEDIC SURGERY

## 2020-06-18 NOTE — PROCEDURES
Large Joint Aspiration/Injection: L knee    Date/Time: 6/16/2020 10:00 AM  Performed by: Juanito Ivory MD  Authorized by: Juanito Ivory MD     Consent Done?:  Yes (Verbal)  Indications:  Pain  Timeout: prior to procedure the correct patient, procedure, and site was verified    Approach:  Anteromedial  Location:  Knee  Site:  L knee  Medications:  15 mg sodium hyaluronate (orthovisc) 30 mg/2 mL

## 2020-06-18 NOTE — PROGRESS NOTES
Past Medical History:   Diagnosis Date    Arthritis     Blood transfusion     Bowel obstruction     X 3    Cancer     MELANOMA RIGHT EYE    Cancer of eyeball, except conjunctiva, cornea, retina, and choroid right    Depression     Diverticulitis     GERD (gastroesophageal reflux disease)     Hypertension     Melanoma     right eye    Peripheral neuropathy     PSVT (paroxysmal supraventricular tachycardia)     HAD X 3 OVER 10 YRS. LAST 7-25-12. HAD ADENOSINE IN [facility] ER. WAS ON INCREASED THYROID MED. NO PROBLEM SINCE    Salmonella     Short bowel syndrome     Thyroid disease     Transient ischemic attack     UTI (urinary tract infection)        Past Surgical History:   Procedure Laterality Date    ABDOMINAL SURGERY      X 3. ILEUM REMOVED. OSTOMY PLACED AND CLOSED.    ADENOIDECTOMY      BILATERAL OOPHORECTOMY      COLONOSCOPY      COLONOSCOPY N/A 8/24/2016    Procedure: COLONOSCOPY;  Surgeon: Alex oCrral MD;  Location: [facility] ENDO;  Service: Endoscopy;  Laterality: N/A;    COLONOSCOPY N/A 8/29/2019    Procedure: COLONOSCOPY;  Surgeon: Tiffanie Vázquez MD;  Location: [facility] ENDO;  Service: Endoscopy;  Laterality: N/A;    COLONOSCOPY N/A 11/22/2019    Procedure: COLONOSCOPY;  Surgeon: Tiffanie Vázquez MD;  Location: [facility] ENDO;  Service: Endoscopy;  Laterality: N/A;    CTR      BILAT    ESOPHAGOGASTRODUODENOSCOPY N/A 8/29/2019    Procedure: EGD (ESOPHAGOGASTRODUODENOSCOPY);  Surgeon: Tiffanei Vázquez MD;  Location: [facility] ENDO;  Service: Endoscopy;  Laterality: N/A;    EYE SURGERY      RIGHT  - LASER MELANOMA; BILAT CATARACT    HYSTERECTOMY      BSO    JOINT REPLACEMENT      RIGHT KNEE    MANDIBLE FRACTURE SURGERY      MENISCECTOMY      MEDIAL AND LATERAL REPAIR, BAKERS CYST    TENDON REPAIR      LEFT THUMB    TONSILLECTOMY      T AND A       Current Outpatient Medications   Medication Sig    acetaminophen (TYLENOL) 500 MG tablet Take 1,000 mg by mouth every 6 (six) hours as needed.     ARMOUR THYROID 300 mg Tab Take 150 mg by mouth once daily. TAKE ONE-HALF TABLET BY MOUTH ONCE DAILY    aspirin (ECOTRIN) 81 MG EC tablet Take 81 mg by mouth once daily.    Bifidobacterium infantis (ALIGN) 4 mg Cap Take 4 mg by mouth once daily.    calcium carbonate (OS-RAMONITA) 500 mg calcium (1,250 mg) tablet Take 1 tablet by mouth 2 (two) times daily.    coenzyme Q10 100 mg capsule Take 100 mg by mouth once daily.    cyanocobalamin, vitamin B-12, 5,000 mcg Subl Place 5,000 mcg under the tongue every Mon, Wed, Fri.    diltiaZEM (TIAZAC) 240 MG Cs24 Take 1 capsule (240 mg total) by mouth once daily.    krill oil 500 mg Cap Take 500 mg by mouth every evening.    loperamide (IMODIUM) 2 mg capsule Take 1 capsule (2 mg total) by mouth 4 (four) times daily as needed for Diarrhea.    magnesium oxide (MAG-OX) 400 mg tablet Take 400 mg by mouth every evening.     metoprolol succinate (TOPROL-XL) 25 MG 24 hr tablet Take 1 tablet (25 mg total) by mouth every evening.    nitroGLYCERIN (NITROSTAT) 0.4 MG SL tablet DISSOLVE 1 TABLET UNDER THE TONGUE EVERY 5 MINUTES AS NEEDED FOR CHEST PAIN (Patient taking differently: Place 0.4 mg under the tongue every 5 (five) minutes as needed for Chest pain. )    omeprazole (PRILOSEC) 20 MG capsule TAKE 1 CAPSULE (20 MG TOTAL) BY MOUTH ONCE DAILY.    propafenone (RHTHYMOL) 150 MG Tab Take 1 tablet (150 mg total) by mouth every 8 (eight) hours.    sertraline (ZOLOFT) 100 MG tablet Take 1 tablet (100 mg total) by mouth once daily. (Patient taking differently: Take 100 mg by mouth every evening. )    thyroid, pork, (ARMOUR THYROID) 15 mg Tab Take 1 tablet (15 mg total) by mouth before breakfast. Take with 150 mg arm her thyroid    thyroid,pork (ARMOUR THYROID ORAL) Take 150 mg by mouth.    VIT C/E/ZN/COPPR/LUTEIN/ZEAXAN (PRESERVISION AREDS 2 ORAL) Take 1 capsule by mouth 2 (two) times daily.     vitamin D 1000 units Tab Take 1,000 Units by mouth once daily.     No current  facility-administered medications for this visit.        Review of patient's allergies indicates:   Allergen Reactions    Flagyl [metronidazole hcl] Diarrhea     SEVERE DIARRHEA    Latex, natural rubber Rash     ITCHING    Levaquin [levofloxacin] Diarrhea     MOOD CHANGES, WEAKNESS    Morphine Other (See Comments)     HALLUCINATES    Augmentin [amoxicillin-pot clavulanate] Other (See Comments)     AGITATION    Ciprofloxacin Anxiety    Codeine Nausea And Vomiting    Demerol [meperidine] Nausea And Vomiting     CAN TAKE WITH PHENERGAN    Dilaudid [hydromorphone (bulk)] Itching     OK WITH BENADRYL    Fentanyl Itching     OK WITH BENADRYL    Fosfomycin      diarrhia    Naproxen Other (See Comments)     HURT STOMACH    Nsaids (non-steroidal anti-inflammatory drug) Other (See Comments)     TOLD NOT TO TAKE AS HAD AN ULCER. HAD BLEEDING    Percocet [oxycodone-acetaminophen] Rash    Prednisone Other (See Comments)     AGITATION    Reglan [metoclopramide hcl] Anxiety    Sulfa (sulfonamide antibiotics) Rash    Vicodin [hydrocodone-acetaminophen] Rash    Zosyn [piperacillin-tazobactam] Rash    Ambien [zolpidem]     Ibuprofen     Macrobid [nitrofurantoin monohyd/m-cryst]     Pcn [penicillins]     Vasotec [enalapril maleate] Other (See Comments)     DRY COUGH       Family History   Problem Relation Age of Onset    Diabetes Mother     Heart disease Mother         MI at 52    Stroke Father     Cancer Father         colon cancer    Rheum arthritis Father     COPD Father     Heart disease Brother     Stroke Brother     Diabetes Brother     Arthritis Daughter     Hypertension Daughter     Cancer Son         Leukemia    Hypertension Son     Heart disease Son     Diabetes Maternal Grandmother     Asthma Maternal Grandfather     Cancer Paternal Grandmother         melanoma    Cancer Paternal Grandfather         lip cancer - smoked pipe    Sleep apnea Son     Hypertension Son     Sinus  disease Son     Hypertension Son     Heart disease Son     Obesity Son        Social History     Socioeconomic History    Marital status:      Spouse name: Not on file    Number of children: Not on file    Years of education: Not on file    Highest education level: Not on file   Occupational History    Not on file   Social Needs    Financial resource strain: Not on file    Food insecurity     Worry: Not on file     Inability: Not on file    Transportation needs     Medical: Not on file     Non-medical: Not on file   Tobacco Use    Smoking status: Former Smoker     Packs/day: 1.00     Years: 18.00     Pack years: 18.00     Start date:      Quit date: 1973     Years since quittin.3    Smokeless tobacco: Never Used   Substance and Sexual Activity    Alcohol use: Yes     Alcohol/week: 2.0 standard drinks     Types: 2 Glasses of wine per week     Comment: WINE once per day    Drug use: No    Sexual activity: Not Currently   Lifestyle    Physical activity     Days per week: Not on file     Minutes per session: Not on file    Stress: Not on file   Relationships    Social connections     Talks on phone: Not on file     Gets together: Not on file     Attends Worship service: Not on file     Active member of club or organization: Not on file     Attends meetings of clubs or organizations: Not on file     Relationship status: Not on file   Other Topics Concern    Not on file   Social History Narrative    Not on file       Chief Complaint:   Chief Complaint   Patient presents with    Knee Pain     LEFT KNEE ORTHOVISC 2/3       Consulting Physician: No ref. provider found    History of present illness:    This is a 87 y.o. year old female who complains of left knee pain.  She responded well to previous injection.  She has a history of knee pain on and off for years. Pain 3/10 and worse with use.    Review of Systems:    Constitution: Denies chills, fever, and sweats.  HENT: Denies  "headaches or blurry vision.  Cardiovascular: Denies chest pain or irregular heart beat.  Respiratory: Denies cough or shortness of breath.  Gastrointestinal: Denies abdominal pain, nausea, or vomiting.  Musculoskeletal:  Denies muscle cramps.  Neurological: Denies dizziness or focal weakness.  Psychiatric/Behavioral: Normal mental status.  Hematologic/Lymphatic: Denies bleeding problem or easy bruising/bleeding.  Skin: Denies rash or suspicious lesions.    Examination:    Vital Signs:    Vitals:    06/16/20 0955   Temp: 97.5 °F (36.4 °C)   Weight: 63 kg (139 lb)   Height: 5' 9" (1.753 m)   PainSc:   5   PainLoc: Knee       Body mass index is 20.53 kg/m².    This a well-developed, well nourished patient in no acute distress.    Alert and oriented x 3 and cooperative to examination.       Physical Exam: Left Knee Exam    Gait   normal    Skin  Rash:   None  Scars:   None    Inspection  Erythema:  None  Bruising:  None  Effusion:  mild  Masses:  None  Lymphadenopathy: None    Range of Motion: 0-120    Medial Joint : Mild  Lateral Joint : None    Patellofemoral Tenderness: yes    Varus Stress:  Stable  Valgus Stress:  Stable    Strength:  4+/5    Pulses:  Palpable distal    Sensation:  Intact          Imaging: X-ray studies demonstrate mild to moderate left knee DJD and CPPD.       Assessment: Primary osteoarthritis of left knee  -     Large Joint Aspiration/Injection: L knee        Plan:   Orthovisc    DISCLAIMER: This note may have been dictated using voice recognition software and may contain grammatical errors.     NOTE: Consult report sent to referring provider via EP[employer] EMR.      "

## 2020-06-23 ENCOUNTER — OFFICE VISIT (OUTPATIENT)
Dept: ORTHOPEDICS | Facility: CLINIC | Age: 85
End: 2020-06-23
Payer: MEDICARE

## 2020-06-23 VITALS — TEMPERATURE: 97 F

## 2020-06-23 DIAGNOSIS — M17.12 PRIMARY OSTEOARTHRITIS OF LEFT KNEE: Primary | ICD-10-CM

## 2020-06-23 PROCEDURE — 20610 LARGE JOINT ASPIRATION/INJECTION: L KNEE: ICD-10-PCS | Mod: HCNC,LT,S$GLB, | Performed by: ORTHOPAEDIC SURGERY

## 2020-06-23 PROCEDURE — 99499 UNLISTED E&M SERVICE: CPT | Mod: HCNC,S$GLB,, | Performed by: ORTHOPAEDIC SURGERY

## 2020-06-23 PROCEDURE — 20610 DRAIN/INJ JOINT/BURSA W/O US: CPT | Mod: HCNC,LT,S$GLB, | Performed by: ORTHOPAEDIC SURGERY

## 2020-06-23 PROCEDURE — 99999 PR PBB SHADOW E&M-EST. PATIENT-LVL II: ICD-10-PCS | Mod: PBBFAC,HCNC,, | Performed by: ORTHOPAEDIC SURGERY

## 2020-06-23 PROCEDURE — 99499 NO LOS: ICD-10-PCS | Mod: HCNC,S$GLB,, | Performed by: ORTHOPAEDIC SURGERY

## 2020-06-23 PROCEDURE — 99999 PR PBB SHADOW E&M-EST. PATIENT-LVL II: CPT | Mod: PBBFAC,HCNC,, | Performed by: ORTHOPAEDIC SURGERY

## 2020-06-23 NOTE — PROGRESS NOTES
Past Medical History:   Diagnosis Date    Arthritis     Blood transfusion     Bowel obstruction     X 3    Cancer     MELANOMA RIGHT EYE    Cancer of eyeball, except conjunctiva, cornea, retina, and choroid right    Depression     Diverticulitis     GERD (gastroesophageal reflux disease)     Hypertension     Melanoma     right eye    Peripheral neuropathy     PSVT (paroxysmal supraventricular tachycardia)     HAD X 3 OVER 10 YRS. LAST 7-25-12. HAD ADENOSINE IN [facility] ER. WAS ON INCREASED THYROID MED. NO PROBLEM SINCE    Salmonella     Short bowel syndrome     Thyroid disease     Transient ischemic attack     UTI (urinary tract infection)        Past Surgical History:   Procedure Laterality Date    ABDOMINAL SURGERY      X 3. ILEUM REMOVED. OSTOMY PLACED AND CLOSED.    ADENOIDECTOMY      BILATERAL OOPHORECTOMY      COLONOSCOPY      COLONOSCOPY N/A 8/24/2016    Procedure: COLONOSCOPY;  Surgeon: Alex Corral MD;  Location: [facility] ENDO;  Service: Endoscopy;  Laterality: N/A;    COLONOSCOPY N/A 8/29/2019    Procedure: COLONOSCOPY;  Surgeon: Tiffanie Vázquez MD;  Location: [facility] ENDO;  Service: Endoscopy;  Laterality: N/A;    COLONOSCOPY N/A 11/22/2019    Procedure: COLONOSCOPY;  Surgeon: Tiffanie Vázquez MD;  Location: [facility] ENDO;  Service: Endoscopy;  Laterality: N/A;    CTR      BILAT    ESOPHAGOGASTRODUODENOSCOPY N/A 8/29/2019    Procedure: EGD (ESOPHAGOGASTRODUODENOSCOPY);  Surgeon: Tiffanie Vázquez MD;  Location: [facility] ENDO;  Service: Endoscopy;  Laterality: N/A;    EYE SURGERY      RIGHT  - LASER MELANOMA; BILAT CATARACT    HYSTERECTOMY      BSO    JOINT REPLACEMENT      RIGHT KNEE    MANDIBLE FRACTURE SURGERY      MENISCECTOMY      MEDIAL AND LATERAL REPAIR, BAKERS CYST    TENDON REPAIR      LEFT THUMB    TONSILLECTOMY      T AND A       Current Outpatient Medications   Medication Sig    acetaminophen (TYLENOL) 500 MG tablet Take 1,000 mg by mouth every 6 (six) hours as needed.     ARMOUR THYROID 300 mg Tab Take 150 mg by mouth once daily. TAKE ONE-HALF TABLET BY MOUTH ONCE DAILY    aspirin (ECOTRIN) 81 MG EC tablet Take 81 mg by mouth once daily.    Bifidobacterium infantis (ALIGN) 4 mg Cap Take 4 mg by mouth once daily.    calcium carbonate (OS-RAMONITA) 500 mg calcium (1,250 mg) tablet Take 1 tablet by mouth 2 (two) times daily.    coenzyme Q10 100 mg capsule Take 100 mg by mouth once daily.    cyanocobalamin, vitamin B-12, 5,000 mcg Subl Place 5,000 mcg under the tongue every Mon, Wed, Fri.    diltiaZEM (TIAZAC) 240 MG Cs24 Take 1 capsule (240 mg total) by mouth once daily.    krill oil 500 mg Cap Take 500 mg by mouth every evening.    loperamide (IMODIUM) 2 mg capsule Take 1 capsule (2 mg total) by mouth 4 (four) times daily as needed for Diarrhea.    magnesium oxide (MAG-OX) 400 mg tablet Take 400 mg by mouth every evening.     metoprolol succinate (TOPROL-XL) 25 MG 24 hr tablet Take 1 tablet (25 mg total) by mouth every evening.    nitroGLYCERIN (NITROSTAT) 0.4 MG SL tablet DISSOLVE 1 TABLET UNDER THE TONGUE EVERY 5 MINUTES AS NEEDED FOR CHEST PAIN (Patient taking differently: Place 0.4 mg under the tongue every 5 (five) minutes as needed for Chest pain. )    omeprazole (PRILOSEC) 20 MG capsule TAKE 1 CAPSULE (20 MG TOTAL) BY MOUTH ONCE DAILY.    propafenone (RHTHYMOL) 150 MG Tab Take 1 tablet (150 mg total) by mouth every 8 (eight) hours.    sertraline (ZOLOFT) 100 MG tablet Take 1 tablet (100 mg total) by mouth once daily. (Patient taking differently: Take 100 mg by mouth every evening. )    thyroid, pork, (ARMOUR THYROID) 15 mg Tab Take 1 tablet (15 mg total) by mouth before breakfast. Take with 150 mg arm her thyroid    thyroid,pork (ARMOUR THYROID ORAL) Take 150 mg by mouth.    VIT C/E/ZN/COPPR/LUTEIN/ZEAXAN (PRESERVISION AREDS 2 ORAL) Take 1 capsule by mouth 2 (two) times daily.     vitamin D 1000 units Tab Take 1,000 Units by mouth once daily.     No current  facility-administered medications for this visit.        Review of patient's allergies indicates:   Allergen Reactions    Flagyl [metronidazole hcl] Diarrhea     SEVERE DIARRHEA    Latex, natural rubber Rash     ITCHING    Levaquin [levofloxacin] Diarrhea     MOOD CHANGES, WEAKNESS    Morphine Other (See Comments)     HALLUCINATES    Augmentin [amoxicillin-pot clavulanate] Other (See Comments)     AGITATION    Ciprofloxacin Anxiety    Codeine Nausea And Vomiting    Demerol [meperidine] Nausea And Vomiting     CAN TAKE WITH PHENERGAN    Dilaudid [hydromorphone (bulk)] Itching     OK WITH BENADRYL    Fentanyl Itching     OK WITH BENADRYL    Fosfomycin      diarrhia    Naproxen Other (See Comments)     HURT STOMACH    Nsaids (non-steroidal anti-inflammatory drug) Other (See Comments)     TOLD NOT TO TAKE AS HAD AN ULCER. HAD BLEEDING    Percocet [oxycodone-acetaminophen] Rash    Prednisone Other (See Comments)     AGITATION    Reglan [metoclopramide hcl] Anxiety    Sulfa (sulfonamide antibiotics) Rash    Vicodin [hydrocodone-acetaminophen] Rash    Zosyn [piperacillin-tazobactam] Rash    Ambien [zolpidem]     Ibuprofen     Macrobid [nitrofurantoin monohyd/m-cryst]     Pcn [penicillins]     Vasotec [enalapril maleate] Other (See Comments)     DRY COUGH       Family History   Problem Relation Age of Onset    Diabetes Mother     Heart disease Mother         MI at 52    Stroke Father     Cancer Father         colon cancer    Rheum arthritis Father     COPD Father     Heart disease Brother     Stroke Brother     Diabetes Brother     Arthritis Daughter     Hypertension Daughter     Cancer Son         Leukemia    Hypertension Son     Heart disease Son     Diabetes Maternal Grandmother     Asthma Maternal Grandfather     Cancer Paternal Grandmother         melanoma    Cancer Paternal Grandfather         lip cancer - smoked pipe    Sleep apnea Son     Hypertension Son     Sinus  disease Son     Hypertension Son     Heart disease Son     Obesity Son        Social History     Socioeconomic History    Marital status:      Spouse name: Not on file    Number of children: Not on file    Years of education: Not on file    Highest education level: Not on file   Occupational History    Not on file   Social Needs    Financial resource strain: Not on file    Food insecurity     Worry: Not on file     Inability: Not on file    Transportation needs     Medical: Not on file     Non-medical: Not on file   Tobacco Use    Smoking status: Former Smoker     Packs/day: 1.00     Years: 18.00     Pack years: 18.00     Start date:      Quit date: 1973     Years since quittin.3    Smokeless tobacco: Never Used   Substance and Sexual Activity    Alcohol use: Yes     Alcohol/week: 2.0 standard drinks     Types: 2 Glasses of wine per week     Comment: WINE once per day    Drug use: No    Sexual activity: Not Currently   Lifestyle    Physical activity     Days per week: Not on file     Minutes per session: Not on file    Stress: Not on file   Relationships    Social connections     Talks on phone: Not on file     Gets together: Not on file     Attends Jewish service: Not on file     Active member of club or organization: Not on file     Attends meetings of clubs or organizations: Not on file     Relationship status: Not on file   Other Topics Concern    Not on file   Social History Narrative    Not on file       Chief Complaint:   Chief Complaint   Patient presents with    Knee Pain     left knee Orthovisc 3/3       Consulting Physician: No ref. provider found    History of present illness:    This is a 87 y.o. year old female who complains of left knee pain.  She responded well to previous injection.  She has a history of knee pain on and off for years. Pain 3/10 and worse with use.    Review of Systems:    Constitution: Denies chills, fever, and sweats.  HENT: Denies  headaches or blurry vision.  Cardiovascular: Denies chest pain or irregular heart beat.  Respiratory: Denies cough or shortness of breath.  Gastrointestinal: Denies abdominal pain, nausea, or vomiting.  Musculoskeletal:  Denies muscle cramps.  Neurological: Denies dizziness or focal weakness.  Psychiatric/Behavioral: Normal mental status.  Hematologic/Lymphatic: Denies bleeding problem or easy bruising/bleeding.  Skin: Denies rash or suspicious lesions.    Examination:    Vital Signs:    Vitals:    06/23/20 0954   Temp: 97.3 °F (36.3 °C)   PainSc:   3       There is no height or weight on file to calculate BMI.    This a well-developed, well nourished patient in no acute distress.    Alert and oriented x 3 and cooperative to examination.       Physical Exam: Left Knee Exam    Gait   normal    Skin  Rash:   None  Scars:   None    Inspection  Erythema:  None  Bruising:  None  Effusion:  mild  Masses:  None  Lymphadenopathy: None    Range of Motion: 0-120    Medial Joint : Mild  Lateral Joint : None    Patellofemoral Tenderness: yes    Varus Stress:  Stable  Valgus Stress:  Stable    Strength:  4+/5    Pulses:  Palpable distal    Sensation:  Intact          Imaging: X-ray studies demonstrate mild to moderate left knee DJD and CPPD.       Assessment: Primary osteoarthritis of left knee  -     Large Joint Aspiration/Injection: L knee        Plan:   Orthovisc    DISCLAIMER: This note may have been dictated using voice recognition software and may contain grammatical errors.     NOTE: Consult report sent to referring provider via EP[employer] EMR.

## 2020-06-23 NOTE — PROCEDURES
Large Joint Aspiration/Injection: L knee    Date/Time: 6/23/2020 10:00 AM  Performed by: Juanito Ivory MD  Authorized by: Juanito Ivory MD     Consent Done?:  Yes (Verbal)  Indications:  Pain  Timeout: prior to procedure the correct patient, procedure, and site was verified    Approach:  Anteromedial  Location:  Knee  Site:  L knee  Medications:  15 mg sodium hyaluronate (orthovisc) 30 mg/2 mL

## 2020-07-09 ENCOUNTER — OFFICE VISIT (OUTPATIENT)
Dept: DERMATOLOGY | Facility: CLINIC | Age: 85
End: 2020-07-09
Payer: MEDICARE

## 2020-07-09 DIAGNOSIS — L82.1 SEBORRHEIC KERATOSES: Primary | ICD-10-CM

## 2020-07-09 DIAGNOSIS — L81.4 SOLAR LENTIGO: ICD-10-CM

## 2020-07-09 DIAGNOSIS — D18.01 CHERRY ANGIOMA: ICD-10-CM

## 2020-07-09 DIAGNOSIS — Z12.83 SKIN CANCER SCREENING: ICD-10-CM

## 2020-07-09 PROCEDURE — 99203 OFFICE O/P NEW LOW 30 MIN: CPT | Mod: HCNC,S$GLB,, | Performed by: DERMATOLOGY

## 2020-07-09 PROCEDURE — 99203 PR OFFICE/OUTPT VISIT, NEW, LEVL III, 30-44 MIN: ICD-10-PCS | Mod: HCNC,S$GLB,, | Performed by: DERMATOLOGY

## 2020-07-09 PROCEDURE — 99999 PR PBB SHADOW E&M-EST. PATIENT-LVL III: CPT | Mod: PBBFAC,HCNC,, | Performed by: DERMATOLOGY

## 2020-07-09 PROCEDURE — 1101F PR PT FALLS ASSESS DOC 0-1 FALLS W/OUT INJ PAST YR: ICD-10-PCS | Mod: HCNC,CPTII,S$GLB, | Performed by: DERMATOLOGY

## 2020-07-09 PROCEDURE — 1159F MED LIST DOCD IN RCRD: CPT | Mod: HCNC,S$GLB,, | Performed by: DERMATOLOGY

## 2020-07-09 PROCEDURE — 1101F PT FALLS ASSESS-DOCD LE1/YR: CPT | Mod: HCNC,CPTII,S$GLB, | Performed by: DERMATOLOGY

## 2020-07-09 PROCEDURE — 99999 PR PBB SHADOW E&M-EST. PATIENT-LVL III: ICD-10-PCS | Mod: PBBFAC,HCNC,, | Performed by: DERMATOLOGY

## 2020-07-09 PROCEDURE — 1159F PR MEDICATION LIST DOCUMENTED IN MEDICAL RECORD: ICD-10-PCS | Mod: HCNC,S$GLB,, | Performed by: DERMATOLOGY

## 2020-07-09 NOTE — PROGRESS NOTES
Subjective:       Patient ID:  Lorena Vallejo is a 87 y.o. female who presents for   Chief Complaint   Patient presents with    Spot     New Patient     87 y.o. female who presents for a TBSE.   Spots - not present at this time - describes as rough and she picks them off.   C/o itchy spot on back on the scalp, comes and goes, uses silver shampoo ([employer])    H/o lesion bx on back, nose (Dede Little)    Derm HX:   MM in R eye (choroid) - 35 years ago - has regular eye exams  PGM - MM     Retired RN      Review of Systems   Constitutional: Negative for fever, chills and fatigue.   Respiratory: Negative for cough and shortness of breath.    Skin: Positive for daily sunscreen use, activity-related sunscreen use and wears hat. Negative for itching, rash, dry skin, sun sensitivity and dry lips.   Hematologic/Lymphatic: Bruises/bleeds easily.      Past Medical History:   Diagnosis Date    Arthritis     Blood transfusion     Bowel obstruction     X 3    Cancer     MELANOMA RIGHT EYE    Cancer of eyeball, except conjunctiva, cornea, retina, and choroid right    Depression     Diverticulitis     GERD (gastroesophageal reflux disease)     Hypertension     Melanoma     right eye    Peripheral neuropathy     PSVT (paroxysmal supraventricular tachycardia)     HAD X 3 OVER 10 YRS. LAST 7-25-12. HAD ADENOSINE IN [facility]. WAS ON INCREASED THYROID MED. NO PROBLEM SINCE    Salmonella     Short bowel syndrome     Thyroid disease     Transient ischemic attack     UTI (urinary tract infection)      Objective:    Physical Exam   Constitutional: She appears well-developed and well-nourished. No distress.   HENT:   Mouth/Throat: Lips normal.    Eyes: Lids are normal.    Neurological: She is alert and oriented to person, place, and time. She is not disoriented.   Psychiatric: She has a normal mood and affect. She is not agitated.   Skin:   Areas Examined (abnormalities noted in diagram):   Scalp / Hair Palpated and  Inspected  Head / Face Inspection Performed  Neck Inspection Performed  Chest / Axilla Inspection Performed  Abdomen Inspection Performed  Genitals / Buttocks / Groin Inspection Performed  Back Inspection Performed  RUE Inspected  LUE Inspection Performed  RLE Inspected  LLE Inspection Performed  Nails and Digits Inspection Performed                   Diagram Legend     Erythematous scaling macule/papule c/w actinic keratosis       Vascular papule c/w angioma      Pigmented verrucoid papule/plaque c/w seborrheic keratosis      Yellow umbilicated papule c/w sebaceous hyperplasia      Irregularly shaped tan macule c/w lentigo     1-2 mm smooth white papules consistent with Milia      Movable subcutaneous cyst with punctum c/w epidermal inclusion cyst      Subcutaneous movable cyst c/w pilar cyst      Firm pink to brown papule c/w dermatofibroma      Pedunculated fleshy papule(s) c/w skin tag(s)      Evenly pigmented macule c/w junctional nevus     Mildly variegated pigmented, slightly irregular-bordered macule c/w mildly atypical nevus      Flesh colored to evenly pigmented papule c/w intradermal nevus       Pink pearly papule/plaque c/w basal cell carcinoma      Erythematous hyperkeratotic cursted plaque c/w SCC      Surgical scar with no sign of skin cancer recurrence      Open and closed comedones      Inflammatory papules and pustules      Verrucoid papule consistent consistent with wart     Erythematous eczematous patches and plaques     Dystrophic onycholytic nail with subungual debris c/w onychomycosis     Umbilicated papule    Erythematous-base heme-crusted tan verrucoid plaque consistent with inflamed seborrheic keratosis     Erythematous Silvery Scaling Plaque c/w Psoriasis     See annotation      Assessment / Plan:        Seborrheic keratoses  These are benign inherited growths without a malignant potential. Reassurance given to patient. No treatment is necessary.     Solar lentigo  This is a benign  hyperpigmented sun induced lesion. Daily sun protection will reduce the number of new lesions. Treatment of these benign lesions are considered cosmetic.    Cherry angioma  This is a benign vascular lesion. Reassurance given. No treatment required.     Skin cancer screening  Total body skin examination performed today including at least 12 points as noted in physical examination. No lesions suspicious for malignancy noted.    Patient instructed in importance in daily sun protection of at least spf 30. Mineral sunscreen ingredients preferred (Zinc +/- Titanium).   Recommend Elta MD for daily use on face and neck.  Patient encouraged to wear hat for all outdoor exposure.   Also discussed sun avoidance and use of protective clothing.           No follow-ups on file.

## 2020-08-11 ENCOUNTER — OFFICE VISIT (OUTPATIENT)
Dept: FAMILY MEDICINE | Facility: CLINIC | Age: 85
End: 2020-08-11
Payer: MEDICARE

## 2020-08-11 VITALS
HEIGHT: 69 IN | HEART RATE: 84 BPM | OXYGEN SATURATION: 95 % | DIASTOLIC BLOOD PRESSURE: 72 MMHG | RESPIRATION RATE: 16 BRPM | SYSTOLIC BLOOD PRESSURE: 110 MMHG | BODY MASS INDEX: 20.44 KG/M2 | WEIGHT: 138 LBS | TEMPERATURE: 97 F

## 2020-08-11 DIAGNOSIS — L03.211 CELLULITIS OF FACE: Primary | ICD-10-CM

## 2020-08-11 PROCEDURE — 99213 PR OFFICE/OUTPT VISIT, EST, LEVL III, 20-29 MIN: ICD-10-PCS | Mod: S$GLB,,, | Performed by: NURSE PRACTITIONER

## 2020-08-11 PROCEDURE — 99213 OFFICE O/P EST LOW 20 MIN: CPT | Mod: S$GLB,,, | Performed by: NURSE PRACTITIONER

## 2020-08-11 RX ORDER — DOXYCYCLINE HYCLATE 100 MG
100 TABLET ORAL 2 TIMES DAILY
Qty: 14 TABLET | Refills: 0 | Status: SHIPPED | OUTPATIENT
Start: 2020-08-11 | End: 2020-08-18

## 2020-08-11 NOTE — PROGRESS NOTES
"Subjective:      10:46 AM     Patient ID: Lorena Vallejo is a 87 y.o. female.    Chief Complaint: eyes itching (no refills needed), Headache, and Sore Throat    Eye Problem   The right eye is affected. This is a recurrent problem. The current episode started in the past 7 days (started 4 days ago). The problem occurs constantly. The problem has been gradually worsening. There was no injury mechanism. The pain is at a severity of 5/10. There is no known exposure to pink eye. She does not wear contacts. Associated symptoms include blurred vision, an eye discharge, a fever (chills) and nausea. Associated symptoms comments: States vision in right eye is always blurry since she had lesion removed in the past. No change currently. . Treatments tried: warm compresses. The treatment provided mild relief.                  Review of Systems   Constitutional: Positive for fever (chills).   Eyes: Positive for blurred vision and discharge.   Gastrointestinal: Positive for nausea.         Objective:      Vitals:    08/11/20 1011   BP: 110/72   Pulse: 84   Resp: 16   Temp: 97.3 °F (36.3 °C)   TempSrc: Oral   SpO2: 95%   Weight: 62.6 kg (138 lb 0.1 oz)   Height: 5' 9" (1.753 m)   PainSc:   4   PainLoc: Head     Physical Exam  Vitals signs and nursing note reviewed.   Constitutional:       General: She is not in acute distress.     Appearance: Normal appearance. She is well-developed. She is obese. She is not ill-appearing, toxic-appearing or diaphoretic.   HENT:      Head: Normocephalic and atraumatic.      Right Ear: Tympanic membrane, ear canal and external ear normal.      Left Ear: Tympanic membrane, ear canal and external ear normal.      Nose: Nose normal. No congestion or rhinorrhea.      Mouth/Throat:      Mouth: Mucous membranes are moist.      Pharynx: Oropharynx is clear. No oropharyngeal exudate or posterior oropharyngeal erythema.   Eyes:      General: No scleral icterus.        Right eye: No discharge.         Left eye: " No discharge.      Extraocular Movements: Extraocular movements intact.      Conjunctiva/sclera: Conjunctivae normal.      Pupils: Pupils are equal, round, and reactive to light.   Neck:      Musculoskeletal: Normal range of motion and neck supple. No neck rigidity or muscular tenderness.      Thyroid: No thyromegaly.      Vascular: No carotid bruit or JVD.      Trachea: No tracheal deviation.   Cardiovascular:      Rate and Rhythm: Normal rate and regular rhythm.      Heart sounds: Normal heart sounds. No murmur. No friction rub. No gallop.    Pulmonary:      Effort: Pulmonary effort is normal. No respiratory distress.      Breath sounds: Normal breath sounds. No stridor. No wheezing or rales.   Chest:      Chest wall: No tenderness.   Abdominal:      General: Bowel sounds are normal. There is no distension.      Palpations: Abdomen is soft. There is no mass.      Tenderness: There is no abdominal tenderness. There is no right CVA tenderness, left CVA tenderness, guarding or rebound.      Hernia: No hernia is present.   Musculoskeletal: Normal range of motion.         General: No swelling, tenderness, deformity or signs of injury.   Lymphadenopathy:      Cervical: No cervical adenopathy.   Skin:     General: Skin is warm.      Capillary Refill: Capillary refill takes less than 2 seconds.      Coloration: Skin is not jaundiced or pale.      Findings: Erythema present. No bruising, lesion or rash.      Comments: Erythema with some purulent drainage of right inner canthus.    Neurological:      General: No focal deficit present.      Mental Status: She is alert and oriented to person, place, and time.      Cranial Nerves: No cranial nerve deficit.      Motor: No weakness or abnormal muscle tone.      Coordination: Coordination normal.      Gait: Gait normal.      Deep Tendon Reflexes: Reflexes are normal and symmetric. Reflexes normal.   Psychiatric:         Mood and Affect: Mood normal.         Behavior: Behavior  normal.         Thought Content: Thought content normal.           Assessment:       1. Cellulitis of face          Plan:          Cellulitis of face  -     doxycycline (VIBRA-TABS) 100 MG tablet; Take 1 tablet (100 mg total) by mouth 2 (two) times daily. for 7 days  Dispense: 14 tablet; Refill: 0          Cellulitis of face  -     doxycycline (VIBRA-TABS) 100 MG tablet; Take 1 tablet (100 mg total) by mouth 2 (two) times daily. for 7 days  Dispense: 14 tablet; Refill: 0      Follow up if symptoms worsen or fail to improve in 1 week.

## 2020-08-31 ENCOUNTER — OFFICE VISIT (OUTPATIENT)
Dept: FAMILY MEDICINE | Facility: CLINIC | Age: 85
End: 2020-08-31
Payer: MEDICARE

## 2020-08-31 VITALS
WEIGHT: 137.13 LBS | TEMPERATURE: 98 F | BODY MASS INDEX: 20.31 KG/M2 | OXYGEN SATURATION: 95 % | HEART RATE: 80 BPM | DIASTOLIC BLOOD PRESSURE: 72 MMHG | SYSTOLIC BLOOD PRESSURE: 132 MMHG | RESPIRATION RATE: 16 BRPM | HEIGHT: 69 IN

## 2020-08-31 DIAGNOSIS — K90.829 SHORT BOWEL SYNDROME: ICD-10-CM

## 2020-08-31 DIAGNOSIS — M25.562 ARTHRALGIA OF LEFT KNEE: Primary | ICD-10-CM

## 2020-08-31 DIAGNOSIS — J30.9 ALLERGIC RHINITIS, UNSPECIFIED SEASONALITY, UNSPECIFIED TRIGGER: ICD-10-CM

## 2020-08-31 DIAGNOSIS — I47.10 SVT (SUPRAVENTRICULAR TACHYCARDIA): ICD-10-CM

## 2020-08-31 PROCEDURE — 99214 OFFICE O/P EST MOD 30 MIN: CPT | Mod: S$GLB,,, | Performed by: INTERNAL MEDICINE

## 2020-08-31 PROCEDURE — 1101F PR PT FALLS ASSESS DOC 0-1 FALLS W/OUT INJ PAST YR: ICD-10-PCS | Mod: CPTII,S$GLB,, | Performed by: INTERNAL MEDICINE

## 2020-08-31 PROCEDURE — 1101F PT FALLS ASSESS-DOCD LE1/YR: CPT | Mod: CPTII,S$GLB,, | Performed by: INTERNAL MEDICINE

## 2020-08-31 PROCEDURE — 1159F PR MEDICATION LIST DOCUMENTED IN MEDICAL RECORD: ICD-10-PCS | Mod: S$GLB,,, | Performed by: INTERNAL MEDICINE

## 2020-08-31 PROCEDURE — 1125F PR PAIN SEVERITY QUANTIFIED, PAIN PRESENT: ICD-10-PCS | Mod: S$GLB,,, | Performed by: INTERNAL MEDICINE

## 2020-08-31 PROCEDURE — 1125F AMNT PAIN NOTED PAIN PRSNT: CPT | Mod: S$GLB,,, | Performed by: INTERNAL MEDICINE

## 2020-08-31 PROCEDURE — 99214 PR OFFICE/OUTPT VISIT, EST, LEVL IV, 30-39 MIN: ICD-10-PCS | Mod: S$GLB,,, | Performed by: INTERNAL MEDICINE

## 2020-08-31 PROCEDURE — 1159F MED LIST DOCD IN RCRD: CPT | Mod: S$GLB,,, | Performed by: INTERNAL MEDICINE

## 2020-08-31 RX ORDER — METOPROLOL SUCCINATE 25 MG/1
25 TABLET, EXTENDED RELEASE ORAL NIGHTLY
Qty: 90 TABLET | Refills: 3 | Status: SHIPPED | OUTPATIENT
Start: 2020-08-31 | End: 2021-06-29 | Stop reason: SDUPTHER

## 2020-08-31 RX ORDER — FLUTICASONE PROPIONATE 50 MCG
1 SPRAY, SUSPENSION (ML) NASAL DAILY
COMMUNITY
End: 2020-08-31 | Stop reason: SDUPTHER

## 2020-08-31 RX ORDER — FLUTICASONE PROPIONATE 50 MCG
1 SPRAY, SUSPENSION (ML) NASAL DAILY
Qty: 16 ML | Refills: 5 | Status: SHIPPED | OUTPATIENT
Start: 2020-08-31

## 2020-08-31 RX ORDER — PROPAFENONE HYDROCHLORIDE 150 MG/1
150 TABLET, COATED ORAL EVERY 8 HOURS
Qty: 270 TABLET | Refills: 3 | Status: SHIPPED | OUTPATIENT
Start: 2020-08-31 | End: 2021-06-16

## 2020-08-31 NOTE — PROGRESS NOTES
"Subjective:      1:13 PM     Patient ID: Lorena Vallejo is a 87 y.o. female.    Chief Complaint: Knee Pain (refills)    HPI         Lower_Extremity_Problems(+). Pain: yes. . Injury: no.   HPI: was allergic  To simvisc      ONSET/TIMING: . Onset    3 mo  ago.     DURATION:   Intermittent         QUALITY/COURSE:    worse,. .     LOCATION:   Left  knee     . Radiation: no.      INTENSITY/SEVERITY:. Severity is #  8 with weight bearing  (10 point scale).        MODIFIERS/TREATMENTS: Current_Limitations_are:  none..   Taking medications: no    Physical_Therapy: no.  Chiropractor: no.     SYMPTOMS/RELATED: . --Possible medication side effects include:.     The following symptoms/statements  are positive if BOLD, negative otherwise.      CONTEXT/WHEN: . Activity . weight bearing. Inactivity.  Sudden  Work related.  Similar_problems_in past.   . Litigation_pending . X-rays. CT . MRI      REVIEW OF SYMPTOMS:   Numbness. Weakness. Muscle_Problems. Fever. Joint_Problems. Swelling. Erythema. Weight_loss. Instability.      .   No palpitations  On propafenone.     Allergic rhinitis doing well on flonase, claritin but eyes still itch.        Review of Systems      Objective:      Vitals:    08/31/20 1306   BP: 132/72   Pulse: 80   Resp: 16   Temp: 98.2 °F (36.8 °C)   TempSrc: Oral   SpO2: 95%   Weight: 62.2 kg (137 lb 2 oz)   Height: 5' 9" (1.753 m)   PainSc:   4   PainLoc: Knee     Physical Exam  Vitals signs and nursing note reviewed.   Constitutional:       Appearance: She is well-developed.   Cardiovascular:      Rate and Rhythm: Normal rate and regular rhythm.      Heart sounds: Normal heart sounds.   Pulmonary:      Effort: Pulmonary effort is normal.      Breath sounds: Normal breath sounds.   Abdominal:      Palpations: Abdomen is soft.      Tenderness: There is no abdominal tenderness.   Musculoskeletal:         General: Swelling and tenderness present.      Comments: Small L subpatellar effusion.  30 deg ROM. Clicks  With " extension.    Neurological:      Mental Status: She is alert.   Psychiatric:         Behavior: Behavior normal.         Thought Content: Thought content normal.       No results found for this or any previous visit (from the past 1008 hour(s)).       Assessment:       1. Arthralgia of left knee    2. SVT (supraventricular tachycardia)    3. Short bowel syndrome    4. Allergic rhinitis, unspecified seasonality, unspecified trigger          Plan:       Arthralgia of left knee  -     Ambulatory referral/consult to Orthopedics; Future; Expected date: 09/07/2020    SVT (supraventricular tachycardia)  -     propafenone (RHTHYMOL) 150 MG Tab; Take 1 tablet (150 mg total) by mouth every 8 (eight) hours.  Dispense: 270 tablet; Refill: 3  -     metoprolol succinate (TOPROL-XL) 25 MG 24 hr tablet; Take 1 tablet (25 mg total) by mouth every evening.  Dispense: 90 tablet; Refill: 3    Short bowel syndrome  -     Vitamin B12; Future; Expected date: 08/31/2020  -     CBC auto differential; Future; Expected date: 08/31/2020    Allergic rhinitis, unspecified seasonality, unspecified trigger  -     fluticasone propionate (FLONASE) 50 mcg/actuation nasal spray; 1 spray (50 mcg total) by Each Nostril route once daily.  Dispense: 16 mL; Refill: 5      Follow up in about 3 months (around 11/30/2020).

## 2020-08-31 NOTE — PATIENT INSTRUCTIONS
Use cane in right hand.   KT tape, youtube for video on how to apply.     Thank you for choosing [facility].     Please fill out the patient experience survey.

## 2020-09-08 ENCOUNTER — OFFICE VISIT (OUTPATIENT)
Dept: ORTHOPEDICS | Facility: CLINIC | Age: 85
End: 2020-09-08
Payer: MEDICARE

## 2020-09-08 ENCOUNTER — TELEPHONE (OUTPATIENT)
Dept: ORTHOPEDICS | Facility: CLINIC | Age: 85
End: 2020-09-08

## 2020-09-08 VITALS
DIASTOLIC BLOOD PRESSURE: 77 MMHG | BODY MASS INDEX: 20.44 KG/M2 | SYSTOLIC BLOOD PRESSURE: 127 MMHG | HEART RATE: 83 BPM | HEIGHT: 69 IN | WEIGHT: 138 LBS

## 2020-09-08 DIAGNOSIS — M25.562 ARTHRALGIA OF LEFT KNEE: ICD-10-CM

## 2020-09-08 DIAGNOSIS — M17.12 PRIMARY OSTEOARTHRITIS OF LEFT KNEE: Primary | ICD-10-CM

## 2020-09-08 PROCEDURE — 1159F MED LIST DOCD IN RCRD: CPT | Mod: S$GLB,,, | Performed by: ORTHOPAEDIC SURGERY

## 2020-09-08 PROCEDURE — 99203 PR OFFICE/OUTPT VISIT, NEW, LEVL III, 30-44 MIN: ICD-10-PCS | Mod: S$GLB,,, | Performed by: ORTHOPAEDIC SURGERY

## 2020-09-08 PROCEDURE — 1125F PR PAIN SEVERITY QUANTIFIED, PAIN PRESENT: ICD-10-PCS | Mod: S$GLB,,, | Performed by: ORTHOPAEDIC SURGERY

## 2020-09-08 PROCEDURE — 1101F PT FALLS ASSESS-DOCD LE1/YR: CPT | Mod: S$GLB,,, | Performed by: ORTHOPAEDIC SURGERY

## 2020-09-08 PROCEDURE — 99203 OFFICE O/P NEW LOW 30 MIN: CPT | Mod: S$GLB,,, | Performed by: ORTHOPAEDIC SURGERY

## 2020-09-08 PROCEDURE — 1159F PR MEDICATION LIST DOCUMENTED IN MEDICAL RECORD: ICD-10-PCS | Mod: S$GLB,,, | Performed by: ORTHOPAEDIC SURGERY

## 2020-09-08 PROCEDURE — 1101F PR PT FALLS ASSESS DOC 0-1 FALLS W/OUT INJ PAST YR: ICD-10-PCS | Mod: S$GLB,,, | Performed by: ORTHOPAEDIC SURGERY

## 2020-09-08 PROCEDURE — 1125F AMNT PAIN NOTED PAIN PRSNT: CPT | Mod: S$GLB,,, | Performed by: ORTHOPAEDIC SURGERY

## 2020-09-08 NOTE — LETTER
September 8, 2020      Stephon Cates MD  [address] [facility] [address]           [facility]  [address] [facility] [address]  [facility] [address]  Phone: [phone]  Fax: [phone]          Patient: Lorena Vallejo   MR Number: [id number]   YOB: 1933   Date of Visit: 9/8/2020       Dear Dr. Stephon Cates:    Thank you for referring Lorena Vallejo to me for evaluation. Attached you will find relevant portions of my assessment and plan of care.    If you have questions, please do not hesitate to call me. I look forward to following Lorena Vallejo along with you.    Sincerely,    Eduardo Haider MD    Enclosure  CC:  No Recipients    If you would like to receive this communication electronically, please contact [email or web address] or [phone] to request more information on [employer] access.    For providers and/or their staff who would like to refer a patient to [facility], please contact us through our one-stop-shop provider referral line, [facility], [place], at [phone].    If you feel you have received this communication in error or would no longer like to receive these types of communications, please e-mail [email or web address]

## 2020-09-08 NOTE — PROGRESS NOTES
[facility]    Subjective:     Chief Complaint:   Chief Complaint   Patient presents with    Left Knee - Pain     Left knee pain x 3 years. States that her pain is constant and gets worse with activity and getting up from a sitting position.        Past Medical History:   Diagnosis Date    Arthritis     Blood transfusion     Bowel obstruction     X 3    Cancer     MELANOMA RIGHT EYE    Cancer of eyeball, except conjunctiva, cornea, retina, and choroid right    Depression     Diverticulitis     GERD (gastroesophageal reflux disease)     Hypertension     Melanoma     right eye    Peripheral neuropathy     PSVT (paroxysmal supraventricular tachycardia)     HAD X 3 OVER 10 YRS. LAST 7-25-12. HAD ADENOSINE IN [facility]. WAS ON INCREASED THYROID MED. NO PROBLEM SINCE    Salmonella     Short bowel syndrome     Thyroid disease     Transient ischemic attack     UTI (urinary tract infection)        Past Surgical History:   Procedure Laterality Date    ABDOMINAL SURGERY      X 3. ILEUM REMOVED. OSTOMY PLACED AND CLOSED.    ADENOIDECTOMY      BILATERAL OOPHORECTOMY      COLONOSCOPY      COLONOSCOPY N/A 8/24/2016    Procedure: COLONOSCOPY;  Surgeon: Alex Corral MD;  Location: [facility] ENDO;  Service: Endoscopy;  Laterality: N/A;    COLONOSCOPY N/A 8/29/2019    Procedure: COLONOSCOPY;  Surgeon: Tiffanie Vázquez MD;  Location: [facility] ENDO;  Service: Endoscopy;  Laterality: N/A;    COLONOSCOPY N/A 11/22/2019    Procedure: COLONOSCOPY;  Surgeon: Tiffanie Vázquez MD;  Location: [facility] ENDO;  Service: Endoscopy;  Laterality: N/A;    CTR      BILAT    ESOPHAGOGASTRODUODENOSCOPY N/A 8/29/2019    Procedure: EGD (ESOPHAGOGASTRODUODENOSCOPY);  Surgeon: Tiffanie Vázquez MD;  Location: [facility] ENDO;  Service: Endoscopy;  Laterality: N/A;    EYE SURGERY      RIGHT  - LASER MELANOMA; BILAT CATARACT    HYSTERECTOMY      BSO    JOINT REPLACEMENT      RIGHT KNEE    MANDIBLE FRACTURE SURGERY      MENISCECTOMY       MEDIAL AND LATERAL REPAIR, BAKERS CYST    TENDON REPAIR      LEFT THUMB    TONSILLECTOMY      T AND A       Current Outpatient Medications   Medication Sig    acetaminophen (TYLENOL) 500 MG tablet Take 1,000 mg by mouth every 6 (six) hours as needed.    ARMOUR THYROID 15 mg Tab TAKE 1 TABLET (15 MG TOTAL) BY MOUTH BEFORE BREAKFAST. TAKE WITH 150 MG ARMOUR THYROID    ARMOUR THYROID 300 mg Tab Take 150 mg by mouth once daily. TAKE ONE-HALF TABLET BY MOUTH ONCE DAILY    aspirin (ECOTRIN) 81 MG EC tablet Take 81 mg by mouth once daily.    Bifidobacterium infantis (ALIGN) 4 mg Cap Take 4 mg by mouth once daily.    calcium carbonate (OS-RAMONITA) 500 mg calcium (1,250 mg) tablet Take 1 tablet by mouth 2 (two) times daily.    coenzyme Q10 100 mg capsule Take 100 mg by mouth once daily.    cyanocobalamin, vitamin B-12, 5,000 mcg Subl Place 5,000 mcg under the tongue every Mon, Wed, Fri.    diltiaZEM (TIAZAC) 240 MG Cs24 Take 1 capsule (240 mg total) by mouth once daily.    loperamide (IMODIUM) 2 mg capsule Take 1 capsule (2 mg total) by mouth 4 (four) times daily as needed for Diarrhea.    magnesium oxide (MAG-OX) 400 mg tablet Take 400 mg by mouth every evening.     metoprolol succinate (TOPROL-XL) 25 MG 24 hr tablet Take 1 tablet (25 mg total) by mouth every evening.    nitroGLYCERIN (NITROSTAT) 0.4 MG SL tablet DISSOLVE 1 TABLET UNDER THE TONGUE EVERY 5 MINUTES AS NEEDED FOR CHEST PAIN (Patient taking differently: Place 0.4 mg under the tongue every 5 (five) minutes as needed for Chest pain. )    omeprazole (PRILOSEC) 20 MG capsule TAKE 1 CAPSULE (20 MG TOTAL) BY MOUTH ONCE DAILY.    propafenone (RHTHYMOL) 150 MG Tab Take 1 tablet (150 mg total) by mouth every 8 (eight) hours.    sertraline (ZOLOFT) 100 MG tablet Take 1 tablet (100 mg total) by mouth once daily. (Patient taking differently: Take 100 mg by mouth every evening. )    VIT C/E/ZN/COPPR/LUTEIN/ZEAXAN (PRESERVISION AREDS 2 ORAL) Take 1 capsule  by mouth 2 (two) times daily.     vitamin D 1000 units Tab Take 1,000 Units by mouth once daily.    fluticasone propionate (FLONASE) 50 mcg/actuation nasal spray 1 spray (50 mcg total) by Each Nostril route once daily. (Patient not taking: Reported on 9/8/2020)    krill oil 500 mg Cap Take 500 mg by mouth every evening.     No current facility-administered medications for this visit.        Review of patient's allergies indicates:   Allergen Reactions    Flagyl [metronidazole hcl] Diarrhea     SEVERE DIARRHEA    Latex, natural rubber Rash     ITCHING    Levaquin [levofloxacin] Diarrhea     MOOD CHANGES, WEAKNESS    Morphine Other (See Comments)     HALLUCINATES    Augmentin [amoxicillin-pot clavulanate] Other (See Comments)     AGITATION    Ciprofloxacin Anxiety    Codeine Nausea And Vomiting    Demerol [meperidine] Nausea And Vomiting     CAN TAKE WITH PHENERGAN    Dilaudid [hydromorphone (bulk)] Itching     OK WITH BENADRYL    Fentanyl Itching     OK WITH BENADRYL    Fosfomycin      diarrhia    Naproxen Other (See Comments)     HURT STOMACH    Nsaids (non-steroidal anti-inflammatory drug) Other (See Comments)     TOLD NOT TO TAKE AS HAD AN ULCER. HAD BLEEDING    Percocet [oxycodone-acetaminophen] Rash    Prednisone Other (See Comments)     AGITATION    Reglan [metoclopramide hcl] Anxiety    Sulfa (sulfonamide antibiotics) Rash    Vicodin [hydrocodone-acetaminophen] Rash    Zosyn [piperacillin-tazobactam] Rash    Ambien [zolpidem]     Ibuprofen     Pcn [penicillins]     Vasotec [enalapril maleate] Other (See Comments)     DRY COUGH       Family History   Problem Relation Age of Onset    Diabetes Mother     Heart disease Mother         MI at 52    Stroke Father     Cancer Father         colon cancer    Rheum arthritis Father     COPD Father     Heart disease Brother     Stroke Brother     Diabetes Brother     Arthritis Daughter     Hypertension Daughter     Cancer Son          Leukemia    Hypertension Son     Heart disease Son     Diabetes Maternal Grandmother     Asthma Maternal Grandfather     Cancer Paternal Grandmother         melanoma    Melanoma Paternal Grandmother     Cancer Paternal Grandfather         lip cancer - smoked pipe    Sleep apnea Son     Hypertension Son     Sinus disease Son     Hypertension Son     Heart disease Son     Obesity Son        Social History     Socioeconomic History    Marital status:      Spouse name: Not on file    Number of children: Not on file    Years of education: Not on file    Highest education level: Not on file   Occupational History    Not on file   Social Needs    Financial resource strain: Not on file    Food insecurity     Worry: Not on file     Inability: Not on file    Transportation needs     Medical: Not on file     Non-medical: Not on file   Tobacco Use    Smoking status: Former Smoker     Packs/day: 1.00     Years: 18.00     Pack years: 18.00     Start date:      Quit date: 1973     Years since quittin.6    Smokeless tobacco: Never Used   Substance and Sexual Activity    Alcohol use: Yes     Alcohol/week: 2.0 standard drinks     Types: 2 Glasses of wine per week     Comment: WINE once per day    Drug use: No    Sexual activity: Not Currently   Lifestyle    Physical activity     Days per week: Not on file     Minutes per session: Not on file    Stress: Not on file   Relationships    Social connections     Talks on phone: Not on file     Gets together: Not on file     Attends [employer] service: Not on file     Active member of club or organization: Not on file     Attends meetings of clubs or organizations: Not on file     Relationship status: Not on file   Other Topics Concern    Are you pregnant or think you may be? Not Asked    Breast-feeding Not Asked   Social History Narrative    Not on file       History of present illness:  In patient returns to clinic today for her left  knee.  She has been having chronic knee pain for several years.  She has a history of prior right knee arthroplasty done by Dr. Haider many years ago.  She has been seeing another orthopedist for the last 2-3 years, she has been receiving viscosupplementation and intra-articular injections with modest relief in her symptoms.  However now her last injections were in June and these do not last a significant period of time and she wishes to discuss joint replacement surgery on the left.      Review of Systems:    Constitution: Negative for chills, fever, and sweats.  Negative for unexplained weight loss.    HENT:  Negative for headaches and blurry vision.    Cardiovascular:Negative for chest pain or irregular heart beat. Negative for hypertension.    Respiratory:  Negative for cough and shortness of breath.    Gastrointestinal: Negative for abdominal pain, heartburn, melena, nausea, and vomitting.    Genitourinary:  Negative bladder incontinence and dysuria.    Musculoskeletal:  See HPI for details.     Neurological: Negative for numbness.    Psychiatric/Behavioral: Negative for depression.  The patient is not nervous/anxious.      Endocrine: Negative for polyuria    Hematologic/Lymphatic: Negative for bleeding problem.  Does not bruise/bleed easily.    Skin: Negative for poor would healing and rash    Objective:      Physical Examination:    Vital Signs:    Vitals:    09/08/20 1007   BP: 127/77   Pulse: 83       Body mass index is 20.38 kg/m².    This a well-developed, well nourished patient in no acute distress.  They are alert and oriented and cooperative to examination.        Examination of the left knee, the patient has no joint effusion.  She has pain with passive and active range of motion.  She has pain with patellofemoral grind testing and crepitus.  Tenderness over the medial joint line, tenderness or lateral joint line and is stable to anterior-posterior and varus and valgus stress.  Pertinent New  "Results:    XRAY Report / Interpretation:   AP lateral and sunrise views of the left knee taken in the office demonstrate severe tricompartmental arthritis of the left knee.  The right knee has a total knee arthroplasty which appears in appropriate position.    Assessment/Plan:      Osteoarthritis left knee, history of right total knee arthroplasty.  The patient with severe bone-on-bone arthritis of the left knee who is now failing viscosupplementation injections.  Plan is to proceed with left total knee arthroplasty.    Patient was consented for surgery. Risks and benefits of the procedure were discussed in great detail. Complications may include but are not limited to bleeding, infection, DVT, nerve injury, altered sensation, continued pain, RSD, loss of motion or a need for additional surgery.    Ralph Lake PA-C served as a scribe for this patient encounter. A "face to face" encounter occured with Dr. Eduardo Haider on this date. The treatment plan and medical decision making are outlined as above:      This note was created using Dragon voice recognition software that occasionally misinterpreted phrases or words.          "

## 2020-09-24 DIAGNOSIS — Z01.818 PREOP EXAMINATION: ICD-10-CM

## 2020-09-24 DIAGNOSIS — M17.12 PRIMARY OSTEOARTHRITIS OF LEFT KNEE: Primary | ICD-10-CM

## 2020-09-24 DIAGNOSIS — Z01.818 PREOP TESTING: ICD-10-CM

## 2020-09-24 DIAGNOSIS — M17.12 OSTEOARTHRITIS OF LEFT KNEE: ICD-10-CM

## 2020-09-24 RX ORDER — MUPIROCIN 20 MG/G
OINTMENT TOPICAL
Status: CANCELLED | OUTPATIENT
Start: 2020-09-24

## 2020-10-09 ENCOUNTER — LAB VISIT (OUTPATIENT)
Dept: LAB | Facility: HOSPITAL | Age: 85
End: 2020-10-09
Attending: SPECIALIST
Payer: MEDICARE

## 2020-10-09 DIAGNOSIS — R07.89 OTHER CHEST PAIN: ICD-10-CM

## 2020-10-09 DIAGNOSIS — E05.90 PRETIBIAL MYXEDEMA: ICD-10-CM

## 2020-10-09 DIAGNOSIS — E78.9 DISORDER OF LIPOPROTEIN AND LIPID METABOLISM: ICD-10-CM

## 2020-10-09 DIAGNOSIS — I20.0 INTERMEDIATE CORONARY SYNDROME: Primary | ICD-10-CM

## 2020-10-09 LAB
ALBUMIN SERPL BCP-MCNC: 4 G/DL (ref 3.5–5.2)
ALP SERPL-CCNC: 67 U/L (ref 55–135)
ALT SERPL W/O P-5'-P-CCNC: 16 U/L (ref 10–44)
ANION GAP SERPL CALC-SCNC: 9 MMOL/L (ref 8–16)
AST SERPL-CCNC: 18 U/L (ref 10–40)
BASOPHILS # BLD AUTO: 0.05 K/UL (ref 0–0.2)
BASOPHILS NFR BLD: 0.9 % (ref 0–1.9)
BILIRUB SERPL-MCNC: 0.5 MG/DL (ref 0.1–1)
BUN SERPL-MCNC: 24 MG/DL (ref 8–23)
CALCIUM SERPL-MCNC: 9.2 MG/DL (ref 8.7–10.5)
CHLORIDE SERPL-SCNC: 108 MMOL/L (ref 95–110)
CHOLEST SERPL-MCNC: 139 MG/DL (ref 120–199)
CHOLEST/HDLC SERPL: 2.7 {RATIO} (ref 2–5)
CO2 SERPL-SCNC: 27 MMOL/L (ref 23–29)
CREAT SERPL-MCNC: 0.9 MG/DL (ref 0.5–1.4)
DIFFERENTIAL METHOD: ABNORMAL
EOSINOPHIL # BLD AUTO: 0.3 K/UL (ref 0–0.5)
EOSINOPHIL NFR BLD: 4.8 % (ref 0–8)
ERYTHROCYTE [DISTWIDTH] IN BLOOD BY AUTOMATED COUNT: 13.2 % (ref 11.5–14.5)
EST. GFR  (AFRICAN AMERICAN): >60 ML/MIN/1.73 M^2
EST. GFR  (NON AFRICAN AMERICAN): 57.7 ML/MIN/1.73 M^2
GLUCOSE SERPL-MCNC: 90 MG/DL (ref 70–110)
HCT VFR BLD AUTO: 36.8 % (ref 37–48.5)
HDLC SERPL-MCNC: 51 MG/DL (ref 40–75)
HDLC SERPL: 36.7 % (ref 20–50)
HGB BLD-MCNC: 12 G/DL (ref 12–16)
IMM GRANULOCYTES # BLD AUTO: 0.02 K/UL (ref 0–0.04)
IMM GRANULOCYTES NFR BLD AUTO: 0.3 % (ref 0–0.5)
LDLC SERPL CALC-MCNC: 57.8 MG/DL (ref 63–159)
LYMPHOCYTES # BLD AUTO: 2.7 K/UL (ref 1–4.8)
LYMPHOCYTES NFR BLD: 45.8 % (ref 18–48)
MCH RBC QN AUTO: 32.7 PG (ref 27–31)
MCHC RBC AUTO-ENTMCNC: 32.6 G/DL (ref 32–36)
MCV RBC AUTO: 100 FL (ref 82–98)
MONOCYTES # BLD AUTO: 0.8 K/UL (ref 0.3–1)
MONOCYTES NFR BLD: 14.5 % (ref 4–15)
NEUTROPHILS # BLD AUTO: 2 K/UL (ref 1.8–7.7)
NEUTROPHILS NFR BLD: 33.7 % (ref 38–73)
NONHDLC SERPL-MCNC: 88 MG/DL
NRBC BLD-RTO: 0 /100 WBC
PLATELET # BLD AUTO: 269 K/UL (ref 150–350)
PMV BLD AUTO: 9.7 FL (ref 9.2–12.9)
POTASSIUM SERPL-SCNC: 4.4 MMOL/L (ref 3.5–5.1)
PROT SERPL-MCNC: 7 G/DL (ref 6–8.4)
RBC # BLD AUTO: 3.67 M/UL (ref 4–5.4)
SODIUM SERPL-SCNC: 144 MMOL/L (ref 136–145)
T4 FREE SERPL-MCNC: 0.84 NG/DL (ref 0.71–1.51)
TRIGL SERPL-MCNC: 151 MG/DL (ref 30–150)
TSH SERPL DL<=0.005 MIU/L-ACNC: 11.85 UIU/ML (ref 0.4–4)
WBC # BLD AUTO: 5.81 K/UL (ref 3.9–12.7)

## 2020-10-09 PROCEDURE — 85025 COMPLETE CBC W/AUTO DIFF WBC: CPT | Mod: HCNC

## 2020-10-09 PROCEDURE — 84439 ASSAY OF FREE THYROXINE: CPT | Mod: HCNC

## 2020-10-09 PROCEDURE — 80061 LIPID PANEL: CPT | Mod: HCNC

## 2020-10-09 PROCEDURE — 36415 COLL VENOUS BLD VENIPUNCTURE: CPT | Mod: HCNC,PO

## 2020-10-09 PROCEDURE — 80053 COMPREHEN METABOLIC PANEL: CPT | Mod: HCNC

## 2020-10-09 PROCEDURE — 84443 ASSAY THYROID STIM HORMONE: CPT | Mod: HCNC

## 2020-10-13 ENCOUNTER — TELEPHONE (OUTPATIENT)
Dept: FAMILY MEDICINE | Facility: CLINIC | Age: 85
End: 2020-10-13

## 2020-10-13 DIAGNOSIS — E03.9 HYPOTHYROIDISM, UNSPECIFIED TYPE: Primary | ICD-10-CM

## 2020-10-13 RX ORDER — THYROID 30 MG/1
30 TABLET ORAL
Qty: 90 TABLET | Refills: 1 | Status: SHIPPED | OUTPATIENT
Start: 2020-10-13 | End: 2021-01-04

## 2020-10-13 NOTE — TELEPHONE ENCOUNTER
Please take a look at recent labs and tsh.Patient wants to know what you want to do with medication.

## 2020-10-13 NOTE — TELEPHONE ENCOUNTER
----- Message from Wilman Nur sent at 10/13/2020  9:56 AM CDT -----  Contact: patient  Type: Needs Medical Advice  Who Called:  patient  Symptoms (please be specific):    How long has patient had these symptoms:    Pharmacy name and phone #:    Best Call Back Number: [phone]  Additional Information: requesting a call back  this morning regarding thyroid issues

## 2020-10-14 NOTE — TELEPHONE ENCOUNTER
Patient takes 165 mg thyroid medication.Patient takes half of a 300 mg tab with 15 mg tab.She says your increase is too much.

## 2020-10-19 ENCOUNTER — HOSPITAL ENCOUNTER (OUTPATIENT)
Dept: PREADMISSION TESTING | Facility: HOSPITAL | Age: 85
Discharge: HOME | End: 2020-10-19
Attending: ORTHOPAEDIC SURGERY
Payer: MEDICARE

## 2020-10-19 ENCOUNTER — HOSPITAL ENCOUNTER (OUTPATIENT)
Dept: RADIOLOGY | Facility: HOSPITAL | Age: 85
Discharge: HOME | End: 2020-10-19
Attending: ORTHOPAEDIC SURGERY
Payer: MEDICARE

## 2020-10-19 VITALS — BODY MASS INDEX: 20.59 KG/M2 | WEIGHT: 139 LBS | HEIGHT: 69 IN

## 2020-10-19 DIAGNOSIS — Z01.818 PREOP TESTING: ICD-10-CM

## 2020-10-19 DIAGNOSIS — M17.12 PRIMARY OSTEOARTHRITIS OF LEFT KNEE: Primary | ICD-10-CM

## 2020-10-19 LAB
ABO + RH BLD: NORMAL
BLD GP AB SCN CELLS X3 SERPL QL: NORMAL

## 2020-10-19 PROCEDURE — 87081 CULTURE SCREEN ONLY: CPT | Mod: HCNC

## 2020-10-19 PROCEDURE — 71046 X-RAY EXAM CHEST 2 VIEWS: CPT | Mod: TC,HCNC,FY

## 2020-10-19 PROCEDURE — 71046 X-RAY EXAM CHEST 2 VIEWS: CPT | Mod: 26,HCNC,, | Performed by: RADIOLOGY

## 2020-10-19 PROCEDURE — 86850 RBC ANTIBODY SCREEN: CPT | Mod: HCNC

## 2020-10-19 PROCEDURE — 36415 COLL VENOUS BLD VENIPUNCTURE: CPT | Mod: HCNC

## 2020-10-19 PROCEDURE — 71046 XR CHEST PA AND LATERAL: ICD-10-PCS | Mod: 26,HCNC,, | Performed by: RADIOLOGY

## 2020-10-19 NOTE — PRE ADMISSION SCREENING
Patient Name: Lorena Vallejo  YOB: 1933   MRN: [id number]     [facility]   Basic Mobility Inpatient Short Form 6 Clicks         How much difficulty does the patient currently have  Unable  A Lot  A Little  None      1. Turning over in bed (including adjusting bedclothes, sheets and blankets)?     1 []    2 []    3 [x]    4 []        2. Sitting down on and standing up from a chair with arms (e.g., wheelchair, bedside commode, etc.)     1 []  2 []  3 []     4 [x]      3. Moving from lying on back to sitting on the side of the bed?     1 []  2 []  3 []    4 [x]    How much help from another person does the patient currently need  Total  A Lot  A Little  None      4. Moving to and from a bed to a chair (including a wheelchair)?    1 []  2 []  3 []    4 [x]      5. Need to walk in hospital room?    1 []  2 []  3 []    4 [x]      6. Climbing 3-5 steps with a railing?    1 []  2 []  3 []    4 [x]       Raw Score:       23           CMS 0-100% Score:    11.20        %   Standardized Score:   56.93            CMS Modifier:      CI                                    [facility]   Basic Mobility Inpatient Short Form 6 Clicks Score Conversion Table*         *Use this form to convert -PAC Basic Mobility Inpatient Raw Scores.   [facility] Inpatient Basic Mobility Short Form Scoring Example   1. Add the number values associated with the response to each item. For example, items totals yield a Raw Score of 21.   2. Match the raw score to the t-Scale scores (t-Scale score = 50.25, SE = 4.69).   3. Find the associated CMS % (CMS % = 28.97%).   4. Locate the correct CMS Functional Modifier Code, or G Code (G code = CJ)     NOTE: Each -PAC Short Form has a separate conversion table. Make sure that you use the correct conversion table.       Instruction Manual - page 45 contains conversion table

## 2020-10-19 NOTE — PRE ADMISSION SCREENING
"               [facility] Risk Assessment Scale    Patient Name: Lorena Vallejo  YOB: 1933  MRN: [id number]            RIsk Factor Measure Recommendation Patient Data Scale/Score   BMI >40 Reconsider surgery, weight loss   Estimated body mass index is 20.53 kg/m² as calculated from the following:    Height as of this encounter: 5' 9" (1.753 m).    Weight as of this encounter: 63 kg (139 lb).   [x] 0 = 1 - 24.9  [] 1 = 25-29.9  [] 2 = 30-34.9  [] 3 = 35-39.9  [] 4 = 40-44.9  [] 5 = 45-99.9   Hemoglobin AIC (if applicable) >9 Delay surgery until DM under control  Refer for:  · Nutrition Therapy  · Exercise   · Medication    No results found for: LABA1C, HGBA1C    Lab Results   Component Value Date    GLU 90 10/09/2020      [] 0 = 4.0-5.6  [] 1 = 5.7-6.4  [] 2 = 6.5-6.9  [] 3 = 7.0-7.9  [] 4 = 8.0-8.9  [] 5 = 9.0-12   Hemoglobin (Anemia) <9 Delay surgery   Correct anemia Lab Results   Component Value Date    HGB 12.0 10/09/2020    [] 20 - <9.0                    Albumin <3 Delay surgery &Workup Lab Results   Component Value Date    ALBUMIN 4.0 10/09/2020    [] 20 - <3.0   Smoking Cessation >4 Weeks Delay Surgery  Refer to OP Cessation Class    Former Smoker  Quit: 1973 [] 20 - current smoker                                _____ PPD                    Hx of MI, PE, Arrhythmia, CVA, DVT <30 Days Delay Surgery    N/A [] 20      Infection Variable Delay surgery and re-evaluate   N/A [] 20 - recent/current infection     Depression (PHQ) >10 out of 27 Delay Surgery and re-evaluate  Medication  Counseling              [x] 0     []1     []2     []3      []4      [] 5                    (1-4)      (5-9)  (10-14)  (15-19)   (20-27)     Memory Impairment & Memory loss (Mini-Cog Screening Tool) Advanced dementia and/or Parkinson's Reconsider surgery     [x] 0     []1     []2     []3     []4     [] 5     Physical Conditioning (Modified AM-PAC Per Physical [facility]) Unable to ambulate on day of surgery Delay surgery " and re-evaluate  Pre-Rehabilitation   (PT evaluation)       [x]  0   []4       []8     []12        []16     []20       (<20%)   (<40%)   (<60%)   (<80% )    (>80%)     Home Environment/Caregiver support  (Per /Navigator Interview)    Availability of basic services and/or approprate assistance during post-operative period Delay surgery and re-evaluate  Safe home environment  Health   1 week post-surgery  Transportation  availability  Ability to obtain DME/Medications post-op    [x] 0     []1     []2     []3     []4     [] 5  [x] 0     []1     []2     []3     []4     [] 5  [x] 0     []1     []2     []3     []4     [] 5  [x] 0     []1     []2     []3     []4     [] 5         MD Contact: Dr. Haider Comments:  Total Score:  0

## 2020-10-19 NOTE — PRE ADMISSION SCREENING
JOINT CAMP ASSESSMENT    Name Lorena Vallejo   MRN [id number]    Age/Sex 87 y.o. female    Surgeon Dr. Eduardo Haider   Joint Camp Date 10/19/2020   Surgery Date 11/2/2020   Procedure Left Knee Arthroplasty   Insurance Payor: [employer] MEDICARE / Plan: [employer] MEDICARE HMO / Product Type: Capitation /    Care Team Patient Care Team:  Stephon Cates MD as PCP - General (Internal Medicine)  Juanito Ivory MD as Consulting Physician (Sports Medicine)  Seda May DO as Consulting Physician (Physical Medicine and Rehabilitation)  Yunier Alvarado MD as Consulting Physician (Ophthalmology)  Benjy Sepulveda MD as Consulting Physician (Cardiology)  Elba De Anda LPN as Care Coordinator (Family Medicine)  Tiffanie Vázquez MD as Consulting Physician (Gastroenterology)  Eduardo Haider MD as Consulting Physician (Orthopedic Surgery)    Pharmacy   [id number] - [place] - [address] [facility] [address]  [address] [address]  Phone: [phone] Fax: [phone]    [facility] - [place], [place] - [address]  [address] [facility] [address] [address]  Phone: [phone] Fax: [phone]     AM-PAC Score   23   Risk Assessment Score 0     Past Medical History:   Diagnosis Date    Arthritis     Blood transfusion     Bowel obstruction     X 3    Cancer     MELANOMA RIGHT EYE    Cancer of eyeball, except conjunctiva, cornea, retina, and choroid right    Depression     Diverticulitis     GERD (gastroesophageal reflux disease)     Hypertension     Melanoma     right eye    Peripheral neuropathy     PSVT (paroxysmal supraventricular tachycardia)     HAD X 3 OVER 10 YRS. LAST 7-25-12. HAD ADENOSINE IN [facility]. WAS ON INCREASED THYROID MED. NO PROBLEM SINCE    Salmonella     Short bowel syndrome     Thyroid disease     Transient ischemic attack     UTI (urinary tract infection)     Wears partial dentures      PARTIAL BOTTOM       Past Surgical History:   Procedure Laterality Date    ABDOMINAL SURGERY      X 3. ILEUM REMOVED. OSTOMY PLACED AND CLOSED.    ADENOIDECTOMY      BILATERAL OOPHORECTOMY      COLONOSCOPY      COLONOSCOPY N/A 8/24/2016    Procedure: COLONOSCOPY;  Surgeon: Alex Corral MD;  Location: [facility] ENDO;  Service: Endoscopy;  Laterality: N/A;    COLONOSCOPY N/A 8/29/2019    Procedure: COLONOSCOPY;  Surgeon: Tiffanie Vázquez MD;  Location: [facility] ENDO;  Service: Endoscopy;  Laterality: N/A;    COLONOSCOPY N/A 11/22/2019    Procedure: COLONOSCOPY;  Surgeon: Tiffanie Vázquez MD;  Location: [facility] ENDO;  Service: Endoscopy;  Laterality: N/A;    CTR      BILAT    ESOPHAGOGASTRODUODENOSCOPY N/A 8/29/2019    Procedure: EGD (ESOPHAGOGASTRODUODENOSCOPY);  Surgeon: Tiffanie Vázquez MD;  Location: [facility] ENDO;  Service: Endoscopy;  Laterality: N/A;    EYE SURGERY      RIGHT  - LASER MELANOMA; BILAT CATARACT    HYSTERECTOMY      BSO    JOINT REPLACEMENT      RIGHT KNEE    MANDIBLE FRACTURE SURGERY      MENISCECTOMY      MEDIAL AND LATERAL REPAIR, BAKERS CYST    TENDON REPAIR      LEFT THUMB    TONSILLECTOMY      T AND A         Home Enviroment     Living Arrangement: Lives with spouse  Home Environment: 1-story house/ trailer, number of outside stairs: 1, walk-in shower  Home Safety Concerns: Pets in the home: dogs (1) and cats (2).    DISCHARGE CAREGIVER/SUPPORT SYSTEM     Identified post-op caregiver: Patient has children / family / friends to help, spouse and daughter.  Patient's caregiver(s) will be able to provide physical assistance. Patient will have someone to assist overnight.      Caregiver present at pre-op interview:  No      PRE-OPERATIVE FUNCTIONAL STATUS     Employment: Retired    Pre-op Functional Status: Patient is independent with mobility/ambulation, transfers, ADL's, IADL's.    Use of assistive device for ambulation: none  ADL: self care  ADL Limitations: difficulty with  walking  Medical Restrictions: Decreased range of motions in extremities    POTENTIAL BARRIERS TO DISCHARGE/POTENTIAL POST-OP COMPLICATIONS     Patient with hx of previous UTI, TIA, short bowel syndrome, PSVT, HTN, and  Previous blood transfusion.    DISCHARGE PLAN     Expected LOS of 2 days or less for joint replacement discussed with patient. We also discussed a discharge path of HH for approximately two weeks with a transition to outpatient PT on the third week given no post-op complications.      Patient in agreement with discharge plan: Yes    Discharge to: Discharge home with home health (PT/OT) x2 weeks with transition to outpatient PT     HH:  [facility]     OP PT: [facility]     Home DME: rolling walker, bedside commode and shower chair    Needed DME at D/C: none     Rx: Per Dr. Haider at discharge     Meds to Beds: N/A  Patient expected to discharge on Aspirin 81mg by mouth twice daily for DVT prophylaxis.

## 2020-10-21 LAB — MRSA SPEC QL CULT: NORMAL

## 2020-10-26 DIAGNOSIS — Z96.652 STATUS POST TOTAL KNEE REPLACEMENT, LEFT: Primary | ICD-10-CM

## 2020-10-30 ENCOUNTER — LAB VISIT (OUTPATIENT)
Dept: PRIMARY CARE CLINIC | Facility: CLINIC | Age: 85
End: 2020-10-30
Payer: MEDICARE

## 2020-10-30 ENCOUNTER — ANESTHESIA EVENT (OUTPATIENT)
Dept: SURGERY | Facility: HOSPITAL | Age: 85
DRG: 469 | End: 2020-10-30
Payer: MEDICARE

## 2020-10-30 DIAGNOSIS — Z01.818 PREOP EXAMINATION: ICD-10-CM

## 2020-10-30 PROCEDURE — U0003 INFECTIOUS AGENT DETECTION BY NUCLEIC ACID (DNA OR RNA); SEVERE ACUTE RESPIRATORY SYNDROME CORONAVIRUS 2 (SARS-COV-2) (CORONAVIRUS DISEASE [COVID-19]), AMPLIFIED PROBE TECHNIQUE, MAKING USE OF HIGH THROUGHPUT TECHNOLOGIES AS DESCRIBED BY CMS-2020-01-R: HCPCS | Mod: HCNC

## 2020-10-31 LAB — SARS-COV-2 RNA RESP QL NAA+PROBE: NOT DETECTED

## 2020-11-02 ENCOUNTER — HOSPITAL ENCOUNTER (INPATIENT)
Facility: HOSPITAL | Age: 85
LOS: 3 days | Discharge: HOME HEALTH | DRG: 469 | End: 2020-11-06
Attending: ORTHOPAEDIC SURGERY | Admitting: HOSPITALIST
Payer: MEDICARE

## 2020-11-02 ENCOUNTER — ANESTHESIA (OUTPATIENT)
Dept: SURGERY | Facility: HOSPITAL | Age: 85
DRG: 469 | End: 2020-11-02
Payer: MEDICARE

## 2020-11-02 DIAGNOSIS — J44.89 CHRONIC BRONCHITIS WITH COPD (CHRONIC OBSTRUCTIVE PULMONARY DISEASE): ICD-10-CM

## 2020-11-02 DIAGNOSIS — M17.12 PRIMARY OSTEOARTHRITIS OF LEFT KNEE: ICD-10-CM

## 2020-11-02 DIAGNOSIS — J44.9 CHRONIC OBSTRUCTIVE PULMONARY DISEASE, UNSPECIFIED COPD TYPE: ICD-10-CM

## 2020-11-02 DIAGNOSIS — M17.12 OSTEOARTHRITIS OF LEFT KNEE: Primary | ICD-10-CM

## 2020-11-02 DIAGNOSIS — Z01.818 PREOP TESTING: ICD-10-CM

## 2020-11-02 DIAGNOSIS — T79.1XXA: ICD-10-CM

## 2020-11-02 DIAGNOSIS — R09.02 HYPOXEMIA: ICD-10-CM

## 2020-11-02 PROCEDURE — C1713 ANCHOR/SCREW BN/BN,TIS/BN: HCPCS | Mod: HCNC | Performed by: ORTHOPAEDIC SURGERY

## 2020-11-02 PROCEDURE — C1776 JOINT DEVICE (IMPLANTABLE): HCPCS | Mod: HCNC | Performed by: ORTHOPAEDIC SURGERY

## 2020-11-02 PROCEDURE — D9220A PRA ANESTHESIA: ICD-10-PCS | Mod: HCNC,CRNA,, | Performed by: NURSE ANESTHETIST, CERTIFIED REGISTERED

## 2020-11-02 PROCEDURE — 63600175 PHARM REV CODE 636 W HCPCS: Mod: HCNC

## 2020-11-02 PROCEDURE — 27200750 HC INSULATED NEEDLE/ STIMUPLEX: Mod: HCNC | Performed by: ANESTHESIOLOGY

## 2020-11-02 PROCEDURE — 71000039 HC RECOVERY, EACH ADD'L HOUR: Mod: HCNC | Performed by: ORTHOPAEDIC SURGERY

## 2020-11-02 PROCEDURE — 27201423 OPTIME MED/SURG SUP & DEVICES STERILE SUPPLY: Mod: HCNC | Performed by: ORTHOPAEDIC SURGERY

## 2020-11-02 PROCEDURE — 37000008 HC ANESTHESIA 1ST 15 MINUTES: Mod: HCNC | Performed by: ORTHOPAEDIC SURGERY

## 2020-11-02 PROCEDURE — 25000003 PHARM REV CODE 250: Mod: HCNC | Performed by: ANESTHESIOLOGY

## 2020-11-02 PROCEDURE — 25000003 PHARM REV CODE 250: Mod: HCNC | Performed by: ORTHOPAEDIC SURGERY

## 2020-11-02 PROCEDURE — 99900104 DSU ONLY-NO CHARGE-EA ADD'L HR (STAT): Mod: HCNC | Performed by: ORTHOPAEDIC SURGERY

## 2020-11-02 PROCEDURE — 25000003 PHARM REV CODE 250: Mod: HCNC | Performed by: HOSPITALIST

## 2020-11-02 PROCEDURE — 63600175 PHARM REV CODE 636 W HCPCS: Mod: HCNC | Performed by: HOSPITALIST

## 2020-11-02 PROCEDURE — 71000033 HC RECOVERY, INTIAL HOUR: Mod: HCNC | Performed by: ORTHOPAEDIC SURGERY

## 2020-11-02 PROCEDURE — 64447 PR NERVE BLOCK INJ, ANES/STEROID, FEMORAL, INCL IMAG GUIDANCE: ICD-10-PCS | Mod: 59,HCNC,LT, | Performed by: ANESTHESIOLOGY

## 2020-11-02 PROCEDURE — 76942 PR U/S GUIDANCE FOR NEEDLE GUIDANCE: ICD-10-PCS | Mod: 26,HCNC,, | Performed by: ANESTHESIOLOGY

## 2020-11-02 PROCEDURE — 63600175 PHARM REV CODE 636 W HCPCS: Mod: HCNC | Performed by: NURSE PRACTITIONER

## 2020-11-02 PROCEDURE — D9220A PRA ANESTHESIA: Mod: HCNC,ANES,, | Performed by: ANESTHESIOLOGY

## 2020-11-02 PROCEDURE — 99900103 DSU ONLY-NO CHARGE-INITIAL HR (STAT): Mod: HCNC | Performed by: ORTHOPAEDIC SURGERY

## 2020-11-02 PROCEDURE — 63600175 PHARM REV CODE 636 W HCPCS: Mod: HCNC | Performed by: ANESTHESIOLOGY

## 2020-11-02 PROCEDURE — 36000711: Mod: HCNC | Performed by: ORTHOPAEDIC SURGERY

## 2020-11-02 PROCEDURE — D9220A PRA ANESTHESIA: ICD-10-PCS | Mod: HCNC,ANES,, | Performed by: ANESTHESIOLOGY

## 2020-11-02 PROCEDURE — 97110 THERAPEUTIC EXERCISES: CPT | Mod: HCNC

## 2020-11-02 PROCEDURE — 63600175 PHARM REV CODE 636 W HCPCS: Mod: HCNC | Performed by: NURSE ANESTHETIST, CERTIFIED REGISTERED

## 2020-11-02 PROCEDURE — 94761 N-INVAS EAR/PLS OXIMETRY MLT: CPT | Mod: HCNC

## 2020-11-02 PROCEDURE — 25000003 PHARM REV CODE 250: Mod: HCNC | Performed by: NURSE PRACTITIONER

## 2020-11-02 PROCEDURE — C9290 INJ, BUPIVACAINE LIPOSOME: HCPCS | Mod: HCNC | Performed by: ANESTHESIOLOGY

## 2020-11-02 PROCEDURE — 64447 NJX AA&/STRD FEMORAL NRV IMG: CPT | Mod: HCNC | Performed by: ANESTHESIOLOGY

## 2020-11-02 PROCEDURE — 63600175 PHARM REV CODE 636 W HCPCS: Mod: HCNC | Performed by: ORTHOPAEDIC SURGERY

## 2020-11-02 PROCEDURE — S5010 5% DEXTROSE AND 0.45% SALINE: HCPCS | Mod: HCNC | Performed by: ORTHOPAEDIC SURGERY

## 2020-11-02 PROCEDURE — 27200688 HC TRAY, SPINAL-HYPER/ ISOBARIC: Mod: HCNC | Performed by: ANESTHESIOLOGY

## 2020-11-02 PROCEDURE — 97161 PT EVAL LOW COMPLEX 20 MIN: CPT | Mod: HCNC

## 2020-11-02 PROCEDURE — 36000710: Mod: HCNC | Performed by: ORTHOPAEDIC SURGERY

## 2020-11-02 PROCEDURE — 76942 ECHO GUIDE FOR BIOPSY: CPT | Mod: 26,HCNC,, | Performed by: ANESTHESIOLOGY

## 2020-11-02 PROCEDURE — 25000003 PHARM REV CODE 250: Mod: HCNC | Performed by: NURSE ANESTHETIST, CERTIFIED REGISTERED

## 2020-11-02 PROCEDURE — 37000009 HC ANESTHESIA EA ADD 15 MINS: Mod: HCNC | Performed by: ORTHOPAEDIC SURGERY

## 2020-11-02 PROCEDURE — D9220A PRA ANESTHESIA: Mod: HCNC,CRNA,, | Performed by: NURSE ANESTHETIST, CERTIFIED REGISTERED

## 2020-11-02 PROCEDURE — 64447 NJX AA&/STRD FEMORAL NRV IMG: CPT | Mod: 59,HCNC,LT, | Performed by: ANESTHESIOLOGY

## 2020-11-02 DEVICE — PATELLA ONLAY 29MM TRI PEG: Type: IMPLANTABLE DEVICE | Site: KNEE | Status: FUNCTIONAL

## 2020-11-02 DEVICE — BASEPLATE TIB EVOLTN SZ 4 L: Type: IMPLANTABLE DEVICE | Site: KNEE | Status: FUNCTIONAL

## 2020-11-02 DEVICE — COMPONENT FEM SZ 4 CS/CR PRI R: Type: IMPLANTABLE DEVICE | Site: KNEE | Status: FUNCTIONAL

## 2020-11-02 DEVICE — IMPLANTABLE DEVICE: Type: IMPLANTABLE DEVICE | Site: KNEE | Status: FUNCTIONAL

## 2020-11-02 DEVICE — CEMENT BONE ORTHOSET 1 40 GRAM: Type: IMPLANTABLE DEVICE | Site: KNEE | Status: FUNCTIONAL

## 2020-11-02 RX ORDER — BUPIVACAINE HYDROCHLORIDE 7.5 MG/ML
INJECTION, SOLUTION INTRASPINAL
Status: DISCONTINUED | OUTPATIENT
Start: 2020-11-02 | End: 2020-11-02

## 2020-11-02 RX ORDER — MIDAZOLAM HYDROCHLORIDE 1 MG/ML
INJECTION INTRAMUSCULAR; INTRAVENOUS
Status: DISCONTINUED | OUTPATIENT
Start: 2020-11-02 | End: 2020-11-02

## 2020-11-02 RX ORDER — MORPHINE SULFATE 2 MG/ML
2 INJECTION, SOLUTION INTRAMUSCULAR; INTRAVENOUS
Status: DISCONTINUED | OUTPATIENT
Start: 2020-11-02 | End: 2020-11-02

## 2020-11-02 RX ORDER — HYDROMORPHONE HYDROCHLORIDE 2 MG/ML
0.25 INJECTION, SOLUTION INTRAMUSCULAR; INTRAVENOUS; SUBCUTANEOUS
Status: DISCONTINUED | OUTPATIENT
Start: 2020-11-02 | End: 2020-11-02

## 2020-11-02 RX ORDER — SODIUM CHLORIDE 0.9 % (FLUSH) 0.9 %
5 SYRINGE (ML) INJECTION
Status: DISCONTINUED | OUTPATIENT
Start: 2020-11-02 | End: 2020-11-06 | Stop reason: HOSPADM

## 2020-11-02 RX ORDER — POLYETHYLENE GLYCOL 3350 17 G/17G
17 POWDER, FOR SOLUTION ORAL DAILY
Status: DISCONTINUED | OUTPATIENT
Start: 2020-11-02 | End: 2020-11-06 | Stop reason: HOSPADM

## 2020-11-02 RX ORDER — BUPIVACAINE HYDROCHLORIDE 5 MG/ML
INJECTION, SOLUTION EPIDURAL; INTRACAUDAL
Status: DISCONTINUED | OUTPATIENT
Start: 2020-11-02 | End: 2020-11-02

## 2020-11-02 RX ORDER — PHENYLEPHRINE HYDROCHLORIDE 10 MG/ML
INJECTION INTRAVENOUS
Status: DISCONTINUED | OUTPATIENT
Start: 2020-11-02 | End: 2020-11-02

## 2020-11-02 RX ORDER — CELECOXIB 100 MG/1
100 CAPSULE ORAL 2 TIMES DAILY
Status: DISCONTINUED | OUTPATIENT
Start: 2020-11-02 | End: 2020-11-02

## 2020-11-02 RX ORDER — SERTRALINE HYDROCHLORIDE 50 MG/1
50 TABLET, FILM COATED ORAL DAILY
Status: DISCONTINUED | OUTPATIENT
Start: 2020-11-02 | End: 2020-11-06 | Stop reason: HOSPADM

## 2020-11-02 RX ORDER — DEXTROSE MONOHYDRATE AND SODIUM CHLORIDE 5; .45 G/100ML; G/100ML
INJECTION, SOLUTION INTRAVENOUS CONTINUOUS
Status: DISCONTINUED | OUTPATIENT
Start: 2020-11-02 | End: 2020-11-03

## 2020-11-02 RX ORDER — VANCOMYCIN HCL IN 5 % DEXTROSE 1G/250ML
1000 PLASTIC BAG, INJECTION (ML) INTRAVENOUS ONCE
Status: COMPLETED | OUTPATIENT
Start: 2020-11-02 | End: 2020-11-02

## 2020-11-02 RX ORDER — FENTANYL CITRATE 50 UG/ML
25 INJECTION, SOLUTION INTRAMUSCULAR; INTRAVENOUS EVERY 5 MIN PRN
Status: DISCONTINUED | OUTPATIENT
Start: 2020-11-02 | End: 2020-11-02 | Stop reason: HOSPADM

## 2020-11-02 RX ORDER — LIDOCAINE HYDROCHLORIDE 10 MG/ML
1 INJECTION, SOLUTION EPIDURAL; INFILTRATION; INTRACAUDAL; PERINEURAL ONCE
Status: DISCONTINUED | OUTPATIENT
Start: 2020-11-02 | End: 2020-11-02 | Stop reason: HOSPADM

## 2020-11-02 RX ORDER — HYDROMORPHONE HYDROCHLORIDE 2 MG/ML
0.2 INJECTION, SOLUTION INTRAMUSCULAR; INTRAVENOUS; SUBCUTANEOUS EVERY 5 MIN PRN
Status: DISCONTINUED | OUTPATIENT
Start: 2020-11-02 | End: 2020-11-02 | Stop reason: HOSPADM

## 2020-11-02 RX ORDER — TRAMADOL HYDROCHLORIDE 50 MG/1
50 TABLET ORAL EVERY 4 HOURS PRN
Status: DISCONTINUED | OUTPATIENT
Start: 2020-11-02 | End: 2020-11-04

## 2020-11-02 RX ORDER — MORPHINE SULFATE 2 MG/ML
2 INJECTION, SOLUTION INTRAMUSCULAR; INTRAVENOUS EVERY 4 HOURS PRN
Status: DISCONTINUED | OUTPATIENT
Start: 2020-11-02 | End: 2020-11-02

## 2020-11-02 RX ORDER — OXYCODONE HYDROCHLORIDE 5 MG/1
5 TABLET ORAL
Status: DISCONTINUED | OUTPATIENT
Start: 2020-11-02 | End: 2020-11-02

## 2020-11-02 RX ORDER — TRANEXAMIC ACID 100 MG/ML
1000 INJECTION, SOLUTION INTRAVENOUS
Status: COMPLETED | OUTPATIENT
Start: 2020-11-02 | End: 2020-11-02

## 2020-11-02 RX ORDER — HYDROMORPHONE HYDROCHLORIDE 1 MG/ML
0.5 INJECTION, SOLUTION INTRAMUSCULAR; INTRAVENOUS; SUBCUTANEOUS ONCE AS NEEDED
Status: COMPLETED | OUTPATIENT
Start: 2020-11-02 | End: 2020-11-02

## 2020-11-02 RX ORDER — ACETAMINOPHEN 325 MG/1
650 TABLET ORAL EVERY 4 HOURS
Status: DISCONTINUED | OUTPATIENT
Start: 2020-11-02 | End: 2020-11-02

## 2020-11-02 RX ORDER — DILTIAZEM HYDROCHLORIDE 120 MG/1
240 CAPSULE, COATED, EXTENDED RELEASE ORAL NIGHTLY
Status: DISCONTINUED | OUTPATIENT
Start: 2020-11-02 | End: 2020-11-06 | Stop reason: HOSPADM

## 2020-11-02 RX ORDER — HYDROMORPHONE HYDROCHLORIDE 2 MG/1
4 TABLET ORAL EVERY 4 HOURS PRN
Status: DISCONTINUED | OUTPATIENT
Start: 2020-11-02 | End: 2020-11-02

## 2020-11-02 RX ORDER — HYDROMORPHONE HYDROCHLORIDE 5 MG/5ML
3 SOLUTION ORAL EVERY 4 HOURS PRN
Status: DISCONTINUED | OUTPATIENT
Start: 2020-11-02 | End: 2020-11-02

## 2020-11-02 RX ORDER — PROPOFOL 10 MG/ML
VIAL (ML) INTRAVENOUS
Status: DISCONTINUED | OUTPATIENT
Start: 2020-11-02 | End: 2020-11-02

## 2020-11-02 RX ORDER — FENTANYL CITRATE 50 UG/ML
INJECTION, SOLUTION INTRAMUSCULAR; INTRAVENOUS
Status: DISCONTINUED | OUTPATIENT
Start: 2020-11-02 | End: 2020-11-02

## 2020-11-02 RX ORDER — CELECOXIB 100 MG/1
200 CAPSULE ORAL ONCE
Status: DISCONTINUED | OUTPATIENT
Start: 2020-11-02 | End: 2020-11-02

## 2020-11-02 RX ORDER — NAPROXEN SODIUM 220 MG/1
81 TABLET, FILM COATED ORAL 2 TIMES DAILY
Status: DISCONTINUED | OUTPATIENT
Start: 2020-11-02 | End: 2020-11-04

## 2020-11-02 RX ORDER — ACETAMINOPHEN 10 MG/ML
INJECTION, SOLUTION INTRAVENOUS
Status: DISCONTINUED | OUTPATIENT
Start: 2020-11-02 | End: 2020-11-02

## 2020-11-02 RX ORDER — CEFAZOLIN SODIUM 2 G/50ML
2 SOLUTION INTRAVENOUS
Status: DISCONTINUED | OUTPATIENT
Start: 2020-11-02 | End: 2020-11-02 | Stop reason: ALTCHOICE

## 2020-11-02 RX ORDER — PROPAFENONE HYDROCHLORIDE 150 MG/1
150 TABLET, COATED ORAL EVERY 8 HOURS
Status: DISCONTINUED | OUTPATIENT
Start: 2020-11-02 | End: 2020-11-06 | Stop reason: HOSPADM

## 2020-11-02 RX ORDER — MUPIROCIN 20 MG/G
OINTMENT TOPICAL
Status: DISCONTINUED | OUTPATIENT
Start: 2020-11-02 | End: 2020-11-02 | Stop reason: HOSPADM

## 2020-11-02 RX ORDER — ACETAMINOPHEN 325 MG/1
650 TABLET ORAL EVERY 4 HOURS
Status: DISCONTINUED | OUTPATIENT
Start: 2020-11-02 | End: 2020-11-02 | Stop reason: SDUPTHER

## 2020-11-02 RX ORDER — LIDOCAINE HCL/PF 100 MG/5ML
SYRINGE (ML) INTRAVENOUS
Status: DISCONTINUED | OUTPATIENT
Start: 2020-11-02 | End: 2020-11-02

## 2020-11-02 RX ORDER — BISACODYL 10 MG
10 SUPPOSITORY, RECTAL RECTAL DAILY
Status: DISCONTINUED | OUTPATIENT
Start: 2020-11-02 | End: 2020-11-06 | Stop reason: HOSPADM

## 2020-11-02 RX ORDER — OXYCODONE HYDROCHLORIDE 10 MG/1
10 TABLET ORAL EVERY 4 HOURS PRN
Status: DISCONTINUED | OUTPATIENT
Start: 2020-11-02 | End: 2020-11-02

## 2020-11-02 RX ORDER — ONDANSETRON 2 MG/ML
4 INJECTION INTRAMUSCULAR; INTRAVENOUS EVERY 12 HOURS PRN
Status: DISCONTINUED | OUTPATIENT
Start: 2020-11-02 | End: 2020-11-02

## 2020-11-02 RX ORDER — ONDANSETRON 8 MG/1
8 TABLET, ORALLY DISINTEGRATING ORAL EVERY 8 HOURS PRN
Status: DISCONTINUED | OUTPATIENT
Start: 2020-11-02 | End: 2020-11-06 | Stop reason: HOSPADM

## 2020-11-02 RX ORDER — PREGABALIN 75 MG/1
75 CAPSULE ORAL ONCE
Status: COMPLETED | OUTPATIENT
Start: 2020-11-02 | End: 2020-11-02

## 2020-11-02 RX ORDER — ONDANSETRON HYDROCHLORIDE 2 MG/ML
INJECTION, SOLUTION INTRAMUSCULAR; INTRAVENOUS
Status: DISCONTINUED | OUTPATIENT
Start: 2020-11-02 | End: 2020-11-02

## 2020-11-02 RX ORDER — PROPOFOL 10 MG/ML
VIAL (ML) INTRAVENOUS CONTINUOUS PRN
Status: DISCONTINUED | OUTPATIENT
Start: 2020-11-02 | End: 2020-11-02

## 2020-11-02 RX ORDER — HYDROMORPHONE HYDROCHLORIDE 2 MG/ML
0.5 INJECTION, SOLUTION INTRAMUSCULAR; INTRAVENOUS; SUBCUTANEOUS
Status: DISCONTINUED | OUTPATIENT
Start: 2020-11-02 | End: 2020-11-02

## 2020-11-02 RX ORDER — VANCOMYCIN HCL IN 5 % DEXTROSE 1G/250ML
1000 PLASTIC BAG, INJECTION (ML) INTRAVENOUS
Status: COMPLETED | OUTPATIENT
Start: 2020-11-02 | End: 2020-11-02

## 2020-11-02 RX ORDER — OXYCODONE AND ACETAMINOPHEN 7.5; 325 MG/1; MG/1
1 TABLET ORAL EVERY 4 HOURS PRN
Qty: 28 TABLET | Refills: 0 | Status: SHIPPED | OUTPATIENT
Start: 2020-11-02 | End: 2020-12-14

## 2020-11-02 RX ORDER — DILTIAZEM HYDROCHLORIDE 120 MG/1
240 CAPSULE, COATED, EXTENDED RELEASE ORAL DAILY
Status: DISCONTINUED | OUTPATIENT
Start: 2020-11-02 | End: 2020-11-02

## 2020-11-02 RX ORDER — HYDROMORPHONE HYDROCHLORIDE 1 MG/ML
0.25 INJECTION, SOLUTION INTRAMUSCULAR; INTRAVENOUS; SUBCUTANEOUS EVERY 6 HOURS PRN
Status: DISCONTINUED | OUTPATIENT
Start: 2020-11-02 | End: 2020-11-04

## 2020-11-02 RX ORDER — ONDANSETRON 2 MG/ML
4 INJECTION INTRAMUSCULAR; INTRAVENOUS EVERY 12 HOURS PRN
Status: DISCONTINUED | OUTPATIENT
Start: 2020-11-02 | End: 2020-11-02 | Stop reason: SDUPTHER

## 2020-11-02 RX ORDER — HYDROMORPHONE HYDROCHLORIDE 2 MG/ML
1 INJECTION, SOLUTION INTRAMUSCULAR; INTRAVENOUS; SUBCUTANEOUS
Status: DISCONTINUED | OUTPATIENT
Start: 2020-11-02 | End: 2020-11-02

## 2020-11-02 RX ORDER — METOPROLOL SUCCINATE 25 MG/1
25 TABLET, EXTENDED RELEASE ORAL DAILY
Status: DISCONTINUED | OUTPATIENT
Start: 2020-11-02 | End: 2020-11-06 | Stop reason: HOSPADM

## 2020-11-02 RX ORDER — OXYCODONE HYDROCHLORIDE 10 MG/1
10 TABLET ORAL
Status: DISCONTINUED | OUTPATIENT
Start: 2020-11-02 | End: 2020-11-02

## 2020-11-02 RX ORDER — HYDROMORPHONE HYDROCHLORIDE 2 MG/1
2 TABLET ORAL EVERY 4 HOURS PRN
Status: DISCONTINUED | OUTPATIENT
Start: 2020-11-02 | End: 2020-11-02

## 2020-11-02 RX ORDER — FAMOTIDINE 20 MG/1
20 TABLET, FILM COATED ORAL 2 TIMES DAILY
Status: DISCONTINUED | OUTPATIENT
Start: 2020-11-02 | End: 2020-11-05

## 2020-11-02 RX ADMIN — VANCOMYCIN HYDROCHLORIDE 1000 MG: 1 INJECTION, POWDER, LYOPHILIZED, FOR SOLUTION INTRAVENOUS at 07:11

## 2020-11-02 RX ADMIN — ACETAMINOPHEN 900 MG: 10 INJECTION, SOLUTION INTRAVENOUS at 09:11

## 2020-11-02 RX ADMIN — FAMOTIDINE 20 MG: 20 TABLET ORAL at 12:11

## 2020-11-02 RX ADMIN — PHENYLEPHRINE HYDROCHLORIDE 100 MCG: 10 INJECTION INTRAVENOUS at 09:11

## 2020-11-02 RX ADMIN — TRANEXAMIC ACID 630 MG: 100 INJECTION, SOLUTION INTRAVENOUS at 08:11

## 2020-11-02 RX ADMIN — HYDROMORPHONE HYDROCHLORIDE 0.25 MG: 1 INJECTION, SOLUTION INTRAMUSCULAR; INTRAVENOUS; SUBCUTANEOUS at 11:11

## 2020-11-02 RX ADMIN — HYDROMORPHONE HYDROCHLORIDE 0.25 MG: 1 INJECTION, SOLUTION INTRAMUSCULAR; INTRAVENOUS; SUBCUTANEOUS at 06:11

## 2020-11-02 RX ADMIN — MUPIROCIN: 20 OINTMENT TOPICAL at 07:11

## 2020-11-02 RX ADMIN — PROPAFENONE HYDROCHLORIDE 150 MG: 150 TABLET, FILM COATED ORAL at 03:11

## 2020-11-02 RX ADMIN — BUPIVACAINE HYDROCHLORIDE 10 ML: 5 INJECTION, SOLUTION EPIDURAL; INTRACAUDAL; PERINEURAL at 08:11

## 2020-11-02 RX ADMIN — PROPOFOL 70 MCG/KG/MIN: 10 INJECTION, EMULSION INTRAVENOUS at 08:11

## 2020-11-02 RX ADMIN — FENTANYL CITRATE 25 MCG: 50 INJECTION, SOLUTION INTRAMUSCULAR; INTRAVENOUS at 09:11

## 2020-11-02 RX ADMIN — ASPIRIN 81 MG: 81 TABLET, CHEWABLE ORAL at 01:11

## 2020-11-02 RX ADMIN — PROPOFOL 20 MG: 10 INJECTION, EMULSION INTRAVENOUS at 08:11

## 2020-11-02 RX ADMIN — DEXTROSE AND SODIUM CHLORIDE: 5; .45 INJECTION, SOLUTION INTRAVENOUS at 12:11

## 2020-11-02 RX ADMIN — TRAMADOL HYDROCHLORIDE 50 MG: 50 TABLET, COATED ORAL at 08:11

## 2020-11-02 RX ADMIN — FAMOTIDINE 20 MG: 20 TABLET ORAL at 08:11

## 2020-11-02 RX ADMIN — HYDROMORPHONE HYDROCHLORIDE 0.5 MG: 1 INJECTION, SOLUTION INTRAMUSCULAR; INTRAVENOUS; SUBCUTANEOUS at 08:11

## 2020-11-02 RX ADMIN — BISACODYL 10 MG: 10 SUPPOSITORY RECTAL at 12:11

## 2020-11-02 RX ADMIN — BUPIVACAINE 20 ML: 13.3 INJECTION, SUSPENSION, LIPOSOMAL INFILTRATION at 08:11

## 2020-11-02 RX ADMIN — FENTANYL CITRATE 25 MCG: 50 INJECTION, SOLUTION INTRAMUSCULAR; INTRAVENOUS at 08:11

## 2020-11-02 RX ADMIN — VANCOMYCIN HYDROCHLORIDE 1000 MG: 1 INJECTION, POWDER, LYOPHILIZED, FOR SOLUTION INTRAVENOUS at 08:11

## 2020-11-02 RX ADMIN — ONDANSETRON 4 MG: 2 INJECTION, SOLUTION INTRAMUSCULAR; INTRAVENOUS at 08:11

## 2020-11-02 RX ADMIN — ROPIVACAINE HYDROCHLORIDE: 5 INJECTION, SOLUTION EPIDURAL; INFILTRATION; PERINEURAL at 09:11

## 2020-11-02 RX ADMIN — POLYETHYLENE GLYCOL 3350 17 G: 17 POWDER, FOR SOLUTION ORAL at 12:11

## 2020-11-02 RX ADMIN — LIDOCAINE HYDROCHLORIDE 50 MG: 20 INJECTION, SOLUTION INTRAVENOUS at 08:11

## 2020-11-02 RX ADMIN — PREGABALIN 75 MG: 75 CAPSULE ORAL at 07:11

## 2020-11-02 RX ADMIN — BUPIVACAINE HYDROCHLORIDE IN DEXTROSE 1.4 ML: 7.5 INJECTION, SOLUTION SUBARACHNOID at 08:11

## 2020-11-02 RX ADMIN — GLYCOPYRROLATE 0.2 MG: 0.2 INJECTION, SOLUTION INTRAMUSCULAR; INTRAVITREAL at 09:11

## 2020-11-02 RX ADMIN — MIDAZOLAM HYDROCHLORIDE 1 MG: 1 INJECTION, SOLUTION INTRAMUSCULAR; INTRAVENOUS at 08:11

## 2020-11-02 RX ADMIN — DILTIAZEM HYDROCHLORIDE 240 MG: 120 CAPSULE, COATED, EXTENDED RELEASE ORAL at 08:11

## 2020-11-02 RX ADMIN — PROPAFENONE HYDROCHLORIDE 150 MG: 150 TABLET, FILM COATED ORAL at 09:11

## 2020-11-02 RX ADMIN — SODIUM CHLORIDE, SODIUM GLUCONATE, SODIUM ACETATE, POTASSIUM CHLORIDE, MAGNESIUM CHLORIDE, SODIUM PHOSPHATE, DIBASIC, AND POTASSIUM PHOSPHATE: .53; .5; .37; .037; .03; .012; .00082 INJECTION, SOLUTION INTRAVENOUS at 09:11

## 2020-11-02 RX ADMIN — SODIUM CHLORIDE, SODIUM GLUCONATE, SODIUM ACETATE, POTASSIUM CHLORIDE, MAGNESIUM CHLORIDE, SODIUM PHOSPHATE, DIBASIC, AND POTASSIUM PHOSPHATE: .53; .5; .37; .037; .03; .012; .00082 INJECTION, SOLUTION INTRAVENOUS at 07:11

## 2020-11-02 RX ADMIN — ASPIRIN 81 MG: 81 TABLET, CHEWABLE ORAL at 08:11

## 2020-11-02 NOTE — H&P
[facility] Medicine  History & Physical    Patient Name: Lorena Vallejo  MRN: [id number]  Admission Date: 11/2/2020  Attending Physician: Danny Gipson MD   Primary Care Provider: Stephon Cates MD         Patient information was obtained from patient and past medical records.     Subjective:     Principal Problem:Osteoarthritis of left knee    Chief Complaint: No chief complaint on file.       HPI: Lorena Vallejo is an 87-year-old  female with past medical history significant for proximal SVT, hypothyroidism, hypertension, depression and arthritis presenting today status post left TKA by Dr. Haider.  Patient was experiencing pain to her left knee for several years.  She tried viscosupplementation and intra-articular injections with only minimal relief and therefore opted for surgical intervention.  She was seen postoperatively and doing well.  She tolerated her diet without any complications.  She is having pain to her left knee which she describes as sharp burning quality, constant timing, worsens with movement, improved after receiving IV Dilaudid.  She denies chest pain, shortness of breath, nausea or vomiting.    Past Medical History:   Diagnosis Date    Arthritis     Blood transfusion     Bowel obstruction     X 3    Cancer     MELANOMA RIGHT EYE    Cancer of eyeball, except conjunctiva, cornea, retina, and choroid right    Depression     Diverticulitis     GERD (gastroesophageal reflux disease)     Hypertension     Melanoma     right eye    Peripheral neuropathy     PSVT (paroxysmal supraventricular tachycardia)     HAD X 3 OVER 10 YRS. LAST 7-25-12. HAD ADENOSINE IN [facility] ER. WAS ON INCREASED THYROID MED. NO PROBLEM SINCE    Salmonella     Short bowel syndrome     Thyroid disease     Transient ischemic attack     UTI (urinary tract infection)     Wears partial dentures     PARTIAL BOTTOM       Past Surgical History:   Procedure Laterality Date    ABDOMINAL  SURGERY      X 3. ILEUM REMOVED. OSTOMY PLACED AND CLOSED.    ADENOIDECTOMY      BILATERAL OOPHORECTOMY      COLONOSCOPY      COLONOSCOPY N/A 8/24/2016    Procedure: COLONOSCOPY;  Surgeon: Alex Corral MD;  Location: [facility] ENDO;  Service: Endoscopy;  Laterality: N/A;    COLONOSCOPY N/A 8/29/2019    Procedure: COLONOSCOPY;  Surgeon: Tiffanie Vázquez MD;  Location: [facility] ENDO;  Service: Endoscopy;  Laterality: N/A;    COLONOSCOPY N/A 11/22/2019    Procedure: COLONOSCOPY;  Surgeon: Tiffanie Vázquez MD;  Location: [facility] ENDO;  Service: Endoscopy;  Laterality: N/A;    CTR      BILAT    ESOPHAGOGASTRODUODENOSCOPY N/A 8/29/2019    Procedure: EGD (ESOPHAGOGASTRODUODENOSCOPY);  Surgeon: Tiffanie Vázquez MD;  Location: [facility] ENDO;  Service: Endoscopy;  Laterality: N/A;    EYE SURGERY      RIGHT  - LASER MELANOMA; BILAT CATARACT    HYSTERECTOMY      BSO    JOINT REPLACEMENT      RIGHT KNEE    MANDIBLE FRACTURE SURGERY      MENISCECTOMY      MEDIAL AND LATERAL REPAIR, BAKERS CYST    TENDON REPAIR      LEFT THUMB    TONSILLECTOMY      T AND A       Review of patient's allergies indicates:   Allergen Reactions    Flagyl [metronidazole hcl] Diarrhea     SEVERE DIARRHEA    Latex, natural rubber Rash     ITCHING    Levaquin [levofloxacin] Diarrhea     MOOD CHANGES, WEAKNESS    Morphine Other (See Comments)     HALLUCINATES    Augmentin [amoxicillin-pot clavulanate] Other (See Comments)     AGITATION    Ciprofloxacin Anxiety    Codeine Nausea And Vomiting    Demerol [meperidine] Nausea And Vomiting     CAN TAKE WITH PHENERGAN    Dilaudid [hydromorphone (bulk)] Itching     OK WITH BENADRYL    Fentanyl Itching     OK WITH BENADRYL    Fosfomycin      diarrhia    Naproxen Other (See Comments)     HURT STOMACH    Nsaids (non-steroidal anti-inflammatory drug) Other (See Comments)     TOLD NOT TO TAKE AS HAD AN ULCER. HAD BLEEDING    Percocet [oxycodone-acetaminophen] Rash    Prednisone Other (See Comments)      AGITATION    Reglan [metoclopramide hcl] Anxiety    Sulfa (sulfonamide antibiotics) Rash    Vicodin [hydrocodone-acetaminophen] Rash    Zosyn [piperacillin-tazobactam] Rash    Ambien [zolpidem]     Ibuprofen     Pcn [penicillins]     Vasotec [enalapril maleate] Other (See Comments)     DRY COUGH       No current facility-administered medications on file prior to encounter.      Current Outpatient Medications on File Prior to Encounter   Medication Sig    acetaminophen (TYLENOL) 500 MG tablet Take 1,000 mg by mouth every 6 (six) hours as needed.    ARMOUR THYROID 300 mg Tab Take 150 mg by mouth once daily. TAKE ONE-HALF TABLET BY MOUTH ONCE DAILY (Patient taking differently: Take 180 mg by mouth once daily. TAKE ONE-HALF TABLET BY MOUTH ONCE DAILY)    Bifidobacterium infantis (ALIGN) 4 mg Cap Take 4 mg by mouth once daily.    diltiaZEM (TIAZAC) 240 MG Cs24 Take 1 capsule (240 mg total) by mouth once daily.    fluticasone propionate (FLONASE) 50 mcg/actuation nasal spray 1 spray (50 mcg total) by Each Nostril route once daily.    magnesium oxide (MAG-OX) 400 mg tablet Take 400 mg by mouth every evening.     metoprolol succinate (TOPROL-XL) 25 MG 24 hr tablet Take 1 tablet (25 mg total) by mouth every evening. (Patient taking differently: Take 25 mg by mouth once daily. )    omeprazole (PRILOSEC) 20 MG capsule TAKE 1 CAPSULE (20 MG TOTAL) BY MOUTH ONCE DAILY.    propafenone (RHTHYMOL) 150 MG Tab Take 1 tablet (150 mg total) by mouth every 8 (eight) hours.    aspirin (ECOTRIN) 81 MG EC tablet Take 81 mg by mouth once daily.    calcium carbonate (OS-RAMONITA) 500 mg calcium (1,250 mg) tablet Take 1 tablet by mouth 2 (two) times daily.    coenzyme Q10 100 mg capsule Take 100 mg by mouth once daily.    cyanocobalamin, vitamin B-12, 5,000 mcg Subl Place 5,000 mcg under the tongue every Mon, Wed, Fri.    krill oil 500 mg Cap Take 500 mg by mouth every evening.    loperamide (IMODIUM) 2 mg capsule Take 1  capsule (2 mg total) by mouth 4 (four) times daily as needed for Diarrhea.    nitroGLYCERIN (NITROSTAT) 0.4 MG SL tablet DISSOLVE 1 TABLET UNDER THE TONGUE EVERY 5 MINUTES AS NEEDED FOR CHEST PAIN (Patient taking differently: Place 0.4 mg under the tongue every 5 (five) minutes as needed for Chest pain. )    VIT C/E/ZN/COPPR/LUTEIN/ZEAXAN (PRESERVISION AREDS 2 ORAL) Take 1 capsule by mouth 2 (two) times daily.     vitamin D 1000 units Tab Take 1,000 Units by mouth once daily.     Family History     Problem Relation (Age of Onset)    Arthritis Daughter    Asthma Maternal Grandfather    COPD Father    Cancer Father, Son, Paternal Grandmother, Paternal Grandfather    Diabetes Mother, Brother, Maternal Grandmother    Heart disease Mother, Brother, Son, Son    Hypertension Daughter, Son, Son, Son    Melanoma Paternal Grandmother    Obesity Son    Rheum arthritis Father    Sinus disease Son    Sleep apnea Son    Stroke Father, Brother        Tobacco Use    Smoking status: Former Smoker     Packs/day: 1.00     Years: 18.00     Pack years: 18.00     Start date:      Quit date: 1973     Years since quittin.7    Smokeless tobacco: Never Used   Substance and Sexual Activity    Alcohol use: Yes     Alcohol/week: 2.0 standard drinks     Types: 2 Glasses of wine per week     Comment: WINE once per day    Drug use: No    Sexual activity: Not Currently     Review of Systems   Constitutional: Negative for chills, fatigue and fever.   HENT: Negative for congestion, sore throat and trouble swallowing.    Eyes: Negative for photophobia and visual disturbance.   Respiratory: Negative for cough and shortness of breath.    Cardiovascular: Negative for chest pain.   Gastrointestinal: Negative for abdominal pain, diarrhea, nausea and vomiting.   Genitourinary: Negative for difficulty urinating, dysuria and urgency.   Musculoskeletal: Positive for arthralgias and gait problem. Negative for back pain.   Skin: Negative  for color change, pallor, rash and wound.   Neurological: Negative for dizziness, weakness and light-headedness.   All other systems reviewed and are negative.    Objective:     Vital Signs (Most Recent):  Temp: 97.5 °F (36.4 °C) (11/02/20 1115)  Pulse: 78 (11/02/20 1115)  Resp: 18 (11/02/20 1159)  BP: 125/60 (11/02/20 1115)  SpO2: 96 % (11/02/20 1200) Vital Signs (24h Range):  Temp:  [97.5 °F (36.4 °C)-98.2 °F (36.8 °C)] 97.5 °F (36.4 °C)  Pulse:  [73-78] 78  Resp:  [12-21] 18  SpO2:  [94 %-100 %] 96 %  BP: (105-149)/(55-66) 125/60     Weight: 63 kg (139 lb)  Body mass index is 20.53 kg/m².    Physical Exam  Vitals signs and nursing note reviewed.   Constitutional:       General: She is not in acute distress.     Appearance: Normal appearance. She is well-developed. She is not ill-appearing.   HENT:      Head: Normocephalic and atraumatic.      Right Ear: External ear normal.      Left Ear: External ear normal.      Nose: Nose normal. No congestion or rhinorrhea.      Mouth/Throat:      Mouth: Mucous membranes are moist.      Pharynx: Oropharynx is clear. No oropharyngeal exudate or posterior oropharyngeal erythema.   Eyes:      Conjunctiva/sclera: Conjunctivae normal.      Pupils: Pupils are equal, round, and reactive to light.   Neck:      Musculoskeletal: Normal range of motion and neck supple.      Vascular: No JVD.   Cardiovascular:      Rate and Rhythm: Normal rate and regular rhythm.      Pulses: Normal pulses.      Heart sounds: Normal heart sounds. No murmur.   Pulmonary:      Effort: Pulmonary effort is normal. No respiratory distress.      Breath sounds: Normal breath sounds. No wheezing.   Abdominal:      General: Bowel sounds are normal. There is no distension.      Palpations: Abdomen is soft.      Tenderness: There is no abdominal tenderness. There is no guarding.   Musculoskeletal:      Comments: Limited range of motion to left knee due to recent surgery.  Cryotherapy in use and Ace wrap clean dry  and intact.  Sensation to lower extremity intact and pedal pulse is strong and equal.   Skin:     General: Skin is warm and dry.      Capillary Refill: Capillary refill takes 2 to 3 seconds.   Neurological:      Mental Status: She is alert and oriented to person, place, and time.   Psychiatric:         Behavior: Behavior normal.         Thought Content: Thought content normal.           CRANIAL NERVES     CN III, IV, VI   Pupils are equal, round, and reactive to light.       Significant Labs: CBC: No results for input(s): WBC, HGB, HCT, PLT in the last 48 hours.  CMP: No results for input(s): NA, K, CL, CO2, GLU, BUN, CREATININE, CALCIUM, PROT, ALBUMIN, BILITOT, ALKPHOS, AST, ALT, ANIONGAP, EGFRNONAA in the last 48 hours.    Invalid input(s): ESTGFAFRICA  All pertinent labs within the past 24 hours have been reviewed.    Significant Imaging: I have reviewed all pertinent imaging results/findings within the past 24 hours.    Assessment/Plan:     * Osteoarthritis of left knee  POD 0 s/p left TKA with Dr. Haider  Continue to follow Orthopedic recommendations.  Needs aggressive incentive spirometry.  Follow hemoglobin and hematocrit closely.  Pain control with IV narcotics and antiemetics as needed.  Physical therapy as per Orthopedics protocol with fall precautions.  ASA 81 mg PO BID for DVT prophylaxis per orthopedic recommendations.        Chronic kidney disease, stage III (moderate)  Creatine stable for now. BMP reviewed- noted CrCl cannot be calculated (Patient's most recent lab result is older than the maximum 7 days allowed.). according to latest data. Monitor UOP and serial BMP and adjust therapy as needed. Renally dose meds.            SVT (supraventricular tachycardia)  Chronic and currently control  Will monitor on telemetry  Resume home rate control meds        Hypothyroidism, unspecified  Chronic, stable  Resume home medication      Hyperlipidemia  Chronic, stable  Resume home medication        VTE Risk  Mitigation (From admission, onward)         Ordered     IP VTE LOW RISK PATIENT  Once      11/02/20 1142     Place sequential compression device  Until discontinued     Aspirin b.i.d. per ortho recommendation 11/02/20 1142                   Laney Lopes NP  Department of Hospital Medicine   [facility]

## 2020-11-02 NOTE — HPI
Lorena Vallejo is an 87-year-old  female with past medical history significant for proximal SVT, hypothyroidism, hypertension, depression and arthritis presenting today status post left TKA by Dr. Haider.  Patient was experiencing pain to her left knee for several years.  She tried viscosupplementation and intra-articular injections with only minimal relief and therefore opted for surgical intervention.  She was seen postoperatively and doing well.  She tolerated her diet without any complications.  She is having pain to her left knee which she describes as sharp burning quality, constant timing, worsens with movement, improved after receiving IV Dilaudid.  She denies chest pain, shortness of breath, nausea or vomiting.

## 2020-11-02 NOTE — H&P
CC/Indication for Procedure: 87 y.o. female with Primary osteoarthritis of left knee [M17.12]  Preop testing [Z01.818]  Osteoarthritis of left knee [M17.12].    Patient scheduled for SC TOTAL KNEE ARTHROPLASTY [02544] (ARTHROPLASTY, KNEE).    Past Medical History:   Diagnosis Date    Arthritis     Blood transfusion     Bowel obstruction     X 3    Cancer     MELANOMA RIGHT EYE    Cancer of eyeball, except conjunctiva, cornea, retina, and choroid right    Depression     Diverticulitis     GERD (gastroesophageal reflux disease)     Hypertension     Melanoma     right eye    Peripheral neuropathy     PSVT (paroxysmal supraventricular tachycardia)     HAD X 3 OVER 10 YRS. LAST 7-25-12. HAD ADENOSINE IN [facility]. WAS ON INCREASED THYROID MED. NO PROBLEM SINCE    Salmonella     Short bowel syndrome     Thyroid disease     Transient ischemic attack     UTI (urinary tract infection)     Wears partial dentures     PARTIAL BOTTOM     Past Surgical History:   Procedure Laterality Date    ABDOMINAL SURGERY      X 3. ILEUM REMOVED. OSTOMY PLACED AND CLOSED.    ADENOIDECTOMY      BILATERAL OOPHORECTOMY      COLONOSCOPY      COLONOSCOPY N/A 8/24/2016    Procedure: COLONOSCOPY;  Surgeon: Alex Corral MD;  Location: [facility];  Service: Endoscopy;  Laterality: N/A;    COLONOSCOPY N/A 8/29/2019    Procedure: COLONOSCOPY;  Surgeon: Tiffanie Vázquez MD;  Location: [facility] ENDO;  Service: Endoscopy;  Laterality: N/A;    COLONOSCOPY N/A 11/22/2019    Procedure: COLONOSCOPY;  Surgeon: Tiffanie Vázquez MD;  Location: [facility] ENDO;  Service: Endoscopy;  Laterality: N/A;    CTR      BILAT    ESOPHAGOGASTRODUODENOSCOPY N/A 8/29/2019    Procedure: EGD (ESOPHAGOGASTRODUODENOSCOPY);  Surgeon: Tiffanie Vázquez MD;  Location: [facility];  Service: Endoscopy;  Laterality: N/A;    EYE SURGERY      RIGHT  - LASER MELANOMA; BILAT CATARACT    HYSTERECTOMY      BSO    JOINT REPLACEMENT      RIGHT KNEE    MANDIBLE FRACTURE  SURGERY      MENISCECTOMY      MEDIAL AND LATERAL REPAIR, BAKERS CYST    TENDON REPAIR      LEFT THUMB    TONSILLECTOMY      T AND A     Family History   Problem Relation Age of Onset    Diabetes Mother     Heart disease Mother         MI at 52    Stroke Father     Cancer Father         colon cancer    Rheum arthritis Father     COPD Father     Heart disease Brother     Stroke Brother     Diabetes Brother     Arthritis Daughter     Hypertension Daughter     Cancer Son         Leukemia    Hypertension Son     Heart disease Son     Diabetes Maternal Grandmother     Asthma Maternal Grandfather     Cancer Paternal Grandmother         melanoma    Melanoma Paternal Grandmother     Cancer Paternal Grandfather         lip cancer - smoked pipe    Sleep apnea Son     Hypertension Son     Sinus disease Son     Hypertension Son     Heart disease Son     Obesity Son      Social History     Socioeconomic History    Marital status:      Spouse name: Not on file    Number of children: Not on file    Years of education: Not on file    Highest education level: Not on file   Occupational History    Not on file   Social Needs    Financial resource strain: Not on file    Food insecurity     Worry: Not on file     Inability: Not on file    Transportation needs     Medical: Not on file     Non-medical: Not on file   Tobacco Use    Smoking status: Former Smoker     Packs/day: 1.00     Years: 18.00     Pack years: 18.00     Start date:      Quit date: 1973     Years since quittin.7    Smokeless tobacco: Never Used   Substance and Sexual Activity    Alcohol use: Yes     Alcohol/week: 2.0 standard drinks     Types: 2 Glasses of wine per week     Comment: WINE once per day    Drug use: No    Sexual activity: Not Currently   Lifestyle    Physical activity     Days per week: Not on file     Minutes per session: Not on file    Stress: Not on file   Relationships    Social  connections     Talks on phone: Not on file     Gets together: Not on file     Attends Yazidi service: Not on file     Active member of club or organization: Not on file     Attends meetings of clubs or organizations: Not on file     Relationship status: Not on file   Other Topics Concern    Are you pregnant or think you may be? Not Asked    Breast-feeding Not Asked   Social History Narrative    Not on file       Review of patient's allergies indicates:   Allergen Reactions    Flagyl [metronidazole hcl] Diarrhea     SEVERE DIARRHEA    Latex, natural rubber Rash     ITCHING    Levaquin [levofloxacin] Diarrhea     MOOD CHANGES, WEAKNESS    Morphine Other (See Comments)     HALLUCINATES    Augmentin [amoxicillin-pot clavulanate] Other (See Comments)     AGITATION    Ciprofloxacin Anxiety    Codeine Nausea And Vomiting    Demerol [meperidine] Nausea And Vomiting     CAN TAKE WITH PHENERGAN    Dilaudid [hydromorphone (bulk)] Itching     OK WITH BENADRYL    Fentanyl Itching     OK WITH BENADRYL    Fosfomycin      diarrhia    Naproxen Other (See Comments)     HURT STOMACH    Nsaids (non-steroidal anti-inflammatory drug) Other (See Comments)     TOLD NOT TO TAKE AS HAD AN ULCER. HAD BLEEDING    Percocet [oxycodone-acetaminophen] Rash    Prednisone Other (See Comments)     AGITATION    Reglan [metoclopramide hcl] Anxiety    Sulfa (sulfonamide antibiotics) Rash    Vicodin [hydrocodone-acetaminophen] Rash    Zosyn [piperacillin-tazobactam] Rash    Ambien [zolpidem]     Ibuprofen     Pcn [penicillins]     Vasotec [enalapril maleate] Other (See Comments)     DRY COUGH         Current Facility-Administered Medications:     electrolyte-S (ISOLYTE), , Intravenous, Continuous, Terry Cui MD, Last Rate: 10 mL/hr at 11/02/20 0709    lidocaine (PF) 10 mg/ml (1%) injection 10 mg, 1 mL, Intradermal, Once, Terry Cui MD    mupirocin 2 % ointment, , Nasal, On Call Procedure, Eduardo Haider MD     sodium chloride 0.9% 49.3 mL with ropivacaine 0.5% (PF) (NAROPIN) 49.3 mL, EPINEPHrine (ADRENALINE) 0.5 mg injection, , Other, Once, Eduardo Haider MD    sodium chloride 0.9% bolus 1,000 mL, 1,000 mL, Intravenous, Once, Terry Cui MD    tranexamic acid (CYKLOKAPRON) 1,000 mg in sodium chloride 0.9 % 100 mL (ready to mix system), 1,000 mg, Intravenous, On Call Procedure, Eduardo Haider MD    vancomycin in dextrose 5 % 1 gram/250 mL IVPB 1,000 mg, 1,000 mg, Intravenous, On Call Procedure, Eduardo Haider MD    ROS:    Denies chest pain or palpitations  Denies shortness of breath  Denies fevers or chills  Denies chest pain  Denies abdominal pain    PE:    General Appearance: Well nourished  Orientation: Oriented to time, place, person  Mental Status: Alert  Heart: RRR  Lungs: CTA  Abdomen: Soft and non-tender    Anesthesia/Surgery risks, benefits and alternative options discussed and understood by patient/family.    This note was created using Dragon voice recognition software that occasionally misinterpreted phrases or words.

## 2020-11-02 NOTE — ANESTHESIA PROCEDURE NOTES
Peripheral Block    Patient location during procedure: pre-op   Block not for primary anesthetic.  Reason for block: at surgeon's request and post-op pain management   Post-op Pain Location: left knee  Start time: 11/2/2020 8:21 AM  Timeout: 11/2/2020 8:21 AM   End time: 11/2/2020 8:30 AM    Staffing  Authorizing Provider: Truman Davenport MD  Performing Provider: Truman Davenport MD    Preanesthetic Checklist  Completed: patient identified, site marked, surgical consent, pre-op evaluation, timeout performed, IV checked, risks and benefits discussed and monitors and equipment checked  Peripheral Block  Patient position: supine  Prep: ChloraPrep  Patient monitoring: heart rate, cardiac monitor, continuous pulse ox, continuous capnometry and frequent blood pressure checks  Block type: adductor canal  Laterality: left  Injection technique: single shot  Needle  Needle type: Stimuplex   Needle gauge: 21 G  Needle length: 4 in  Needle localization: anatomical landmarks and ultrasound guidance   -ultrasound image captured on disc.  Assessment  Injection assessment: negative aspiration, negative parasthesia and local visualized surrounding nerve  Paresthesia pain: none  Heart rate change: no  Slow fractionated injection: yes  Additional Notes  VSS.  [facility] RN monitoring vitals throughout procedure.  Patient tolerated procedure well.Exparel 266mg, 20ml and Bup 0.5% 10ml.  No complications

## 2020-11-02 NOTE — ANESTHESIA PREPROCEDURE EVALUATION
11/02/2020  Lorena Vallejo is a 87 y.o., female.    Pre-op Assessment    I have reviewed the Patient Summary Reports.     I have reviewed the Nursing Notes. I have reviewed the NPO Status.   I have reviewed the Medications.     Review of Systems  Anesthesia Hx:  No problems with previous Anesthesia    Social:  Former Smoker    Hematology/Oncology:         -- Cancer in past history (eyeball):   Cardiovascular:   Hypertension, well controlled Dysrhythmias (PSVT) ECG has been reviewed.    1. No scintigraphic evidence of stress-induced ischemia or infarct     2. Normal left ventricular wall motion.     3. Calculated left ventricular ejection fraction of 69 %.   Pulmonary:  Pulmonary Normal    Renal/:   Chronic Renal Disease, CRI    Hepatic/GI:   GERD, well controlled    Musculoskeletal:   Arthritis     Neurological:   TIA, Neuromuscular Disease,   Peripheral Neuropathy    Endocrine:   Hypothyroidism    Psych:   anxiety          Physical Exam  General:  Well nourished    Airway/Jaw/Neck:  Airway Findings: Mouth Opening: Normal Tongue: Normal  General Airway Assessment: Adult  Oropharynx Findings:  Mallampati: II  Jaw/Neck Findings:  Neck ROM: Normal ROM     Eyes/Ears/Nose:  Eyes/Ears/Nose Findings:    Dental:  Dental Findings: In tact   Chest/Lungs:  Chest/Lungs Findings: Normal Respiratory Rate     Heart/Vascular:  Heart Findings: Rate: Normal  Rhythm: Regular Rhythm        Mental Status:  Mental Status Findings:  Cooperative, Alert and Oriented         Anesthesia Plan  Type of Anesthesia, risks & benefits discussed:  Anesthesia Type:  general, spinal  Patient's Preference:   Intra-op Monitoring Plan: standard ASA monitors  Intra-op Monitoring Plan Comments:   Post Op Pain Control Plan: multimodal analgesia and peripheral nerve block  Post Op Pain Control Plan Comments:   Induction:   IV  Beta Blocker:  Patient is  on a Beta-Blocker and has received one dose within the past 24 hours (No further documentation required).       Informed Consent: Patient understands risks and agrees with Anesthesia plan.  Questions answered. Anesthesia consent signed with patient.  ASA Score: 3     Day of Surgery Review of History & Physical:  There are no significant changes.   H&P completed by Anesthesiologist.       Ready For Surgery From Anesthesia Perspective.

## 2020-11-02 NOTE — ASSESSMENT & PLAN NOTE
POD 0 s/p left TKA with Dr. Haider  Continue to follow Orthopedic recommendations.  Needs aggressive incentive spirometry.  Follow hemoglobin and hematocrit closely.  Pain control with IV narcotics and antiemetics as needed.  Physical therapy as per Orthopedics protocol with fall precautions.  ASA 81 mg PO BID for DVT prophylaxis per orthopedic recommendations.

## 2020-11-02 NOTE — TRANSFER OF CARE
"Anesthesia Transfer of Care Note    Patient: Lorena Vallejo    Procedure(s) Performed: Procedure(s) (LRB):  ARTHROPLASTY, KNEE (Left)    Patient location: PACU    Anesthesia Type: spinal    Transport from OR: Transported from OR on 2-3 L/min O2 by NC with adequate spontaneous ventilation    Post pain: adequate analgesia    Post assessment: no apparent anesthetic complications and tolerated procedure well    Post vital signs: stable    Level of consciousness: sedated and responds to stimulation    Nausea/Vomiting: no nausea/vomiting    Complications: none    Transfer of care protocol was followed      Last vitals:   Visit Vitals  /65   Pulse 73   Temp 36.6 °C (97.8 °F) (Skin)   Resp 16   Ht 5' 9" (1.753 m)   Wt 63 kg (139 lb)   SpO2 98%   Breastfeeding No   BMI 20.53 kg/m²     "

## 2020-11-02 NOTE — OP NOTE
[facility]  Surgery Department  Operative Note    SUMMARY     Date of Procedure: 11/2/2020     Procedure:  Left total knee arthroplasty    Surgeon(s) and Role:     * Eduardo Haider MD - Primary    Assisting Surgeon:  Yaw    Pre-Operative Diagnosis: Primary osteoarthritis of left knee [M17.12]  Preop testing [Z01.818]    Post-Operative Diagnosis: Post-Op Diagnosis Codes:     * Primary osteoarthritis of left knee [M17.12]     * Preop testing [Z01.818]    Anesthesia: Spinal    Intraoperative Findings:  Bone-on-bone arthritis left knee    Description of the Findings of the Procedure:  Patient was placed on the operating table in supine position.  The left knee was prepped and draped in the usual sterile manner for surgery.  Standard anterior approach was undertaken.  The medial parapatellar tissues were visualized and divided.  The medial tibial plateau was taken down subperiosteal E.  The knee was flexed to 90°.  A drill was used to gain access to the femur an intramedullary doc was inserted up the femoral canal.  A cutting jig was pinned into position such that would make 11 mm cut.  This was selected as the patient had a flexion contracture.  The cut was made without difficulty.  We sized the femur.  She sized to a size 4.  The cuts were made without difficulty.  We placed a femoral trial.  It fit perfectly.  We turned our attention to the tibia.  The tibia was subluxed anteriorly.  A cutting jig was pinned into position such that would make a neutral cut intake 2 mm of medial bone.  It was checked secondarily in the cut was made.  We placed a femoral trial and tibial trial.  The construct had full extension gaps P we broached the tibia.  All excess cement was removed at the time of see mentation in the construct was held in full extension until all cement hardened.  Is sleepy irrigated.  We tapped the actual spacer in.  Was the extensor mechanism with a combination of 2.  FiberWire in a running  Quill stitch.  We irrigated and closed the subcu with 2 0 Vicryl and the skin with 4 Monocryl.  Sterile dressings were applied and the patient was noted to be in stable condition pending an x-ray and neurovascular check  Complications: No    Estimated Blood Loss (EBL): * No values recorded between 11/2/2020  9:14 AM and 11/2/2020  9:43 AM *           Implants:   Implant Name Type Inv. Item Serial No.  Lot No. LRB No. Used Action   CEMENT BONE ORTHOSET 1 40 GRAM - [id number]  CEMENT BONE ORTHOSET 1 40 GRAM  [employer] [id number] Left 2 Implanted   COMPONENT FEM SZ 4 CS/CR GERMANIA R - [id number]  COMPONENT FEM SZ 4 CS/CR GERMANIA R  [employer] [id number] Left 1 Implanted   PATELLA ONLAY 29MM TRI PEG - [id number]  PATELLA ONLAY 29MM TRI PEG  [employer] [id number] Left 1 Implanted   BASEPLATE TIB EVOLTN SZ 4 L - [id number]  BASEPLATE TIB EVOLTN SZ 4 L  [employer] [id number] Left 1 Implanted       Specimens:   Specimen (12h ago, onward)    None                  Condition: Good    Disposition: PACU - hemodynamically stable.

## 2020-11-02 NOTE — PT/OT/SLP EVAL
Physical Therapy Evaluation    Patient Name:  Lorena Vallejo   MRN:  [id number]    Recommendations:     Discharge Recommendations:  home, home health PT   Discharge Equipment Recommendations: bedside commode, walker, rolling   Barriers to discharge: None    Assessment:     Lorena Vallejo is a 87 y.o. female admitted with a medical diagnosis of <principal problem not specified>.  She presents with the following impairments/functional limitations:  weakness, impaired endurance, impaired sensation, impaired functional mobilty, gait instability, impaired balance, decreased lower extremity function, pain, decreased ROM, orthopedic precautions .  Patient agreeable to PT evaluation this afternoon but did report left LE still being completely numb from the knee down to the ground.  Patient also reported max pain graded 4-8/10 in the lower left quad.  Patient presented supine in bed and required max assist to transfer to sitting and max assist to stand and only able to stand a few seconds.      Rehab Prognosis: Good; patient would benefit from acute skilled PT services to address these deficits and reach maximum level of function.    Recent Surgery: Procedure(s) (LRB):  ARTHROPLASTY, KNEE (Left) Day of Surgery    Plan:     During this hospitalization, patient to be seen BID to address the identified rehab impairments via gait training, therapeutic activities, therapeutic exercises and progress toward the following goals:    · Plan of Care Expires:  12/07/20    Subjective     Chief Complaint: pain and numbness  Patient/Family Comments/goals: go home  Pain/Comfort:  · Pain Rating 1: 4/10  · Location - Side 1: Left  · Location - Orientation 1: lower  · Location 1: knee  · Pain Addressed 1: Reposition, Cessation of Activity  · Pain Rating Post-Intervention 1: 8/10(pain increased with movement and exercise)    Patients cultural, spiritual, Congregational conflicts given the current situation:      Living Environment:  Currently lives with  spouse in 1 story home  Prior to admission, patients level of function was independent.  Equipment used at home: none.  DME owned (not currently used): none.  Upon discharge, patient will have assistance from family.    Objective:     Communicated with nurse prior to session.  Patient found supine with bed alarm, cryotherapy, oxygen, PureWick, peripheral IV, SCD  upon PT entry to room.    General Precautions: Standard, fall   Orthopedic Precautions:LLE weight bearing as tolerated   Braces:       Exams:  · RLE ROM: WFL  · RLE Strength: Deficits: 4+/5 overall  · LLE ROM: Deficits: zero AROM, PROM 95 degrees flexion  · LLE Strength: Deficits: no muscle contraction noted in right LE    Functional Mobility:  · Bed Mobility:     · Supine to Sit: maximal assistance  · Sit to Supine: maximal assistance  · Transfers:     · Sit to Stand:  maximal assistance with rolling walker  · Gait: unable to even stand but for a second due to numb left LE    Therapeutic Activities and Exercises:   exercise to include quad sets and ankle pumps.  Patient instructed to perform quad sets frequently during rest of day today.    AM-PAC 6 CLICK MOBILITY  Total Score:10     Patient left supine with call button in reach, bed alarm on, nurse notified and spouse present.    GOALS:   Multidisciplinary Problems     Physical Therapy Goals        Problem: Physical Therapy Goal    Goal Priority Disciplines Outcome Goal Variances Interventions   Physical Therapy Goal     PT, PT/OT Ongoing, Progressing     Description: Goals to be met by: 2020    Patient will increase functional independence with mobility by performin. Supine to sit with Stand-by Assistance  2. Sit to supine with Stand-by Assistance  3. Sit to stand transfer with Stand-by Assistance  4. Bed to chair transfer with Supervision using Rolling Walker  5. Gait  x 150 feet with Supervision using Rolling Walker.                      History:     Past Medical History:   Diagnosis Date     Arthritis     Blood transfusion     Bowel obstruction     X 3    Cancer     MELANOMA RIGHT EYE    Cancer of eyeball, except conjunctiva, cornea, retina, and choroid right    Depression     Diverticulitis     GERD (gastroesophageal reflux disease)     Hypertension     Melanoma     right eye    Peripheral neuropathy     PSVT (paroxysmal supraventricular tachycardia)     HAD X 3 OVER 10 YRS. LAST 7-25-12. HAD ADENOSINE IN [facility] ER. WAS ON INCREASED THYROID MED. NO PROBLEM SINCE    Salmonella     Short bowel syndrome     Thyroid disease     Transient ischemic attack     UTI (urinary tract infection)     Wears partial dentures     PARTIAL BOTTOM       Past Surgical History:   Procedure Laterality Date    ABDOMINAL SURGERY      X 3. ILEUM REMOVED. OSTOMY PLACED AND CLOSED.    ADENOIDECTOMY      BILATERAL OOPHORECTOMY      COLONOSCOPY      COLONOSCOPY N/A 8/24/2016    Procedure: COLONOSCOPY;  Surgeon: Alex Corral MD;  Location: [facility] ENDO;  Service: Endoscopy;  Laterality: N/A;    COLONOSCOPY N/A 8/29/2019    Procedure: COLONOSCOPY;  Surgeon: Tiffanie Vázquez MD;  Location: [facility] ENDO;  Service: Endoscopy;  Laterality: N/A;    COLONOSCOPY N/A 11/22/2019    Procedure: COLONOSCOPY;  Surgeon: Tiffanie Vázquez MD;  Location: [facility] ENDO;  Service: Endoscopy;  Laterality: N/A;    CTR      BILAT    ESOPHAGOGASTRODUODENOSCOPY N/A 8/29/2019    Procedure: EGD (ESOPHAGOGASTRODUODENOSCOPY);  Surgeon: Tiffanie Vázquez MD;  Location: [facility] ENDO;  Service: Endoscopy;  Laterality: N/A;    EYE SURGERY      RIGHT  - LASER MELANOMA; BILAT CATARACT    HYSTERECTOMY      BSO    JOINT REPLACEMENT      RIGHT KNEE    MANDIBLE FRACTURE SURGERY      MENISCECTOMY      MEDIAL AND LATERAL REPAIR, BAKERS CYST    TENDON REPAIR      LEFT THUMB    TONSILLECTOMY      T AND A       Time Tracking:     PT Received On: 11/02/20  PT Start Time: 1342     PT Stop Time: 1405  PT Total Time (min): 23 min     Billable Minutes:  Evaluation 15 and Therapeutic Exercise 8      Chris Megilligan, PT  11/02/2020

## 2020-11-02 NOTE — ANESTHESIA POSTPROCEDURE EVALUATION
Anesthesia Post Evaluation    Patient: Lorena Vallejo    Procedure(s) Performed: Procedure(s) (LRB):  ARTHROPLASTY, KNEE (Left)    Final Anesthesia Type: general    Patient location during evaluation: PACU  Patient participation: Yes- Able to Participate  Level of consciousness: sedated and awake  Post-procedure vital signs: reviewed and stable  Pain management: adequate  Airway patency: patent    PONV status at discharge: No PONV  Anesthetic complications: no      Cardiovascular status: blood pressure returned to baseline  Respiratory status: spontaneous ventilation  Hydration status: euvolemic  Follow-up not needed.          Vitals Value Taken Time   /57 11/02/20 1034   Temp 36.6 °C (97.9 °F) 11/02/20 1017   Pulse 78 11/02/20 1035   Resp 37 11/02/20 1035   SpO2 97 % 11/02/20 1035   Vitals shown include unvalidated device data.      No case tracking events are documented in the log.      Pain/Brandie Score: Brandie Score: 9 (11/2/2020 10:30 AM)

## 2020-11-02 NOTE — SUBJECTIVE & OBJECTIVE
Past Medical History:   Diagnosis Date    Arthritis     Blood transfusion     Bowel obstruction     X 3    Cancer     MELANOMA RIGHT EYE    Cancer of eyeball, except conjunctiva, cornea, retina, and choroid right    Depression     Diverticulitis     GERD (gastroesophageal reflux disease)     Hypertension     Melanoma     right eye    Peripheral neuropathy     PSVT (paroxysmal supraventricular tachycardia)     HAD X 3 OVER 10 YRS. LAST 7-25-12. HAD ADENOSINE IN [facility] ER. WAS ON INCREASED THYROID MED. NO PROBLEM SINCE    Salmonella     Short bowel syndrome     Thyroid disease     Transient ischemic attack     UTI (urinary tract infection)     Wears partial dentures     PARTIAL BOTTOM       Past Surgical History:   Procedure Laterality Date    ABDOMINAL SURGERY      X 3. ILEUM REMOVED. OSTOMY PLACED AND CLOSED.    ADENOIDECTOMY      BILATERAL OOPHORECTOMY      COLONOSCOPY      COLONOSCOPY N/A 8/24/2016    Procedure: COLONOSCOPY;  Surgeon: Alex Corral MD;  Location: [facility] ENDO;  Service: Endoscopy;  Laterality: N/A;    COLONOSCOPY N/A 8/29/2019    Procedure: COLONOSCOPY;  Surgeon: Tiffanie Vázquez MD;  Location: [facility] ENDO;  Service: Endoscopy;  Laterality: N/A;    COLONOSCOPY N/A 11/22/2019    Procedure: COLONOSCOPY;  Surgeon: Tiffanie Vázquez MD;  Location: [facility] ENDO;  Service: Endoscopy;  Laterality: N/A;    CTR      BILAT    ESOPHAGOGASTRODUODENOSCOPY N/A 8/29/2019    Procedure: EGD (ESOPHAGOGASTRODUODENOSCOPY);  Surgeon: Tiffanie Vázquez MD;  Location: [facility];  Service: Endoscopy;  Laterality: N/A;    EYE SURGERY      RIGHT  - LASER MELANOMA; BILAT CATARACT    HYSTERECTOMY      BSO    JOINT REPLACEMENT      RIGHT KNEE    MANDIBLE FRACTURE SURGERY      MENISCECTOMY      MEDIAL AND LATERAL REPAIR, BAKERS CYST    TENDON REPAIR      LEFT THUMB    TONSILLECTOMY      T AND A       Review of patient's allergies indicates:   Allergen Reactions    Flagyl [metronidazole hcl] Diarrhea      SEVERE DIARRHEA    Latex, natural rubber Rash     ITCHING    Levaquin [levofloxacin] Diarrhea     MOOD CHANGES, WEAKNESS    Morphine Other (See Comments)     HALLUCINATES    Augmentin [amoxicillin-pot clavulanate] Other (See Comments)     AGITATION    Ciprofloxacin Anxiety    Codeine Nausea And Vomiting    Demerol [meperidine] Nausea And Vomiting     CAN TAKE WITH PHENERGAN    Dilaudid [hydromorphone (bulk)] Itching     OK WITH BENADRYL    Fentanyl Itching     OK WITH BENADRYL    Fosfomycin      diarrhia    Naproxen Other (See Comments)     HURT STOMACH    Nsaids (non-steroidal anti-inflammatory drug) Other (See Comments)     TOLD NOT TO TAKE AS HAD AN ULCER. HAD BLEEDING    Percocet [oxycodone-acetaminophen] Rash    Prednisone Other (See Comments)     AGITATION    Reglan [metoclopramide hcl] Anxiety    Sulfa (sulfonamide antibiotics) Rash    Vicodin [hydrocodone-acetaminophen] Rash    Zosyn [piperacillin-tazobactam] Rash    Ambien [zolpidem]     Ibuprofen     Pcn [penicillins]     Vasotec [enalapril maleate] Other (See Comments)     DRY COUGH       No current facility-administered medications on file prior to encounter.      Current Outpatient Medications on File Prior to Encounter   Medication Sig    acetaminophen (TYLENOL) 500 MG tablet Take 1,000 mg by mouth every 6 (six) hours as needed.    ARMOUR THYROID 300 mg Tab Take 150 mg by mouth once daily. TAKE ONE-HALF TABLET BY MOUTH ONCE DAILY (Patient taking differently: Take 180 mg by mouth once daily. TAKE ONE-HALF TABLET BY MOUTH ONCE DAILY)    Bifidobacterium infantis (ALIGN) 4 mg Cap Take 4 mg by mouth once daily.    diltiaZEM (TIAZAC) 240 MG Cs24 Take 1 capsule (240 mg total) by mouth once daily.    fluticasone propionate (FLONASE) 50 mcg/actuation nasal spray 1 spray (50 mcg total) by Each Nostril route once daily.    magnesium oxide (MAG-OX) 400 mg tablet Take 400 mg by mouth every evening.     metoprolol succinate  (TOPROL-XL) 25 MG 24 hr tablet Take 1 tablet (25 mg total) by mouth every evening. (Patient taking differently: Take 25 mg by mouth once daily. )    omeprazole (PRILOSEC) 20 MG capsule TAKE 1 CAPSULE (20 MG TOTAL) BY MOUTH ONCE DAILY.    propafenone (RHTHYMOL) 150 MG Tab Take 1 tablet (150 mg total) by mouth every 8 (eight) hours.    aspirin (ECOTRIN) 81 MG EC tablet Take 81 mg by mouth once daily.    calcium carbonate (OS-RAMONITA) 500 mg calcium (1,250 mg) tablet Take 1 tablet by mouth 2 (two) times daily.    coenzyme Q10 100 mg capsule Take 100 mg by mouth once daily.    cyanocobalamin, vitamin B-12, 5,000 mcg Subl Place 5,000 mcg under the tongue every Mon, Wed, Fri.    krill oil 500 mg Cap Take 500 mg by mouth every evening.    loperamide (IMODIUM) 2 mg capsule Take 1 capsule (2 mg total) by mouth 4 (four) times daily as needed for Diarrhea.    nitroGLYCERIN (NITROSTAT) 0.4 MG SL tablet DISSOLVE 1 TABLET UNDER THE TONGUE EVERY 5 MINUTES AS NEEDED FOR CHEST PAIN (Patient taking differently: Place 0.4 mg under the tongue every 5 (five) minutes as needed for Chest pain. )    VIT C/E/ZN/COPPR/LUTEIN/ZEAXAN (PRESERVISION AREDS 2 ORAL) Take 1 capsule by mouth 2 (two) times daily.     vitamin D 1000 units Tab Take 1,000 Units by mouth once daily.     Family History     Problem Relation (Age of Onset)    Arthritis Daughter    Asthma Maternal Grandfather    COPD Father    Cancer Father, Son, Paternal Grandmother, Paternal Grandfather    Diabetes Mother, Brother, Maternal Grandmother    Heart disease Mother, Brother, Son, Son    Hypertension Daughter, Son, Son, Son    Melanoma Paternal Grandmother    Obesity Son    Rheum arthritis Father    Sinus disease Son    Sleep apnea Son    Stroke Father, Brother        Tobacco Use    Smoking status: Former Smoker     Packs/day: 1.00     Years: 18.00     Pack years: 18.00     Start date:      Quit date: 1973     Years since quittin.7    Smokeless tobacco:  Never Used   Substance and Sexual Activity    Alcohol use: Yes     Alcohol/week: 2.0 standard drinks     Types: 2 Glasses of wine per week     Comment: WINE once per day    Drug use: No    Sexual activity: Not Currently     Review of Systems   Constitutional: Negative for chills, fatigue and fever.   HENT: Negative for congestion, sore throat and trouble swallowing.    Eyes: Negative for photophobia and visual disturbance.   Respiratory: Negative for cough and shortness of breath.    Cardiovascular: Negative for chest pain.   Gastrointestinal: Negative for abdominal pain, diarrhea, nausea and vomiting.   Genitourinary: Negative for difficulty urinating, dysuria and urgency.   Musculoskeletal: Positive for arthralgias and gait problem. Negative for back pain.   Skin: Negative for color change, pallor, rash and wound.   Neurological: Negative for dizziness, weakness and light-headedness.   All other systems reviewed and are negative.    Objective:     Vital Signs (Most Recent):  Temp: 97.5 °F (36.4 °C) (11/02/20 1115)  Pulse: 78 (11/02/20 1115)  Resp: 18 (11/02/20 1159)  BP: 125/60 (11/02/20 1115)  SpO2: 96 % (11/02/20 1200) Vital Signs (24h Range):  Temp:  [97.5 °F (36.4 °C)-98.2 °F (36.8 °C)] 97.5 °F (36.4 °C)  Pulse:  [73-78] 78  Resp:  [12-21] 18  SpO2:  [94 %-100 %] 96 %  BP: (105-149)/(55-66) 125/60     Weight: 63 kg (139 lb)  Body mass index is 20.53 kg/m².    Physical Exam  Vitals signs and nursing note reviewed.   Constitutional:       General: She is not in acute distress.     Appearance: Normal appearance. She is well-developed. She is not ill-appearing.   HENT:      Head: Normocephalic and atraumatic.      Right Ear: External ear normal.      Left Ear: External ear normal.      Nose: Nose normal. No congestion or rhinorrhea.      Mouth/Throat:      Mouth: Mucous membranes are moist.      Pharynx: Oropharynx is clear. No oropharyngeal exudate or posterior oropharyngeal erythema.   Eyes:       Conjunctiva/sclera: Conjunctivae normal.      Pupils: Pupils are equal, round, and reactive to light.   Neck:      Musculoskeletal: Normal range of motion and neck supple.      Vascular: No JVD.   Cardiovascular:      Rate and Rhythm: Normal rate and regular rhythm.      Pulses: Normal pulses.      Heart sounds: Normal heart sounds. No murmur.   Pulmonary:      Effort: Pulmonary effort is normal. No respiratory distress.      Breath sounds: Normal breath sounds. No wheezing.   Abdominal:      General: Bowel sounds are normal. There is no distension.      Palpations: Abdomen is soft.      Tenderness: There is no abdominal tenderness. There is no guarding.   Musculoskeletal:      Comments: Limited range of motion to left knee due to recent surgery.  Cryotherapy in use and Ace wrap clean dry and intact.  Sensation to lower extremity intact and pedal pulse is strong and equal.   Skin:     General: Skin is warm and dry.      Capillary Refill: Capillary refill takes 2 to 3 seconds.   Neurological:      Mental Status: She is alert and oriented to person, place, and time.   Psychiatric:         Behavior: Behavior normal.         Thought Content: Thought content normal.           CRANIAL NERVES     CN III, IV, VI   Pupils are equal, round, and reactive to light.       Significant Labs: CBC: No results for input(s): WBC, HGB, HCT, PLT in the last 48 hours.  CMP: No results for input(s): NA, K, CL, CO2, GLU, BUN, CREATININE, CALCIUM, PROT, ALBUMIN, BILITOT, ALKPHOS, AST, ALT, ANIONGAP, EGFRNONAA in the last 48 hours.    Invalid input(s): ESTGFAFRICA  All pertinent labs within the past 24 hours have been reviewed.    Significant Imaging: I have reviewed all pertinent imaging results/findings within the past 24 hours.

## 2020-11-02 NOTE — PLAN OF CARE
Pt took her bridge out and it the container labeled an brought to recovery   Let pacu nurse know it was on the counter and the pt requested that it be put back in when she wakes up.

## 2020-11-02 NOTE — PLAN OF CARE
Problem: Physical Therapy Goal  Goal: Physical Therapy Goal  Description: Goals to be met by: 2020    Patient will increase functional independence with mobility by performin. Supine to sit with Stand-by Assistance  2. Sit to supine with Stand-by Assistance  3. Sit to stand transfer with Stand-by Assistance  4. Bed to chair transfer with Supervision using Rolling Walker  5. Gait  x 150 feet with Supervision using Rolling Walker.     Outcome: Ongoing, Progressing

## 2020-11-02 NOTE — ANESTHESIA PROCEDURE NOTES
Spinal    Diagnosis: left knee replacement  Patient location during procedure: OR  Start time: 11/2/2020 8:51 AM  Timeout: 11/2/2020 8:51 AM  End time: 11/2/2020 8:57 AM    Staffing  Authorizing Provider: Truman Davenport MD  Performing Provider: Truman Davenport MD    Spinal Block  Patient position: sitting  Prep: Betadine and ChloraPrep  Patient monitoring: heart rate, cardiac monitor and continuous pulse ox  Approach: left paramedian  Location: L2-3  Injection technique: single shot  CSF Fluid: clear free-flowing CSF  Needle  Needle type: Quincke   Needle gauge: 22 G  Needle localization: anatomical landmarks  Assessment  Sensory level: T10   Dermatomal levels determined by pinch or prick  Ease of block: easy  Patient's tolerance of the procedure: comfortable throughout block and no complaints  Additional Notes  +CSF, no paresthesia or blood. No complications

## 2020-11-02 NOTE — ADDENDUM NOTE
Addendum  created 11/02/20 1120 by Truman Davenport MD    Attestation recorded in Intraprocedure, Child order released for a procedure order, Clinical Note Signed, Flowsheet accepted, Intraprocedure Attestations filed, Intraprocedure Blocks edited, Intraprocedure Flowsheets edited, Intraprocedure Meds edited

## 2020-11-03 LAB
ANION GAP SERPL CALC-SCNC: 8 MMOL/L (ref 8–16)
BASOPHILS # BLD AUTO: 0.04 K/UL (ref 0–0.2)
BASOPHILS NFR BLD: 0.4 % (ref 0–1.9)
BUN SERPL-MCNC: 7 MG/DL (ref 8–23)
CALCIUM SERPL-MCNC: 7.9 MG/DL (ref 8.7–10.5)
CHLORIDE SERPL-SCNC: 101 MMOL/L (ref 95–110)
CO2 SERPL-SCNC: 23 MMOL/L (ref 23–29)
CREAT SERPL-MCNC: 0.7 MG/DL (ref 0.5–1.4)
DIFFERENTIAL METHOD: ABNORMAL
EOSINOPHIL # BLD AUTO: 0 K/UL (ref 0–0.5)
EOSINOPHIL NFR BLD: 0.2 % (ref 0–8)
ERYTHROCYTE [DISTWIDTH] IN BLOOD BY AUTOMATED COUNT: 13.2 % (ref 11.5–14.5)
EST. GFR  (AFRICAN AMERICAN): >60 ML/MIN/1.73 M^2
EST. GFR  (NON AFRICAN AMERICAN): >60 ML/MIN/1.73 M^2
GLUCOSE SERPL-MCNC: 107 MG/DL (ref 70–110)
HCT VFR BLD AUTO: 32.3 % (ref 37–48.5)
HGB BLD-MCNC: 10.6 G/DL (ref 12–16)
IMM GRANULOCYTES # BLD AUTO: 0.02 K/UL (ref 0–0.04)
IMM GRANULOCYTES NFR BLD AUTO: 0.2 % (ref 0–0.5)
LYMPHOCYTES # BLD AUTO: 1.6 K/UL (ref 1–4.8)
LYMPHOCYTES NFR BLD: 15.3 % (ref 18–48)
MCH RBC QN AUTO: 32.1 PG (ref 27–31)
MCHC RBC AUTO-ENTMCNC: 32.8 G/DL (ref 32–36)
MCV RBC AUTO: 98 FL (ref 82–98)
MONOCYTES # BLD AUTO: 1.6 K/UL (ref 0.3–1)
MONOCYTES NFR BLD: 15.6 % (ref 4–15)
NEUTROPHILS # BLD AUTO: 7.1 K/UL (ref 1.8–7.7)
NEUTROPHILS NFR BLD: 68.3 % (ref 38–73)
NRBC BLD-RTO: 0 /100 WBC
PLATELET # BLD AUTO: 237 K/UL (ref 150–350)
PMV BLD AUTO: 9 FL (ref 9.2–12.9)
POTASSIUM SERPL-SCNC: 3.8 MMOL/L (ref 3.5–5.1)
RBC # BLD AUTO: 3.3 M/UL (ref 4–5.4)
SODIUM SERPL-SCNC: 132 MMOL/L (ref 136–145)
WBC # BLD AUTO: 10.36 K/UL (ref 3.9–12.7)

## 2020-11-03 PROCEDURE — 25000003 PHARM REV CODE 250: Mod: HCNC | Performed by: NURSE PRACTITIONER

## 2020-11-03 PROCEDURE — 94761 N-INVAS EAR/PLS OXIMETRY MLT: CPT | Mod: HCNC

## 2020-11-03 PROCEDURE — 25000003 PHARM REV CODE 250: Mod: HCNC | Performed by: HOSPITALIST

## 2020-11-03 PROCEDURE — 63600175 PHARM REV CODE 636 W HCPCS: Mod: HCNC | Performed by: PHYSICIAN ASSISTANT

## 2020-11-03 PROCEDURE — 85025 COMPLETE CBC W/AUTO DIFF WBC: CPT | Mod: HCNC

## 2020-11-03 PROCEDURE — 36415 COLL VENOUS BLD VENIPUNCTURE: CPT | Mod: HCNC

## 2020-11-03 PROCEDURE — 25000003 PHARM REV CODE 250: Mod: HCNC | Performed by: PHYSICIAN ASSISTANT

## 2020-11-03 PROCEDURE — 80048 BASIC METABOLIC PNL TOTAL CA: CPT | Mod: HCNC

## 2020-11-03 PROCEDURE — 25000003 PHARM REV CODE 250: Mod: HCNC | Performed by: ORTHOPAEDIC SURGERY

## 2020-11-03 PROCEDURE — 63600175 PHARM REV CODE 636 W HCPCS: Mod: HCNC | Performed by: NURSE PRACTITIONER

## 2020-11-03 PROCEDURE — 97530 THERAPEUTIC ACTIVITIES: CPT | Mod: HCNC

## 2020-11-03 PROCEDURE — S5010 5% DEXTROSE AND 0.45% SALINE: HCPCS | Mod: HCNC | Performed by: ORTHOPAEDIC SURGERY

## 2020-11-03 PROCEDURE — 97116 GAIT TRAINING THERAPY: CPT | Mod: HCNC

## 2020-11-03 PROCEDURE — 97165 OT EVAL LOW COMPLEX 30 MIN: CPT | Mod: HCNC

## 2020-11-03 PROCEDURE — 12000002 HC ACUTE/MED SURGE SEMI-PRIVATE ROOM: Mod: HCNC

## 2020-11-03 PROCEDURE — 97535 SELF CARE MNGMENT TRAINING: CPT | Mod: HCNC

## 2020-11-03 RX ORDER — HYDROMORPHONE HYDROCHLORIDE 1 MG/ML
0.5 INJECTION, SOLUTION INTRAMUSCULAR; INTRAVENOUS; SUBCUTANEOUS ONCE
Status: COMPLETED | OUTPATIENT
Start: 2020-11-03 | End: 2020-11-03

## 2020-11-03 RX ORDER — HYDROXYZINE PAMOATE 25 MG/1
25 CAPSULE ORAL EVERY 6 HOURS PRN
Status: DISCONTINUED | OUTPATIENT
Start: 2020-11-03 | End: 2020-11-06 | Stop reason: HOSPADM

## 2020-11-03 RX ORDER — ONDANSETRON 4 MG/1
4 TABLET, ORALLY DISINTEGRATING ORAL EVERY 6 HOURS
Status: DISCONTINUED | OUTPATIENT
Start: 2020-11-03 | End: 2020-11-06 | Stop reason: HOSPADM

## 2020-11-03 RX ORDER — ACETAMINOPHEN 325 MG/1
650 TABLET ORAL EVERY 6 HOURS PRN
Status: DISCONTINUED | OUTPATIENT
Start: 2020-11-03 | End: 2020-11-05

## 2020-11-03 RX ORDER — HYDROMORPHONE HYDROCHLORIDE 2 MG/ML
2 INJECTION, SOLUTION INTRAMUSCULAR; INTRAVENOUS; SUBCUTANEOUS ONCE
Status: COMPLETED | OUTPATIENT
Start: 2020-11-03 | End: 2020-11-03

## 2020-11-03 RX ORDER — OXYCODONE HYDROCHLORIDE 10 MG/1
10 TABLET ORAL EVERY 4 HOURS PRN
Status: DISCONTINUED | OUTPATIENT
Start: 2020-11-03 | End: 2020-11-05

## 2020-11-03 RX ADMIN — DILTIAZEM HYDROCHLORIDE 240 MG: 120 CAPSULE, COATED, EXTENDED RELEASE ORAL at 09:11

## 2020-11-03 RX ADMIN — HYDROMORPHONE HYDROCHLORIDE 0.25 MG: 1 INJECTION, SOLUTION INTRAMUSCULAR; INTRAVENOUS; SUBCUTANEOUS at 12:11

## 2020-11-03 RX ADMIN — HYDROMORPHONE HYDROCHLORIDE 0.25 MG: 1 INJECTION, SOLUTION INTRAMUSCULAR; INTRAVENOUS; SUBCUTANEOUS at 06:11

## 2020-11-03 RX ADMIN — ONDANSETRON 4 MG: 4 TABLET, ORALLY DISINTEGRATING ORAL at 05:11

## 2020-11-03 RX ADMIN — ASPIRIN 81 MG: 81 TABLET, CHEWABLE ORAL at 09:11

## 2020-11-03 RX ADMIN — HYDROMORPHONE HYDROCHLORIDE 0.25 MG: 1 INJECTION, SOLUTION INTRAMUSCULAR; INTRAVENOUS; SUBCUTANEOUS at 03:11

## 2020-11-03 RX ADMIN — OXYCODONE HYDROCHLORIDE 10 MG: 10 TABLET ORAL at 10:11

## 2020-11-03 RX ADMIN — PROPAFENONE HYDROCHLORIDE 150 MG: 150 TABLET, FILM COATED ORAL at 03:11

## 2020-11-03 RX ADMIN — DEXTROSE AND SODIUM CHLORIDE: 5; .45 INJECTION, SOLUTION INTRAVENOUS at 06:11

## 2020-11-03 RX ADMIN — TRAMADOL HYDROCHLORIDE 50 MG: 50 TABLET, COATED ORAL at 07:11

## 2020-11-03 RX ADMIN — FAMOTIDINE 20 MG: 20 TABLET ORAL at 09:11

## 2020-11-03 RX ADMIN — OXYCODONE HYDROCHLORIDE 10 MG: 10 TABLET ORAL at 04:11

## 2020-11-03 RX ADMIN — SERTRALINE HYDROCHLORIDE 50 MG: 50 TABLET ORAL at 09:11

## 2020-11-03 RX ADMIN — ACETAMINOPHEN 650 MG: 325 TABLET ORAL at 04:11

## 2020-11-03 RX ADMIN — METOPROLOL SUCCINATE 25 MG: 25 TABLET, EXTENDED RELEASE ORAL at 09:11

## 2020-11-03 RX ADMIN — HYDROMORPHONE HYDROCHLORIDE 0.5 MG: 1 INJECTION, SOLUTION INTRAMUSCULAR; INTRAVENOUS; SUBCUTANEOUS at 04:11

## 2020-11-03 RX ADMIN — ONDANSETRON 4 MG: 4 TABLET, ORALLY DISINTEGRATING ORAL at 12:11

## 2020-11-03 RX ADMIN — OXYCODONE HYDROCHLORIDE 10 MG: 10 TABLET ORAL at 12:11

## 2020-11-03 RX ADMIN — PROPAFENONE HYDROCHLORIDE 150 MG: 150 TABLET, FILM COATED ORAL at 09:11

## 2020-11-03 RX ADMIN — PROPAFENONE HYDROCHLORIDE 150 MG: 150 TABLET, FILM COATED ORAL at 06:11

## 2020-11-03 RX ADMIN — HYDROMORPHONE HYDROCHLORIDE 2 MG: 2 INJECTION INTRAMUSCULAR; INTRAVENOUS; SUBCUTANEOUS at 09:11

## 2020-11-03 RX ADMIN — TRAMADOL HYDROCHLORIDE 50 MG: 50 TABLET, COATED ORAL at 03:11

## 2020-11-03 NOTE — PT/OT/SLP PROGRESS
Physical Therapy Treatment    Patient Name:  Lorena Vallejo   MRN:  [id number]    Recommendations:     Discharge Recommendations:  home, home health PT   Discharge Equipment Recommendations: bedside commode, walker, rolling   Barriers to discharge: None    Assessment:     Lorena Vallejo is a 87 y.o. female admitted with a medical diagnosis of Osteoarthritis of left knee.  She presents with the following impairments/functional limitations:  weakness, impaired endurance, gait instability, impaired functional mobilty, impaired balance, decreased lower extremity function, decreased safety awareness, pain, decreased ROM, edema, orthopedic precautions .  Patient agreeable to PT treatment on third attempt this morning refusing the first 2 due to pain levels. Patient presented supine in bed and required min assist to transfer to sitting and then max assist to stand.  Patient then able to ambulate x 8 feet with RW with min assist but with very slow step to gait pattern.  Patient remained up in chair post treatment.      Rehab Prognosis: Good; patient would benefit from acute skilled PT services to address these deficits and reach maximum level of function.    Recent Surgery: Procedure(s) (LRB):  ARTHROPLASTY, KNEE (Left) 1 Day Post-Op    Plan:     During this hospitalization, patient to be seen BID to address the identified rehab impairments via gait training, therapeutic activities, therapeutic exercises and progress toward the following goals:    · Plan of Care Expires:  12/07/20    Subjective     Chief Complaint: pain  Patient/Family Comments/goals: stop hurting  Pain/Comfort:  · Pain Rating 1: 5/10  · Location - Side 1: Left  · Location - Orientation 1: lower  · Location 1: knee  · Pain Addressed 1: Reposition, Cessation of Activity  · Pain Rating Post-Intervention 1: 5/10      Objective:     Communicated with nurse prior to session.  Patient found supine with bed alarm, cryotherapy, PureWick, peripheral IV, SCD, telemetry upon  PT entry to room.     General Precautions: Standard, fall   Orthopedic Precautions:LLE weight bearing as tolerated   Braces:       Functional Mobility:  · Bed Mobility:     · Supine to Sit: minimum assistance  · Transfers:     · Sit to Stand:  maximal assistance with rolling walker  · Gait: x 8 feet with RW with min assist      AM-PAC 6 CLICK MOBILITY          Therapeutic Activities and Exercises:   transfer training supine to sit with min assist, sit to stand with max assist  Scoot to EOB with mod assist  Gait training x 8 feet with RW with min assist but with very slow step to gait pattern    Patient left up in chair with call button in reach, chair alarm on and nurse notified..    GOALS:   Multidisciplinary Problems     Physical Therapy Goals        Problem: Physical Therapy Goal    Goal Priority Disciplines Outcome Goal Variances Interventions   Physical Therapy Goal     PT, PT/OT Ongoing, Progressing     Description: Goals to be met by: 2020    Patient will increase functional independence with mobility by performin. Supine to sit with Stand-by Assistance  2. Sit to supine with Stand-by Assistance  3. Sit to stand transfer with Stand-by Assistance  4. Bed to chair transfer with Supervision using Rolling Walker  5. Gait  x 150 feet with Supervision using Rolling Walker.                      Time Tracking:     PT Received On: 20  PT Start Time: 1117     PT Stop Time: 1143  PT Total Time (min): 26 min     Billable Minutes: Gait Training 13 and Therapeutic Activity 13    Treatment Type: Treatment  PT/PTA: PT     PTA Visit Number: 0     Fernando Sarailligan, PT  2020

## 2020-11-03 NOTE — PROGRESS NOTES
[facility] Hospital Medicine  Progress Note    Patient Name: Lorena Vallejo  MRN: [id number]  Patient Class: OP- Outpatient Recovery   Admission Date: 11/2/2020  Length of Stay: 0 days  Attending Physician: Danny Gipson MD  Primary Care Provider: Stephon Cates MD        Subjective:     Principal Problem:Osteoarthritis of left knee        HPI:  Lorena Vallejo is an 87-year-old  female with past medical history significant for proximal SVT, hypothyroidism, hypertension, depression and arthritis presenting today status post left TKA by Dr. Haider.  Patient was experiencing pain to her left knee for several years.  She tried viscosupplementation and intra-articular injections with only minimal relief and therefore opted for surgical intervention.  She was seen postoperatively and doing well.  She tolerated her diet without any complications.  She is having pain to her left knee which she describes as sharp burning quality, constant timing, worsens with movement, improved after receiving IV Dilaudid.  She denies chest pain, shortness of breath, nausea or vomiting.    Overview/Hospital Course:  No notes on file    Interval History:  Notes reviewed.  Patient had uncontrolled pain last night.  Patient was seen and examined this morning by orthopedic physician assistant and pain medication was adjusted.  Patient received IV dose of Dilaudid and states her pain is currently controlled.  She is due to work with physical therapy and depending on how she progresses and pain control will discuss discharge planning.    Review of Systems   Constitutional: Negative for chills, fatigue and fever.   HENT: Negative for congestion, sore throat and trouble swallowing.    Eyes: Negative for photophobia and visual disturbance.   Respiratory: Negative for cough and shortness of breath.    Cardiovascular: Negative for chest pain, palpitations and leg swelling.   Gastrointestinal: Negative for abdominal pain,  diarrhea, nausea and vomiting.   Endocrine: Negative for polyphagia and polyuria.   Genitourinary: Negative for difficulty urinating, dysuria, frequency and urgency.   Musculoskeletal: Positive for arthralgias and gait problem. Negative for back pain.   Skin: Negative for color change, pallor and rash.   Neurological: Negative for dizziness, weakness and light-headedness.   All other systems reviewed and are negative.    Objective:     Vital Signs (Most Recent):  Temp: 97.9 °F (36.6 °C) (11/03/20 0942)  Pulse: 104 (11/03/20 0942)  Resp: 18 (11/03/20 1222)  BP: 138/76 (11/03/20 0942)  SpO2: 96 % (11/03/20 0942) Vital Signs (24h Range):  Temp:  [97.7 °F (36.5 °C)-100.4 °F (38 °C)] 97.9 °F (36.6 °C)  Pulse:  [] 104  Resp:  [17-22] 18  SpO2:  [94 %-96 %] 96 %  BP: (130-162)/(62-99) 138/76     Weight: 63 kg (139 lb)  Body mass index is 20.53 kg/m².    Intake/Output Summary (Last 24 hours) at 11/3/2020 1356  Last data filed at 11/3/2020 0500  Gross per 24 hour   Intake 360 ml   Output 925 ml   Net -565 ml      Physical Exam  Vitals signs and nursing note reviewed.   Constitutional:       General: She is not in acute distress.     Appearance: Normal appearance. She is well-developed. She is not ill-appearing or toxic-appearing.   HENT:      Head: Normocephalic and atraumatic.      Right Ear: External ear normal.      Left Ear: External ear normal.      Nose: Nose normal. No congestion or rhinorrhea.      Mouth/Throat:      Mouth: Mucous membranes are moist.      Pharynx: Oropharynx is clear. No oropharyngeal exudate or posterior oropharyngeal erythema.   Eyes:      General: No scleral icterus.     Conjunctiva/sclera: Conjunctivae normal.      Pupils: Pupils are equal, round, and reactive to light.   Neck:      Musculoskeletal: Normal range of motion and neck supple.      Vascular: No JVD.   Cardiovascular:      Rate and Rhythm: Normal rate and regular rhythm.      Pulses: Normal pulses.      Heart sounds: Normal  heart sounds. No murmur.   Pulmonary:      Effort: Pulmonary effort is normal. No respiratory distress.      Breath sounds: Normal breath sounds. No stridor. No wheezing, rhonchi or rales.   Abdominal:      General: Bowel sounds are normal. There is no distension.      Palpations: Abdomen is soft.      Tenderness: There is no abdominal tenderness.   Musculoskeletal:         General: Tenderness present. No swelling.      Comments: Limited range of motion to left knee secondary to pain with movement.  Tender to palpation anterior knee and left upper thigh.  Cryotherapy in use and dressing with Ace wrap clean dry and intact   Skin:     General: Skin is warm and dry.      Capillary Refill: Capillary refill takes 2 to 3 seconds.      Coloration: Skin is not jaundiced or pale.   Neurological:      General: No focal deficit present.      Mental Status: She is alert and oriented to person, place, and time.      Cranial Nerves: No cranial nerve deficit.      Motor: No weakness.   Psychiatric:         Behavior: Behavior normal.         Thought Content: Thought content normal.         Significant Labs:   CBC:   Recent Labs   Lab 11/03/20  0521   WBC 10.36   HGB 10.6*   HCT 32.3*        CMP:   Recent Labs   Lab 11/03/20  0521   *   K 3.8      CO2 23      BUN 7*   CREATININE 0.7   CALCIUM 7.9*   ANIONGAP 8   EGFRNONAA >60     All pertinent labs within the past 24 hours have been reviewed.    Significant Imaging: I have reviewed all pertinent imaging results/findings within the past 24 hours.      Assessment/Plan:      * Osteoarthritis of left knee  POD 1 s/p left TKA with Dr. Haider  Continue to follow Orthopedic recommendations.  Needs aggressive incentive spirometry.  Follow hemoglobin and hematocrit closely.  Pain control with IV narcotics and antiemetics as needed.  Physical therapy as per Orthopedics protocol with fall precautions.  ASA 81 mg PO BID for DVT prophylaxis per orthopedic  recommendations.        Chronic kidney disease, stage III (moderate)  Creatine stable for now. BMP reviewed- noted CrCl cannot be calculated (Patient's most recent lab result is older than the maximum 7 days allowed.). according to latest data. Monitor UOP and serial BMP and adjust therapy as needed. Renally dose meds.            SVT (supraventricular tachycardia)  Chronic and currently control  Will monitor on telemetry  Resume home rate control meds        Hypothyroidism, unspecified  Chronic, stable  Resume home medication      Hyperlipidemia  Chronic, stable  Resume home medication        VTE Risk Mitigation (From admission, onward)         Ordered     IP VTE LOW RISK PATIENT  Once      11/02/20 1142     Place sequential compression device  Until discontinued      11/02/20 1142                Discharge Planning   YANICK:      Code Status: Prior   Is the patient medically ready for discharge?:     Reason for patient still in hospital (select all that apply): Patient trending condition, Treatment and Consult recommendations  Discharge Plan A: Home Health                  Laney Lopes NP  Department of Hospital Medicine   [facility]

## 2020-11-03 NOTE — PT/OT/SLP EVAL
Occupational Therapy   Evaluation    Name: Lorena Vallejo  MRN: [id number]  Admitting Diagnosis:  Osteoarthritis of left knee 1 Day Post-Op    Recommendations:     Discharge Recommendations: home health OT  Discharge Equipment Recommendations:  none  Barriers to discharge:  None    Assessment:     Lorena Vallejo is a 87 y.o. female with a medical diagnosis of Osteoarthritis of left knee.  She presents with a decline in functional status due to the listed impairments, impacting ADLs and functional mobility. Pt is s/p L TKA, POD #1.     Pt was anxious but motivated to get to the bathroom. She required Min A for supine to sit, Mod A for sit to stand and Min A to walk to the bathroom at a slow pace with frequent cues for sequencing steps, especially with turning around. Pt put forth good effort with all treatment activities.     She may benefit from adaptive equipment for LB ADL's. Pt has impaired activity tolerance, requiring frequent rest breaks. Recommend OT treatment to maximize endurance, safety & independence with ADL's & functional mobility.    Performance deficits affecting function: weakness, impaired endurance, impaired functional mobilty, gait instability, pain, impaired self care skills, decreased ROM, orthopedic precautions.      Rehab Prognosis: Good; patient would benefit from acute skilled OT services to address these deficits and reach maximum level of function.       Plan:     Patient to be seen 3 x/week to address the above listed problems via self-care/home management, therapeutic activities, therapeutic exercises  · Plan of Care Expires: 11/09/20  · Plan of Care Reviewed with: patient, spouse    Subjective     Chief Complaint: L knee pain  Patient/Family Comments/goals: To use the bathroom like a normal person.    Occupational Profile:  Living Environment: Pt lives with her  in a [facility] with 1 JASON and a walk-in shower.  Previous level of function: Patient was Independent with all I/ADL's at home and in  the community and driving.  Roles and Routines: Pt enjoys gardening and is very active.  Equipment Used at Home:  bedside commode, walker, rolling, shower chair  Assistance upon Discharge: Family will assist pt at home.    Pain/Comfort:  · Pain Rating 1: 3/10  · Location - Side 1: Left  · Location 1: knee  · Pain Addressed 1: Pre-medicate for activity, Reposition, Distraction, Cessation of Activity  · Pain Rating Post-Intervention 1: 3/10    Patients cultural, spiritual, Taoism conflicts given the current situation:      Objective:     Communicated with: Doug, nurse and Fernando PT prior to session.  Patient found supine with PureWick upon OT entry to room.    General Precautions: Standard, fall   Orthopedic Precautions:LLE weight bearing as tolerated   Braces: N/A     Occupational Performance:    Bed Mobility:    · Patient completed Scooting/Bridging with minimum assistance  · Patient completed Supine to Sit with minimum assistance and with trapeze  · Patient completed Sit to Supine with moderate assistance and with leg lift    Functional Mobility/Transfers:  · Patient completed Sit <> Stand Transfer with moderate assistance  with  rolling walker and cues for foot placement   · Patient completed Toilet Transfer Stand Pivot technique with minimum assistance with  rolling walker and grab bars. OT educated patient on toilet transfer techniques using rolling walker.  · Functional Mobility: Pt walked from bedside to bathroom, then to sink & back to bedside using walker with SBA & no LOB noted.  Cues needed for taking smaller steps and walker management with turning and backing up to sit.    Activities of Daily Living:  · Toileting: moderate assistance for brief management only; supervision seated for deshaun-hygiene and Min A standing for clothing management    Cognitive/Visual Perceptual:  Cognitive/Psychosocial Skills:     -       Safety awareness/insight to disability: cues for walker safety   -        Mood/Affect/Coping skills/emotional control: Cooperative and Anxious    Physical Exam:  Postural examination/scapula alignment:    -       Rounded shoulders  -       Forward head  Upper Extremity Range of Motion:     -       Right Upper Extremity: WFL  -       Left Upper Extremity: WFL  Upper Extremity Strength:    -       Right Upper Extremity: WFL  -       Left Upper Extremity: WFL  Gross motor coordination:   WFL    AMPAC 6 Click ADL:  AMPAC Total Score: 20    Treatment & Education:  OT ed pt on OT role & POC as well as discharge recommendations.  OT ed pt on bed mobility techniques to increase independence with task.  OT ed patient on safety with walker use for functional mobility with cues for hand placement.  OT ed pt on fall risk and strongly advised pt to call for help for all OOB mobility.    Education:    Patient left supine with all lines intact, call button in reach and bed alarm on    GOALS:   Multidisciplinary Problems     Occupational Therapy Goals        Problem: Occupational Therapy Goal    Goal Priority Disciplines Outcome Interventions   Occupational Therapy Goal     OT, PT/OT Ongoing, Progressing    Description: Goals to be met by: 11/9/2020     Patient will increase functional independence with ADLs by performing:    LE Dressing with Stand-by Assistance and Assistive Devices as needed.  Toileting from toilet with Stand-by Assistance and Assistive Devices as needed for hygiene and clothing management.   Toilet transfer to toilet with Stand-by Assistance.                     History:     Past Medical History:   Diagnosis Date    Arthritis     Blood transfusion     Bowel obstruction     X 3    Cancer     MELANOMA RIGHT EYE    Cancer of eyeball, except conjunctiva, cornea, retina, and choroid right    Depression     Diverticulitis     GERD (gastroesophageal reflux disease)     Hypertension     Melanoma     right eye    Peripheral neuropathy     PSVT (paroxysmal supraventricular  tachycardia)     HAD X 3 OVER 10 YRS. LAST 7-25-12. HAD ADENOSINE IN [facility] ER. WAS ON INCREASED THYROID MED. NO PROBLEM SINCE    Salmonella     Short bowel syndrome     Thyroid disease     Transient ischemic attack     UTI (urinary tract infection)     Wears partial dentures     PARTIAL BOTTOM       Past Surgical History:   Procedure Laterality Date    ABDOMINAL SURGERY      X 3. ILEUM REMOVED. OSTOMY PLACED AND CLOSED.    ADENOIDECTOMY      BILATERAL OOPHORECTOMY      COLONOSCOPY      COLONOSCOPY N/A 8/24/2016    Procedure: COLONOSCOPY;  Surgeon: Alex Corral MD;  Location: [facility] ENDO;  Service: Endoscopy;  Laterality: N/A;    COLONOSCOPY N/A 8/29/2019    Procedure: COLONOSCOPY;  Surgeon: Tiffanie Vázquez MD;  Location: [facility] ENDO;  Service: Endoscopy;  Laterality: N/A;    COLONOSCOPY N/A 11/22/2019    Procedure: COLONOSCOPY;  Surgeon: Tiffanie Vázquez MD;  Location: [facility] ENDO;  Service: Endoscopy;  Laterality: N/A;    CTR      BILAT    ESOPHAGOGASTRODUODENOSCOPY N/A 8/29/2019    Procedure: EGD (ESOPHAGOGASTRODUODENOSCOPY);  Surgeon: Tiffanie Vázquez MD;  Location: [facility] ENDO;  Service: Endoscopy;  Laterality: N/A;    EYE SURGERY      RIGHT  - LASER MELANOMA; BILAT CATARACT    HYSTERECTOMY      BSO    JOINT REPLACEMENT      RIGHT KNEE    KNEE ARTHROPLASTY Left 11/2/2020    Procedure: ARTHROPLASTY, KNEE;  Surgeon: Eduardo Haider MD;  Location[facility];  Service: Orthopedics;  Laterality: Left;  Giovany Doll    MANDIBLE FRACTURE SURGERY      MENISCECTOMY      MEDIAL AND LATERAL REPAIR, BAKERS CYST    TENDON REPAIR      LEFT THUMB    TONSILLECTOMY      T AND A       Time Tracking:     OT Date of Treatment: 11/03/20  OT Start Time: 1316  OT Stop Time: 1352  OT Total Time (min): 36 min    Billable Minutes:Evaluation 10  Self Care/Home Management 26    BRANDON Berg  11/3/2020

## 2020-11-03 NOTE — PLAN OF CARE
CM met with pt bedside to complete discharge assessment. Pt verified information on facesheet as correct. Denies POA/LW. Reports living at listed address with spouse. PCP is Dr. Cates. [facility] is [facility] [address]. DME- [facility]. Transportation home at discharge will be provided by spouse. Discharge plan is home with [facility] home health (disclosure signed). CM following to assist in any DC needs.      11/02/20 1530   Discharge Assessment   Assessment Type Discharge Planning Assessment   Confirmed/corrected address and phone number on facesheet? Yes   Assessment information obtained from? Patient   Communicated expected length of stay with patient/caregiver yes   Prior to hospitilization cognitive status: Alert/Oriented   Prior to hospitalization functional status: Independent   Current cognitive status: Alert/Oriented   Current Functional Status: Assistive Equipment   Lives With spouse   Able to Return to Prior Arrangements yes   Is patient able to care for self after discharge? Yes   Patient's perception of discharge disposition home health   Readmission Within the Last 30 Days no previous admission in last 30 days   Patient currently being followed by outpatient case management? No   Patient currently receives any other outside agency services? No   Equipment Currently Used at Home walker, rolling;bedside commode;shower chair   Do you have any problems affording any of your prescribed medications? No   Is the patient taking medications as prescribed? yes   Does the patient have transportation home? Yes   Transportation Anticipated family or friend will provide   Does the patient receive services at the [facility]? No   Discharge Plan A Home Health   Discharge Plan B Home with family   DME Needed Upon Discharge  none   Patient/Family in Agreement with Plan yes

## 2020-11-03 NOTE — SUBJECTIVE & OBJECTIVE
Interval History:  Notes reviewed.  Patient had uncontrolled pain last night.  Patient was seen and examined this morning by orthopedic physician assistant and pain medication was adjusted.  Patient received IV dose of Dilaudid and states her pain is currently controlled.  She is due to work with physical therapy and depending on how she progresses and pain control will discuss discharge planning.    Review of Systems   Constitutional: Negative for chills, fatigue and fever.   HENT: Negative for congestion, sore throat and trouble swallowing.    Eyes: Negative for photophobia and visual disturbance.   Respiratory: Negative for cough and shortness of breath.    Cardiovascular: Negative for chest pain, palpitations and leg swelling.   Gastrointestinal: Negative for abdominal pain, diarrhea, nausea and vomiting.   Endocrine: Negative for polyphagia and polyuria.   Genitourinary: Negative for difficulty urinating, dysuria, frequency and urgency.   Musculoskeletal: Positive for arthralgias and gait problem. Negative for back pain.   Skin: Negative for color change, pallor and rash.   Neurological: Negative for dizziness, weakness and light-headedness.   All other systems reviewed and are negative.    Objective:     Vital Signs (Most Recent):  Temp: 97.9 °F (36.6 °C) (11/03/20 0942)  Pulse: 104 (11/03/20 0942)  Resp: 18 (11/03/20 1222)  BP: 138/76 (11/03/20 0942)  SpO2: 96 % (11/03/20 0942) Vital Signs (24h Range):  Temp:  [97.7 °F (36.5 °C)-100.4 °F (38 °C)] 97.9 °F (36.6 °C)  Pulse:  [] 104  Resp:  [17-22] 18  SpO2:  [94 %-96 %] 96 %  BP: (130-162)/(62-99) 138/76     Weight: 63 kg (139 lb)  Body mass index is 20.53 kg/m².    Intake/Output Summary (Last 24 hours) at 11/3/2020 1356  Last data filed at 11/3/2020 0500  Gross per 24 hour   Intake 360 ml   Output 925 ml   Net -565 ml      Physical Exam  Vitals signs and nursing note reviewed.   Constitutional:       General: She is not in acute distress.     Appearance:  Normal appearance. She is well-developed. She is not ill-appearing or toxic-appearing.   HENT:      Head: Normocephalic and atraumatic.      Right Ear: External ear normal.      Left Ear: External ear normal.      Nose: Nose normal. No congestion or rhinorrhea.      Mouth/Throat:      Mouth: Mucous membranes are moist.      Pharynx: Oropharynx is clear. No oropharyngeal exudate or posterior oropharyngeal erythema.   Eyes:      General: No scleral icterus.     Conjunctiva/sclera: Conjunctivae normal.      Pupils: Pupils are equal, round, and reactive to light.   Neck:      Musculoskeletal: Normal range of motion and neck supple.      Vascular: No JVD.   Cardiovascular:      Rate and Rhythm: Normal rate and regular rhythm.      Pulses: Normal pulses.      Heart sounds: Normal heart sounds. No murmur.   Pulmonary:      Effort: Pulmonary effort is normal. No respiratory distress.      Breath sounds: Normal breath sounds. No stridor. No wheezing, rhonchi or rales.   Abdominal:      General: Bowel sounds are normal. There is no distension.      Palpations: Abdomen is soft.      Tenderness: There is no abdominal tenderness.   Musculoskeletal:         General: Tenderness present. No swelling.      Comments: Limited range of motion to left knee secondary to pain with movement.  Tender to palpation anterior knee and left upper thigh.  Cryotherapy in use and dressing with Ace wrap clean dry and intact   Skin:     General: Skin is warm and dry.      Capillary Refill: Capillary refill takes 2 to 3 seconds.      Coloration: Skin is not jaundiced or pale.   Neurological:      General: No focal deficit present.      Mental Status: She is alert and oriented to person, place, and time.      Cranial Nerves: No cranial nerve deficit.      Motor: No weakness.   Psychiatric:         Behavior: Behavior normal.         Thought Content: Thought content normal.         Significant Labs:   CBC:   Recent Labs   Lab 11/03/20  0521   WBC 10.36    HGB 10.6*   HCT 32.3*        CMP:   Recent Labs   Lab 11/03/20  0521   *   K 3.8      CO2 23      BUN 7*   CREATININE 0.7   CALCIUM 7.9*   ANIONGAP 8   EGFRNONAA >60     All pertinent labs within the past 24 hours have been reviewed.    Significant Imaging: I have reviewed all pertinent imaging results/findings within the past 24 hours.

## 2020-11-03 NOTE — [facility] SECONDARY REVIEW
Other (see comment)    Level of Care Issue    Chart reviewed. Case discussed with Concha at [employer] [id number] (covering for Candice). Concha feels that inpatient would be approved given the amount of iv dilaudid pt has required post op. Inpatient requested. kv

## 2020-11-03 NOTE — NURSING
Notified PARK Meyers that pt. Had aweful night, no pain control. Also notified TEQUILA Hernandez. Awaiting further orders.

## 2020-11-03 NOTE — PLAN OF CARE
Patient alert and oriented. Crying in pain at beginning of shift, despite PRN dilaudid and tramadol, NP contacted, 1 time order for dilaudid 0.5mg, moderately effective. Continued PRN pain meds overnight. Neuro checks good overnight. Tolerating PO fluids well. Tele in place, NSR noted. Cryo in place. Surgical dressing CDI.   Patient continued to complain of excruciating pain this morning, NP notified, another 1 time order of dilaudid, only moderately effective. Ace bandage loosened, cryo in place, neuro checks and pulses normal.      Bed low, call bell in place.         Problem: Adult Inpatient Plan of Care  Goal: Plan of Care Review  Outcome: Ongoing, Progressing  Flowsheets (Taken 11/3/2020 0214)  Plan of Care Reviewed With: patient  Goal: Patient-Specific Goal (Individualization)  Outcome: Ongoing, Progressing  Flowsheets (Taken 11/3/2020 0214)  Individualized Care Needs: Pain management, wound care, IVF     Problem: Fall Injury Risk  Goal: Absence of Fall and Fall-Related Injury  Outcome: Ongoing, Progressing  Intervention: Identify and Manage Contributors to Fall Injury Risk  Flowsheets (Taken 11/3/2020 0214)  Self-Care Promotion:   independence encouraged   BADL personal objects within reach  Medication Review/Management: medications reviewed  Intervention: Promote Injury-Free Environment  Flowsheets (Taken 11/3/2020 0214)  Safety Promotion/Fall Prevention:   assistive device/personal item within reach   bed alarm set  Environmental Safety Modification:   assistive device/personal items within reach   clutter free environment maintained     Problem: Infection  Goal: Infection Symptom Resolution  Outcome: Ongoing, Progressing  Intervention: Prevent or Manage Infection  Flowsheets (Taken 11/3/2020 0214)  Fever Reduction/Comfort Measures: lightweight bedding  Infection Management: aseptic technique maintained     Problem: Skin Injury Risk Increased  Goal: Skin Health and Integrity  Outcome: Ongoing,  Progressing  Intervention: Optimize Skin Protection  Flowsheets (Taken 11/3/2020 0214)  Pressure Reduction Techniques:   frequent weight shift encouraged   weight shift assistance provided  Pressure Reduction Devices: pressure-redistributing mattress utilized  Skin Protection:   adhesive use limited   tubing/devices free from skin contact  Head of Bed (HOB): HOB elevated

## 2020-11-03 NOTE — PROGRESS NOTES
[facility]  Orthopedics  Progress Note    Patient Name: Lorena Vallejo  MRN: [id number]  Admission Date: 11/2/2020  Hospital Length of Stay: 0 days  Attending Provider: Danny Gipson MD  Primary Care Provider: Stephon Cates MD  Follow-up For: Procedure(s) (LRB):  ARTHROPLASTY, KNEE (Left)    Post-Operative Day: 1 Day Post-Op  Subjective:     Principal Problem:Osteoarthritis of left knee    Principal Orthopedic Problem: S/P L TKA    Interval History: none    Review of patient's allergies indicates:   Allergen Reactions    Flagyl [metronidazole hcl] Diarrhea     SEVERE DIARRHEA    Latex, natural rubber Rash     ITCHING    Levaquin [levofloxacin] Diarrhea     MOOD CHANGES, WEAKNESS    Morphine Other (See Comments)     HALLUCINATES    Augmentin [amoxicillin-pot clavulanate] Other (See Comments)     AGITATION    Ciprofloxacin Anxiety    Codeine Nausea And Vomiting    Demerol [meperidine] Nausea And Vomiting     CAN TAKE WITH PHENERGAN    Dilaudid [hydromorphone (bulk)] Itching     OK WITH BENADRYL    Fentanyl Itching     OK WITH BENADRYL    Fosfomycin      diarrhia    Naproxen Other (See Comments)     HURT STOMACH    Nsaids (non-steroidal anti-inflammatory drug) Other (See Comments)     TOLD NOT TO TAKE AS HAD AN ULCER. HAD BLEEDING    Percocet [oxycodone-acetaminophen] Rash    Prednisone Other (See Comments)     AGITATION    Reglan [metoclopramide hcl] Anxiety    Sulfa (sulfonamide antibiotics) Rash    Vicodin [hydrocodone-acetaminophen] Rash    Zosyn [piperacillin-tazobactam] Rash    Ambien [zolpidem]     Ibuprofen     Pcn [penicillins]     Vasotec [enalapril maleate] Other (See Comments)     DRY COUGH       Current Facility-Administered Medications   Medication    acetaminophen tablet 650 mg    aspirin chewable tablet 81 mg    bisacodyL suppository 10 mg    dextrose 5 % and 0.45 % NaCl infusion    diltiaZEM 24 hr capsule 240 mg    famotidine tablet 20 mg    HYDROmorphone  "injection 0.25 mg    metoprolol succinate (TOPROL-XL) 24 hr tablet 25 mg    ondansetron disintegrating tablet 8 mg    polyethylene glycol packet 17 g    propafenone tablet 150 mg    sertraline tablet 50 mg    sodium chloride 0.9% flush 5 mL    traMADoL tablet 50 mg     Objective:     Vital Signs (Most Recent):  Temp: (!) 100.4 °F (38 °C) (11/03/20 0417)  Pulse: (!) 112 (11/03/20 0417)  Resp: 20 (11/03/20 0615)  BP: 130/65 (11/03/20 0620)  SpO2: (!) 94 % (11/03/20 0417) Vital Signs (24h Range):  Temp:  [97.5 °F (36.4 °C)-100.4 °F (38 °C)] 100.4 °F (38 °C)  Pulse:  [] 112  Resp:  [12-22] 20  SpO2:  [94 %-100 %] 94 %  BP: (105-162)/(55-99) 130/65     Weight: 63 kg (139 lb)  Height: 5' 9" (175.3 cm)  Body mass index is 20.53 kg/m².      Intake/Output Summary (Last 24 hours) at 11/3/2020 0701  Last data filed at 11/3/2020 0500  Gross per 24 hour   Intake 2360 ml   Output 950 ml   Net 1410 ml       General    Nursing note and vitals reviewed.  Constitutional: She is oriented to person, place, and time. She appears well-developed and well-nourished.   Pulmonary/Chest: Effort normal.   Neurological: She is alert and oriented to person, place, and time.   Psychiatric: She has a normal mood and affect. Her behavior is normal.             Left Knee Exam     Comments:  LLE DNVI. Post-op dressing C/D/I      Significant Labs:   CBC:   Recent Labs   Lab 11/03/20  0521   WBC 10.36   HGB 10.6*   HCT 32.3*        CMP:   Recent Labs   Lab 11/03/20  0521   *   K 3.8      CO2 23      BUN 7*   CREATININE 0.7   CALCIUM 7.9*   ANIONGAP 8   EGFRNONAA >60     All pertinent labs within the past 24 hours have been reviewed.    Significant Imaging: X-Ray: I have reviewed all pertinent results/findings and my personal findings are:  Status post left knee arthroplasty without complication.    Assessment/Plan:     * Osteoarthritis of left knee  OOB with PT and nursing  Change dressing today   DC home with HH " may be delayed until we can get her pain controlled  Oxycodone 10mg q4h scheduled  Zofran 4mg q6h scheduled  Dilaudid 2mg IV now          TEQUILA CRUZ  Orthopedics  [facility]

## 2020-11-03 NOTE — CARE UPDATE
11/03/20 0829   PRE-TX-O2   O2 Device (Oxygen Therapy) room air   SpO2 (!) 94 %   Pulse Oximetry Type Intermittent   $ Pulse Oximetry - Multiple Charge Pulse Oximetry - Multiple   Pulse 90   Resp 18

## 2020-11-03 NOTE — ASSESSMENT & PLAN NOTE
POD 1 s/p left TKA with Dr. Haider  Continue to follow Orthopedic recommendations.  Needs aggressive incentive spirometry.  Follow hemoglobin and hematocrit closely.  Pain control with IV narcotics and antiemetics as needed.  Physical therapy as per Orthopedics protocol with fall precautions.  ASA 81 mg PO BID for DVT prophylaxis per orthopedic recommendations.

## 2020-11-03 NOTE — HPI
POD #4 S/P L TKA  - Doing much better. Wants to go home.  - Stayed over night again 2/2 low O2 sats

## 2020-11-03 NOTE — NURSING
Patient voicing upset with use of purewhick, however voiced inability to use bedpan re: pain and unable to get OOB to walk to bathroom. Patient educated on options for voiding.

## 2020-11-03 NOTE — RESPIRATORY THERAPY
11/02/20 1849   Patient Assessment/Suction   Level of Consciousness (AVPU) alert   Respiratory Effort Unlabored   PRE-TX-O2   O2 Device (Oxygen Therapy) room air   SpO2 96 %   Pulse Oximetry Type Intermittent   $ Pulse Oximetry - Multiple Charge Pulse Oximetry - Multiple

## 2020-11-03 NOTE — PT/OT/SLP PROGRESS
Physical Therapy      Patient Name:  Lorena Vallejo   MRN:  [id number]    Patient not seen today secondary to Other (Comment)(PT was on hold per RN 2x this morning due to patient berenice pain level). Will follow-up again at 11 AM today.    Chris Megilligan, PT

## 2020-11-03 NOTE — NURSING
"Called to pt. Bedside. She is sitting up in chair, crying.  at bedside. Pt. Stating "I can't believe yall are treating me like this. I am in so much pain and I have been asking to get washed up. You people ought to be ashamed of yourselves for treating patients this way." Nurse tried to explain to pt. That PT sat her in the chair for lunch and they were going to work with her again shortly. That once she had finished therapy we would get her back in bed and give her a bath.  Also, made pt. Aware that she had oxycodone ordered and if she felt she needed it she had to ask. Pt. 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 Is dissatified with care.   "

## 2020-11-03 NOTE — SUBJECTIVE & OBJECTIVE
Principal Problem:Osteoarthritis of left knee    Principal Orthopedic Problem: S/P L TKA    Interval History: none    Review of patient's allergies indicates:   Allergen Reactions    Flagyl [metronidazole hcl] Diarrhea     SEVERE DIARRHEA    Latex, natural rubber Rash     ITCHING    Levaquin [levofloxacin] Diarrhea     MOOD CHANGES, WEAKNESS    Morphine Other (See Comments)     HALLUCINATES    Augmentin [amoxicillin-pot clavulanate] Other (See Comments)     AGITATION    Ciprofloxacin Anxiety    Codeine Nausea And Vomiting    Demerol [meperidine] Nausea And Vomiting     CAN TAKE WITH PHENERGAN    Dilaudid [hydromorphone (bulk)] Itching     OK WITH BENADRYL    Fentanyl Itching     OK WITH BENADRYL    Fosfomycin      diarrhia    Naproxen Other (See Comments)     HURT STOMACH    Nsaids (non-steroidal anti-inflammatory drug) Other (See Comments)     TOLD NOT TO TAKE AS HAD AN ULCER. HAD BLEEDING    Percocet [oxycodone-acetaminophen] Rash    Prednisone Other (See Comments)     AGITATION    Reglan [metoclopramide hcl] Anxiety    Sulfa (sulfonamide antibiotics) Rash    Vicodin [hydrocodone-acetaminophen] Rash    Zosyn [piperacillin-tazobactam] Rash    Ambien [zolpidem]     Ibuprofen     Pcn [penicillins]     Vasotec [enalapril maleate] Other (See Comments)     DRY COUGH       Current Facility-Administered Medications   Medication    acetaminophen tablet 650 mg    aspirin chewable tablet 81 mg    bisacodyL suppository 10 mg    dextrose 5 % and 0.45 % NaCl infusion    diltiaZEM 24 hr capsule 240 mg    famotidine tablet 20 mg    HYDROmorphone injection 0.25 mg    metoprolol succinate (TOPROL-XL) 24 hr tablet 25 mg    ondansetron disintegrating tablet 8 mg    polyethylene glycol packet 17 g    propafenone tablet 150 mg    sertraline tablet 50 mg    sodium chloride 0.9% flush 5 mL    traMADoL tablet 50 mg     Objective:     Vital Signs (Most Recent):  Temp: (!) 100.4 °F (38 °C) (11/03/20  "0417)  Pulse: (!) 112 (11/03/20 0417)  Resp: 20 (11/03/20 0615)  BP: 130/65 (11/03/20 0620)  SpO2: (!) 94 % (11/03/20 0417) Vital Signs (24h Range):  Temp:  [97.5 °F (36.4 °C)-100.4 °F (38 °C)] 100.4 °F (38 °C)  Pulse:  [] 112  Resp:  [12-22] 20  SpO2:  [94 %-100 %] 94 %  BP: (105-162)/(55-99) 130/65     Weight: 63 kg (139 lb)  Height: 5' 9" (175.3 cm)  Body mass index is 20.53 kg/m².      Intake/Output Summary (Last 24 hours) at 11/3/2020 0701  Last data filed at 11/3/2020 0500  Gross per 24 hour   Intake 2360 ml   Output 950 ml   Net 1410 ml       General    Nursing note and vitals reviewed.  Constitutional: She is oriented to person, place, and time. She appears well-developed and well-nourished.   Pulmonary/Chest: Effort normal.   Neurological: She is alert and oriented to person, place, and time.   Psychiatric: She has a normal mood and affect. Her behavior is normal.             Left Knee Exam     Comments:  LLE DNVI. Post-op dressing C/D/I      Significant Labs:   CBC:   Recent Labs   Lab 11/03/20  0521   WBC 10.36   HGB 10.6*   HCT 32.3*        CMP:   Recent Labs   Lab 11/03/20  0521   *   K 3.8      CO2 23      BUN 7*   CREATININE 0.7   CALCIUM 7.9*   ANIONGAP 8   EGFRNONAA >60     All pertinent labs within the past 24 hours have been reviewed.    Significant Imaging: X-Ray: I have reviewed all pertinent results/findings and my personal findings are:  Status post left knee arthroplasty without complication.  "

## 2020-11-03 NOTE — PT/OT/SLP PROGRESS
Physical Therapy Treatment    Patient Name:  Lorena Vallejo   MRN:  [id number]    Recommendations:     Discharge Recommendations:  home, home health PT   Discharge Equipment Recommendations: bedside commode, walker, rolling   Barriers to discharge: None    Assessment:     Lorena Vallejo is a 87 y.o. female admitted with a medical diagnosis of Osteoarthritis of left knee.  She presents with the following impairments/functional limitations:  weakness, impaired endurance, impaired functional mobilty, gait instability, impaired balance, decreased lower extremity function, decreased safety awareness, pain, decreased ROM, edema, orthopedic precautions .  Patient agreeable to PT but continued to report significant increased pain in her left knee.  Patient presented supine in bed and required min assist to transfer to sitting and then min assist to stand.  Patient then ambulated x 35 feet,x 10 feet with RW with min assist but with very slow step go gait pattern and mod C/O increased pain with weight bearing.  Patient then returned to supine with mod assist due to max reports of pain.    Rehab Prognosis: Good; patient would benefit from acute skilled PT services to address these deficits and reach maximum level of function.    Recent Surgery: Procedure(s) (LRB):  ARTHROPLASTY, KNEE (Left) 1 Day Post-Op    Plan:     During this hospitalization, patient to be seen BID to address the identified rehab impairments via gait training, therapeutic activities, therapeutic exercises and progress toward the following goals:    · Plan of Care Expires:  12/07/20    Subjective     Chief Complaint: pain  Patient/Family Comments/goals: stop hurting  Pain/Comfort:  · Pain Rating 1: 3/10  · Location - Side 1: Left  · Location - Orientation 1: lower  · Location 1: knee  · Pain Addressed 1: Reposition, Cessation of Activity  · Pain Rating Post-Intervention 1: 6/10(pain increased with weight bearing)      Objective:     Communicated with nurse prior to  session.  Patient found supine with cryotherapy, bed alarm, PureWick, SCD upon PT entry to room.     General Precautions: Standard, fall   Orthopedic Precautions:LLE weight bearing as tolerated   Braces:       Functional Mobility:  · Bed Mobility:     · Supine to Sit: minimum assistance  · Sit to Supine: moderate assistance  · Transfers:     · Sit to Stand:  minimum assistance with rolling walker  · Gait: x 35 feet, x 10 feet with RW with min assist      AM-PAC 6 CLICK MOBILITY          Therapeutic Activities and Exercises:   transfer training supine to sit with min assist, sit to supine with mod assist, sit to stand with min assist  Gait training x 35 feet,x 10 feet with RW with min assist and with slow step to gait pattern.    Patient left supine with call button in reach, bed alarm on, nurse notified and spouse present..    GOALS:   Multidisciplinary Problems     Physical Therapy Goals        Problem: Physical Therapy Goal    Goal Priority Disciplines Outcome Goal Variances Interventions   Physical Therapy Goal     PT, PT/OT Ongoing, Progressing     Description: Goals to be met by: 2020    Patient will increase functional independence with mobility by performin. Supine to sit with Stand-by Assistance  2. Sit to supine with Stand-by Assistance  3. Sit to stand transfer with Stand-by Assistance  4. Bed to chair transfer with Supervision using Rolling Walker  5. Gait  x 150 feet with Supervision using Rolling Walker.                      Time Tracking:     PT Received On: 20  PT Start Time: 1420     PT Stop Time: 1452  PT Total Time (min): 32 min     Billable Minutes: Gait Training 20 and Therapeutic Activity 12    Treatment Type: Treatment  PT/PTA: PT     PTA Visit Number: 0     Chris Megilligan, PT  2020

## 2020-11-03 NOTE — ASSESSMENT & PLAN NOTE
OOB with PT and nursing  Change dressing today   DC home with HH may be delayed until we can get her pain controlled  Oxycodone 10mg q4h scheduled  Zofran 4mg q6h scheduled  Dilaudid 2mg IV now

## 2020-11-04 PROBLEM — R09.02 HYPOXEMIA: Status: ACTIVE | Noted: 2020-11-04

## 2020-11-04 LAB
ALBUMIN SERPL BCP-MCNC: 3.3 G/DL (ref 3.5–5.2)
ALP SERPL-CCNC: 71 U/L (ref 55–135)
ALT SERPL W/O P-5'-P-CCNC: 25 U/L (ref 10–44)
ANION GAP SERPL CALC-SCNC: 8 MMOL/L (ref 8–16)
AST SERPL-CCNC: 25 U/L (ref 10–40)
BASOPHILS # BLD AUTO: 0.04 K/UL (ref 0–0.2)
BASOPHILS NFR BLD: 0.4 % (ref 0–1.9)
BILIRUB SERPL-MCNC: 0.7 MG/DL (ref 0.1–1)
BUN SERPL-MCNC: 13 MG/DL (ref 8–23)
CALCIUM SERPL-MCNC: 9 MG/DL (ref 8.7–10.5)
CHLORIDE SERPL-SCNC: 98 MMOL/L (ref 95–110)
CO2 SERPL-SCNC: 27 MMOL/L (ref 23–29)
CREAT SERPL-MCNC: 1 MG/DL (ref 0.5–1.4)
DIFFERENTIAL METHOD: ABNORMAL
EOSINOPHIL # BLD AUTO: 0 K/UL (ref 0–0.5)
EOSINOPHIL NFR BLD: 0.3 % (ref 0–8)
ERYTHROCYTE [DISTWIDTH] IN BLOOD BY AUTOMATED COUNT: 13.4 % (ref 11.5–14.5)
EST. GFR  (AFRICAN AMERICAN): 59 ML/MIN/1.73 M^2
EST. GFR  (NON AFRICAN AMERICAN): 51 ML/MIN/1.73 M^2
GLUCOSE SERPL-MCNC: 115 MG/DL (ref 70–110)
HCT VFR BLD AUTO: 29.9 % (ref 37–48.5)
HGB BLD-MCNC: 9.7 G/DL (ref 12–16)
IMM GRANULOCYTES # BLD AUTO: 0.06 K/UL (ref 0–0.04)
IMM GRANULOCYTES NFR BLD AUTO: 0.6 % (ref 0–0.5)
LYMPHOCYTES # BLD AUTO: 1.5 K/UL (ref 1–4.8)
LYMPHOCYTES NFR BLD: 14.4 % (ref 18–48)
MCH RBC QN AUTO: 32.3 PG (ref 27–31)
MCHC RBC AUTO-ENTMCNC: 32.4 G/DL (ref 32–36)
MCV RBC AUTO: 100 FL (ref 82–98)
MONOCYTES # BLD AUTO: 1.6 K/UL (ref 0.3–1)
MONOCYTES NFR BLD: 16.1 % (ref 4–15)
NEUTROPHILS # BLD AUTO: 7 K/UL (ref 1.8–7.7)
NEUTROPHILS NFR BLD: 68.2 % (ref 38–73)
NRBC BLD-RTO: 0 /100 WBC
PLATELET # BLD AUTO: 224 K/UL (ref 150–350)
PMV BLD AUTO: 8.9 FL (ref 9.2–12.9)
POTASSIUM SERPL-SCNC: 4.1 MMOL/L (ref 3.5–5.1)
PROT SERPL-MCNC: 6.7 G/DL (ref 6–8.4)
RBC # BLD AUTO: 3 M/UL (ref 4–5.4)
SODIUM SERPL-SCNC: 133 MMOL/L (ref 136–145)
WBC # BLD AUTO: 10.21 K/UL (ref 3.9–12.7)

## 2020-11-04 PROCEDURE — 25000003 PHARM REV CODE 250: Mod: HCNC | Performed by: HOSPITALIST

## 2020-11-04 PROCEDURE — 97110 THERAPEUTIC EXERCISES: CPT | Mod: HCNC

## 2020-11-04 PROCEDURE — 97535 SELF CARE MNGMENT TRAINING: CPT | Mod: HCNC,CO

## 2020-11-04 PROCEDURE — 63600175 PHARM REV CODE 636 W HCPCS: Mod: HCNC | Performed by: NURSE PRACTITIONER

## 2020-11-04 PROCEDURE — 25000003 PHARM REV CODE 250: Mod: HCNC | Performed by: PHYSICIAN ASSISTANT

## 2020-11-04 PROCEDURE — 94640 AIRWAY INHALATION TREATMENT: CPT | Mod: HCNC

## 2020-11-04 PROCEDURE — 36415 COLL VENOUS BLD VENIPUNCTURE: CPT | Mod: HCNC

## 2020-11-04 PROCEDURE — 25000003 PHARM REV CODE 250: Mod: HCNC | Performed by: NURSE PRACTITIONER

## 2020-11-04 PROCEDURE — 27000221 HC OXYGEN, UP TO 24 HOURS: Mod: HCNC

## 2020-11-04 PROCEDURE — 97530 THERAPEUTIC ACTIVITIES: CPT | Mod: HCNC

## 2020-11-04 PROCEDURE — 80053 COMPREHEN METABOLIC PANEL: CPT | Mod: HCNC

## 2020-11-04 PROCEDURE — 94799 UNLISTED PULMONARY SVC/PX: CPT | Mod: HCNC

## 2020-11-04 PROCEDURE — 94761 N-INVAS EAR/PLS OXIMETRY MLT: CPT | Mod: HCNC

## 2020-11-04 PROCEDURE — 25000003 PHARM REV CODE 250: Mod: HCNC | Performed by: ORTHOPAEDIC SURGERY

## 2020-11-04 PROCEDURE — 99900035 HC TECH TIME PER 15 MIN (STAT): Mod: HCNC

## 2020-11-04 PROCEDURE — 12000002 HC ACUTE/MED SURGE SEMI-PRIVATE ROOM: Mod: HCNC

## 2020-11-04 PROCEDURE — 85025 COMPLETE CBC W/AUTO DIFF WBC: CPT | Mod: HCNC

## 2020-11-04 PROCEDURE — 25500020 PHARM REV CODE 255: Mod: HCNC

## 2020-11-04 PROCEDURE — 97116 GAIT TRAINING THERAPY: CPT | Mod: HCNC

## 2020-11-04 PROCEDURE — 25000242 PHARM REV CODE 250 ALT 637 W/ HCPCS: Mod: HCNC | Performed by: NURSE PRACTITIONER

## 2020-11-04 RX ORDER — IPRATROPIUM BROMIDE AND ALBUTEROL SULFATE 2.5; .5 MG/3ML; MG/3ML
3 SOLUTION RESPIRATORY (INHALATION) EVERY 6 HOURS
Status: DISCONTINUED | OUTPATIENT
Start: 2020-11-04 | End: 2020-11-06 | Stop reason: HOSPADM

## 2020-11-04 RX ADMIN — IPRATROPIUM BROMIDE AND ALBUTEROL SULFATE 3 ML: .5; 2.5 SOLUTION RESPIRATORY (INHALATION) at 02:11

## 2020-11-04 RX ADMIN — RIVAROXABAN 10 MG: 10 TABLET, FILM COATED ORAL at 08:11

## 2020-11-04 RX ADMIN — PROPAFENONE HYDROCHLORIDE 150 MG: 150 TABLET, FILM COATED ORAL at 04:11

## 2020-11-04 RX ADMIN — FAMOTIDINE 20 MG: 20 TABLET ORAL at 08:11

## 2020-11-04 RX ADMIN — SERTRALINE HYDROCHLORIDE 50 MG: 50 TABLET ORAL at 08:11

## 2020-11-04 RX ADMIN — IPRATROPIUM BROMIDE AND ALBUTEROL SULFATE 3 ML: .5; 2.5 SOLUTION RESPIRATORY (INHALATION) at 07:11

## 2020-11-04 RX ADMIN — PROPAFENONE HYDROCHLORIDE 150 MG: 150 TABLET, FILM COATED ORAL at 05:11

## 2020-11-04 RX ADMIN — IOHEXOL 75 ML: 350 INJECTION, SOLUTION INTRAVENOUS at 10:11

## 2020-11-04 RX ADMIN — ONDANSETRON 4 MG: 4 TABLET, ORALLY DISINTEGRATING ORAL at 12:11

## 2020-11-04 RX ADMIN — DILTIAZEM HYDROCHLORIDE 240 MG: 120 CAPSULE, COATED, EXTENDED RELEASE ORAL at 08:11

## 2020-11-04 RX ADMIN — OXYCODONE HYDROCHLORIDE 10 MG: 10 TABLET ORAL at 08:11

## 2020-11-04 RX ADMIN — ONDANSETRON 4 MG: 4 TABLET, ORALLY DISINTEGRATING ORAL at 05:11

## 2020-11-04 RX ADMIN — METOPROLOL SUCCINATE 25 MG: 25 TABLET, EXTENDED RELEASE ORAL at 08:11

## 2020-11-04 RX ADMIN — HYDROMORPHONE HYDROCHLORIDE 0.25 MG: 1 INJECTION, SOLUTION INTRAMUSCULAR; INTRAVENOUS; SUBCUTANEOUS at 12:11

## 2020-11-04 NOTE — RESPIRATORY THERAPY
Placed patient on 2 lpm nasal cannula. Sats 85% on room air, verified by RN. RN notifying MD.     11/04/20 0912   PRE-TX-O2   O2 Device (Oxygen Therapy) nasal cannula   Flow (L/min) 2   SpO2 95 %   Pulse 88   Resp 18

## 2020-11-04 NOTE — NURSING
"Pt insisted on getting to chair. Pt reports leg still "has very little feeling and is not moving". Pt expressed concerns with pain management and mobility.  Assisted pt to chair with walker. Pain medication offered. Chair alarm on.   "

## 2020-11-04 NOTE — ASSESSMENT & PLAN NOTE
Acute episode  Patient asymptomatic  O2 sat improved with oxygen supplementation  Will order CTA chest to rule out PE

## 2020-11-04 NOTE — ASSESSMENT & PLAN NOTE
OOB with PT and nursing  Change dressing daily  DC home with HH   DC ASA - change to Xarelto 10mg qd

## 2020-11-04 NOTE — ASSESSMENT & PLAN NOTE
POD 2 s/p left TKA with Dr. Haider  Continue to follow Orthopedic recommendations.  Needs aggressive incentive spirometry.  Follow hemoglobin and hematocrit closely.  Pain control with IV narcotics and antiemetics as needed.  Physical therapy as per Orthopedics protocol with fall precautions.  ASA 81 mg PO BID for DVT prophylaxis per orthopedic recommendations.

## 2020-11-04 NOTE — SUBJECTIVE & OBJECTIVE
Principal Problem:Osteoarthritis of left knee    Principal Orthopedic Problem: S/P L TKA    Interval History: none    Review of patient's allergies indicates:   Allergen Reactions    Flagyl [metronidazole hcl] Diarrhea     SEVERE DIARRHEA    Latex, natural rubber Rash     ITCHING    Levaquin [levofloxacin] Diarrhea     MOOD CHANGES, WEAKNESS    Morphine Other (See Comments)     HALLUCINATES    Augmentin [amoxicillin-pot clavulanate] Other (See Comments)     AGITATION    Ciprofloxacin Anxiety    Codeine Nausea And Vomiting    Demerol [meperidine] Nausea And Vomiting     CAN TAKE WITH PHENERGAN    Dilaudid [hydromorphone (bulk)] Itching     OK WITH BENADRYL    Fentanyl Itching     OK WITH BENADRYL    Fosfomycin      diarrhia    Naproxen Other (See Comments)     HURT STOMACH    Nsaids (non-steroidal anti-inflammatory drug) Other (See Comments)     TOLD NOT TO TAKE AS HAD AN ULCER. HAD BLEEDING    Percocet [oxycodone-acetaminophen] Rash    Prednisone Other (See Comments)     AGITATION    Reglan [metoclopramide hcl] Anxiety    Sulfa (sulfonamide antibiotics) Rash    Vicodin [hydrocodone-acetaminophen] Rash    Zosyn [piperacillin-tazobactam] Rash    Ambien [zolpidem]     Ibuprofen     Pcn [penicillins]     Vasotec [enalapril maleate] Other (See Comments)     DRY COUGH       Current Facility-Administered Medications   Medication    acetaminophen tablet 650 mg    aspirin chewable tablet 81 mg    bisacodyL suppository 10 mg    diltiaZEM 24 hr capsule 240 mg    famotidine tablet 20 mg    HYDROmorphone injection 0.25 mg    hydrOXYzine pamoate capsule 25 mg    metoprolol succinate (TOPROL-XL) 24 hr tablet 25 mg    ondansetron disintegrating tablet 4 mg    ondansetron disintegrating tablet 8 mg    oxyCODONE immediate release tablet Tab 10 mg    polyethylene glycol packet 17 g    propafenone tablet 150 mg    sertraline tablet 50 mg    sodium chloride 0.9% flush 5 mL    traMADoL tablet 50 mg  "    Objective:     Vital Signs (Most Recent):  Temp: 97.5 °F (36.4 °C) (11/04/20 0509)  Pulse: 76 (11/04/20 0559)  Resp: 18 (11/04/20 0509)  BP: (!) 117/56 (11/04/20 0559)  SpO2: (!) 93 % (11/04/20 0509) Vital Signs (24h Range):  Temp:  [97.5 °F (36.4 °C)-99 °F (37.2 °C)] 97.5 °F (36.4 °C)  Pulse:  [] 76  Resp:  [15-22] 18  SpO2:  [92 %-96 %] 93 %  BP: (101-138)/(52-76) 117/56     Weight: 63 kg (139 lb)  Height: 5' 9" (175.3 cm)  Body mass index is 20.53 kg/m².      Intake/Output Summary (Last 24 hours) at 11/4/2020 0709  Last data filed at 11/4/2020 0600  Gross per 24 hour   Intake 240 ml   Output 200 ml   Net 40 ml       General    Nursing note and vitals reviewed.  Constitutional: She is oriented to person, place, and time. She appears well-developed and well-nourished.   Pulmonary/Chest: Effort normal.   Neurological: She is alert and oriented to person, place, and time.   Psychiatric: She has a normal mood and affect. Her behavior is normal.             Left Knee Exam     Comments:  LLE DNVI. Post-op dressing C/D/I      Significant Labs:   CBC:   Recent Labs   Lab 11/03/20  0521   WBC 10.36   HGB 10.6*   HCT 32.3*        CMP:   Recent Labs   Lab 11/03/20 0521   *   K 3.8      CO2 23      BUN 7*   CREATININE 0.7   CALCIUM 7.9*   ANIONGAP 8   EGFRNONAA >60     All pertinent labs within the past 24 hours have been reviewed.    Significant Imaging: X-Ray: I have reviewed all pertinent results/findings and my personal findings are:  Status post left knee arthroplasty without complication.  "

## 2020-11-04 NOTE — PROGRESS NOTES
[facility]  Orthopedics  Progress Note    Patient Name: Lorena Vallejo  MRN: [id number]  Admission Date: 11/2/2020  Hospital Length of Stay: 1 days  Attending Provider: Danny Gipson MD  Primary Care Provider: Stephon Cates MD  Follow-up For: Procedure(s) (LRB):  ARTHROPLASTY, KNEE (Left)    Post-Operative Day: 2 Days Post-Op  Subjective:     Principal Problem:Osteoarthritis of left knee    Principal Orthopedic Problem: S/P L TKA    Interval History: none    Review of patient's allergies indicates:   Allergen Reactions    Flagyl [metronidazole hcl] Diarrhea     SEVERE DIARRHEA    Latex, natural rubber Rash     ITCHING    Levaquin [levofloxacin] Diarrhea     MOOD CHANGES, WEAKNESS    Morphine Other (See Comments)     HALLUCINATES    Augmentin [amoxicillin-pot clavulanate] Other (See Comments)     AGITATION    Ciprofloxacin Anxiety    Codeine Nausea And Vomiting    Demerol [meperidine] Nausea And Vomiting     CAN TAKE WITH PHENERGAN    Dilaudid [hydromorphone (bulk)] Itching     OK WITH BENADRYL    Fentanyl Itching     OK WITH BENADRYL    Fosfomycin      diarrhia    Naproxen Other (See Comments)     HURT STOMACH    Nsaids (non-steroidal anti-inflammatory drug) Other (See Comments)     TOLD NOT TO TAKE AS HAD AN ULCER. HAD BLEEDING    Percocet [oxycodone-acetaminophen] Rash    Prednisone Other (See Comments)     AGITATION    Reglan [metoclopramide hcl] Anxiety    Sulfa (sulfonamide antibiotics) Rash    Vicodin [hydrocodone-acetaminophen] Rash    Zosyn [piperacillin-tazobactam] Rash    Ambien [zolpidem]     Ibuprofen     Pcn [penicillins]     Vasotec [enalapril maleate] Other (See Comments)     DRY COUGH       Current Facility-Administered Medications   Medication    acetaminophen tablet 650 mg    aspirin chewable tablet 81 mg    bisacodyL suppository 10 mg    diltiaZEM 24 hr capsule 240 mg    famotidine tablet 20 mg    HYDROmorphone injection 0.25 mg    hydrOXYzine  "pamoate capsule 25 mg    metoprolol succinate (TOPROL-XL) 24 hr tablet 25 mg    ondansetron disintegrating tablet 4 mg    ondansetron disintegrating tablet 8 mg    oxyCODONE immediate release tablet Tab 10 mg    polyethylene glycol packet 17 g    propafenone tablet 150 mg    sertraline tablet 50 mg    sodium chloride 0.9% flush 5 mL    traMADoL tablet 50 mg     Objective:     Vital Signs (Most Recent):  Temp: 97.5 °F (36.4 °C) (11/04/20 0509)  Pulse: 76 (11/04/20 0559)  Resp: 18 (11/04/20 0509)  BP: (!) 117/56 (11/04/20 0559)  SpO2: (!) 93 % (11/04/20 0509) Vital Signs (24h Range):  Temp:  [97.5 °F (36.4 °C)-99 °F (37.2 °C)] 97.5 °F (36.4 °C)  Pulse:  [] 76  Resp:  [15-22] 18  SpO2:  [92 %-96 %] 93 %  BP: (101-138)/(52-76) 117/56     Weight: 63 kg (139 lb)  Height: 5' 9" (175.3 cm)  Body mass index is 20.53 kg/m².      Intake/Output Summary (Last 24 hours) at 11/4/2020 0709  Last data filed at 11/4/2020 0600  Gross per 24 hour   Intake 240 ml   Output 200 ml   Net 40 ml       General    Nursing note and vitals reviewed.  Constitutional: She is oriented to person, place, and time. She appears well-developed and well-nourished.   Pulmonary/Chest: Effort normal.   Neurological: She is alert and oriented to person, place, and time.   Psychiatric: She has a normal mood and affect. Her behavior is normal.             Left Knee Exam     Comments:  LLE DNVI. Post-op dressing C/D/I      Significant Labs:   CBC:   Recent Labs   Lab 11/03/20 0521   WBC 10.36   HGB 10.6*   HCT 32.3*        CMP:   Recent Labs   Lab 11/03/20 0521   *   K 3.8      CO2 23      BUN 7*   CREATININE 0.7   CALCIUM 7.9*   ANIONGAP 8   EGFRNONAA >60     All pertinent labs within the past 24 hours have been reviewed.    Significant Imaging: X-Ray: I have reviewed all pertinent results/findings and my personal findings are:  Status post left knee arthroplasty without complication.    Assessment/Plan:     * " Osteoarthritis of left knee  OOB with PT and nursing  Change dressing daily  DC home with HH   DC ASA - change to Xarelto 10mg qd          TEQUILA CRUZ  Orthopedics  [facility]

## 2020-11-04 NOTE — PROGRESS NOTES
[facility] Hospital Medicine  Progress Note    Patient Name: Lorena Vallejo  MRN: [id number]  Patient Class: IP- Inpatient   Admission Date: 11/2/2020  Length of Stay: 1 days  Attending Physician: Danny Gipson MD  Primary Care Provider: Stephon Cates MD        Subjective:     Principal Problem:Osteoarthritis of left knee        HPI:  Lorena Vallejo is an 87-year-old  female with past medical history significant for proximal SVT, hypothyroidism, hypertension, depression and arthritis presenting today status post left TKA by Dr. Haider.  Patient was experiencing pain to her left knee for several years.  She tried viscosupplementation and intra-articular injections with only minimal relief and therefore opted for surgical intervention.  She was seen postoperatively and doing well.  She tolerated her diet without any complications.  She is having pain to her left knee which she describes as sharp burning quality, constant timing, worsens with movement, improved after receiving IV Dilaudid.  She denies chest pain, shortness of breath, nausea or vomiting.    Overview/Hospital Course:  No notes on file    Interval History:  Notes reviewed.  No acute events overnight.  Patient states her pain is better controlled today.  She has been working with PT but is still having weakness to her left lower extremity and pain with ambulation.  Patient also hypoxemic but asymptomatic without shortness of breath, chest pain or palpitations.  Oxygen saturation improved with supplemental O2 place.  Will order CTA chest to rule out PE.      Review of Systems   Constitutional: Negative for chills, fatigue and fever.   HENT: Negative for congestion, sore throat and trouble swallowing.    Eyes: Negative for photophobia and visual disturbance.   Respiratory: Negative for cough and shortness of breath.    Cardiovascular: Negative for chest pain, palpitations and leg swelling.   Gastrointestinal: Negative for abdominal  pain, diarrhea, nausea and vomiting.   Endocrine: Negative for polyphagia and polyuria.   Genitourinary: Negative for difficulty urinating, dysuria, frequency and urgency.   Musculoskeletal: Positive for arthralgias and gait problem. Negative for back pain.   Skin: Negative for color change, pallor and rash.   Neurological: Negative for dizziness, weakness and light-headedness.   All other systems reviewed and are negative.    Objective:     Vital Signs (Most Recent):  Temp: 99 °F (37.2 °C) (11/04/20 1104)  Pulse: 75 (11/04/20 1419)  Resp: 18 (11/04/20 1419)  BP: 139/67 (11/04/20 1104)  SpO2: (!) 94 % (11/04/20 1419) Vital Signs (24h Range):  Temp:  [97.5 °F (36.4 °C)-99 °F (37.2 °C)] 99 °F (37.2 °C)  Pulse:  [74-99] 75  Resp:  [15-18] 18  SpO2:  [85 %-95 %] 94 %  BP: (101-139)/(52-67) 139/67     Weight: 63 kg (139 lb)  Body mass index is 20.53 kg/m².    Intake/Output Summary (Last 24 hours) at 11/4/2020 1633  Last data filed at 11/4/2020 0600  Gross per 24 hour   Intake 240 ml   Output 200 ml   Net 40 ml      Physical Exam  Vitals signs and nursing note reviewed.   Constitutional:       General: She is not in acute distress.     Appearance: Normal appearance. She is well-developed. She is not ill-appearing or toxic-appearing.   HENT:      Head: Normocephalic and atraumatic.      Right Ear: External ear normal.      Left Ear: External ear normal.      Nose: Nose normal. No congestion or rhinorrhea.      Mouth/Throat:      Mouth: Mucous membranes are moist.      Pharynx: Oropharynx is clear. No oropharyngeal exudate or posterior oropharyngeal erythema.   Eyes:      General: No scleral icterus.     Conjunctiva/sclera: Conjunctivae normal.      Pupils: Pupils are equal, round, and reactive to light.   Neck:      Musculoskeletal: Normal range of motion and neck supple.      Vascular: No JVD.   Cardiovascular:      Rate and Rhythm: Normal rate and regular rhythm.      Pulses: Normal pulses.      Heart sounds: Normal  heart sounds. No murmur.   Pulmonary:      Effort: Pulmonary effort is normal. No respiratory distress.      Breath sounds: Normal breath sounds. No stridor. No wheezing, rhonchi or rales.   Abdominal:      General: Bowel sounds are normal. There is no distension.      Palpations: Abdomen is soft.      Tenderness: There is no abdominal tenderness.   Musculoskeletal:         General: Tenderness present. No swelling.      Comments: Patient having difficulty raising left leg due to pain with movement and poor quadriceps control.  Cryotherapy in use and dressing with Ace wrap clean dry and intact   Skin:     General: Skin is warm and dry.      Capillary Refill: Capillary refill takes 2 to 3 seconds.      Coloration: Skin is not jaundiced or pale.      Findings: No erythema.   Neurological:      General: No focal deficit present.      Mental Status: She is alert and oriented to person, place, and time.      Cranial Nerves: No cranial nerve deficit.      Motor: No weakness.   Psychiatric:         Mood and Affect: Mood normal.         Behavior: Behavior normal.         Thought Content: Thought content normal.         Significant Labs:   CBC:   Recent Labs   Lab 11/03/20  0521 11/04/20  1232   WBC 10.36 10.21   HGB 10.6* 9.7*   HCT 32.3* 29.9*    224     CMP:   Recent Labs   Lab 11/03/20  0521 11/04/20  1232   * 133*   K 3.8 4.1    98   CO2 23 27    115*   BUN 7* 13   CREATININE 0.7 1.0   CALCIUM 7.9* 9.0   PROT  --  6.7   ALBUMIN  --  3.3*   BILITOT  --  0.7   ALKPHOS  --  71   AST  --  25   ALT  --  25   ANIONGAP 8 8   EGFRNONAA >60 51*     All pertinent labs within the past 24 hours have been reviewed.    Significant Imaging: I have reviewed all pertinent imaging results/findings within the past 24 hours.      Assessment/Plan:      * Osteoarthritis of left knee  POD 2 s/p left TKA with Dr. Haider  Continue to follow Orthopedic recommendations.  Needs aggressive incentive spirometry.  Follow  hemoglobin and hematocrit closely.  Pain control with IV narcotics and antiemetics as needed.  Physical therapy as per Orthopedics protocol with fall precautions.  xarelto for DVT prophylaxis per orthopedic recommendations.        Hypoxemia  Acute episode  Patient asymptomatic  O2 sat improved with oxygen supplementation  Will order CTA chest to rule out PE    Chronic kidney disease, stage III (moderate)  Creatine stable for now. BMP reviewed- noted CrCl cannot be calculated (Patient's most recent lab result is older than the maximum 7 days allowed.). according to latest data. Monitor UOP and serial BMP and adjust therapy as needed. Renally dose meds.            SVT (supraventricular tachycardia)  Chronic and currently control  Will monitor on telemetry  Resume home rate control meds        Hypothyroidism, unspecified  Chronic, stable  Resume home medication      Hyperlipidemia  Chronic, stable  Resume home medication        VTE Risk Mitigation (From admission, onward)         Ordered     rivaroxaban tablet 10 mg  Daily      11/04/20 0716     IP VTE LOW RISK PATIENT  Once      11/02/20 1142     Place sequential compression device  Until discontinued      11/02/20 1142                Discharge Planning   YANICK:      Code Status: Prior   Is the patient medically ready for discharge?:     Reason for patient still in hospital (select all that apply): Patient new problem, Patient trending condition, Treatment and PT / OT recommendations  Discharge Plan A: Home Health                  Laney Lopes NP  Department of Hospital Medicine   [facility]

## 2020-11-04 NOTE — PT/OT/SLP PROGRESS
Physical Therapy Treatment    Patient Name:  Lorena Vallejo   MRN:  [id number]    Recommendations:     Discharge Recommendations:  home, home health PT   Discharge Equipment Recommendations: bedside commode, walker, rolling   Barriers to discharge: None    Assessment:     Lorena Vallejo is a 87 y.o. female admitted with a medical diagnosis of Osteoarthritis of left knee.  She presents with the following impairments/functional limitations:  weakness, impaired endurance, impaired functional mobilty, gait instability, impaired balance, decreased ROM, pain, orthopedic precautions, impaired cardiopulmonary response to activity .  Patient agreeable to PT treatment this afternoon but did report max SOB with minimal activity.  Patient presented sitting in chair at bedside and was able to transfer to standing with min assist. Patient then ambulated x 40 feet, x 20 feet, x 15 feet each with RW with CGA and O2 at 3L.  Gait distance limited by SOB so O2 sats taken following first gait at 92% and following second attempt at 89%.      Rehab Prognosis: Good; patient would benefit from acute skilled PT services to address these deficits and reach maximum level of function.    Recent Surgery: Procedure(s) (LRB):  ARTHROPLASTY, KNEE (Left) 2 Days Post-Op    Plan:     During this hospitalization, patient to be seen BID to address the identified rehab impairments via gait training, therapeutic activities, therapeutic exercises and progress toward the following goals:    · Plan of Care Expires:  12/07/20    Subjective     Chief Complaint: SOB  Patient/Family Comments/goals: go home  Pain/Comfort:  · Pain Rating 1: 5/10  · Location - Side 1: Left  · Location - Orientation 1: lower  · Location 1: knee  · Pain Addressed 1: Reposition, Cessation of Activity  · Pain Rating Post-Intervention 1: 5/10      Objective:     Communicated with nurse prior to session.  Patient found up in chair with bed alarm, oxygen, peripheral IV, SCD, telemetry upon PT  entry to room.     General Precautions: Standard, fall   Orthopedic Precautions:N/A   Braces:       Functional Mobility:  · Transfers:     · Sit to Stand:  minimum assistance with rolling walker  · Gait: x 40 feet, x 20 feet, x 15 feet with RW with min assist and O2 at 3L      AM-PAC 6 CLICK MOBILITY          Therapeutic Activities and Exercises:   transfer training sit to stand with min assist  Gait training x 40 fet, x 20 feet, x 15 feet with RW with min assist and O2 at 2L    Patient left up in chair with call button in reach, chair alarm on and nurse notified..    GOALS:   Multidisciplinary Problems     Physical Therapy Goals        Problem: Physical Therapy Goal    Goal Priority Disciplines Outcome Goal Variances Interventions   Physical Therapy Goal     PT, PT/OT Ongoing, Progressing     Description: Goals to be met by: 2020    Patient will increase functional independence with mobility by performin. Supine to sit with Stand-by Assistance  2. Sit to supine with Stand-by Assistance  3. Sit to stand transfer with Stand-by Assistance  4. Bed to chair transfer with Supervision using Rolling Walker  5. Gait  x 150 feet with Supervision using Rolling Walker.                      Time Tracking:     PT Received On: 20  PT Start Time: 1301     PT Stop Time: 1332  PT Total Time (min): 31 min     Billable Minutes: Gait Training 20 and Therapeutic Activity 11    Treatment Type: Treatment  PT/PTA: PT     PTA Visit Number: 0     Chris Megilligan, PT  2020

## 2020-11-04 NOTE — CHAPLAIN
10 min visit w/pt and her ; welcomed my visit and prayer; she said she can't put weight on her knee very well; she was sitting up in chair; we talked about her [employer], Covid affecting it, etc; confessed she's not a very patient. Appreciated the visit. Lord, in your mercy.

## 2020-11-04 NOTE — PT/OT/SLP PROGRESS
Physical Therapy Treatment    Patient Name:  Lorena Vallejo   MRN:  [id number]    Recommendations:     Discharge Recommendations:  home, home health PT   Discharge Equipment Recommendations: bedside commode, walker, rolling   Barriers to discharge: None    Assessment:     Lorena Vallejo is a 87 y.o. female admitted with a medical diagnosis of Osteoarthritis of left knee.  She presents with the following impairments/functional limitations:  weakness, impaired endurance, impaired functional mobilty, gait instability, impaired balance, decreased lower extremity function, pain, decreased ROM, edema, orthopedic precautions .  Patient agreeable to PT treatment this morning but continues to report having max difficulty moving left LE at all.  Patient presented supine in bed and required min assist to transfer to sitting and then min-mod to stand with RW.  Patient then ambulated x 50 feet, x 20 feet, x 10 feet with RW with min assist due to unsteady left knee.  Patient continues to present with limited quad control on the left LE so may benefit from use of knee immobilize at DC.    Rehab Prognosis: Good; patient would benefit from acute skilled PT services to address these deficits and reach maximum level of function.    Recent Surgery: Procedure(s) (LRB):  ARTHROPLASTY, KNEE (Left) 2 Days Post-Op    Plan:     During this hospitalization, patient to be seen BID to address the identified rehab impairments via gait training, therapeutic activities, therapeutic exercises and progress toward the following goals:    · Plan of Care Expires:  12/07/20    Subjective     Chief Complaint: being not able to move left LE  Patient/Family Comments/goals: go home  Pain/Comfort:  · Pain Rating 1: 0/10  · Location - Side 1: Left  · Location - Orientation 1: lower  · Location 1: knee  · Pain Addressed 1: Reposition, Cessation of Activity  · Pain Rating Post-Intervention 1: 7/10(pain increased with activity using the left LE)      Objective:      Communicated with nurse Bolanos prior to session.  Patient found supine with bed alarm, cryotherapy, peripheral IV, SCD upon PT entry to room.     General Precautions: Standard, fall   Orthopedic Precautions:LLE weight bearing as tolerated   Braces:       Functional Mobility:  · Bed Mobility:     · Supine to Sit: minimum assistance  · Transfers:     · Sit to Stand:  minimum assistance and moderate assistance with rolling walker  · Gait: x 50 feet, x 20 feet, x 10 feet with RW with min assist      AM-PAC 6 CLICK MOBILITY          Therapeutic Activities and Exercises:   exercise in sitting to include LAQ, seated hip flexion and quad sets. All done left LE as AAROM and done 2 x 12 reps.  Gait training x 50 feet, x 20, x 10 feet with RW with min assist    Patient left up in chair with call button in reach, chair alarm on and nurse notified..    GOALS:   Multidisciplinary Problems     Physical Therapy Goals        Problem: Physical Therapy Goal    Goal Priority Disciplines Outcome Goal Variances Interventions   Physical Therapy Goal     PT, PT/OT Ongoing, Progressing     Description: Goals to be met by: 2020    Patient will increase functional independence with mobility by performin. Supine to sit with Stand-by Assistance  2. Sit to supine with Stand-by Assistance  3. Sit to stand transfer with Stand-by Assistance  4. Bed to chair transfer with Supervision using Rolling Walker  5. Gait  x 150 feet with Supervision using Rolling Walker.                      Time Tracking:     PT Received On: 20  PT Start Time: 0711     PT Stop Time: 0750  PT Total Time (min): 39 min     Billable Minutes: Gait Training 25 and Therapeutic Exercise 14    Treatment Type: Treatment  PT/PTA: PT     PTA Visit Number: 0     Fenrando Sarailligan, PT  2020

## 2020-11-04 NOTE — PLAN OF CARE
Problem: Occupational Therapy Goal  Goal: Occupational Therapy Goal  Description: Goals to be met by: 11/9/2020     Patient will increase functional independence with ADLs by performing:    LE Dressing with Stand-by Assistance and Assistive Devices as needed. SBA to doff both socks and don both shoes w/o AE. Pt found reacher to be cumbersome and unnecessary.   Toileting from toilet with Stand-by Assistance and Assistive Devices as needed for hygiene and clothing management. -pt agrees to leave pure wick off and use BSC the next time she needs to void. She will probably require ModA.  Toilet transfer to toilet with Stand-by Assistance. -bed<>BSC ModA w/ RW.    Outcome: Ongoing, Progressing; Patient is making progress. Goals remain appropriate. Continue OT plan of care.

## 2020-11-04 NOTE — HOSPITAL COURSE
This patient was observed by hospital medicine after undergoing left TKA with Dr. Haider on 11/2.  Her pain initially was uncontrolled and she required several doses of IV Dilaudid for pain control.  Oral medications were adjusted by orthopedist and patient's pain was better controlled.  She was initially placed on aspirin b.i.d. for for VTE prophylaxis and this was transition to Xarelto per orthopedic orders.  Patient experienced hypoxemia on 11/4 without symptoms.  CTA chest was performed and negative for PE and revealed atelectasis.  IS and neb treatments were ordered and patient was placed on supplemental oxygen with improvement in O2 saturation.  Patient worked with PT/OT post-operatively who recommended leg brace due to buckling of knee when ambulating and this was ordered.  CT scan of the patient's chest was done secondary to hypoxemia and patient without atelectasis but no other acute processes, including complete list.  Patient remained hypoxic, but was otherwise in good condition.  Etiology of the patient's hypoxia was likely identified as here listed.  She received home oxygen evaluation was found to qualify for home oxygen.  Patient was discharged home with home health.  Patient was noted to have a small hematoma on the left thigh on the medial aspect which had increased and was noted to have a mild anemia from admission.  I did not feel that this was necessary to warrant for other observation in the hospital, but I discussed with the patient to monitor the size of the hematoma in if it increased that she should seek medical care at that point in time.

## 2020-11-04 NOTE — PT/OT/SLP PROGRESS
Occupational Therapy   Treatment    Name: Lorena Vallejo  MRN: [id number]  Admitting Diagnosis:  Osteoarthritis of left knee  2 Days Post-Op    Recommendations:     Discharge Recommendations: home, home health OT, home health PT  Discharge Equipment Recommendations:  none  Barriers to discharge:  None    Assessment:     Lorena Vallejo is a 87 y.o. female with a medical diagnosis of Osteoarthritis of left knee.  She presents with increased despair regarding her decreased functional (I) at this time. She is agreeable to mobilize and fears that her life and her leg will never be better. Pt requires tactile and verbal cues, and reinforcement to bear weight thorough LLE during bed<>BSC transfer and bed>chair transfer. Pt is overall Mod A and mobilizes safely with step by step instructions, requiring (A) to extend LLE before sitting.. Performance deficits affecting function are weakness, impaired endurance, pain, decreased ROM, decreased lower extremity function, decreased coordination, impaired balance, gait instability, impaired functional mobilty, impaired self care skills, edema, impaired cardiopulmonary response to activity.     Rehab Prognosis:  Good; patient would benefit from acute skilled OT services to address these deficits and reach maximum level of function.       Plan:     Patient to be seen 3 x/week to address the above listed problems via self-care/home management, therapeutic activities, therapeutic exercises  · Plan of Care Expires: 11/09/20  · Plan of Care Reviewed with: patient    Subjective     Pain/Comfort:  · Pain Rating 1: other (see comments)(unrated)  · Location - Side 1: Left  · Location - Orientation 1: generalized  · Location 1: knee  · Pain Rating Post-Intervention 1: other (see comments)(unrated)    Objective:     Communicated with: Cici SANDOVAL prior to session.  Patient found HOB elevated with oxygen, peripheral IV, telemetry upon OT entry to room.    General Precautions: Standard, fall   Orthopedic  "Precautions:N/A   Braces: N/A     Occupational Performance:     Bed Mobility:    · Patient completed Scooting/Bridging with contact guard assistance and support under LLE so that it didn't "drop off the edge of the bed"  · Patient completed Supine to Sit with contact guard assistance and cosntant support under LLE to prevent it from sropping off bed.     Functional Mobility/Transfers:  · Patient completed Bed <> Chair Transfer using Step Transfer technique with minimum assistance with no assistive device and support at underarms and VC for sequencing of all extremities as well as to facilitate WB through LLE.  · Patient completed Toilet Transfer Step Transfer technique with moderate assistance with  rolling walker  · Functional Mobility: pt has a tendency to let the L knee flex and semi-buckle during transfers, requiring quick reaction time to provide support.    Activities of Daily Living:  · Lower Body Dressing: stand by assistance with set up to doff socks and don shoes. pt requires increased time 2* decreased ROM LLE. AE was unsuccessful, cumbersome, and unnecessary for pt, thought she did attempt it.    [facility] 6 Click ADL: 20    Treatment & Education:  While seated and weight bearing through BLE pt completed 2 x 10 reps ankle ER/IR and practicing bringing L ankle to midline rather than letting it rest externally rotated. GOODWIN ed for continued focus to keep the L toe pointed forward during transfers, standing, and ambulation.   -GOODWIN ed for pt to trust the process and have a positive outlook; her healing is on track and she will be amazed at her quick progress moving forward.  -Pt v/u and agreeable to all ed.    Patient left up in chair with recliner in use and cryo donned with all lines intact, call button in reach, chair alarm on and Cici SANDOVAL notifiedEducation:      GOALS:   Multidisciplinary Problems     Occupational Therapy Goals        Problem: Occupational Therapy Goal    Goal Priority Disciplines Outcome " Interventions   Occupational Therapy Goal     OT, PT/OT Ongoing, Progressing    Description: Goals to be met by: 11/9/2020     Patient will increase functional independence with ADLs by performing:    LE Dressing with Stand-by Assistance and Assistive Devices as needed.  Toileting from toilet with Stand-by Assistance and Assistive Devices as needed for hygiene and clothing management.   Toilet transfer to toilet with Stand-by Assistance.                     Time Tracking:     OT Date of Treatment: 11/04/20  OT Start Time: 1122  OT Stop Time: 1200  OT Total Time (min): 38 min    Billable Minutes:Self Care/Home Management 38 min    SHAYY Sharma  11/4/2020

## 2020-11-04 NOTE — PLAN OF CARE
Plan of care reviewed with patient, verbalizes understanding. VSS. Cardiac monitor in place, NSR. Meds administered PRN for c/o nausea/pain. Cryotherapy in place. Purewick in place. Safety maintained, bed in lowest position, wheels locked, call light in reach.

## 2020-11-04 NOTE — NURSING
Notified by Respiratory Therapist patient's  O2 Sats low to mid 80's, asymptomatic. Assessed patient,no SOB noted. Deep breathing encouraged. Sats remaining 85-89%. 2L O2 applied,sats 95%. Laney Lopes,PARK notified. CTA Chest STAT ordered.

## 2020-11-04 NOTE — PLAN OF CARE
Plan of care reviewed with patient, verbalized understanding. Patient states L knee pain is now controlled. Dressing clean dry and intact. Bed in lowest position and call light within reach.

## 2020-11-04 NOTE — SUBJECTIVE & OBJECTIVE
Interval History:  Notes reviewed.  No acute events overnight.  Patient states her pain is better controlled today.  She has been working with PT but is still having weakness to her left lower extremity and pain with ambulation.  Patient also hypoxemic but asymptomatic without shortness of breath, chest pain or palpitations.  Oxygen saturation improved with supplemental O2 place.  Will order CTA chest to rule out PE.      Review of Systems   Constitutional: Negative for chills, fatigue and fever.   HENT: Negative for congestion, sore throat and trouble swallowing.    Eyes: Negative for photophobia and visual disturbance.   Respiratory: Negative for cough and shortness of breath.    Cardiovascular: Negative for chest pain, palpitations and leg swelling.   Gastrointestinal: Negative for abdominal pain, diarrhea, nausea and vomiting.   Endocrine: Negative for polyphagia and polyuria.   Genitourinary: Negative for difficulty urinating, dysuria, frequency and urgency.   Musculoskeletal: Positive for arthralgias and gait problem. Negative for back pain.   Skin: Negative for color change, pallor and rash.   Neurological: Negative for dizziness, weakness and light-headedness.   All other systems reviewed and are negative.    Objective:     Vital Signs (Most Recent):  Temp: 99 °F (37.2 °C) (11/04/20 1104)  Pulse: 75 (11/04/20 1419)  Resp: 18 (11/04/20 1419)  BP: 139/67 (11/04/20 1104)  SpO2: (!) 94 % (11/04/20 1419) Vital Signs (24h Range):  Temp:  [97.5 °F (36.4 °C)-99 °F (37.2 °C)] 99 °F (37.2 °C)  Pulse:  [74-99] 75  Resp:  [15-18] 18  SpO2:  [85 %-95 %] 94 %  BP: (101-139)/(52-67) 139/67     Weight: 63 kg (139 lb)  Body mass index is 20.53 kg/m².    Intake/Output Summary (Last 24 hours) at 11/4/2020 1633  Last data filed at 11/4/2020 0600  Gross per 24 hour   Intake 240 ml   Output 200 ml   Net 40 ml      Physical Exam  Vitals signs and nursing note reviewed.   Constitutional:       General: She is not in acute  distress.     Appearance: Normal appearance. She is well-developed. She is not ill-appearing or toxic-appearing.   HENT:      Head: Normocephalic and atraumatic.      Right Ear: External ear normal.      Left Ear: External ear normal.      Nose: Nose normal. No congestion or rhinorrhea.      Mouth/Throat:      Mouth: Mucous membranes are moist.      Pharynx: Oropharynx is clear. No oropharyngeal exudate or posterior oropharyngeal erythema.   Eyes:      General: No scleral icterus.     Conjunctiva/sclera: Conjunctivae normal.      Pupils: Pupils are equal, round, and reactive to light.   Neck:      Musculoskeletal: Normal range of motion and neck supple.      Vascular: No JVD.   Cardiovascular:      Rate and Rhythm: Normal rate and regular rhythm.      Pulses: Normal pulses.      Heart sounds: Normal heart sounds. No murmur.   Pulmonary:      Effort: Pulmonary effort is normal. No respiratory distress.      Breath sounds: Normal breath sounds. No stridor. No wheezing, rhonchi or rales.   Abdominal:      General: Bowel sounds are normal. There is no distension.      Palpations: Abdomen is soft.      Tenderness: There is no abdominal tenderness.   Musculoskeletal:         General: Tenderness present. No swelling.      Comments: Patient having difficulty raising left leg due to pain with movement and poor quadriceps control.  Cryotherapy in use and dressing with Ace wrap clean dry and intact   Skin:     General: Skin is warm and dry.      Capillary Refill: Capillary refill takes 2 to 3 seconds.      Coloration: Skin is not jaundiced or pale.      Findings: No erythema.   Neurological:      General: No focal deficit present.      Mental Status: She is alert and oriented to person, place, and time.      Cranial Nerves: No cranial nerve deficit.      Motor: No weakness.   Psychiatric:         Mood and Affect: Mood normal.         Behavior: Behavior normal.         Thought Content: Thought content normal.          Significant Labs:   CBC:   Recent Labs   Lab 11/03/20  0521 11/04/20  1232   WBC 10.36 10.21   HGB 10.6* 9.7*   HCT 32.3* 29.9*    224     CMP:   Recent Labs   Lab 11/03/20 0521 11/04/20  1232   * 133*   K 3.8 4.1    98   CO2 23 27    115*   BUN 7* 13   CREATININE 0.7 1.0   CALCIUM 7.9* 9.0   PROT  --  6.7   ALBUMIN  --  3.3*   BILITOT  --  0.7   ALKPHOS  --  71   AST  --  25   ALT  --  25   ANIONGAP 8 8   EGFRNONAA >60 51*     All pertinent labs within the past 24 hours have been reviewed.    Significant Imaging: I have reviewed all pertinent imaging results/findings within the past 24 hours.

## 2020-11-05 PROBLEM — J84.9 ILD (INTERSTITIAL LUNG DISEASE): Status: ACTIVE | Noted: 2020-11-05

## 2020-11-05 PROBLEM — T79.1XXA FAT EMBOLISM: Status: ACTIVE | Noted: 2020-11-05

## 2020-11-05 PROBLEM — J47.9 BRONCHIECTASIS WITHOUT COMPLICATION: Status: ACTIVE | Noted: 2020-11-05

## 2020-11-05 PROBLEM — J44.89 CHRONIC BRONCHITIS WITH COPD (CHRONIC OBSTRUCTIVE PULMONARY DISEASE): Status: ACTIVE | Noted: 2020-11-05

## 2020-11-05 LAB
ALBUMIN SERPL BCP-MCNC: 3 G/DL (ref 3.5–5.2)
ALP SERPL-CCNC: 69 U/L (ref 55–135)
ALT SERPL W/O P-5'-P-CCNC: 23 U/L (ref 10–44)
ANION GAP SERPL CALC-SCNC: 12 MMOL/L (ref 8–16)
AST SERPL-CCNC: 24 U/L (ref 10–40)
BASOPHILS # BLD AUTO: 0.02 K/UL (ref 0–0.2)
BASOPHILS NFR BLD: 0.2 % (ref 0–1.9)
BILIRUB SERPL-MCNC: 0.7 MG/DL (ref 0.1–1)
BUN SERPL-MCNC: 11 MG/DL (ref 8–23)
CALCIUM SERPL-MCNC: 9 MG/DL (ref 8.7–10.5)
CHLORIDE SERPL-SCNC: 98 MMOL/L (ref 95–110)
CO2 SERPL-SCNC: 25 MMOL/L (ref 23–29)
CREAT SERPL-MCNC: 0.9 MG/DL (ref 0.5–1.4)
DIFFERENTIAL METHOD: ABNORMAL
EOSINOPHIL # BLD AUTO: 0 K/UL (ref 0–0.5)
EOSINOPHIL NFR BLD: 0.2 % (ref 0–8)
ERYTHROCYTE [DISTWIDTH] IN BLOOD BY AUTOMATED COUNT: 13.3 % (ref 11.5–14.5)
EST. GFR  (AFRICAN AMERICAN): >60 ML/MIN/1.73 M^2
EST. GFR  (NON AFRICAN AMERICAN): 58 ML/MIN/1.73 M^2
GLUCOSE SERPL-MCNC: 135 MG/DL (ref 70–110)
HCT VFR BLD AUTO: 27.2 % (ref 37–48.5)
HGB BLD-MCNC: 8.9 G/DL (ref 12–16)
IMM GRANULOCYTES # BLD AUTO: 0.07 K/UL (ref 0–0.04)
IMM GRANULOCYTES NFR BLD AUTO: 0.7 % (ref 0–0.5)
LYMPHOCYTES # BLD AUTO: 1.1 K/UL (ref 1–4.8)
LYMPHOCYTES NFR BLD: 11.3 % (ref 18–48)
MCH RBC QN AUTO: 32.7 PG (ref 27–31)
MCHC RBC AUTO-ENTMCNC: 32.7 G/DL (ref 32–36)
MCV RBC AUTO: 100 FL (ref 82–98)
MONOCYTES # BLD AUTO: 1.2 K/UL (ref 0.3–1)
MONOCYTES NFR BLD: 12.2 % (ref 4–15)
NEUTROPHILS # BLD AUTO: 7.1 K/UL (ref 1.8–7.7)
NEUTROPHILS NFR BLD: 75.4 % (ref 38–73)
NRBC BLD-RTO: 0 /100 WBC
PLATELET # BLD AUTO: 206 K/UL (ref 150–350)
PMV BLD AUTO: 9.2 FL (ref 9.2–12.9)
POTASSIUM SERPL-SCNC: 3.9 MMOL/L (ref 3.5–5.1)
PROT SERPL-MCNC: 6.4 G/DL (ref 6–8.4)
RBC # BLD AUTO: 2.72 M/UL (ref 4–5.4)
SODIUM SERPL-SCNC: 135 MMOL/L (ref 136–145)
WBC # BLD AUTO: 9.39 K/UL (ref 3.9–12.7)

## 2020-11-05 PROCEDURE — 25000003 PHARM REV CODE 250: Mod: HCNC | Performed by: NURSE PRACTITIONER

## 2020-11-05 PROCEDURE — 27000221 HC OXYGEN, UP TO 24 HOURS: Mod: HCNC

## 2020-11-05 PROCEDURE — 99223 PR INITIAL HOSPITAL CARE,LEVL III: ICD-10-PCS | Mod: HCNC,,, | Performed by: INTERNAL MEDICINE

## 2020-11-05 PROCEDURE — 94761 N-INVAS EAR/PLS OXIMETRY MLT: CPT | Mod: HCNC

## 2020-11-05 PROCEDURE — 99223 1ST HOSP IP/OBS HIGH 75: CPT | Mod: HCNC,,, | Performed by: INTERNAL MEDICINE

## 2020-11-05 PROCEDURE — 36415 COLL VENOUS BLD VENIPUNCTURE: CPT | Mod: HCNC

## 2020-11-05 PROCEDURE — 25000003 PHARM REV CODE 250: Mod: HCNC | Performed by: HOSPITALIST

## 2020-11-05 PROCEDURE — 25000003 PHARM REV CODE 250: Mod: HCNC | Performed by: ORTHOPAEDIC SURGERY

## 2020-11-05 PROCEDURE — 25000242 PHARM REV CODE 250 ALT 637 W/ HCPCS: Mod: HCNC | Performed by: NURSE PRACTITIONER

## 2020-11-05 PROCEDURE — 94640 AIRWAY INHALATION TREATMENT: CPT | Mod: HCNC

## 2020-11-05 PROCEDURE — 85025 COMPLETE CBC W/AUTO DIFF WBC: CPT | Mod: HCNC

## 2020-11-05 PROCEDURE — 97110 THERAPEUTIC EXERCISES: CPT | Mod: HCNC

## 2020-11-05 PROCEDURE — 25000003 PHARM REV CODE 250: Mod: HCNC | Performed by: PHYSICIAN ASSISTANT

## 2020-11-05 PROCEDURE — 97116 GAIT TRAINING THERAPY: CPT | Mod: HCNC

## 2020-11-05 PROCEDURE — 80053 COMPREHEN METABOLIC PANEL: CPT | Mod: HCNC

## 2020-11-05 PROCEDURE — 12000002 HC ACUTE/MED SURGE SEMI-PRIVATE ROOM: Mod: HCNC

## 2020-11-05 PROCEDURE — 97530 THERAPEUTIC ACTIVITIES: CPT | Mod: HCNC

## 2020-11-05 PROCEDURE — 94799 UNLISTED PULMONARY SVC/PX: CPT | Mod: HCNC

## 2020-11-05 RX ORDER — CELECOXIB 100 MG/1
100 CAPSULE ORAL 2 TIMES DAILY
Qty: 20 CAPSULE | Refills: 0 | Status: SHIPPED | OUTPATIENT
Start: 2020-11-05 | End: 2020-11-15

## 2020-11-05 RX ORDER — ACETAMINOPHEN 500 MG
1000 TABLET ORAL EVERY 6 HOURS
Status: DISCONTINUED | OUTPATIENT
Start: 2020-11-05 | End: 2020-11-06 | Stop reason: HOSPADM

## 2020-11-05 RX ORDER — IBUPROFEN 600 MG/1
600 TABLET ORAL 4 TIMES DAILY
Status: DISCONTINUED | OUTPATIENT
Start: 2020-11-05 | End: 2020-11-06 | Stop reason: HOSPADM

## 2020-11-05 RX ORDER — FAMOTIDINE 20 MG/1
20 TABLET, FILM COATED ORAL DAILY
Status: DISCONTINUED | OUTPATIENT
Start: 2020-11-06 | End: 2020-11-06 | Stop reason: HOSPADM

## 2020-11-05 RX ORDER — BISACODYL 10 MG
10 SUPPOSITORY, RECTAL RECTAL DAILY
Refills: 0 | COMMUNITY
Start: 2020-11-06 | End: 2021-01-04

## 2020-11-05 RX ORDER — DIPHENHYDRAMINE HCL 25 MG
25 CAPSULE ORAL 2 TIMES DAILY PRN
Status: DISCONTINUED | OUTPATIENT
Start: 2020-11-05 | End: 2020-11-05 | Stop reason: SDUPTHER

## 2020-11-05 RX ORDER — OXYCODONE HYDROCHLORIDE 10 MG/1
10 TABLET ORAL EVERY 6 HOURS PRN
Status: DISCONTINUED | OUTPATIENT
Start: 2020-11-05 | End: 2020-11-06 | Stop reason: HOSPADM

## 2020-11-05 RX ORDER — OXYCODONE HYDROCHLORIDE 5 MG/1
5 TABLET ORAL EVERY 6 HOURS PRN
Qty: 20 TABLET | Refills: 0 | Status: SHIPPED | OUTPATIENT
Start: 2020-11-05 | End: 2020-11-15

## 2020-11-05 RX ORDER — DIPHENHYDRAMINE HCL 25 MG
25 CAPSULE ORAL EVERY 6 HOURS PRN
Status: DISCONTINUED | OUTPATIENT
Start: 2020-11-05 | End: 2020-11-06 | Stop reason: HOSPADM

## 2020-11-05 RX ORDER — ACETAMINOPHEN 500 MG
1000 TABLET ORAL EVERY 6 HOURS
Qty: 80 TABLET | Refills: 0 | Status: SHIPPED | OUTPATIENT
Start: 2020-11-05 | End: 2020-11-16

## 2020-11-05 RX ORDER — OXYCODONE HYDROCHLORIDE 5 MG/1
5 TABLET ORAL EVERY 6 HOURS PRN
Status: DISCONTINUED | OUTPATIENT
Start: 2020-11-05 | End: 2020-11-06 | Stop reason: HOSPADM

## 2020-11-05 RX ADMIN — ONDANSETRON 8 MG: 8 TABLET, ORALLY DISINTEGRATING ORAL at 01:11

## 2020-11-05 RX ADMIN — RIVAROXABAN 10 MG: 10 TABLET, FILM COATED ORAL at 08:11

## 2020-11-05 RX ADMIN — ACETAMINOPHEN 1000 MG: 500 TABLET ORAL at 11:11

## 2020-11-05 RX ADMIN — ACETAMINOPHEN 1000 MG: 500 TABLET ORAL at 06:11

## 2020-11-05 RX ADMIN — IPRATROPIUM BROMIDE AND ALBUTEROL SULFATE 3 ML: .5; 2.5 SOLUTION RESPIRATORY (INHALATION) at 01:11

## 2020-11-05 RX ADMIN — PROPAFENONE HYDROCHLORIDE 150 MG: 150 TABLET, FILM COATED ORAL at 04:11

## 2020-11-05 RX ADMIN — ONDANSETRON 4 MG: 4 TABLET, ORALLY DISINTEGRATING ORAL at 11:11

## 2020-11-05 RX ADMIN — PROPAFENONE HYDROCHLORIDE 150 MG: 150 TABLET, FILM COATED ORAL at 09:11

## 2020-11-05 RX ADMIN — IPRATROPIUM BROMIDE AND ALBUTEROL SULFATE 3 ML: .5; 2.5 SOLUTION RESPIRATORY (INHALATION) at 07:11

## 2020-11-05 RX ADMIN — HYDROXYZINE PAMOATE 25 MG: 25 CAPSULE ORAL at 01:11

## 2020-11-05 RX ADMIN — PROPAFENONE HYDROCHLORIDE 150 MG: 150 TABLET, FILM COATED ORAL at 02:11

## 2020-11-05 RX ADMIN — IBUPROFEN 600 MG: 600 TABLET ORAL at 01:11

## 2020-11-05 RX ADMIN — SERTRALINE HYDROCHLORIDE 50 MG: 50 TABLET ORAL at 08:11

## 2020-11-05 RX ADMIN — METOPROLOL SUCCINATE 25 MG: 25 TABLET, EXTENDED RELEASE ORAL at 08:11

## 2020-11-05 RX ADMIN — FAMOTIDINE 20 MG: 20 TABLET ORAL at 08:11

## 2020-11-05 RX ADMIN — DIPHENHYDRAMINE HYDROCHLORIDE 25 MG: 25 CAPSULE ORAL at 03:11

## 2020-11-05 RX ADMIN — OXYCODONE HYDROCHLORIDE 10 MG: 10 TABLET ORAL at 01:11

## 2020-11-05 RX ADMIN — DILTIAZEM HYDROCHLORIDE 240 MG: 120 CAPSULE, COATED, EXTENDED RELEASE ORAL at 11:11

## 2020-11-05 RX ADMIN — OXYCODONE HYDROCHLORIDE 10 MG: 10 TABLET ORAL at 11:11

## 2020-11-05 NOTE — RESPIRATORY THERAPY
11/05/20 1400   Home Oxygen Qualification   Room Air SpO2 At Rest (!) 85 %   SpO2 on Recovery 95 %   Recovery Heart Rate 98 bpm   Recovery O2 LPM 2 LPM   Home O2 Eval Comments pt does qualify for home O2

## 2020-11-05 NOTE — DISCHARGE SUMMARY
[facility] Hospital Medicine  Discharge Summary      Patient Name: Lorena Vallejo  MRN: [id number]  Admission Date: 11/2/2020  Hospital Length of Stay: 2 days  Discharge Date and Time:  11/05/2020 3:24 PM  Attending Physician: Arnaud Weeks MD   Discharging Provider: Arnaud Weeks MD  Primary Care Provider: Stephon Cates MD      HPI:   Lorena Vallejo is an 87-year-old  female with past medical history significant for proximal SVT, hypothyroidism, hypertension, depression and arthritis presenting today status post left TKA by Dr. Haider.  Patient was experiencing pain to her left knee for several years.  She tried viscosupplementation and intra-articular injections with only minimal relief and therefore opted for surgical intervention.  She was seen postoperatively and doing well.  She tolerated her diet without any complications.  She is having pain to her left knee which she describes as sharp burning quality, constant timing, worsens with movement, improved after receiving IV Dilaudid.  She denies chest pain, shortness of breath, nausea or vomiting.    Procedure(s) (LRB):  ARTHROPLASTY, KNEE (Left)      Hospital Course:   This patient was observed by hospital medicine after undergoing left TKA with Dr. Haider on 11/2.  Her pain initially was uncontrolled and she required several doses of IV Dilaudid for pain control.  Oral medications were adjusted by orthopedist and patient's pain was better controlled.  She was initially placed on aspirin b.i.d. for for VTE prophylaxis and this was transition to Xarelto per orthopedic orders.  Patient experienced hypoxemia on 11/4 without symptoms.  CTA chest was performed and negative for PE and revealed atelectasis.  IS and neb treatments were ordered and patient was placed on supplemental oxygen with improvement in O2 saturation.  Patient worked with PT/OT post-operatively who recommended leg brace due to buckling of knee when ambulating and this was  ordered.  CT scan of the patient's chest was done secondary to hypoxemia and patient without atelectasis but no other acute processes, including complete list.  Patient remained hypoxic, but was otherwise in good condition.  Etiology of the patient's hypoxia was likely identified as fat embolism.  She received home oxygen evaluation was found to qualify for home oxygen.  Patient was discharged home with home health.     Consults:     No new Assessment & Plan notes have been filed under this hospital service since the last note was generated.  Service: Hospital Medicine    Final Active Diagnoses:    Diagnosis Date Noted POA    PRINCIPAL PROBLEM:  Osteoarthritis of left knee [M17.12] 09/24/2020 Yes    Hypoxemia [R09.02] 11/04/2020 No    Chronic kidney disease, stage III (moderate) [N18.30] 07/18/2019 Yes     Chronic    SVT (supraventricular tachycardia) [I47.1] 07/18/2019 Yes    Hypothyroidism, unspecified [E03.9] 12/12/2018 Yes    Hyperlipidemia [E78.5] 05/14/2015 Yes      Problems Resolved During this Admission:       Discharged Condition: stable    Disposition: Home or Self Care    Follow Up:  Follow-up Information     Stephon Cates MD In 2 weeks.    Specialties: Family Medicine, Internal Medicine  Contact information:  [address]  [facility] [address]  [phone]             Eduardo Haider MD In 1 week.    Specialties: Orthopedic Surgery, Surgery, Sports Medicine  Contact information:  [address]  [address]  [place] [address]  [phone]             [facility] [place].    Why: Home Health  Contact information:  [address], [address]  [facility] [address]  [phone]               Patient Instructions:      OXYGEN FOR HOME USE     Order Specific Question Answer Comments   Liter Flow 2    Duration Continuous    Qualifying SpO2: 88%    Testing done at: Rest    Route nasal cannula    Portable mode: continuous    Device home concentrator    Length of need (in months): 3 mos   "  Patient condition with qualifying saturation other chronic pulmonary condition air embolism   Height: 5' 9" (1.753 m)    Weight: 63 kg (139 lb)    Does patient have medical equipment at home? bedside commode    Does patient have medical equipment at home? walker, rolling    Does patient have medical equipment at home? shower chair    Alternative treatment measures have been tried or considered and deemed clinically ineffective. Yes      Ambulatory referral/consult to Home Health   Standing Status: Future   Referral Priority: Routine Referral Type: Home Health   Referral Reason: Specialty Services Required   Requested Specialty: Home Health Services   Number of Visits Requested: 1     Diet Adult Regular     Notify your health care provider if you experience any of the following:  temperature >100.4     Notify your health care provider if you experience any of the following:  persistent nausea and vomiting or diarrhea     Notify your health care provider if you experience any of the following:  severe uncontrolled pain     Activity as tolerated       Significant Diagnostic Studies:     Pending Diagnostic Studies:     None         Medications:  Reconciled Home Medications:      Medication List      START taking these medications    bisacodyL 10 mg Supp  Commonly known as: DULCOLAX  Place 1 suppository (10 mg total) rectally once daily.  Start taking on: November 6, 2020     celecoxib 100 MG capsule  Commonly known as: CeleBREX  Take 1 capsule (100 mg total) by mouth 2 (two) times daily. for 10 days     oxyCODONE 5 MG immediate release tablet  Commonly known as: ROXICODONE  Take 1 tablet (5 mg total) by mouth every 6 (six) hours as needed for Pain (may take 2 for severe pain).     oxyCODONE-acetaminophen 7.5-325 mg per tablet  Commonly known as: PERCOCET  Take 1 tablet by mouth every 4 (four) hours as needed for Pain.     rivaroxaban 10 mg Tab  Commonly known as: XARELTO  Take 1 tablet (10 mg total) by mouth daily " with dinner or evening meal.        CHANGE how you take these medications    acetaminophen 500 MG tablet  Commonly known as: TYLENOL  Take 2 tablets (1,000 mg total) by mouth every 6 (six) hours. for 10 days  What changed:   · when to take this  · reasons to take this     * ARMOUR THYROID 300 mg Tab  Generic drug: thyroid (pork)  Take 150 mg by mouth once daily. TAKE ONE-HALF TABLET BY MOUTH ONCE DAILY  What changed: how much to take     * thyroid (pork) 30 mg Tab  Commonly known as: ARMOUR THYROID  Take 1 tablet (30 mg total) by mouth before breakfast. Take with 300 mg  What changed: Another medication with the same name was changed. Make sure you understand how and when to take each.     metoprolol succinate 25 MG 24 hr tablet  Commonly known as: TOPROL-XL  Take 1 tablet (25 mg total) by mouth every evening.  What changed: when to take this     nitroGLYCERIN 0.4 MG SL tablet  Commonly known as: NITROSTAT  DISSOLVE 1 TABLET UNDER THE TONGUE EVERY 5 MINUTES AS NEEDED FOR CHEST PAIN  What changed: See the new instructions.     sertraline 100 MG tablet  Commonly known as: ZOLOFT  Take 1 tablet (100 mg total) by mouth every evening.  What changed:   · how much to take  · when to take this         * This list has 2 medication(s) that are the same as other medications prescribed for you. Read the directions carefully, and ask your doctor or other care provider to review them with you.            CONTINUE taking these medications    ALIGN 4 mg Cap  Generic drug: Bifidobacterium infantis  Take 4 mg by mouth once daily.     aspirin 81 MG EC tablet  Commonly known as: ECOTRIN  Take 81 mg by mouth once daily.     calcium carbonate 500 mg calcium (1,250 mg) tablet  Commonly known as: OS-RAMONITA  Take 1 tablet by mouth 2 (two) times daily.     coenzyme Q10 100 mg capsule  Take 100 mg by mouth once daily.     cyanocobalamin (vitamin B-12) 5,000 mcg Subl  Place 5,000 mcg under the tongue every Mon, Wed, Fri.     diltiaZEM 240 MG  Cs24  Commonly known as: TIAZAC  Take 1 capsule (240 mg total) by mouth once daily.     fluticasone propionate 50 mcg/actuation nasal spray  Commonly known as: FLONASE  1 spray (50 mcg total) by Each Nostril route once daily.     krill oil 500 mg Cap  Take 500 mg by mouth every evening.     loperamide 2 mg capsule  Commonly known as: IMODIUM  Take 1 capsule (2 mg total) by mouth 4 (four) times daily as needed for Diarrhea.     magnesium oxide 400 mg (241.3 mg magnesium) tablet  Commonly known as: MAG-OX  Take 400 mg by mouth every evening.     omeprazole 20 MG capsule  Commonly known as: PRILOSEC  TAKE 1 CAPSULE (20 MG TOTAL) BY MOUTH ONCE DAILY.     PRESERVISION AREDS 2 ORAL  Take 1 capsule by mouth 2 (two) times daily.     propafenone 150 MG Tab  Commonly known as: RHTHYMOL  Take 1 tablet (150 mg total) by mouth every 8 (eight) hours.     vitamin D 1000 units Tab  Commonly known as: VITAMIN D3  Take 1,000 Units by mouth once daily.            Indwelling Lines/Drains at time of discharge:   Lines/Drains/Airways     Drain            Female External Urinary Catheter 11/03/20 2 days                Time spent on the discharge of patient: 32 minutes  Patient was seen and examined on the date of discharge and determined to be suitable for discharge.         Arnaud Weeks MD  Department of Hospital Medicine  [facility]

## 2020-11-05 NOTE — PLAN OF CARE
MILANA sent patient information to [facility] via [employer] system.       11/05/20 4314   Post-Acute Status   Post-Acute Authorization Home [facility]   Home Health Status Referrals Sent   Patient choice form signed by patient/caregiver List with quality metrics by geographic area provided

## 2020-11-05 NOTE — PLAN OF CARE
Patient aao x 4. Plan of care reviewed with patient, verbalized understanding. Neurovascular checks +2 pedal pulse. Dressing clean dry and intact. Worked with PT, up in chair throughout day. Required O2 today, Laney Lopes NP aware. IS at bedside, educated and encouraged. Bed in lowest position and call light within reach.

## 2020-11-05 NOTE — PLAN OF CARE
Per Che with [facility] [phone], patient do not have qualifying diagnosis for home O2 w/portable tank.  SW spoke to Autumn with [employer] [phone],  patient do not have qualifying diagnosis for home O2 w/portable tank.  MILANA spoke to Brad with [employer] [phone],  patient do not have qualifying diagnosis for home O2 w/portable tank.  GEOFFREY Cain informed Dr. Weeks who stated he will consult pulmonology.         11/05/20 1638   Post-Acute Status   Post-Acute Authorization E   [facility] Status Awaiting Internal medical Clearance

## 2020-11-05 NOTE — NURSING
SEAN Peña NP notified that patient is due for Rythmol 150mg  Q8 hrs, last dose given at 1640 due at 2200. Hr 96. Order received to hold for now & given 0600 dose with am vitals. Will continue to monitor.

## 2020-11-05 NOTE — RESPIRATORY THERAPY
11/05/20 4496   Patient Assessment/Suction   Level of Consciousness (AVPU) alert   PRE-TX-O2   O2 Device (Oxygen Therapy) room air  (placed pt back on NC 2L)   SpO2 (!) 85 %  (placed pt back on NC 2L)   Pulse Oximetry Type Intermittent

## 2020-11-05 NOTE — RESPIRATORY THERAPY
11/05/20 0712   Patient Assessment/Suction   Level of Consciousness (AVPU) alert   All Lung Fields Breath Sounds clear   Cough Frequency no cough   PRE-TX-O2   O2 Device (Oxygen Therapy) nasal cannula   $ Is the patient on Low Flow Oxygen? Yes   Flow (L/min) 3  (decreased to 2L)   SpO2 96 %   Pulse Oximetry Type Intermittent   $ Pulse Oximetry - Multiple Charge Pulse Oximetry - Multiple   Pulse 94   Resp 18   Aerosol Therapy   $ Aerosol Therapy Charges Aerosol Treatment   Respiratory Treatment Status (SVN) given   Treatment Route (SVN) mouthpiece   Patient Position (SVN) Alcazar's   Post Treatment Assessment (SVN) breath sounds unchanged   Signs of Intolerance (SVN) none   Breath Sounds Post-Respiratory Treatment   Post-treatment Heart Rate (beats/min) 96   Post-treatment Resp Rate (breaths/min) 18   Incentive Spirometer   $ Incentive Spirometer Charges done with encouragement   Administration (IS) mouthpiece   Number of Repetitions (IS) 10   Level Incentive Spirometer (mL) 1250   Patient Tolerance (IS) good

## 2020-11-05 NOTE — PLAN OF CARE
Patient cleared for discharge from case management standpoint.       11/05/20 1418   Final Note   Assessment Type Final Discharge Note   Anticipated Discharge Disposition Home-Health   Hospital Follow Up  Appt(s) scheduled? Yes   Discharge plans and expectations educations in teach back method with documentation complete? Yes   Right Care Referral Info   Post Acute Recommendation Home-care   Facility Name [facility]

## 2020-11-05 NOTE — CONSULTS
11/05/2020      Admit Date: 11/2/2020  Lorena Vallejo  New Patient Consult    No chief complaint on file.      History of Present Illness:   Pt admitted with tka left  Knee on 11/2. Was on rm air on 11/4 but now needs oxygen 2-3 lpm to keep sat over 90.    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 No swallow nor Raynauld's problems.  djd problems.    Pt saw Dr Pagan for ild in past but dismissed after couple month f/u. Pt active - yd wk.  Declines cough. Did smoke 20 py.    I see her  as pt.    Pt hypothryoid by tsh 10/9/2020.      PFSH:  Past Medical History:   Diagnosis Date    Arthritis     Blood transfusion     Bowel obstruction     X 3    Cancer     MELANOMA RIGHT EYE    Cancer of eyeball, except conjunctiva, cornea, retina, and choroid right    Depression     Diverticulitis     GERD (gastroesophageal reflux disease)     Hypertension     Melanoma     right eye    Peripheral neuropathy     PSVT (paroxysmal supraventricular tachycardia)     HAD X 3 OVER 10 YRS. LAST 7-25-12. HAD ADENOSINE IN [facility] ER. WAS ON INCREASED THYROID MED. NO PROBLEM SINCE    Salmonella     Short bowel syndrome     Thyroid disease     Transient ischemic attack     UTI (urinary tract infection)     Wears partial dentures     PARTIAL BOTTOM     Past Surgical History:   Procedure Laterality Date    ABDOMINAL SURGERY      X 3. ILEUM REMOVED. OSTOMY PLACED AND CLOSED.    ADENOIDECTOMY      BILATERAL OOPHORECTOMY      COLONOSCOPY      COLONOSCOPY N/A 8/24/2016    Procedure: COLONOSCOPY;  Surgeon: Alex Corral MD;  Location: [facility];  Service: Endoscopy;  Laterality: N/A;    COLONOSCOPY N/A 8/29/2019    Procedure: COLONOSCOPY;  Surgeon: Tiffanie Vázquez MD;  Location: [facility];  Service: Endoscopy;  Laterality: N/A;    COLONOSCOPY N/A 11/22/2019    Procedure: COLONOSCOPY;  Surgeon: Tiffanie Vázquez MD;  Location: [facility];  Service: Endoscopy;   Laterality: N/A;    CTR      BILAT    ESOPHAGOGASTRODUODENOSCOPY N/A 2019    Procedure: EGD (ESOPHAGOGASTRODUODENOSCOPY);  Surgeon: Tiffanie Vázquez MD;  Location: [facility] ENDO;  Service: Endoscopy;  Laterality: N/A;    EYE SURGERY      RIGHT  - LASER MELANOMA; BILAT CATARACT    HYSTERECTOMY      BSO    JOINT REPLACEMENT      RIGHT KNEE    KNEE ARTHROPLASTY Left 2020    Procedure: ARTHROPLASTY, KNEE;  Surgeon: Eduardo Haider MD;  Location: [facility] OR;  Service: Orthopedics;  Laterality: Left;  Giovany Doll    MANDIBLE FRACTURE SURGERY      MENISCECTOMY      MEDIAL AND LATERAL REPAIR, BAKERS CYST    TENDON REPAIR      LEFT THUMB    TONSILLECTOMY      T AND A     Social History     Tobacco Use    Smoking status: Former Smoker     Packs/day: 1.00     Years: 18.00     Pack years: 18.00     Start date:      Quit date: 1973     Years since quittin.7    Smokeless tobacco: Never Used   Substance Use Topics    Alcohol use: Yes     Alcohol/week: 2.0 standard drinks     Types: 2 Glasses of wine per week     Comment: WINE once per day    Drug use: No     Family History   Problem Relation Age of Onset    Diabetes Mother     Heart disease Mother         MI at 52    Stroke Father     Cancer Father         colon cancer    Rheum arthritis Father     COPD Father     Heart disease Brother     Stroke Brother     Diabetes Brother     Arthritis Daughter     Hypertension Daughter     Cancer Son         Leukemia    Hypertension Son     Heart disease Son     Diabetes Maternal Grandmother     Asthma Maternal Grandfather     Cancer Paternal Grandmother         melanoma    Melanoma Paternal Grandmother     Cancer Paternal Grandfather         lip cancer - smoked pipe    Sleep apnea Son     Hypertension Son     Sinus disease Son     Hypertension Son     Heart disease Son     Obesity Son      Review of patient's allergies indicates:   Allergen Reactions    Flagyl [metronidazole hcl]  "Diarrhea     SEVERE DIARRHEA    Latex, natural rubber Rash     ITCHING    Levaquin [levofloxacin] Diarrhea     MOOD CHANGES, WEAKNESS    Morphine Other (See Comments)     HALLUCINATES    Augmentin [amoxicillin-pot clavulanate] Other (See Comments)     AGITATION    Ciprofloxacin Anxiety    Codeine Nausea And Vomiting    Demerol [meperidine] Nausea And Vomiting     CAN TAKE WITH PHENERGAN    Dilaudid [hydromorphone (bulk)] Itching     OK WITH BENADRYL    Fentanyl Itching     OK WITH BENADRYL    Fosfomycin      diarrhia    Naproxen Other (See Comments)     HURT STOMACH    Nsaids (non-steroidal anti-inflammatory drug) Other (See Comments)     TOLD NOT TO TAKE AS HAD AN ULCER. HAD BLEEDING    Percocet [oxycodone-acetaminophen] Rash    Prednisone Other (See Comments)     AGITATION    Reglan [metoclopramide hcl] Anxiety    Sulfa (sulfonamide antibiotics) Rash    Vicodin [hydrocodone-acetaminophen] Rash    Zosyn [piperacillin-tazobactam] Rash    Ambien [zolpidem]     Ibuprofen     Pcn [penicillins]     Vasotec [enalapril maleate] Other (See Comments)     DRY COUGH       Performance Status:Performance Status:The patient's activity level is functions out of house.    Review of Systems:  a review of eleven systems covering constitutional, Psych, Eye, HEENT, Respiratory, Cardiac, GI, , Musculoskeletal, Endocrine, Dermatologicwas negative except the above mentioned abnormalities and for any pertinent findings as listed below:  pertinent positive as above, rest is good         Exam:Comprehensive exam done. BP (!) 132/58 (Patient Position: Lying)   Pulse 85   Temp 98.8 °F (37.1 °C) (Oral)   Resp 20   Ht 5' 9" (1.753 m)   Wt 63 kg (139 lb)   SpO2 (!) 93%   Breastfeeding No   BMI 20.53 kg/m²   Exam included Vitals as listed, and patient's appearance and affect and alertness and mood, oral exam for yeast and hygiene and pharynx lesions and Mallapatti (M) score, neck with inspection for jvd and masses " and thyroid abnormalities and lymph nodes (supraclavicular and infraclavicular nodes also examined and noted if abn), chest exam included symmetry and effort and fremitus and percussion and auscultation, cardiac exam included rhythm and gallops and murmur and rubs and jvd and edema, abdominal exam for mass and hepatosplenomegaly and tenderness and hernias and bowel sounds, Musculoskeletal exam with muscle tone and posture and mobility/gait and  strenght, and skin for rashes and cyanosis and pallor and turgor, extremity for clubbing.  Findings were normal except as listed below:  M4, slender, left knee swollen with redness inner thigh.  Rales both lung bases, symmetric, no distress on ox, rest good    Radiographs reviewed: view by direct vision cta 11/5/2020 with ild and atelectasis rll,  Ct chest 7/25/2019 with ild c/w ipf, min brnochiectasis.  Has had several studies for pe in past with cta/v/q. Jarrell aorta seen also.  No results found for this or any previous visit.]    Labs     Recent Labs   Lab 11/05/20  0458   WBC 9.39   HGB 8.9*   HCT 27.2*        Recent Labs   Lab 11/05/20  0458   *   K 3.9   CL 98   CO2 25   BUN 11   CREATININE 0.9   *   CALCIUM 9.0   AST 24   ALT 23   ALKPHOS 69   BILITOT 0.7   PROT 6.4   ALBUMIN 3.0*   No results for input(s): PH, PCO2, PO2, HCO3 in the last 24 hours.  Microbiology Results (last 7 days)     ** No results found for the last 168 hours. **      echo 8/29/2018 Conclusion    · Left ventricle ejection fraction is normal at 55%  · Normal LV diastolic function.  · Left ventricle shows mild concentric hypertrophy.  · The left ventricle cavity is small.  · RV systolic function is normal.  · Left atrium is mildly dilated.  · Right atrium is mildly dilated.  · Mitral valve shows trace regurgitation.  · There is mild stenosis of the Mitral Valve.  · Tricuspid valve shows mild regurgitation.  · Normal central venous pressure (3 mm Hg).  · The estimated PA systolic  pressure is 18.21 mm Hg         Impression:  Active Hospital Problems    Diagnosis  POA    *Osteoarthritis of left knee [M17.12]  Yes    Fat embolism [T79.1XXA]  Yes    Hypoxemia [R09.02]  No    Chronic kidney disease, stage III (moderate) [N18.30]  Yes     Chronic     Stable, will continue to monitor.       SVT (supraventricular tachycardia) [I47.1]  Yes    Hypothyroidism, unspecified [E03.9]  Yes    Hyperlipidemia [E78.5]  Yes      Resolved Hospital Problems   No resolved problems to display.               Plan:   Chr ild, could be early RA but seems unlikely, check ccp and rf.    She has component copd and bronchiectasis.   Hypoxic resp failure from multitude problems including fat embolism and atelectasis.    outpt in am on ox needed.  resp rx may trigger svt?      Need office f/u 6 wks- check rf and ccp in am.     doubt will have problems with progressive ild ?   Needs f/u .

## 2020-11-05 NOTE — PLAN OF CARE
CM requested hh orders.      11/05/20 1256   Post-Acute Status   Post-Acute Authorization Home Health   Home Health Status Awaiting Orders for HH

## 2020-11-05 NOTE — ASSESSMENT & PLAN NOTE
OOB with PT and nursing  Change dressing daily  Orthopaedically stable to DC home with HH   Aggressive IS

## 2020-11-05 NOTE — PT/OT/SLP PROGRESS
Physical Therapy Treatment    Patient Name:  Lorena Vallejo   MRN:  [id number]    Recommendations:     Discharge Recommendations:  home, home health PT   Discharge Equipment Recommendations: bedside commode, walker, rolling   Barriers to discharge: Pt needing supplemental O2    Assessment:     Lorena Vallejo is a 87 y.o. female admitted with a medical diagnosis of Osteoarthritis of left knee.  She presents with the following impairments/functional limitations:  weakness, impaired endurance, impaired functional mobilty, gait instability, impaired balance, edema, impaired cardiopulmonary response to activity, orthopedic precautions. Pt found in bed and expressed being fatigued, but willing to participate in anticipation of being able to go home today. However, pt's SPO2 dropped to 84% with gait training on room air and subsequently, RN told pt she would probably be staying one more night. Pt still exhibiting a step to pattern during gait, but was standing up more erect this afternoon.    Rehab Prognosis: Fair; patient would benefit from acute skilled PT services to address these deficits and reach maximum level of function.    Recent Surgery: Procedure(s) (LRB):  ARTHROPLASTY, KNEE (Left) 3 Days Post-Op    Plan:     During this hospitalization, patient to be seen BID to address the identified rehab impairments via gait training, therapeutic activities, therapeutic exercises and progress toward the following goals:    · Plan of Care Expires:  12/07/20    Subjective     Chief Complaint: Pt itching and concerned about hematoma on medial L thigh residual from surgery.  Patient/Family Comments/goals: to go home.  Pain/Comfort:  · Pain Rating 1: 0/10  · Location - Side 1: Left  · Location 1: knee  · Pain Addressed 1: Pre-medicate for activity, Reposition, Distraction, Nurse notified  · Pain Rating Post-Intervention 1: 5/10      Objective:     Communicated with LORI Carmona prior to and during session.  Patient found HOB elevated with  bed alarm, peripheral IV, oxygen, telemetry, SCD upon PT entry to room.     General Precautions: Standard, fall, respiratory   Orthopedic Precautions:LLE weight bearing as tolerated   Braces: N/A     Functional Mobility:  · Bed Mobility:     · Supine to Sit: stand by assistance, SPO2 checked on 1Lpm sitting EOB, 95%. O2 doffed, SPO2 remained 94% after 2 min. Then, proceeded with gt trng on RA as noted below.  · Sit to Supine: minimum assistance  · Transfers:     · Sit to Stand:  contact guard assistance with rolling walker  · Gait: x 2 trials of 45 feet with RW and CGA for safety in step to pattern. Pt on RA 1st trial with SPO2 dropping to 84%. PT had pt sit and donned O2 at 1Lpm as pt was on at start of session. After 2nd gt trial, SPO2 on 1Lpm was 93%. Reported findings to LORI Carmona.      AM-PAC 6 CLICK MOBILITY          Therapeutic Activities and Exercises:   Pt performed qs & hs x 10 reps each with vc for proper execution. PT advised pt to repeat 10 more reps each twice more this evening.  PT discussed safety measures regarding increased fall risk with O2 tubing during transfers and gait training at home and educated pt and her  on proper technique for ascending/descending threshold step with RW.     Patient left HOB elevated with all lines intact, call button in reach, bed alarm on and RN notified and pt's .    GOALS:   Multidisciplinary Problems     Physical Therapy Goals        Problem: Physical Therapy Goal    Goal Priority Disciplines Outcome Goal Variances Interventions   Physical Therapy Goal     PT, PT/OT Ongoing, Progressing     Description: Goals to be met by: 2020    Patient will increase functional independence with mobility by performin. Supine to sit with Stand-by Assistance  2. Sit to supine with Stand-by Assistance  3. Sit to stand transfer with Stand-by Assistance  4. Bed to chair transfer with Supervision using Rolling Walker  5. Gait  x 150 feet with Supervision  using Rolling Walker.                      Time Tracking:     PT Received On: 11/05/20  PT Start Time: 1450     PT Stop Time: 1530  PT Total Time (min): 40 min     Billable Minutes: Gait Training 17 and Therapeutic Activity 23    Treatment Type: Treatment  PT/PTA: PT     PTA Visit Number: 0     Katie Chavez, PT  11/05/2020

## 2020-11-05 NOTE — NURSING
"Entered room after bed alarm sounding. Patient in room crying. Patient stated" my foot feel out of bed & I was just putting it back in."  Patient was upset because bed alarm was going off & avasys told her not to get out of bed. Patient refused Avasys . Avasys removed from bed. Will continue to monitor.  "

## 2020-11-05 NOTE — PROGRESS NOTES
Pharmacist Renal Dose Adjustment Note    Lorena Vallejo is a 87 y.o. female being treated with the medication famotidine.    Patient Data:    Vital Signs (Most Recent):  Temp: 98.8 °F (37.1 °C) (11/05/20 0840)  Pulse: 95 (11/05/20 0840)  Resp: 16 (11/05/20 0840)  BP: (!) 132/58 (11/05/20 0840)  SpO2: 97 % (11/05/20 0840)   Vital Signs (72h Range):  Temp:  [97.5 °F (36.4 °C)-100.4 °F (38 °C)]   Pulse:  []   Resp:  [12-22]   BP: (101-162)/(52-99)   SpO2:  [85 %-100 %]      Recent Labs   Lab 11/03/20  0521 11/04/20  1232 11/05/20  0458   CREATININE 0.7 1.0 0.9     Serum creatinine: 0.9 mg/dL 11/05/20 0458  Estimated creatinine clearance: 43.9 mL/min    Due to CrCl < 60 mL/min, Famotidine 20 mg BID has been adjusted to famotidine 20 mg daily according to pharmacy practice protocol for renal dosage adjustments.    Pharmacist's Name: Isa Adams  Pharmacist's Extension: [phone]

## 2020-11-05 NOTE — PT/OT/SLP PROGRESS
Physical Therapy Treatment    Patient Name:  Lorena Vallejo   MRN:  [id number]    Recommendations:     Discharge Recommendations:  home, home health PT   Discharge Equipment Recommendations: bedside commode, walker, rolling   Barriers to discharge: None    Assessment:     Lorena Vallejo is a 87 y.o. female admitted with a medical diagnosis of Osteoarthritis of left knee.  She presents with the following impairments/functional limitations:  weakness, impaired endurance, impaired functional mobilty, gait instability, impaired balance, edema, impaired cardiopulmonary response to activity. Pt tolerated treatment fairly well despite pain complaints during exercise and impaired balance and respiratory status during gait training. Pt is moving very slowly, but is motivated to progress. Significant weakness and edema noted in L LE.    Rehab Prognosis: Good; patient would benefit from acute skilled PT services to address these deficits and reach maximum level of function.    Recent Surgery: Procedure(s) (LRB):  ARTHROPLASTY, KNEE (Left) 3 Days Post-Op    Plan:     During this hospitalization, patient to be seen BID to address the identified rehab impairments via gait training, therapeutic activities, therapeutic exercises and progress toward the following goals:    · Plan of Care Expires:  12/07/20    Subjective     Chief Complaint: swelling and difficulty moving L LE for exercise and gait  Patient/Family Comments/goals: N/A  Pain/Comfort:  · Pain Rating 1: 0/10  · Location - Side 1: Left  · Location 1: knee  · Pain Addressed 1: Pre-medicate for activity, Reposition, Nurse notified, Distraction  · Pain Rating Post-Intervention 1: 5/10      Objective:     Communicated with LORI Carmona prior to session.  Patient found on BSC with RN assisting pt back to bedside chair. PT took over for RN. with peripheral IV, telemetry, oxygen upon PT entry to room.     General Precautions: Standard, fall   Orthopedic Precautions:LLE weight bearing as  tolerated   Braces: N/A     Functional Mobility:  · Transfers:     · Sit to Stand:  contact guard assistance, minimum assistance and and extra time with rolling walker  · Toilet Transfer: minimum assistance and due to chair height with  rolling walker  using  Step Transfer  · Gait: x 2 trials of 40 ft with CGA for safety and vc for step through pattern, for which pt then needed min A for balance due to being flexed forward at the hip. Pt exhibited posterior pelvic girdle weakness preventing pt from staying upright during gait.  · Balance: Poor+ to Fair -      AM-PAC 6 CLICK MOBILITY          Therapeutic Activities and Exercises:   Pt performed B ap x 15, L laq x 5 with assist/tc/vc from PT, L knee flexion x 5 reps with vc for proper execution and joint protection and L hip flexion with assist.     Patient left up in chair with all lines intact, call button in reach, chair alarm on and RN notified..    GOALS:   Multidisciplinary Problems     Physical Therapy Goals        Problem: Physical Therapy Goal    Goal Priority Disciplines Outcome Goal Variances Interventions   Physical Therapy Goal     PT, PT/OT Ongoing, Progressing     Description: Goals to be met by: 2020    Patient will increase functional independence with mobility by performin. Supine to sit with Stand-by Assistance  2. Sit to supine with Stand-by Assistance  3. Sit to stand transfer with Stand-by Assistance  4. Bed to chair transfer with Supervision using Rolling Walker  5. Gait  x 150 feet with Supervision using Rolling Walker.                      Time Tracking:     PT Received On: 20  PT Start Time: 903     PT Stop Time: 954  PT Total Time (min): 51 min     Billable Minutes: Gait Training 20, Therapeutic Activity 10 and Therapeutic Exercise 21    Treatment Type: Treatment  PT/PTA: PT     PTA Visit Number: 0     Katie Chavez, PT  2020

## 2020-11-05 NOTE — SUBJECTIVE & OBJECTIVE
Principal Problem:Osteoarthritis of left knee    Principal Orthopedic Problem: S/P L TKA    Interval History: none    Review of patient's allergies indicates:   Allergen Reactions    Flagyl [metronidazole hcl] Diarrhea     SEVERE DIARRHEA    Latex, natural rubber Rash     ITCHING    Levaquin [levofloxacin] Diarrhea     MOOD CHANGES, WEAKNESS    Morphine Other (See Comments)     HALLUCINATES    Augmentin [amoxicillin-pot clavulanate] Other (See Comments)     AGITATION    Ciprofloxacin Anxiety    Codeine Nausea And Vomiting    Demerol [meperidine] Nausea And Vomiting     CAN TAKE WITH PHENERGAN    Dilaudid [hydromorphone (bulk)] Itching     OK WITH BENADRYL    Fentanyl Itching     OK WITH BENADRYL    Fosfomycin      diarrhia    Naproxen Other (See Comments)     HURT STOMACH    Nsaids (non-steroidal anti-inflammatory drug) Other (See Comments)     TOLD NOT TO TAKE AS HAD AN ULCER. HAD BLEEDING    Percocet [oxycodone-acetaminophen] Rash    Prednisone Other (See Comments)     AGITATION    Reglan [metoclopramide hcl] Anxiety    Sulfa (sulfonamide antibiotics) Rash    Vicodin [hydrocodone-acetaminophen] Rash    Zosyn [piperacillin-tazobactam] Rash    Ambien [zolpidem]     Ibuprofen     Pcn [penicillins]     Vasotec [enalapril maleate] Other (See Comments)     DRY COUGH       Current Facility-Administered Medications   Medication    acetaminophen tablet 650 mg    albuterol-ipratropium 2.5 mg-0.5 mg/3 mL nebulizer solution 3 mL    bisacodyL suppository 10 mg    diltiaZEM 24 hr capsule 240 mg    famotidine tablet 20 mg    hydrOXYzine pamoate capsule 25 mg    metoprolol succinate (TOPROL-XL) 24 hr tablet 25 mg    ondansetron disintegrating tablet 4 mg    ondansetron disintegrating tablet 8 mg    oxyCODONE immediate release tablet Tab 10 mg    polyethylene glycol packet 17 g    propafenone tablet 150 mg    rivaroxaban tablet 10 mg    sertraline tablet 50 mg    sodium chloride 0.9% flush 5  "mL     Objective:     Vital Signs (Most Recent):  Temp: 97.9 °F (36.6 °C) (11/05/20 0355)  Pulse: 90 (11/05/20 0355)  Resp: 18 (11/05/20 0355)  BP: (!) 119/58 (11/05/20 0355)  SpO2: 95 % (11/05/20 0355) Vital Signs (24h Range):  Temp:  [97.9 °F (36.6 °C)-99.5 °F (37.5 °C)] 97.9 °F (36.6 °C)  Pulse:  [] 90  Resp:  [16-20] 18  SpO2:  [85 %-95 %] 95 %  BP: (119-139)/(58-67) 119/58     Weight: 63 kg (139 lb)  Height: 5' 9" (175.3 cm)  Body mass index is 20.53 kg/m².      Intake/Output Summary (Last 24 hours) at 11/5/2020 0713  Last data filed at 11/5/2020 0220  Gross per 24 hour   Intake 360 ml   Output 850 ml   Net -490 ml       General    Nursing note and vitals reviewed.  Constitutional: She is oriented to person, place, and time. She appears well-developed and well-nourished.   Pulmonary/Chest: Effort normal.   Neurological: She is alert and oriented to person, place, and time.   Psychiatric: She has a normal mood and affect. Her behavior is normal.             Left Knee Exam     Comments:  LLE DNVI. Post-op dressing C/D/I      Significant Labs:   CBC:   Recent Labs   Lab 11/04/20  1232 11/05/20  0458   WBC 10.21 9.39   HGB 9.7* 8.9*   HCT 29.9* 27.2*    206     CMP:   Recent Labs   Lab 11/04/20  1232 11/05/20  0458   * 135*   K 4.1 3.9   CL 98 98   CO2 27 25   * 135*   BUN 13 11   CREATININE 1.0 0.9   CALCIUM 9.0 9.0   PROT 6.7 6.4   ALBUMIN 3.3* 3.0*   BILITOT 0.7 0.7   ALKPHOS 71 69   AST 25 24   ALT 25 23   ANIONGAP 8 12   EGFRNONAA 51* 58*     All pertinent labs within the past 24 hours have been reviewed.    Significant Imaging: X-Ray: I have reviewed all pertinent results/findings and my personal findings are:  Status post left knee arthroplasty without complication.  "

## 2020-11-05 NOTE — NURSING
"Patient tried to get up on her own with spouse x 2 without calling nurses station for assistance. Chair alarm set. Patient stated I was just going to walk around room with . Educated on importance of calling before getting up  and risk of falling . Patient also wanted to be left alone in restroom without staff member outside door. Stated "I promise to call when done".  Explained policy with patient, verbalized understanding. Allowed nurse to wait outside door.   "

## 2020-11-05 NOTE — PLAN OF CARE
[facility] accepted patient via [facility] system.       11/05/20 1416   Post-Acute Status   Post-Acute Authorization Home Health   Home Health Status Set-up Complete

## 2020-11-05 NOTE — PLAN OF CARE
Plan of care reviewed with patient. Sr on telemetry. O2-3L per NC in use. L. Knee with dressing C/D/I. Pedal pulses WNL.Cryotherapy in use during night, scd/ foot pump in use. Up to BSC three times this shift with assistance, tolerated well. Medicated for pain once this shift, full relief obtained. Remains free from falls/injury. Instructed to call for assistance as needed during night, verbalized understanding. Call light in reach, bed alarm &  avasys in use.

## 2020-11-05 NOTE — PROGRESS NOTES
[facility]  Orthopedics  Progress Note    Patient Name: Lorena Vallejo  MRN: [id number]  Admission Date: 11/2/2020  Hospital Length of Stay: 2 days  Attending Provider: Arnaud Weeks MD  Primary Care Provider: Stephon Cates MD  Follow-up For: Procedure(s) (LRB):  ARTHROPLASTY, KNEE (Left)    Post-Operative Day: 3 Days Post-Op  Subjective:     Principal Problem:Osteoarthritis of left knee    Principal Orthopedic Problem: S/P L TKA    Interval History: none    Review of patient's allergies indicates:   Allergen Reactions    Flagyl [metronidazole hcl] Diarrhea     SEVERE DIARRHEA    Latex, natural rubber Rash     ITCHING    Levaquin [levofloxacin] Diarrhea     MOOD CHANGES, WEAKNESS    Morphine Other (See Comments)     HALLUCINATES    Augmentin [amoxicillin-pot clavulanate] Other (See Comments)     AGITATION    Ciprofloxacin Anxiety    Codeine Nausea And Vomiting    Demerol [meperidine] Nausea And Vomiting     CAN TAKE WITH PHENERGAN    Dilaudid [hydromorphone (bulk)] Itching     OK WITH BENADRYL    Fentanyl Itching     OK WITH BENADRYL    Fosfomycin      diarrhia    Naproxen Other (See Comments)     HURT STOMACH    Nsaids (non-steroidal anti-inflammatory drug) Other (See Comments)     TOLD NOT TO TAKE AS HAD AN ULCER. HAD BLEEDING    Percocet [oxycodone-acetaminophen] Rash    Prednisone Other (See Comments)     AGITATION    Reglan [metoclopramide hcl] Anxiety    Sulfa (sulfonamide antibiotics) Rash    Vicodin [hydrocodone-acetaminophen] Rash    Zosyn [piperacillin-tazobactam] Rash    Ambien [zolpidem]     Ibuprofen     Pcn [penicillins]     Vasotec [enalapril maleate] Other (See Comments)     DRY COUGH       Current Facility-Administered Medications   Medication    acetaminophen tablet 650 mg    albuterol-ipratropium 2.5 mg-0.5 mg/3 mL nebulizer solution 3 mL    bisacodyL suppository 10 mg    diltiaZEM 24 hr capsule 240 mg    famotidine tablet 20 mg    hydrOXYzine pamoate  "capsule 25 mg    metoprolol succinate (TOPROL-XL) 24 hr tablet 25 mg    ondansetron disintegrating tablet 4 mg    ondansetron disintegrating tablet 8 mg    oxyCODONE immediate release tablet Tab 10 mg    polyethylene glycol packet 17 g    propafenone tablet 150 mg    rivaroxaban tablet 10 mg    sertraline tablet 50 mg    sodium chloride 0.9% flush 5 mL     Objective:     Vital Signs (Most Recent):  Temp: 97.9 °F (36.6 °C) (11/05/20 0355)  Pulse: 90 (11/05/20 0355)  Resp: 18 (11/05/20 0355)  BP: (!) 119/58 (11/05/20 0355)  SpO2: 95 % (11/05/20 0355) Vital Signs (24h Range):  Temp:  [97.9 °F (36.6 °C)-99.5 °F (37.5 °C)] 97.9 °F (36.6 °C)  Pulse:  [] 90  Resp:  [16-20] 18  SpO2:  [85 %-95 %] 95 %  BP: (119-139)/(58-67) 119/58     Weight: 63 kg (139 lb)  Height: 5' 9" (175.3 cm)  Body mass index is 20.53 kg/m².      Intake/Output Summary (Last 24 hours) at 11/5/2020 0713  Last data filed at 11/5/2020 0220  Gross per 24 hour   Intake 360 ml   Output 850 ml   Net -490 ml       General    Nursing note and vitals reviewed.  Constitutional: She is oriented to person, place, and time. She appears well-developed and well-nourished.   Pulmonary/Chest: Effort normal.   Neurological: She is alert and oriented to person, place, and time.   Psychiatric: She has a normal mood and affect. Her behavior is normal.             Left Knee Exam     Comments:  LLE DNVI. Post-op dressing C/D/I      Significant Labs:   CBC:   Recent Labs   Lab 11/04/20  1232 11/05/20 0458   WBC 10.21 9.39   HGB 9.7* 8.9*   HCT 29.9* 27.2*    206     CMP:   Recent Labs   Lab 11/04/20  1232 11/05/20  0458   * 135*   K 4.1 3.9   CL 98 98   CO2 27 25   * 135*   BUN 13 11   CREATININE 1.0 0.9   CALCIUM 9.0 9.0   PROT 6.7 6.4   ALBUMIN 3.3* 3.0*   BILITOT 0.7 0.7   ALKPHOS 71 69   AST 25 24   ALT 25 23   ANIONGAP 8 12   EGFRNONAA 51* 58*     All pertinent labs within the past 24 hours have been reviewed.    Significant Imaging: " X-Ray: I have reviewed all pertinent results/findings and my personal findings are:  Status post left knee arthroplasty without complication.    Assessment/Plan:     * Osteoarthritis of left knee  OOB with PT and nursing  Change dressing daily  Orthopaedically stable to DC home with HH   Aggressive IS          TEQUILA CRUZ  Orthopedics  [facility]

## 2020-11-06 ENCOUNTER — TELEPHONE (OUTPATIENT)
Dept: PULMONOLOGY | Facility: CLINIC | Age: 85
End: 2020-11-06

## 2020-11-06 VITALS
SYSTOLIC BLOOD PRESSURE: 134 MMHG | WEIGHT: 139 LBS | HEART RATE: 84 BPM | BODY MASS INDEX: 20.59 KG/M2 | TEMPERATURE: 98 F | OXYGEN SATURATION: 95 % | RESPIRATION RATE: 18 BRPM | HEIGHT: 69 IN | DIASTOLIC BLOOD PRESSURE: 62 MMHG

## 2020-11-06 LAB
ALBUMIN SERPL BCP-MCNC: 2.7 G/DL (ref 3.5–5.2)
ALP SERPL-CCNC: 66 U/L (ref 55–135)
ALT SERPL W/O P-5'-P-CCNC: 23 U/L (ref 10–44)
ANION GAP SERPL CALC-SCNC: 10 MMOL/L (ref 8–16)
AST SERPL-CCNC: 26 U/L (ref 10–40)
BASOPHILS # BLD AUTO: 0.03 K/UL (ref 0–0.2)
BASOPHILS NFR BLD: 0.4 % (ref 0–1.9)
BILIRUB SERPL-MCNC: 0.7 MG/DL (ref 0.1–1)
BUN SERPL-MCNC: 17 MG/DL (ref 8–23)
CALCIUM SERPL-MCNC: 9.2 MG/DL (ref 8.7–10.5)
CCP AB SER IA-ACNC: <0.5 U/ML
CHLORIDE SERPL-SCNC: 104 MMOL/L (ref 95–110)
CO2 SERPL-SCNC: 26 MMOL/L (ref 23–29)
CREAT SERPL-MCNC: 1 MG/DL (ref 0.5–1.4)
DIFFERENTIAL METHOD: ABNORMAL
EOSINOPHIL # BLD AUTO: 0.2 K/UL (ref 0–0.5)
EOSINOPHIL NFR BLD: 2.8 % (ref 0–8)
ERYTHROCYTE [DISTWIDTH] IN BLOOD BY AUTOMATED COUNT: 13.6 % (ref 11.5–14.5)
EST. GFR  (AFRICAN AMERICAN): 59 ML/MIN/1.73 M^2
EST. GFR  (NON AFRICAN AMERICAN): 51 ML/MIN/1.73 M^2
GLUCOSE SERPL-MCNC: 111 MG/DL (ref 70–110)
HCT VFR BLD AUTO: 24.7 % (ref 37–48.5)
HGB BLD-MCNC: 8 G/DL (ref 12–16)
IMM GRANULOCYTES # BLD AUTO: 0.02 K/UL (ref 0–0.04)
IMM GRANULOCYTES NFR BLD AUTO: 0.3 % (ref 0–0.5)
LYMPHOCYTES # BLD AUTO: 1.2 K/UL (ref 1–4.8)
LYMPHOCYTES NFR BLD: 18 % (ref 18–48)
MCH RBC QN AUTO: 32.5 PG (ref 27–31)
MCHC RBC AUTO-ENTMCNC: 32.4 G/DL (ref 32–36)
MCV RBC AUTO: 100 FL (ref 82–98)
MONOCYTES # BLD AUTO: 1.1 K/UL (ref 0.3–1)
MONOCYTES NFR BLD: 15.9 % (ref 4–15)
NEUTROPHILS # BLD AUTO: 4.3 K/UL (ref 1.8–7.7)
NEUTROPHILS NFR BLD: 62.6 % (ref 38–73)
NRBC BLD-RTO: 0 /100 WBC
PLATELET # BLD AUTO: 223 K/UL (ref 150–350)
PMV BLD AUTO: 9.5 FL (ref 9.2–12.9)
POTASSIUM SERPL-SCNC: 4.3 MMOL/L (ref 3.5–5.1)
PROT SERPL-MCNC: 6.3 G/DL (ref 6–8.4)
RBC # BLD AUTO: 2.46 M/UL (ref 4–5.4)
RHEUMATOID FACT SERPL-ACNC: 17 IU/ML (ref 0–15)
SODIUM SERPL-SCNC: 140 MMOL/L (ref 136–145)
WBC # BLD AUTO: 6.9 K/UL (ref 3.9–12.7)

## 2020-11-06 PROCEDURE — 97535 SELF CARE MNGMENT TRAINING: CPT | Mod: HCNC,CO

## 2020-11-06 PROCEDURE — 99232 SBSQ HOSP IP/OBS MODERATE 35: CPT | Mod: HCNC,,, | Performed by: INTERNAL MEDICINE

## 2020-11-06 PROCEDURE — 94761 N-INVAS EAR/PLS OXIMETRY MLT: CPT | Mod: HCNC

## 2020-11-06 PROCEDURE — 99232 PR SUBSEQUENT HOSPITAL CARE,LEVL II: ICD-10-PCS | Mod: HCNC,,, | Performed by: INTERNAL MEDICINE

## 2020-11-06 PROCEDURE — 25000003 PHARM REV CODE 250: Mod: HCNC | Performed by: ORTHOPAEDIC SURGERY

## 2020-11-06 PROCEDURE — 86200 CCP ANTIBODY: CPT | Mod: HCNC

## 2020-11-06 PROCEDURE — 94799 UNLISTED PULMONARY SVC/PX: CPT | Mod: HCNC

## 2020-11-06 PROCEDURE — 86431 RHEUMATOID FACTOR QUANT: CPT | Mod: HCNC

## 2020-11-06 PROCEDURE — 25000003 PHARM REV CODE 250: Mod: HCNC | Performed by: NURSE PRACTITIONER

## 2020-11-06 PROCEDURE — 36415 COLL VENOUS BLD VENIPUNCTURE: CPT | Mod: HCNC

## 2020-11-06 PROCEDURE — 85025 COMPLETE CBC W/AUTO DIFF WBC: CPT | Mod: HCNC

## 2020-11-06 PROCEDURE — 25000242 PHARM REV CODE 250 ALT 637 W/ HCPCS: Mod: HCNC | Performed by: NURSE PRACTITIONER

## 2020-11-06 PROCEDURE — 25000003 PHARM REV CODE 250: Mod: HCNC | Performed by: HOSPITALIST

## 2020-11-06 PROCEDURE — 27000221 HC OXYGEN, UP TO 24 HOURS: Mod: HCNC

## 2020-11-06 PROCEDURE — 97116 GAIT TRAINING THERAPY: CPT | Mod: HCNC,CQ

## 2020-11-06 PROCEDURE — 94640 AIRWAY INHALATION TREATMENT: CPT | Mod: HCNC

## 2020-11-06 PROCEDURE — 80053 COMPREHEN METABOLIC PANEL: CPT | Mod: HCNC

## 2020-11-06 PROCEDURE — 97110 THERAPEUTIC EXERCISES: CPT | Mod: HCNC,CQ

## 2020-11-06 PROCEDURE — 25000003 PHARM REV CODE 250: Mod: HCNC | Performed by: PHYSICIAN ASSISTANT

## 2020-11-06 PROCEDURE — 86038 ANTINUCLEAR ANTIBODIES: CPT | Mod: HCNC

## 2020-11-06 RX ORDER — ASPIRIN 81 MG/1
81 TABLET ORAL 2 TIMES DAILY
Qty: 60 TABLET | Refills: 0 | Status: SHIPPED | OUTPATIENT
Start: 2020-11-06 | End: 2020-12-14

## 2020-11-06 RX ORDER — ASPIRIN 81 MG/1
81 TABLET ORAL 2 TIMES DAILY
Status: DISCONTINUED | OUTPATIENT
Start: 2020-11-06 | End: 2020-11-06 | Stop reason: HOSPADM

## 2020-11-06 RX ADMIN — OXYCODONE 5 MG: 5 TABLET ORAL at 10:11

## 2020-11-06 RX ADMIN — BISACODYL 10 MG: 10 SUPPOSITORY RECTAL at 08:11

## 2020-11-06 RX ADMIN — ACETAMINOPHEN 1000 MG: 500 TABLET ORAL at 12:11

## 2020-11-06 RX ADMIN — PROPAFENONE HYDROCHLORIDE 150 MG: 150 TABLET, FILM COATED ORAL at 02:11

## 2020-11-06 RX ADMIN — SERTRALINE HYDROCHLORIDE 50 MG: 50 TABLET ORAL at 08:11

## 2020-11-06 RX ADMIN — IPRATROPIUM BROMIDE AND ALBUTEROL SULFATE 3 ML: .5; 2.5 SOLUTION RESPIRATORY (INHALATION) at 12:11

## 2020-11-06 RX ADMIN — RIVAROXABAN 10 MG: 10 TABLET, FILM COATED ORAL at 08:11

## 2020-11-06 RX ADMIN — FAMOTIDINE 20 MG: 20 TABLET ORAL at 08:11

## 2020-11-06 RX ADMIN — METOPROLOL SUCCINATE 25 MG: 25 TABLET, EXTENDED RELEASE ORAL at 08:11

## 2020-11-06 RX ADMIN — POLYETHYLENE GLYCOL 3350 17 G: 17 POWDER, FOR SOLUTION ORAL at 08:11

## 2020-11-06 RX ADMIN — IPRATROPIUM BROMIDE AND ALBUTEROL SULFATE 3 ML: .5; 2.5 SOLUTION RESPIRATORY (INHALATION) at 07:11

## 2020-11-06 RX ADMIN — ONDANSETRON 4 MG: 4 TABLET, ORALLY DISINTEGRATING ORAL at 12:11

## 2020-11-06 RX ADMIN — PROPAFENONE HYDROCHLORIDE 150 MG: 150 TABLET, FILM COATED ORAL at 06:11

## 2020-11-06 RX ADMIN — IBUPROFEN 600 MG: 600 TABLET ORAL at 08:11

## 2020-11-06 NOTE — ASSESSMENT & PLAN NOTE
No treatment indicated at this point in time.  Will provide supplemental oxygen and defer to pulmonology for further evaluation and management.

## 2020-11-06 NOTE — PLAN OF CARE
[facility] approved oxygen from [facility]. Stated okay to deliver 1 tank to pt's room. They will set up delivery to home for home concentrator. CM will deliver 1 tank to room. Dr. Weeks updated.        11/06/20 0939   Post-Acute Status   Post-Acute Authorization HME   HME Status Pending Delivery Hospital

## 2020-11-06 NOTE — RESPIRATORY THERAPY
11/06/20 0734   Patient Assessment/Suction   Level of Consciousness (AVPU) alert   All Lung Fields Breath Sounds clear   Cough Frequency no cough   PRE-TX-O2   O2 Device (Oxygen Therapy) nasal cannula   $ Is the patient on Low Flow Oxygen? Yes   Flow (L/min) 2   SpO2 95 %   Pulse Oximetry Type Intermittent   $ Pulse Oximetry - Multiple Charge Pulse Oximetry - Multiple   Pulse 79   Resp 16   Aerosol Therapy   $ Aerosol Therapy Charges Aerosol Treatment   Respiratory Treatment Status (SVN) given   Patient Position (SVN) Alcazar's   Post Treatment Assessment (SVN) breath sounds unchanged   Signs of Intolerance (SVN) none   Breath Sounds Post-Respiratory Treatment   Post-treatment Heart Rate (beats/min) 83   Post-treatment Resp Rate (breaths/min) 16   Incentive Spirometer   $ Incentive Spirometer Charges done with encouragement   Administration (IS) mouthpiece   Number of Repetitions (IS) 10   Level Incentive Spirometer (mL) 1250   Patient Tolerance (IS) good

## 2020-11-06 NOTE — ASSESSMENT & PLAN NOTE
CTA shows no evidence of PE.  Likely related to fat embolism.  Will order home oxygen.  No need for further evaluation at this point in time, but will consult with pulmonology.

## 2020-11-06 NOTE — ASSESSMENT & PLAN NOTE
OOB with PT and nursing  Change dressing daily  Orthopaedically stable to DC home with HH and home O2  Pulmonology evaluated her yesterday

## 2020-11-06 NOTE — ASSESSMENT & PLAN NOTE
s/p left TKA with Dr. Haider on 11/3  Continue to follow Orthopedic recommendations.  Needs aggressive incentive spirometry.  Follow hemoglobin and hematocrit closely.  Pain control with IV narcotics and antiemetics as needed.  Physical therapy as per Orthopedics protocol with fall precautions.  Rivaroxaban for DVT prophylaxis per Orthopedic recommendations.

## 2020-11-06 NOTE — PLAN OF CARE
Problem: Occupational Therapy Goal  Goal: Occupational Therapy Goal  Description: Goals to be met by: 11/9/2020     Patient will increase functional independence with ADLs by performing:    LE Dressing with Stand-by Assistance and Assistive Devices as needed.-Pamela for pants  Toileting from toilet with Stand-by Assistance and Assistive Devices as needed for hygiene and clothing management.   Toilet transfer to toilet with Stand-by Assistance.    Outcome: Ongoing, Progressing; Patient is making progress and is set for d/c today. Goals remain appropriate. Continue OT plan of care.

## 2020-11-06 NOTE — SUBJECTIVE & OBJECTIVE
Interval History:  Patient seen and examined.  Remains hypoxic throughout the day.  Home oxygen evaluation done which shows the patient qualifies for home oxygen.  CTA of the chest reviews no evidence of pulmonary embolus or otherwise acute pulmonary process apart from atelectasis.  CTA chest does show evidence of cholelithiasis, for the patient is chronic and longstanding for several decades.    Review of Systems   Constitutional: Positive for fatigue.   Respiratory: Positive for shortness of breath. Negative for cough.    Cardiovascular: Negative for chest pain and leg swelling.   Gastrointestinal: Negative for abdominal pain and nausea.   Neurological: Negative for weakness.   Psychiatric/Behavioral: Negative for confusion.   All other systems reviewed and are negative.    Objective:     Vital Signs (Most Recent):  Temp: 97.7 °F (36.5 °C) (11/05/20 1931)  Pulse: 85 (11/05/20 1931)  Resp: 20 (11/05/20 1931)  BP: (!) 104/49 (11/05/20 1931)  SpO2: (!) 94 % (11/05/20 1931) Vital Signs (24h Range):  Temp:  [97.7 °F (36.5 °C)-98.8 °F (37.1 °C)] 97.7 °F (36.5 °C)  Pulse:  [] 85  Resp:  [16-20] 20  SpO2:  [85 %-97 %] 94 %  BP: (104-132)/(49-58) 104/49     Weight: 63 kg (139 lb)  Body mass index is 20.53 kg/m².    Intake/Output Summary (Last 24 hours) at 11/5/2020 2052  Last data filed at 11/5/2020 1800  Gross per 24 hour   Intake 840 ml   Output 850 ml   Net -10 ml      Physical Exam  Vitals signs and nursing note reviewed.   Constitutional:       General: She is not in acute distress.  Eyes:      Pupils: Pupils are equal, round, and reactive to light.   Cardiovascular:      Rate and Rhythm: Normal rate and regular rhythm.      Heart sounds: No murmur.   Pulmonary:      Effort: Pulmonary effort is normal.      Breath sounds: Normal breath sounds.   Abdominal:      General: There is no distension.      Palpations: Abdomen is soft.      Tenderness: There is no abdominal tenderness.   Skin:     Coloration: Skin is not  pale.      Findings: No rash.   Neurological:      Mental Status: She is alert and oriented to person, place, and time.         Significant Labs: All pertinent labs within the past 24 hours have been reviewed.    Significant Imaging: I have reviewed all pertinent imaging results/findings within the past 24 hours.

## 2020-11-06 NOTE — RESPIRATORY THERAPY
11/05/20 1930   Patient Assessment/Suction   Level of Consciousness (AVPU) alert   Respiratory Effort Normal;Unlabored   Expansion/Accessory Muscles/Retractions expansion symmetric;no retractions;no use of accessory muscles   All Lung Fields Breath Sounds clear   Cough Frequency no cough   PRE-TX-O2   O2 Device (Oxygen Therapy) nasal cannula   Flow (L/min) 1   SpO2 (!) 90 %   Pulse Oximetry Type Intermittent   Pulse 79   Resp 18   Aerosol Therapy   $ Aerosol Therapy Charges Aerosol Treatment   Respiratory Treatment Status (SVN) given   Treatment Route (SVN) mouthpiece;oxygen   Patient Position (SVN) semi-Alcazar's   Signs of Intolerance (SVN) none   Breath Sounds Post-Respiratory Treatment   Throughout All Fields Post-Treatment All Fields   Throughout All Fields Post-Treatment no change   Post-treatment Heart Rate (beats/min) 82   Post-treatment Resp Rate (breaths/min) 18   Incentive Spirometer   $ Incentive Spirometer Charges done with encouragement   Administration (IS) proper technique demonstrated   Number of Repetitions (IS) 10   Level Incentive Spirometer (mL) 1250   Patient Tolerance (IS) good

## 2020-11-06 NOTE — PLAN OF CARE
Plan of care reviewed with patient. SR on telemetry. O2-1L per NC in use. L. Knee with dressing C/D/I. Cryotherapy in use. Pedal pulses WNL. Remains free from falls/injury. Instructed to call for assistance as needed during night, verbalized understanding. Call light in reach,bed alarm in use.

## 2020-11-06 NOTE — TELEPHONE ENCOUNTER
LVM for patient about her appt date and time 12/10/2020 at 8:20am. I also put a letter in the mail.      ----- Message from Espinoza Poon MD sent at 11/5/2020  5:52 PM CST -----  I need to see in 6 wks- I see her  regular

## 2020-11-06 NOTE — PT/OT/SLP PROGRESS
Occupational Therapy   Treatment    Name: Lorena Vallejo  MRN: [id number]  Admitting Diagnosis:  Osteoarthritis of left knee  4 Days Post-Op    Recommendations:     Discharge Recommendations: home, home health PT, home health OT  Discharge Equipment Recommendations:  none  Barriers to discharge:  None    Assessment:     Lorena Vallejo is a 87 y.o. female with a medical diagnosis of Osteoarthritis of left knee.  She presents with increased understanding of her deficits, increased awareness to her Oxygen requirements, and goo recall of dressing techniques. Pt will benefit from continued in home therapy service to reinforce WB through LLE and facilitate safety and (I) in the home. Performance deficits affecting function are weakness, impaired endurance, impaired self care skills, impaired functional mobilty, gait instability, impaired balance, pain, decreased ROM, impaired cardiopulmonary response to activity.     Rehab Prognosis:  Good; patient would benefit from acute skilled OT services to address these deficits and reach maximum level of function.       Plan:     Patient to be seen 3 x/week to address the above listed problems via self-care/home management, therapeutic activities, therapeutic exercises  · Plan of Care Expires: 11/09/20  · Plan of Care Reviewed with: patient    Subjective     Pain/Comfort:  · Pain Rating 1: other (see comments)(unrated)  · Location - Side 1: Left  · Location - Orientation 1: generalized  · Location 1: knee  · Pain Addressed 1: Pre-medicate for activity, Reposition, Distraction, Other (see comments)(cryotherapy donned at end of tx)    Objective:     Communicated with: Chelsea SANDOVAL prior to session.  Patient found up in chair with recliner in use with oxygen, peripheral IV, telemetry, cryotherapy upon OT entry to room.    General Precautions: Standard, fall, respiratory   Orthopedic Precautions:LLE weight bearing as tolerated   Braces: N/A     Occupational Performance:     Functional  Mobility/Transfers:  · Patient completed Sit <> Stand Transfer with contact guard assistance  with  rolling walker   · Functional Mobility: FAIR; pt requires VC to equally WB through BLE during static and dynamic stand. No LOB, but very unsteady stand/gait.    Activities of Daily Living:  · Upper Body Dressing: stand by assistance with set up and VC for energy conservation techniques: gathering all clothes and bringing to EOB before dressing, doffing NC to pull bra overhead, then donning and giving at least 1 min for recovery, then repeat steps to don shirt. GOODWIN ed for increased time that this will take and to embrace the process.  · Lower Body Dressing: minimum assistance to thread pants over LLE, and to don shoe on LLE.      [facility] 6 Click ADL: 21    Treatment & Education:  -GOODWIN ed for dressing the LLE first, as it has less AROM to navigate clothing.  -GOODWIN ed for direct and eyes on supervision during showering to ensure safety.  -pt v/u and agreeable to all ed.    Patient left up in chair with recliner in use and cryo donned with all lines intact, call button in reach, chair alarm on and PCT Tinya notifiedEducation:      GOALS:   Multidisciplinary Problems     Occupational Therapy Goals        Problem: Occupational Therapy Goal    Goal Priority Disciplines Outcome Interventions   Occupational Therapy Goal     OT, PT/OT Ongoing, Progressing    Description: Goals to be met by: 11/9/2020     Patient will increase functional independence with ADLs by performing:    LE Dressing with Stand-by Assistance and Assistive Devices as needed.  Toileting from toilet with Stand-by Assistance and Assistive Devices as needed for hygiene and clothing management.   Toilet transfer to toilet with Stand-by Assistance.                     Time Tracking:     OT Date of Treatment: 11/06/20  OT Start Time: 1213  OT Stop Time: 1242  OT Total Time (min): 29 min    Billable Minutes:Self Care/Home Management 29 min    Yumi Worley  GOODWIN/L  11/6/2020

## 2020-11-06 NOTE — PROGRESS NOTES
[facility] Hospital Medicine  Progress Note    Patient Name: Lorena Vallejo  MRN: [id number]  Patient Class: IP- Inpatient   Admission Date: 11/2/2020  Length of Stay: 2 days  Attending Physician: Arnaud Weeks MD  Primary Care Provider: Stephon Cates MD        Subjective:     Principal Problem:Osteoarthritis of left knee        HPI:  Lorena Vallejo is an 87-year-old  female with past medical history significant for proximal SVT, hypothyroidism, hypertension, depression and arthritis presenting today status post left TKA by Dr. Haider.  Patient was experiencing pain to her left knee for several years.  She tried viscosupplementation and intra-articular injections with only minimal relief and therefore opted for surgical intervention.  She was seen postoperatively and doing well.  She tolerated her diet without any complications.  She is having pain to her left knee which she describes as sharp burning quality, constant timing, worsens with movement, improved after receiving IV Dilaudid.  She denies chest pain, shortness of breath, nausea or vomiting.    Overview/Hospital Course:  This patient was observed by hospital medicine after undergoing left TKA with Dr. Haider on 11/2.  Her pain initially was uncontrolled and she required several doses of IV Dilaudid for pain control.  Oral medications were adjusted by orthopedist and patient's pain was better controlled.  She was initially placed on aspirin b.i.d. for for VTE prophylaxis and this was transition to Xarelto per orthopedic orders.  Patient experienced hypoxemia on 11/4 without symptoms.  CTA chest was performed and negative for PE and revealed atelectasis.  IS and neb treatments were ordered and patient was placed on supplemental oxygen with improvement in O2 saturation.  Patient worked with PT/OT post-operatively who recommended leg brace due to buckling of knee when ambulating and this was ordered.  CT scan of the patient's chest was  done secondary to hypoxemia and patient without atelectasis but no other acute processes, including complete list.  Patient remained hypoxic, but was otherwise in good condition.  Etiology of the patient's hypoxia was likely identified as here listed.  She received home oxygen evaluation was found to qualify for home oxygen.  Patient was discharged home with home health.    Interval History:  Patient seen and examined.  Remains hypoxic throughout the day.  Home oxygen evaluation done which shows the patient qualifies for home oxygen.  CTA of the chest reviews no evidence of pulmonary embolus or otherwise acute pulmonary process apart from atelectasis.  CTA chest does show evidence of cholelithiasis, for the patient is chronic and longstanding for several decades.    Review of Systems   Constitutional: Positive for fatigue.   Respiratory: Positive for shortness of breath. Negative for cough.    Cardiovascular: Negative for chest pain and leg swelling.   Gastrointestinal: Negative for abdominal pain and nausea.   Neurological: Negative for weakness.   Psychiatric/Behavioral: Negative for confusion.   All other systems reviewed and are negative.    Objective:     Vital Signs (Most Recent):  Temp: 97.7 °F (36.5 °C) (11/05/20 1931)  Pulse: 85 (11/05/20 1931)  Resp: 20 (11/05/20 1931)  BP: (!) 104/49 (11/05/20 1931)  SpO2: (!) 94 % (11/05/20 1931) Vital Signs (24h Range):  Temp:  [97.7 °F (36.5 °C)-98.8 °F (37.1 °C)] 97.7 °F (36.5 °C)  Pulse:  [] 85  Resp:  [16-20] 20  SpO2:  [85 %-97 %] 94 %  BP: (104-132)/(49-58) 104/49     Weight: 63 kg (139 lb)  Body mass index is 20.53 kg/m².    Intake/Output Summary (Last 24 hours) at 11/5/2020 2052  Last data filed at 11/5/2020 1800  Gross per 24 hour   Intake 840 ml   Output 850 ml   Net -10 ml      Physical Exam  Vitals signs and nursing note reviewed.   Constitutional:       General: She is not in acute distress.  Eyes:      Pupils: Pupils are equal, round, and reactive to  light.   Cardiovascular:      Rate and Rhythm: Normal rate and regular rhythm.      Heart sounds: No murmur.   Pulmonary:      Effort: Pulmonary effort is normal.      Breath sounds: Normal breath sounds.   Abdominal:      General: There is no distension.      Palpations: Abdomen is soft.      Tenderness: There is no abdominal tenderness.   Skin:     Coloration: Skin is not pale.      Findings: No rash.   Neurological:      Mental Status: She is alert and oriented to person, place, and time.         Significant Labs: All pertinent labs within the past 24 hours have been reviewed.    Significant Imaging: I have reviewed all pertinent imaging results/findings within the past 24 hours.      Assessment/Plan:      * Osteoarthritis of left knee  s/p left TKA with Dr. Haider on 11/3  Continue to follow Orthopedic recommendations.  Needs aggressive incentive spirometry.  Follow hemoglobin and hematocrit closely.  Pain control with IV narcotics and antiemetics as needed.  Physical therapy as per Orthopedics protocol with fall precautions.  Rivaroxaban for DVT prophylaxis per Orthopedic recommendations.        Hypoxemia  CTA shows no evidence of PE.  Likely related to fat embolism.  Will order home oxygen.  No need for further evaluation at this point in time, but will consult with pulmonology.    Fat embolism  No treatment indicated at this point in time.  Will provide supplemental oxygen and defer to pulmonology for further evaluation and management.      ILD (interstitial lung disease)  Chronic.  Defer to pulmonology for management.      Chronic kidney disease, stage III (moderate)  Creatine stable for now. BMP reviewed- noted Estimated Creatinine Clearance: 43.9 mL/min (based on SCr of 0.9 mg/dL). according to latest data. Monitor UOP and serial BMP and adjust therapy as needed. Renally dose meds.            SVT (supraventricular tachycardia)  Chronic and currently control  Will monitor on telemetry  Resume home  rhythmol        Hypothyroidism, unspecified  Chronic, stable  Resume home medication      Hyperlipidemia  Chronic, stable  Resume home medication        VTE Risk Mitigation (From admission, onward)         Ordered     rivaroxaban tablet 10 mg  Daily      11/04/20 0716     IP VTE LOW RISK PATIENT  Once      11/02/20 1142     Place sequential compression device  Until discontinued      11/02/20 1142                Discharge Planning   YANICK: 11/5/2020     Code Status: Prior   Is the patient medically ready for discharge?:     Reason for patient still in hospital (select all that apply): Treatment  Discharge Plan A: Home Health                  Arnaud Weeks MD  Department of Hospital Medicine   [facility]

## 2020-11-06 NOTE — PLAN OF CARE
Problem: Physical Therapy Goal  Goal: Physical Therapy Goal  Description: Goals to be met by: 2020    Patient will increase functional independence with mobility by performin. Supine to sit with Stand-by Assistance  2. Sit to supine with Stand-by Assistance  3. Sit to stand transfer with Stand-by Assistance  4. Bed to chair transfer with Supervision using Rolling Walker  5. Gait  x 150 feet with Supervision using Rolling Walker.     Outcome: Ongoing, Progressing   Ambulate with rw and CGA, WBAT LLE.

## 2020-11-06 NOTE — NURSING
Discharge  Summary  Explained to patient and daughter verbalized understanding. Left hospital with daughter in good condition.

## 2020-11-06 NOTE — PLAN OF CARE
Ok to pull portable tank from [facility] DME closet per Che ELIZONDO With [facility] [phone].  SW delivered tank to patient's hospital room.  Patient signed delivery ticket and rental agreement.       11/06/20 1255   Post-Acute Status   Post-Acute Authorization HME   HME Status Set-up Complete

## 2020-11-06 NOTE — PROGRESS NOTES
[facility]  Orthopedics  Progress Note    Patient Name: Lorena Vallejo  MRN: [id number]  Admission Date: 11/2/2020  Hospital Length of Stay: 3 days  Attending Provider: Arnaud Weeks MD  Primary Care Provider: Stephon Cates MD  Follow-up For: Procedure(s) (LRB):  ARTHROPLASTY, KNEE (Left)    Post-Operative Day: 4 Days Post-Op  Subjective:     Principal Problem:Osteoarthritis of left knee    Principal Orthopedic Problem: S/P L TKA    Interval History: none    Review of patient's allergies indicates:   Allergen Reactions    Flagyl [metronidazole hcl] Diarrhea     SEVERE DIARRHEA    Latex, natural rubber Rash     ITCHING    Levaquin [levofloxacin] Diarrhea     MOOD CHANGES, WEAKNESS    Morphine Other (See Comments)     HALLUCINATES    Augmentin [amoxicillin-pot clavulanate] Other (See Comments)     AGITATION    Ciprofloxacin Anxiety    Codeine Nausea And Vomiting    Demerol [meperidine] Nausea And Vomiting     CAN TAKE WITH PHENERGAN    Dilaudid [hydromorphone (bulk)] Itching     OK WITH BENADRYL    Fentanyl Itching     OK WITH BENADRYL    Fosfomycin      diarrhia    Naproxen Other (See Comments)     HURT STOMACH    Nsaids (non-steroidal anti-inflammatory drug) Other (See Comments)     TOLD NOT TO TAKE AS HAD AN ULCER. HAD BLEEDING    Percocet [oxycodone-acetaminophen] Rash    Prednisone Other (See Comments)     AGITATION    Reglan [metoclopramide hcl] Anxiety    Sulfa (sulfonamide antibiotics) Rash    Vicodin [hydrocodone-acetaminophen] Rash    Zosyn [piperacillin-tazobactam] Rash    Ambien [zolpidem]     Ibuprofen     Pcn [penicillins]     Vasotec [enalapril maleate] Other (See Comments)     DRY COUGH       Current Facility-Administered Medications   Medication    acetaminophen tablet 1,000 mg    albuterol-ipratropium 2.5 mg-0.5 mg/3 mL nebulizer solution 3 mL    bisacodyL suppository 10 mg    diltiaZEM 24 hr capsule 240 mg    diphenhydrAMINE capsule 25 mg    famotidine  "tablet 20 mg    hydrOXYzine pamoate capsule 25 mg    ibuprofen tablet 600 mg    metoprolol succinate (TOPROL-XL) 24 hr tablet 25 mg    ondansetron disintegrating tablet 4 mg    ondansetron disintegrating tablet 8 mg    oxyCODONE immediate release tablet 5 mg    oxyCODONE immediate release tablet Tab 10 mg    polyethylene glycol packet 17 g    propafenone tablet 150 mg    rivaroxaban tablet 10 mg    sertraline tablet 50 mg    sodium chloride 0.9% flush 5 mL     Objective:     Vital Signs (Most Recent):  Temp: 97.3 °F (36.3 °C) (11/06/20 0348)  Pulse: 81 (11/06/20 0348)  Resp: 18 (11/06/20 0348)  BP: (!) 117/59 (11/06/20 0348)  SpO2: (!) 93 % (11/06/20 0348) Vital Signs (24h Range):  Temp:  [97.3 °F (36.3 °C)-98.8 °F (37.1 °C)] 97.3 °F (36.3 °C)  Pulse:  [72-95] 81  Resp:  [16-20] 18  SpO2:  [85 %-97 %] 93 %  BP: (104-132)/(49-59) 117/59     Weight: 63 kg (139 lb)  Height: 5' 9" (175.3 cm)  Body mass index is 20.53 kg/m².      Intake/Output Summary (Last 24 hours) at 11/6/2020 0724  Last data filed at 11/6/2020 0457  Gross per 24 hour   Intake 840 ml   Output 500 ml   Net 340 ml       General    Nursing note and vitals reviewed.  Constitutional: She is oriented to person, place, and time. She appears well-developed and well-nourished.   Pulmonary/Chest: Effort normal.   Neurological: She is alert and oriented to person, place, and time.   Psychiatric: She has a normal mood and affect. Her behavior is normal.             Left Knee Exam     Comments:  LLE DNVI. Post-op dressing C/D/I      Significant Labs:   CBC:   Recent Labs   Lab 11/04/20  1232 11/05/20  0458 11/06/20  0555   WBC 10.21 9.39 6.90   HGB 9.7* 8.9* 8.0*   HCT 29.9* 27.2* 24.7*    206 223     CMP:   Recent Labs   Lab 11/04/20  1232 11/05/20  0458 11/06/20  0555   * 135* 140   K 4.1 3.9 4.3   CL 98 98 104   CO2 27 25 26   * 135* 111*   BUN 13 11 17   CREATININE 1.0 0.9 1.0   CALCIUM 9.0 9.0 9.2   PROT 6.7 6.4 6.3   ALBUMIN " 3.3* 3.0* 2.7*   BILITOT 0.7 0.7 0.7   ALKPHOS 71 69 66   AST 25 24 26   ALT 25 23 23   ANIONGAP 8 12 10   EGFRNONAA 51* 58* 51*     All pertinent labs within the past 24 hours have been reviewed.    Significant Imaging: X-Ray: I have reviewed all pertinent results/findings and my personal findings are:  Status post left knee arthroplasty without complication.    Assessment/Plan:     * Osteoarthritis of left knee  OOB with PT and nursing  Change dressing daily  Orthopaedically stable to DC home with HH and home O2  Pulmonology evaluated her yesterday          TEQUILA CRUZ  Orthopedics  [facility]

## 2020-11-06 NOTE — PROGRESS NOTES
Progress Note  PULMONARY    Admit Date: 11/2/2020 11/06/2020      SUBJECTIVE:     11/6 no new c/o. Left knee pain       PFSH and Allergies reviewed.    OBJECTIVE:     Vitals (Most recent):  Vitals:    11/06/20 0927   BP: 134/62   Pulse: 98   Resp:    Temp:        Vitals (24 hour range):  Temp:  [97.3 °F (36.3 °C)-97.9 °F (36.6 °C)]   Pulse:  [72-98]   Resp:  [16-20]   BP: (104-134)/(49-62)   SpO2:  [85 %-95 %]       Intake/Output Summary (Last 24 hours) at 11/6/2020 1029  Last data filed at 11/6/2020 0457  Gross per 24 hour   Intake 600 ml   Output 500 ml   Net 100 ml          Physical Exam:  The patient's neuro status (alertness,orientation,cognitive function,motor skills,), pharyngeal exam (oral lesions, hygiene, abn dentition,), Neck (jvd,mass,thyroid,nodes in neck and above/below clavicle),RESPIRATORY(symmetry,effort,fremitus,percussion,auscultation),  Cor(rhythm,heart tones including gallops,perfusion,edema)ABD(distention,hepatic&splenomegaly,tenderness,masses), Skin(rash,cyanosis),Psyc(affect,judgement,).  Exam negative except for these pertinent findings:    Alert, nc, min rales, chest is symmetric, no distress, normal percussion, normal fremitus andrest good      Radiographs reviewed: view by direct vision cta with mild ild, seen on prior [facility] ct also.  No results found for this or any previous visit.]    Labs     Recent Labs   Lab 11/06/20  0555   WBC 6.90   HGB 8.0*   HCT 24.7*        Recent Labs   Lab 11/06/20  0555      K 4.3      CO2 26   BUN 17   CREATININE 1.0   *   CALCIUM 9.2   AST 26   ALT 23   ALKPHOS 66   BILITOT 0.7   PROT 6.3   ALBUMIN 2.7*   No results for input(s): PH, PCO2, PO2, HCO3 in the last 24 hours.  Microbiology Results (last 7 days)     ** No results found for the last 168 hours. **          Impression:  Active Hospital Problems    Diagnosis  POA    *Osteoarthritis of left knee [M17.12]  Yes    Fat embolism [T79.1XXA]  Yes    ILD (interstitial lung  disease) [J84.9]  Yes    Bronchiectasis without complication [J47.9]  Yes    Chronic bronchitis with COPD (chronic obstructive pulmonary disease) [J44.9]  Yes    Hypoxemia [R09.02]  No    Chronic kidney disease, stage III (moderate) [N18.30]  Yes     Chronic     Stable, will continue to monitor.       SVT (supraventricular tachycardia) [I47.1]  Yes    Hypothyroidism, unspecified [E03.9]  Yes    Hyperlipidemia [E78.5]  Yes      Resolved Hospital Problems   No resolved problems to display.               Plan:   11/5 still low ox, fat embolism and atelectasis are acute problems on top of ild/copd.  Pt is stable for outpt with oxygen- will see in office and f/u ox needs, etc.  Fh rhuem lung in son ppt eval with jamil,rf,ccp.                                     .

## 2020-11-06 NOTE — PT/OT/SLP PROGRESS
Physical Therapy Treatment    Patient Name:  Lorena Vallejo   MRN:  [id number]    Recommendations:     Discharge Recommendations:  home, home health PT   Discharge Equipment Recommendations: none   Barriers to discharge: None    Assessment:     Lorena Vallejo is a 87 y.o. female admitted with a medical diagnosis of Osteoarthritis of left knee.  She presents with the following impairments/functional limitations:  weakness, impaired endurance, impaired self care skills, impaired functional mobilty, gait instability, decreased lower extremity function, pain, impaired cardiopulmonary response to activity, orthopedic precautions . Tolerated treatment. Reported feeling better than earlier this morning but still has L knee pain and swelling. Ambulated with rw and CGA , WBAT LLE with O2 attached. / 65  After transfer BSC to bed.      Rehab Prognosis: Fair; patient would benefit from acute skilled PT services to address these deficits and reach maximum level of function.    Recent Surgery: Procedure(s) (LRB):  ARTHROPLASTY, KNEE (Left) 4 Days Post-Op    Plan:     During this hospitalization, patient to be seen BID to address the identified rehab impairments via gait training, therapeutic activities, therapeutic exercises and progress toward the following goals:    · Plan of Care Expires:  12/07/20    Subjective     Chief Complaint: pain L knee  Patient/Family Comments/goals: to return home with spouse  Pain/Comfort:  · Pain Rating 1: 6/10  · Location - Side 1: Left  · Location - Orientation 1: generalized  · Location 1: knee  · Pain Addressed 1: Pre-medicate for activity, Reposition, Nurse notified      Objective:     Communicated with nurse Tran prior to session.  Patient found seated BSC with oxygen, telemetry, cryotherapy upon PT entry to room.     General Precautions: Standard, fall, respiratory   Orthopedic Precautions:LLE weight bearing as tolerated   Braces: N/A     Functional Mobility:  · Transfers:     · Sit to Stand:   contact guard assistance with rolling walker  · Gait: 80' x 2 with rw and CGA , WBAT LLE with O2. Seated rest between each .       AM-PAC 6 CLICK MOBILITY          Therapeutic Activities and Exercises:   SPT,  BSC to bed with CGA.   Shoes applied and extra gown with assistance.( Dr. Weeks arrived to see patient).    Ambulated 80' in hallway with rw and CGA on O2,  sat briefly ,  / 72.   Ambulated 80' to chair in room .  / 63.    Performed LE exercises at 10 reps each , slowly ; TKE's, SLR's, HS, QD, AP's,           Patient left up in chair with all lines intact, call button in reach, chair alarm on and nurse Chelsea notified..    GOALS:   Multidisciplinary Problems     Physical Therapy Goals        Problem: Physical Therapy Goal    Goal Priority Disciplines Outcome Goal Variances Interventions   Physical Therapy Goal     PT, PT/OT Ongoing, Progressing     Description: Goals to be met by: 2020    Patient will increase functional independence with mobility by performin. Supine to sit with Stand-by Assistance  2. Sit to supine with Stand-by Assistance  3. Sit to stand transfer with Stand-by Assistance  4. Bed to chair transfer with Supervision using Rolling Walker  5. Gait  x 150 feet with Supervision using Rolling Walker.                      Time Tracking:     PT Received On: 20  PT Start Time: 1045     PT Stop Time: 1117  PT Total Time (min): 32 min     Billable Minutes: Gait Training 15min and Therapeutic Exercise 17min       PT/PTA: PTA     PTA Visit Number: 1     Maggi Jessee, PTA  2020

## 2020-11-06 NOTE — SUBJECTIVE & OBJECTIVE
Principal Problem:Osteoarthritis of left knee    Principal Orthopedic Problem: S/P L TKA    Interval History: none    Review of patient's allergies indicates:   Allergen Reactions    Flagyl [metronidazole hcl] Diarrhea     SEVERE DIARRHEA    Latex, natural rubber Rash     ITCHING    Levaquin [levofloxacin] Diarrhea     MOOD CHANGES, WEAKNESS    Morphine Other (See Comments)     HALLUCINATES    Augmentin [amoxicillin-pot clavulanate] Other (See Comments)     AGITATION    Ciprofloxacin Anxiety    Codeine Nausea And Vomiting    Demerol [meperidine] Nausea And Vomiting     CAN TAKE WITH PHENERGAN    Dilaudid [hydromorphone (bulk)] Itching     OK WITH BENADRYL    Fentanyl Itching     OK WITH BENADRYL    Fosfomycin      diarrhia    Naproxen Other (See Comments)     HURT STOMACH    Nsaids (non-steroidal anti-inflammatory drug) Other (See Comments)     TOLD NOT TO TAKE AS HAD AN ULCER. HAD BLEEDING    Percocet [oxycodone-acetaminophen] Rash    Prednisone Other (See Comments)     AGITATION    Reglan [metoclopramide hcl] Anxiety    Sulfa (sulfonamide antibiotics) Rash    Vicodin [hydrocodone-acetaminophen] Rash    Zosyn [piperacillin-tazobactam] Rash    Ambien [zolpidem]     Ibuprofen     Pcn [penicillins]     Vasotec [enalapril maleate] Other (See Comments)     DRY COUGH       Current Facility-Administered Medications   Medication    acetaminophen tablet 1,000 mg    albuterol-ipratropium 2.5 mg-0.5 mg/3 mL nebulizer solution 3 mL    bisacodyL suppository 10 mg    diltiaZEM 24 hr capsule 240 mg    diphenhydrAMINE capsule 25 mg    famotidine tablet 20 mg    hydrOXYzine pamoate capsule 25 mg    ibuprofen tablet 600 mg    metoprolol succinate (TOPROL-XL) 24 hr tablet 25 mg    ondansetron disintegrating tablet 4 mg    ondansetron disintegrating tablet 8 mg    oxyCODONE immediate release tablet 5 mg    oxyCODONE immediate release tablet Tab 10 mg    polyethylene glycol packet 17 g     "propafenone tablet 150 mg    rivaroxaban tablet 10 mg    sertraline tablet 50 mg    sodium chloride 0.9% flush 5 mL     Objective:     Vital Signs (Most Recent):  Temp: 97.3 °F (36.3 °C) (11/06/20 0348)  Pulse: 81 (11/06/20 0348)  Resp: 18 (11/06/20 0348)  BP: (!) 117/59 (11/06/20 0348)  SpO2: (!) 93 % (11/06/20 0348) Vital Signs (24h Range):  Temp:  [97.3 °F (36.3 °C)-98.8 °F (37.1 °C)] 97.3 °F (36.3 °C)  Pulse:  [72-95] 81  Resp:  [16-20] 18  SpO2:  [85 %-97 %] 93 %  BP: (104-132)/(49-59) 117/59     Weight: 63 kg (139 lb)  Height: 5' 9" (175.3 cm)  Body mass index is 20.53 kg/m².      Intake/Output Summary (Last 24 hours) at 11/6/2020 0724  Last data filed at 11/6/2020 0457  Gross per 24 hour   Intake 840 ml   Output 500 ml   Net 340 ml       General    Nursing note and vitals reviewed.  Constitutional: She is oriented to person, place, and time. She appears well-developed and well-nourished.   Pulmonary/Chest: Effort normal.   Neurological: She is alert and oriented to person, place, and time.   Psychiatric: She has a normal mood and affect. Her behavior is normal.             Left Knee Exam     Comments:  LLE DNVI. Post-op dressing C/D/I      Significant Labs:   CBC:   Recent Labs   Lab 11/04/20  1232 11/05/20  0458 11/06/20  0555   WBC 10.21 9.39 6.90   HGB 9.7* 8.9* 8.0*   HCT 29.9* 27.2* 24.7*    206 223     CMP:   Recent Labs   Lab 11/04/20  1232 11/05/20  0458 11/06/20  0555   * 135* 140   K 4.1 3.9 4.3   CL 98 98 104   CO2 27 25 26   * 135* 111*   BUN 13 11 17   CREATININE 1.0 0.9 1.0   CALCIUM 9.0 9.0 9.2   PROT 6.7 6.4 6.3   ALBUMIN 3.3* 3.0* 2.7*   BILITOT 0.7 0.7 0.7   ALKPHOS 71 69 66   AST 25 24 26   ALT 25 23 23   ANIONGAP 8 12 10   EGFRNONAA 51* 58* 51*     All pertinent labs within the past 24 hours have been reviewed.    Significant Imaging: X-Ray: I have reviewed all pertinent results/findings and my personal findings are:  Status post left knee arthroplasty without " complication.

## 2020-11-06 NOTE — ASSESSMENT & PLAN NOTE
Creatine stable for now. BMP reviewed- noted Estimated Creatinine Clearance: 43.9 mL/min (based on SCr of 0.9 mg/dL). according to latest data. Monitor UOP and serial BMP and adjust therapy as needed. Renally dose meds.

## 2020-11-06 NOTE — PLAN OF CARE
Poc discussed with pt. Pt verbalized understanding. VSS. Afebrile. AAO. Pain controlled per prn pain meds. Last BM 11/5/20 with small amount of blood. Oxygen sats are 85-88 when not on oxygen. O2 @ 2L NC. PIV cdi. Large bruise on upper left leg possibly from surgery. Incision dressing cdi. Bed low call light in place. Safety maintained. Will continue to monitor.

## 2020-11-09 ENCOUNTER — TELEPHONE (OUTPATIENT)
Dept: FAMILY MEDICINE | Facility: CLINIC | Age: 85
End: 2020-11-09

## 2020-11-09 ENCOUNTER — TELEPHONE (OUTPATIENT)
Dept: MEDSURG UNIT | Facility: HOSPITAL | Age: 85
End: 2020-11-09

## 2020-11-09 ENCOUNTER — PATIENT OUTREACH (OUTPATIENT)
Dept: ADMINISTRATIVE | Facility: CLINIC | Age: 85
End: 2020-11-09

## 2020-11-09 DIAGNOSIS — E07.81 EUTHYROID SICK SYNDROME: ICD-10-CM

## 2020-11-09 DIAGNOSIS — R79.89 ABNORMAL TSH: ICD-10-CM

## 2020-11-09 DIAGNOSIS — R77.0 ABNORMAL ALBUMIN: ICD-10-CM

## 2020-11-09 DIAGNOSIS — E03.9 HYPOTHYROIDISM, UNSPECIFIED TYPE: ICD-10-CM

## 2020-11-09 DIAGNOSIS — D64.9 ANEMIA, UNSPECIFIED TYPE: Primary | ICD-10-CM

## 2020-11-09 LAB — ANA SER QL IF: NORMAL

## 2020-11-09 NOTE — TELEPHONE ENCOUNTER
Patient wants to know if you can order any labs that you may want patient to get.She has home health coming out to draw labs and they will add them.Patient had knee surgery and had a few problems.She would like a cbc ordered too.

## 2020-11-09 NOTE — TELEPHONE ENCOUNTER
----- Message from Karlo Mcpherson sent at 11/9/2020 10:26 AM CST -----  Contact: patient  Patient called in and cancelled her lab appointment that was on 11/11/20 because she wants her home health nurse to draw labs from home because she just had surgery.  Patient also stated she wants to speak to nurse about adding some orders to existing labs.    Call back number is [phone]

## 2020-11-09 NOTE — PATIENT INSTRUCTIONS
Osteoarthritis  Osteoarthritis (also called degenerative joint disease) happens when the cartilage in a joint becomes damaged and worn. This may be due to age, wear and tear, overuse of the joint, or other problems. Osteoarthritis can affect any joint. But it is most common in hands, knees, spine, hips, and feet. Symptoms include joint stiffness, pain, and swelling.  Home care  · When a joint is more sore than usual, rest it for a day or two.  · Heat can help relieve stiffness. Take a hot bath or apply a heating pad for up to 30 minutes at a time. If symptoms are worse in the morning, using heat just after awakening can help relax the muscle and soothe the joints.   · Ice helps relieve pain and swelling. It is often used after activity. Use a cold pack wrapped in a thin cloth on the joint for 10 to 15 minutes at a time.   · Alternating hot and cold can also help relieve pain. Try this for 20 minutes at a time, several times per day.  · Exercise helps prevent the muscles and ligaments around the joint from becoming weak. It also helps maintain function in the joint.  Be as active as you can. Talk to your healthcare provider about what activity program is best for you.  · Excess weight puts a lot of extra strain on weight-bearing joints of the lower back, hips, knees, feet and ankles. If you are overweight, talk to your healthcare provider about a safe and effective weight loss program.  · Use anti-inflammatory medicines as prescribed for pain. This includes acetaminophen or NSAIDs such as ibuprofen or naproxen. If needed, topical or injected medicines may be recommended. Talk to your healthcare provider if these options are not enough to manage your pain.  · Talk with your healthcare provider about devices that might help improve your function and reduce pain.  Follow-up care  Follow up with your healthcare provider as advised by our staff.  When to seek medical advice  Call your healthcare provider right away if  any of these occur:  · Redness or swelling of a painful joint  · Discharge or pus from a painful joint  · Fever of 100.4ºF (38ºC) or higher, or as directed by your healthcare provider  · Worsening joint pain  · Decreased ability to move the joint or bear weight on the joint  Date Last Reviewed: 3/1/2017  © 3510-1100 [employer]. [address] [place] [address]. All rights reserved. This information is not intended as a substitute for professional medical care. Always follow your healthcare professional's instructions.

## 2020-11-10 ENCOUNTER — LAB VISIT (OUTPATIENT)
Dept: LAB | Facility: HOSPITAL | Age: 85
End: 2020-11-10
Attending: INTERNAL MEDICINE
Payer: MEDICARE

## 2020-11-10 DIAGNOSIS — I51.9 MYXEDEMA HEART DISEASE: ICD-10-CM

## 2020-11-10 DIAGNOSIS — Z47.1 AFTERCARE FOLLOWING JOINT REPLACEMENT: Primary | ICD-10-CM

## 2020-11-10 DIAGNOSIS — E03.9 MYXEDEMA HEART DISEASE: ICD-10-CM

## 2020-11-10 PROCEDURE — 85025 COMPLETE CBC W/AUTO DIFF WBC: CPT | Mod: HCNC

## 2020-11-10 PROCEDURE — 84439 ASSAY OF FREE THYROXINE: CPT | Mod: HCNC

## 2020-11-10 PROCEDURE — 80053 COMPREHEN METABOLIC PANEL: CPT | Mod: HCNC

## 2020-11-10 PROCEDURE — 84443 ASSAY THYROID STIM HORMONE: CPT | Mod: HCNC

## 2020-11-11 LAB
ALBUMIN SERPL BCP-MCNC: 3.5 G/DL (ref 3.5–5.2)
ALP SERPL-CCNC: 86 U/L (ref 55–135)
ALT SERPL W/O P-5'-P-CCNC: 23 U/L (ref 10–44)
ANION GAP SERPL CALC-SCNC: 13 MMOL/L (ref 8–16)
AST SERPL-CCNC: 21 U/L (ref 10–40)
BASOPHILS # BLD AUTO: 0.05 K/UL (ref 0–0.2)
BASOPHILS NFR BLD: 0.6 % (ref 0–1.9)
BILIRUB SERPL-MCNC: 0.7 MG/DL (ref 0.1–1)
BUN SERPL-MCNC: 17 MG/DL (ref 8–23)
CALCIUM SERPL-MCNC: 8.5 MG/DL (ref 8.7–10.5)
CHLORIDE SERPL-SCNC: 101 MMOL/L (ref 95–110)
CO2 SERPL-SCNC: 22 MMOL/L (ref 23–29)
CREAT SERPL-MCNC: 0.9 MG/DL (ref 0.5–1.4)
DIFFERENTIAL METHOD: ABNORMAL
EOSINOPHIL # BLD AUTO: 0.2 K/UL (ref 0–0.5)
EOSINOPHIL NFR BLD: 2.5 % (ref 0–8)
ERYTHROCYTE [DISTWIDTH] IN BLOOD BY AUTOMATED COUNT: 14.8 % (ref 11.5–14.5)
EST. GFR  (AFRICAN AMERICAN): >60 ML/MIN/1.73 M^2
EST. GFR  (NON AFRICAN AMERICAN): 57.7 ML/MIN/1.73 M^2
GLUCOSE SERPL-MCNC: 125 MG/DL (ref 70–110)
HCT VFR BLD AUTO: 32.8 % (ref 37–48.5)
HGB BLD-MCNC: 9.8 G/DL (ref 12–16)
IMM GRANULOCYTES # BLD AUTO: 0.03 K/UL (ref 0–0.04)
IMM GRANULOCYTES NFR BLD AUTO: 0.4 % (ref 0–0.5)
LYMPHOCYTES # BLD AUTO: 2.3 K/UL (ref 1–4.8)
LYMPHOCYTES NFR BLD: 27.5 % (ref 18–48)
MCH RBC QN AUTO: 31.9 PG (ref 27–31)
MCHC RBC AUTO-ENTMCNC: 29.9 G/DL (ref 32–36)
MCV RBC AUTO: 107 FL (ref 82–98)
MONOCYTES # BLD AUTO: 1.3 K/UL (ref 0.3–1)
MONOCYTES NFR BLD: 15.5 % (ref 4–15)
NEUTROPHILS # BLD AUTO: 4.4 K/UL (ref 1.8–7.7)
NEUTROPHILS NFR BLD: 53.5 % (ref 38–73)
NRBC BLD-RTO: 0 /100 WBC
PLATELET # BLD AUTO: 442 K/UL (ref 150–350)
PMV BLD AUTO: 9.4 FL (ref 9.2–12.9)
POTASSIUM SERPL-SCNC: 4.4 MMOL/L (ref 3.5–5.1)
PROT SERPL-MCNC: 6.4 G/DL (ref 6–8.4)
RBC # BLD AUTO: 3.07 M/UL (ref 4–5.4)
SODIUM SERPL-SCNC: 136 MMOL/L (ref 136–145)
T4 FREE SERPL-MCNC: 0.78 NG/DL (ref 0.71–1.51)
TSH SERPL DL<=0.005 MIU/L-ACNC: 6.26 UIU/ML (ref 0.4–4)
WBC # BLD AUTO: 8.28 K/UL (ref 3.9–12.7)

## 2020-11-12 ENCOUNTER — TELEPHONE (OUTPATIENT)
Dept: ORTHOPEDICS | Facility: CLINIC | Age: 85
End: 2020-11-12

## 2020-11-12 NOTE — TELEPHONE ENCOUNTER
Do you have a cough? no  Have you had a cough since your surgical procedure ?no  Are you short of breath?no  Have you experienced shortness of breath since your surgical procedure?no  Do you have a fever? no   Did you have a fever since your surgical procedure? no  If yes, what was your temperature   Any redness at the surgery site? no Warm to the touch? yes  Have you been tested for COVID-19 since your surgical procedure? no What was your result? n/a

## 2020-11-13 ENCOUNTER — EXTERNAL HOME HEALTH (OUTPATIENT)
Dept: HOME HEALTH SERVICES | Facility: HOSPITAL | Age: 85
End: 2020-11-13

## 2020-11-16 ENCOUNTER — OFFICE VISIT (OUTPATIENT)
Dept: ORTHOPEDICS | Facility: CLINIC | Age: 85
End: 2020-11-16
Payer: MEDICARE

## 2020-11-16 VITALS
DIASTOLIC BLOOD PRESSURE: 78 MMHG | WEIGHT: 139 LBS | HEART RATE: 88 BPM | SYSTOLIC BLOOD PRESSURE: 120 MMHG | BODY MASS INDEX: 20.59 KG/M2 | HEIGHT: 69 IN | OXYGEN SATURATION: 99 %

## 2020-11-16 DIAGNOSIS — Z96.652 STATUS POST TOTAL KNEE REPLACEMENT, LEFT: Primary | ICD-10-CM

## 2020-11-16 PROCEDURE — 99024 POSTOP FOLLOW-UP VISIT: CPT | Mod: S$GLB,,, | Performed by: PHYSICIAN ASSISTANT

## 2020-11-16 PROCEDURE — 1125F PR PAIN SEVERITY QUANTIFIED, PAIN PRESENT: ICD-10-PCS | Mod: S$GLB,,, | Performed by: PHYSICIAN ASSISTANT

## 2020-11-16 PROCEDURE — 1125F AMNT PAIN NOTED PAIN PRSNT: CPT | Mod: S$GLB,,, | Performed by: PHYSICIAN ASSISTANT

## 2020-11-16 PROCEDURE — 99024 PR POST-OP FOLLOW-UP VISIT: ICD-10-PCS | Mod: S$GLB,,, | Performed by: PHYSICIAN ASSISTANT

## 2020-11-16 NOTE — PROGRESS NOTES
[facility] ORTHOPEDICS  [address]  [place] [address]  Phone: [phone]   [phone]    Patient's PCP:Stephon Cates MD  Referring Provider: No ref. provider found    POST-OP Note:    Subjective:        Chief Complaint:   Chief Complaint   Patient presents with    Left Knee - Post-op Evaluation     Post op left TKA on 11/2/2020, doing well. Had some hospital complications (collapsed lung/anemia), but following with PCP for that.    In home PT, ready to transition to OP PT       Past Medical History:   Diagnosis Date    Arthritis     Blood transfusion     Bowel obstruction     X 3    Cancer     MELANOMA RIGHT EYE    Cancer of eyeball, except conjunctiva, cornea, retina, and choroid right    Depression     Diverticulitis     GERD (gastroesophageal reflux disease)     Hypertension     Melanoma     right eye    Peripheral neuropathy     PSVT (paroxysmal supraventricular tachycardia)     HAD X 3 OVER 10 YRS. LAST 7-25-12. HAD ADENOSINE IN [facility] ER. WAS ON INCREASED THYROID MED. NO PROBLEM SINCE    Salmonella     Short bowel syndrome     Thyroid disease     Transient ischemic attack     UTI (urinary tract infection)     Wears partial dentures     PARTIAL BOTTOM       Past Surgical History:   Procedure Laterality Date    ABDOMINAL SURGERY      X 3. ILEUM REMOVED. OSTOMY PLACED AND CLOSED.    ADENOIDECTOMY      BILATERAL OOPHORECTOMY      COLONOSCOPY      COLONOSCOPY N/A 8/24/2016    Procedure: COLONOSCOPY;  Surgeon: Alex Corral MD;  Location: [facility];  Service: Endoscopy;  Laterality: N/A;    COLONOSCOPY N/A 8/29/2019    Procedure: COLONOSCOPY;  Surgeon: Tiffanie Vázquez MD;  Location: [facility];  Service: Endoscopy;  Laterality: N/A;    COLONOSCOPY N/A 11/22/2019    Procedure: COLONOSCOPY;  Surgeon: Tiffanie Vázquez MD;  Location: [facility];  Service: Endoscopy;  Laterality: N/A;    CTR      BILAT    ESOPHAGOGASTRODUODENOSCOPY N/A 8/29/2019    Procedure: EGD  (ESOPHAGOGASTRODUODENOSCOPY);  Surgeon: Tiffanie Vázquez MD;  Location: [facility] ENDO;  Service: Endoscopy;  Laterality: N/A;    EYE SURGERY      RIGHT  - LASER MELANOMA; BILAT CATARACT    HYSTERECTOMY      BSO    JOINT REPLACEMENT      RIGHT KNEE    KNEE ARTHROPLASTY Left 11/2/2020    Procedure: ARTHROPLASTY, KNEE;  Surgeon: Eduardo Haider MD;  Location: [facility] OR;  Service: Orthopedics;  Laterality: Left;  Giovany Doll    MANDIBLE FRACTURE SURGERY      MENISCECTOMY      MEDIAL AND LATERAL REPAIR, BAKERS CYST    TENDON REPAIR      LEFT THUMB    TONSILLECTOMY      T AND A       Current Outpatient Medications   Medication Sig    acetaminophen (TYLENOL) 500 MG tablet Take 2 tablets (1,000 mg total) by mouth every 6 (six) hours. for 10 days    ARMOUR THYROID 300 mg Tab Take 150 mg by mouth once daily. TAKE ONE-HALF TABLET BY MOUTH ONCE DAILY (Patient taking differently: Take 180 mg by mouth once daily. TAKE ONE-HALF TABLET BY MOUTH ONCE DAILY)    aspirin (ECOTRIN) 81 MG EC tablet Take 1 tablet (81 mg total) by mouth 2 (two) times a day.    Bifidobacterium infantis (ALIGN) 4 mg Cap Take 4 mg by mouth once daily.    bisacodyL (DULCOLAX) 10 mg Supp Place 1 suppository (10 mg total) rectally once daily.    calcium carbonate (OS-RAMONITA) 500 mg calcium (1,250 mg) tablet Take 1 tablet by mouth 2 (two) times daily.    cyanocobalamin, vitamin B-12, 5,000 mcg Subl Place 5,000 mcg under the tongue every Mon, Wed, Fri.    diltiaZEM (TIAZAC) 240 MG Cs24 TAKE 1 CAPSULE EVERY DAY    fluticasone propionate (FLONASE) 50 mcg/actuation nasal spray 1 spray (50 mcg total) by Each Nostril route once daily.    krill oil 500 mg Cap Take 500 mg by mouth every evening.    loperamide (IMODIUM) 2 mg capsule Take 1 capsule (2 mg total) by mouth 4 (four) times daily as needed for Diarrhea.    magnesium oxide (MAG-OX) 400 mg tablet Take 400 mg by mouth every evening.     metoprolol succinate (TOPROL-XL) 25 MG 24 hr tablet Take 1 tablet  (25 mg total) by mouth every evening. (Patient taking differently: Take 25 mg by mouth once daily. )    nitroGLYCERIN (NITROSTAT) 0.4 MG SL tablet DISSOLVE 1 TABLET UNDER THE TONGUE EVERY 5 MINUTES AS NEEDED FOR CHEST PAIN (Patient taking differently: Place 0.4 mg under the tongue every 5 (five) minutes as needed for Chest pain. )    omeprazole (PRILOSEC) 20 MG capsule TAKE 1 CAPSULE (20 MG TOTAL) BY MOUTH ONCE DAILY.    propafenone (RHTHYMOL) 150 MG Tab Take 1 tablet (150 mg total) by mouth every 8 (eight) hours.    sertraline (ZOLOFT) 100 MG tablet Take 1 tablet (100 mg total) by mouth every evening. (Patient taking differently: Take 50 mg by mouth once daily. )    thyroid, pork, (ARMOUR THYROID) 30 mg Tab Take 1 tablet (30 mg total) by mouth before breakfast. Take with 300 mg    VIT C/E/ZN/COPPR/LUTEIN/ZEAXAN (PRESERVISION AREDS 2 ORAL) Take 1 capsule by mouth 2 (two) times daily.     vitamin D 1000 units Tab Take 1,000 Units by mouth once daily.    coenzyme Q10 100 mg capsule Take 100 mg by mouth once daily.    oxyCODONE-acetaminophen (PERCOCET) 7.5-325 mg per tablet Take 1 tablet by mouth every 4 (four) hours as needed for Pain. (Patient not taking: Reported on 11/9/2020)     No current facility-administered medications for this visit.        Review of patient's allergies indicates:   Allergen Reactions    Flagyl [metronidazole hcl] Diarrhea     SEVERE DIARRHEA    Latex, natural rubber Rash     ITCHING    Levaquin [levofloxacin] Diarrhea     MOOD CHANGES, WEAKNESS    Morphine Other (See Comments)     HALLUCINATES    Augmentin [amoxicillin-pot clavulanate] Other (See Comments)     AGITATION    Ciprofloxacin Anxiety    Codeine Nausea And Vomiting    Demerol [meperidine] Nausea And Vomiting     CAN TAKE WITH PHENERGAN    Dilaudid [hydromorphone (bulk)] Itching     OK WITH BENADRYL    Fentanyl Itching     OK WITH BENADRYL    Fosfomycin      diarrhia    Naproxen Other (See Comments)     HURT  STOMACH    Nsaids (non-steroidal anti-inflammatory drug) Other (See Comments)     TOLD NOT TO TAKE AS HAD AN ULCER. HAD BLEEDING    Percocet [oxycodone-acetaminophen] Rash    Prednisone Other (See Comments)     AGITATION    Reglan [metoclopramide hcl] Anxiety    Sulfa (sulfonamide antibiotics) Rash    Vicodin [hydrocodone-acetaminophen] Rash    Zosyn [piperacillin-tazobactam] Rash    Ambien [zolpidem]     Ibuprofen     Pcn [penicillins]     Vasotec [enalapril maleate] Other (See Comments)     DRY COUGH       Family History   Problem Relation Age of Onset    Diabetes Mother     Heart disease Mother         MI at 52    Stroke Father     Cancer Father         colon cancer    Rheum arthritis Father     COPD Father     Heart disease Brother     Stroke Brother     Diabetes Brother     Arthritis Daughter     Hypertension Daughter     Cancer Son         Leukemia    Hypertension Son     Heart disease Son     Diabetes Maternal Grandmother     Asthma Maternal Grandfather     Cancer Paternal Grandmother         melanoma    Melanoma Paternal Grandmother     Cancer Paternal Grandfather         lip cancer - smoked pipe    Sleep apnea Son     Hypertension Son     Sinus disease Son     Hypertension Son     Heart disease Son     Obesity Son        Social History     Socioeconomic History    Marital status:      Spouse name: Not on file    Number of children: Not on file    Years of education: Not on file    Highest education level: Not on file   Occupational History    Not on file   Social Needs    Financial resource strain: Not on file    Food insecurity     Worry: Not on file     Inability: Not on file    Transportation needs     Medical: Not on file     Non-medical: Not on file   Tobacco Use    Smoking status: Former Smoker     Packs/day: 1.00     Years: 18.00     Pack years: 18.00     Start date:      Quit date: 1973     Years since quittin.7    Smokeless tobacco:  Never Used   Substance and Sexual Activity    Alcohol use: Yes     Alcohol/week: 2.0 standard drinks     Types: 2 Glasses of wine per week     Comment: WINE once per day    Drug use: No    Sexual activity: Not Currently   Lifestyle    Physical activity     Days per week: Not on file     Minutes per session: Not on file    Stress: Not on file   Relationships    Social connections     Talks on phone: Not on file     Gets together: Not on file     Attends Latter day service: Not on file     Active member of club or organization: Not on file     Attends meetings of clubs or organizations: Not on file     Relationship status: Not on file   Other Topics Concern    Are you pregnant or think you may be? Not Asked    Breast-feeding Not Asked   Social History Narrative    Not on file       History of present illness:   Patient returns to clinic today for her left knee, she is status post left total knee arthroplasty on November 2nd.  Overall she is doing well, she has some mild irritation from the sterile take dressings.      Review of Systems:    Musculoskeletal:  See HPI       Objective:        Physical Examination:    Vital Signs:   Vitals:    11/16/20 1001   BP: 120/78   Pulse: 88       Body mass index is 20.53 kg/m².    This a well-developed, well nourished patient in no acute distress.  They are alert and oriented and cooperative to examination.          Examination of the left knee, there is generalized hypertrophy consistent with left total knee arthroplasty.  Midline surgical incision is well healed.  The patient does have some skin irritation or mild dermatitis.  Very mild redness.  No warmth, no drainage.  Midline surgical incision well healed no evidence of wound failure dehiscence or infection.  She does have a couple of bullous lesions which are healing as well.  We removed all of her sterile tape dressings, we removed her sutures today.  She has excellent range of motion 0-120 degrees.  Calf soft  nontender bilaterally.    Pertinent New Results:     XRAY Report / Interpretation:        Assessment:       1. Status post total knee replacement, left      Plan:     Status post total knee replacement, left  -     Ambulatory referral/consult to Physical/Occupational Therapy; Future; Expected date: 11/23/2020        Follow up in about 4 weeks (around 12/14/2020) for P/O L TKA 11/02/2020.      Stable status post left total knee arthroplasty, patient does not need a refill of her pain medications, she continues to take aspirin 81 mg b.i.d., she will follow up in 1 month.  Will start formal physical therapy.    [unfilled]  TEX Goodman, PARaymundoC    This note was created using [employer] voice recognition software that occasionally misinterprets words or phrases.

## 2020-11-17 NOTE — PLAN OF CARE
BG's scanned into phone note.  rx for Zofran and ketostix ready for signature. Pt requested email with Humalog dosing for positive ketones.   Pre op assessment in progress/multiple allergies,  set up on text messaging

## 2020-11-18 ENCOUNTER — TELEPHONE (OUTPATIENT)
Dept: FAMILY MEDICINE | Facility: CLINIC | Age: 85
End: 2020-11-18

## 2020-11-18 ENCOUNTER — OFFICE VISIT (OUTPATIENT)
Dept: FAMILY MEDICINE | Facility: CLINIC | Age: 85
End: 2020-11-18
Payer: MEDICARE

## 2020-11-18 VITALS
HEIGHT: 69 IN | HEART RATE: 75 BPM | WEIGHT: 134.25 LBS | SYSTOLIC BLOOD PRESSURE: 124 MMHG | OXYGEN SATURATION: 95 % | TEMPERATURE: 97 F | RESPIRATION RATE: 16 BRPM | DIASTOLIC BLOOD PRESSURE: 70 MMHG | BODY MASS INDEX: 19.88 KG/M2

## 2020-11-18 DIAGNOSIS — L29.9 ITCHING: ICD-10-CM

## 2020-11-18 DIAGNOSIS — R09.02 HYPOXIA: ICD-10-CM

## 2020-11-18 DIAGNOSIS — Z96.651 STATUS POST RIGHT KNEE REPLACEMENT: Primary | ICD-10-CM

## 2020-11-18 DIAGNOSIS — D62 ANEMIA DUE TO BLOOD LOSS, ACUTE: ICD-10-CM

## 2020-11-18 DIAGNOSIS — G47.00 INSOMNIA, UNSPECIFIED TYPE: ICD-10-CM

## 2020-11-18 PROCEDURE — 1101F PR PT FALLS ASSESS DOC 0-1 FALLS W/OUT INJ PAST YR: ICD-10-PCS | Mod: CPTII,S$GLB,, | Performed by: INTERNAL MEDICINE

## 2020-11-18 PROCEDURE — 1101F PT FALLS ASSESS-DOCD LE1/YR: CPT | Mod: CPTII,S$GLB,, | Performed by: INTERNAL MEDICINE

## 2020-11-18 PROCEDURE — 3288F PR FALLS RISK ASSESSMENT DOCUMENTED: ICD-10-PCS | Mod: CPTII,S$GLB,, | Performed by: INTERNAL MEDICINE

## 2020-11-18 PROCEDURE — 1125F AMNT PAIN NOTED PAIN PRSNT: CPT | Mod: S$GLB,,, | Performed by: INTERNAL MEDICINE

## 2020-11-18 PROCEDURE — 1125F PR PAIN SEVERITY QUANTIFIED, PAIN PRESENT: ICD-10-PCS | Mod: S$GLB,,, | Performed by: INTERNAL MEDICINE

## 2020-11-18 PROCEDURE — 99495 TCM SERVICES (MODERATE COMPLEXITY): ICD-10-PCS | Mod: S$GLB,,, | Performed by: INTERNAL MEDICINE

## 2020-11-18 PROCEDURE — 99495 TRANSJ CARE MGMT MOD F2F 14D: CPT | Mod: S$GLB,,, | Performed by: INTERNAL MEDICINE

## 2020-11-18 PROCEDURE — 3288F FALL RISK ASSESSMENT DOCD: CPT | Mod: CPTII,S$GLB,, | Performed by: INTERNAL MEDICINE

## 2020-11-18 RX ORDER — TRAMADOL HYDROCHLORIDE 50 MG/1
50 TABLET ORAL NIGHTLY
Qty: 10 EACH | Refills: 0 | Status: SHIPPED | OUTPATIENT
Start: 2020-11-18 | End: 2020-12-14 | Stop reason: SDUPTHER

## 2020-11-18 NOTE — TELEPHONE ENCOUNTER
----- Message from Patti Chowdhury sent at 11/18/2020  3:58 PM CST -----  Contact: self  Type: Needs Medical Advice  Who Called:  patient     Pharmacy name and phone #:    [id number] - [place] - [address]  [facility] [address] [address]  Phone: [phone] Fax: [phone]    Best Call Back Number: [phone] (home)   Additional Information: seen today states a 2% Cortizone ointment was to be sent to the pharmacy for the itching on her leg, she states pharmacy has not received, please contact to advise.

## 2020-11-18 NOTE — PROGRESS NOTES
The Subjective:      11:36 AM     Patient ID: Lorena Vallejo is a 87 y.o. female.    Chief Complaint: Hyperlipidemia (labs,no refills needed)    HPI   The patient was admitted 11/05 for knee replacement of the right knee.  She was having problems with itching postoperatively.  She says that 2% over-the-counter hydrocortisone cream relieves the itching.  She has been going to physical therapy and the knee is progressing well.  She does complain of being very tired which is likely due to some postoperative anemia with her hemoglobin dropping to 8 but has already gone up to 9.8.  The patient has short gut syndrome and iron does not agree with her the so that is not option orally.    The the patient became hypoxic during the hospitalization.  She was discharged on oxygen but after 1 week was able to get off of it.  Her pulse oximetry is 95% which is consistent with when she is done been preoperatively in the past.  Note that she has interstitial lung disease.  Pulmonary thought that the patient may have had a fat embolism causing dyspnea.      Since being discharged from the hospital the patient cannot find a comfortable position sleep.  She says is getting better the Tylenol and Celebrex in the she has a history of allergy to narcotics due to itching     CHIEF COMPLAINT: Hyperlipidemia. cholesterol screening: no.   HPI:     ONSET:    MODIFIERS/TREATMENTS: . Taking medications: yes. . Non-compliance with following diet: no. .     SYMPTOMS/RELATED:Possible medication side effects include:   Myalgia: no.  .     REVIEW OF SYMPTOMS: past weights:   Wt Readings from Last 1 Encounters:   11/18/20 1130 60.9 kg (134 lb 4.2 oz)                                                     Last lipids: total   Lab Results   Component Value Date    CHOL 139 10/09/2020    CHOL 138 01/08/2020    CHOL 137 07/17/2019                                                                     HDL   Lab Results   Component Value Date    HDL 51 10/09/2020     "HDL 58 01/08/2020    HDL 59 07/17/2019                                                                     LDL   Lab Results   Component Value Date    LDLCALC 57.8 (L) 10/09/2020    LDLCALC 59.0 (L) 01/08/2020    LDLCALC 51.6 (L) 07/17/2019                                                                     TRIG   Lab Results   Component Value Date    TRIG 151 (H) 10/09/2020    TRIG 105 01/08/2020    TRIG 132 07/17/2019                                                                         Review of Systems      Objective:      Vitals:    11/18/20 1130   BP: 124/70   Pulse: 75   Resp: 16   Temp: 96.9 °F (36.1 °C)   TempSrc: Oral   SpO2: 95%   Weight: 60.9 kg (134 lb 4.2 oz)   Height: 5' 9" (1.753 m)   PainSc:   2   PainLoc: Knee     Physical Exam  Vitals signs and nursing note reviewed.   Constitutional:       Appearance: She is well-developed.   Cardiovascular:      Rate and Rhythm: Normal rate and regular rhythm.      Heart sounds: Normal heart sounds.   Pulmonary:      Effort: Pulmonary effort is normal.      Breath sounds: Normal breath sounds.   Abdominal:      Palpations: Abdomen is soft.      Tenderness: There is no abdominal tenderness.   Musculoskeletal: Normal range of motion.         General: Swelling (Right knee) present.   Skin:     Comments: Excoriations of the medial aspect of the right knee   Neurological:      Mental Status: She is alert.   Psychiatric:         Behavior: Behavior normal.         Thought Content: Thought content normal.       Recent Results (from the past 1008 hour(s))   CBC W/ AUTO DIFFERENTIAL    Collection Time: 10/09/20  8:47 AM   Result Value Ref Range    WBC 5.81 3.90 - 12.70 K/uL    RBC 3.67 (L) 4.00 - 5.40 M/uL    Hemoglobin 12.0 12.0 - 16.0 g/dL    Hematocrit 36.8 (L) 37.0 - 48.5 %     (H) 82 - 98 fL    MCH 32.7 (H) 27.0 - 31.0 pg    MCHC 32.6 32.0 - 36.0 g/dL    RDW 13.2 11.5 - 14.5 %    Platelets 269 150 - 350 K/uL    MPV 9.7 9.2 - 12.9 fL    Immature Granulocytes " 0.3 0.0 - 0.5 %    Gran # (ANC) 2.0 1.8 - 7.7 K/uL    Immature Grans (Abs) 0.02 0.00 - 0.04 K/uL    Lymph # 2.7 1.0 - 4.8 K/uL    Mono # 0.8 0.3 - 1.0 K/uL    Eos # 0.3 0.0 - 0.5 K/uL    Baso # 0.05 0.00 - 0.20 K/uL    nRBC 0 0 /100 WBC    Gran % 33.7 (L) 38.0 - 73.0 %    Lymph % 45.8 18.0 - 48.0 %    Mono % 14.5 4.0 - 15.0 %    Eosinophil % 4.8 0.0 - 8.0 %    Basophil % 0.9 0.0 - 1.9 %    Differential Method Automated    COMPREHENSIVE METABOLIC PANEL    Collection Time: 10/09/20  8:47 AM   Result Value Ref Range    Sodium 144 136 - 145 mmol/L    Potassium 4.4 3.5 - 5.1 mmol/L    Chloride 108 95 - 110 mmol/L    CO2 27 23 - 29 mmol/L    Glucose 90 70 - 110 mg/dL    BUN 24 (H) 8 - 23 mg/dL    Creatinine 0.9 0.5 - 1.4 mg/dL    Calcium 9.2 8.7 - 10.5 mg/dL    Total Protein 7.0 6.0 - 8.4 g/dL    Albumin 4.0 3.5 - 5.2 g/dL    Total Bilirubin 0.5 0.1 - 1.0 mg/dL    Alkaline Phosphatase 67 55 - 135 U/L    AST 18 10 - 40 U/L    ALT 16 10 - 44 U/L    Anion Gap 9 8 - 16 mmol/L    eGFR if African American >60.0 >60 mL/min/1.73 m^2    eGFR if non  57.7 (A) >60 mL/min/1.73 m^2   LIPID PANEL    Collection Time: 10/09/20  8:47 AM   Result Value Ref Range    Cholesterol 139 120 - 199 mg/dL    Triglycerides 151 (H) 30 - 150 mg/dL    HDL 51 40 - 75 mg/dL    LDL Cholesterol 57.8 (L) 63.0 - 159.0 mg/dL    HDL/Cholesterol Ratio 36.7 20.0 - 50.0 %    Total Cholesterol/HDL Ratio 2.7 2.0 - 5.0    Non-HDL Cholesterol 88 mg/dL   TSH    Collection Time: 10/09/20  8:47 AM   Result Value Ref Range    TSH 11.852 (H) 0.400 - 4.000 uIU/mL   T4, FREE    Collection Time: 10/09/20  8:47 AM   Result Value Ref Range    Free T4 0.84 0.71 - 1.51 ng/dL   Culture, MRSA    Collection Time: 10/19/20 10:44 AM    Specimen: Nares, Left; MRSA source   Result Value Ref Range    MRSA Surveillance Screen No MRSA isolated    Type & Screen    Collection Time: 10/19/20 10:44 AM   Result Value Ref Range    Group & Rh A POS     Indirect Tomasz NEG     COVID-19 Routine Screening    Collection Time: 10/30/20 10:05 AM   Result Value Ref Range    SARS-CoV2 (COVID-19) Qualitative PCR Not Detected Not Detected   CBC auto differential    Collection Time: 11/03/20  5:21 AM   Result Value Ref Range    WBC 10.36 3.90 - 12.70 K/uL    RBC 3.30 (L) 4.00 - 5.40 M/uL    Hemoglobin 10.6 (L) 12.0 - 16.0 g/dL    Hematocrit 32.3 (L) 37.0 - 48.5 %    MCV 98 82 - 98 fL    MCH 32.1 (H) 27.0 - 31.0 pg    MCHC 32.8 32.0 - 36.0 g/dL    RDW 13.2 11.5 - 14.5 %    Platelets 237 150 - 350 K/uL    MPV 9.0 (L) 9.2 - 12.9 fL    Immature Granulocytes 0.2 0.0 - 0.5 %    Gran # (ANC) 7.1 1.8 - 7.7 K/uL    Immature Grans (Abs) 0.02 0.00 - 0.04 K/uL    Lymph # 1.6 1.0 - 4.8 K/uL    Mono # 1.6 (H) 0.3 - 1.0 K/uL    Eos # 0.0 0.0 - 0.5 K/uL    Baso # 0.04 0.00 - 0.20 K/uL    nRBC 0 0 /100 WBC    Gran % 68.3 38.0 - 73.0 %    Lymph % 15.3 (L) 18.0 - 48.0 %    Mono % 15.6 (H) 4.0 - 15.0 %    Eosinophil % 0.2 0.0 - 8.0 %    Basophil % 0.4 0.0 - 1.9 %    Differential Method Automated    Basic Metabolic Panel    Collection Time: 11/03/20  5:21 AM   Result Value Ref Range    Sodium 132 (L) 136 - 145 mmol/L    Potassium 3.8 3.5 - 5.1 mmol/L    Chloride 101 95 - 110 mmol/L    CO2 23 23 - 29 mmol/L    Glucose 107 70 - 110 mg/dL    BUN 7 (L) 8 - 23 mg/dL    Creatinine 0.7 0.5 - 1.4 mg/dL    Calcium 7.9 (L) 8.7 - 10.5 mg/dL    Anion Gap 8 8 - 16 mmol/L    eGFR if African American >60 >60 mL/min/1.73 m^2    eGFR if non African American >60 >60 mL/min/1.73 m^2   CBC Auto Differential    Collection Time: 11/04/20 12:32 PM   Result Value Ref Range    WBC 10.21 3.90 - 12.70 K/uL    RBC 3.00 (L) 4.00 - 5.40 M/uL    Hemoglobin 9.7 (L) 12.0 - 16.0 g/dL    Hematocrit 29.9 (L) 37.0 - 48.5 %     (H) 82 - 98 fL    MCH 32.3 (H) 27.0 - 31.0 pg    MCHC 32.4 32.0 - 36.0 g/dL    RDW 13.4 11.5 - 14.5 %    Platelets 224 150 - 350 K/uL    MPV 8.9 (L) 9.2 - 12.9 fL    Immature Granulocytes 0.6 (H) 0.0 - 0.5 %    Gran # (ANC)  7.0 1.8 - 7.7 K/uL    Immature Grans (Abs) 0.06 (H) 0.00 - 0.04 K/uL    Lymph # 1.5 1.0 - 4.8 K/uL    Mono # 1.6 (H) 0.3 - 1.0 K/uL    Eos # 0.0 0.0 - 0.5 K/uL    Baso # 0.04 0.00 - 0.20 K/uL    nRBC 0 0 /100 WBC    Gran % 68.2 38.0 - 73.0 %    Lymph % 14.4 (L) 18.0 - 48.0 %    Mono % 16.1 (H) 4.0 - 15.0 %    Eosinophil % 0.3 0.0 - 8.0 %    Basophil % 0.4 0.0 - 1.9 %    Differential Method Automated    Comprehensive Metabolic Panel    Collection Time: 11/04/20 12:32 PM   Result Value Ref Range    Sodium 133 (L) 136 - 145 mmol/L    Potassium 4.1 3.5 - 5.1 mmol/L    Chloride 98 95 - 110 mmol/L    CO2 27 23 - 29 mmol/L    Glucose 115 (H) 70 - 110 mg/dL    BUN 13 8 - 23 mg/dL    Creatinine 1.0 0.5 - 1.4 mg/dL    Calcium 9.0 8.7 - 10.5 mg/dL    Total Protein 6.7 6.0 - 8.4 g/dL    Albumin 3.3 (L) 3.5 - 5.2 g/dL    Total Bilirubin 0.7 0.1 - 1.0 mg/dL    Alkaline Phosphatase 71 55 - 135 U/L    AST 25 10 - 40 U/L    ALT 25 10 - 44 U/L    Anion Gap 8 8 - 16 mmol/L    eGFR if African American 59 (A) >60 mL/min/1.73 m^2    eGFR if non African American 51 (A) >60 mL/min/1.73 m^2   CBC Auto Differential    Collection Time: 11/05/20  4:58 AM   Result Value Ref Range    WBC 9.39 3.90 - 12.70 K/uL    RBC 2.72 (L) 4.00 - 5.40 M/uL    Hemoglobin 8.9 (L) 12.0 - 16.0 g/dL    Hematocrit 27.2 (L) 37.0 - 48.5 %     (H) 82 - 98 fL    MCH 32.7 (H) 27.0 - 31.0 pg    MCHC 32.7 32.0 - 36.0 g/dL    RDW 13.3 11.5 - 14.5 %    Platelets 206 150 - 350 K/uL    MPV 9.2 9.2 - 12.9 fL    Immature Granulocytes 0.7 (H) 0.0 - 0.5 %    Gran # (ANC) 7.1 1.8 - 7.7 K/uL    Immature Grans (Abs) 0.07 (H) 0.00 - 0.04 K/uL    Lymph # 1.1 1.0 - 4.8 K/uL    Mono # 1.2 (H) 0.3 - 1.0 K/uL    Eos # 0.0 0.0 - 0.5 K/uL    Baso # 0.02 0.00 - 0.20 K/uL    nRBC 0 0 /100 WBC    Gran % 75.4 (H) 38.0 - 73.0 %    Lymph % 11.3 (L) 18.0 - 48.0 %    Mono % 12.2 4.0 - 15.0 %    Eosinophil % 0.2 0.0 - 8.0 %    Basophil % 0.2 0.0 - 1.9 %    Differential Method Automated     Comprehensive Metabolic Panel    Collection Time: 11/05/20  4:58 AM   Result Value Ref Range    Sodium 135 (L) 136 - 145 mmol/L    Potassium 3.9 3.5 - 5.1 mmol/L    Chloride 98 95 - 110 mmol/L    CO2 25 23 - 29 mmol/L    Glucose 135 (H) 70 - 110 mg/dL    BUN 11 8 - 23 mg/dL    Creatinine 0.9 0.5 - 1.4 mg/dL    Calcium 9.0 8.7 - 10.5 mg/dL    Total Protein 6.4 6.0 - 8.4 g/dL    Albumin 3.0 (L) 3.5 - 5.2 g/dL    Total Bilirubin 0.7 0.1 - 1.0 mg/dL    Alkaline Phosphatase 69 55 - 135 U/L    AST 24 10 - 40 U/L    ALT 23 10 - 44 U/L    Anion Gap 12 8 - 16 mmol/L    eGFR if African American >60 >60 mL/min/1.73 m^2    eGFR if non African American 58 (A) >60 mL/min/1.73 m^2   CBC Auto Differential    Collection Time: 11/06/20  5:55 AM   Result Value Ref Range    WBC 6.90 3.90 - 12.70 K/uL    RBC 2.46 (L) 4.00 - 5.40 M/uL    Hemoglobin 8.0 (L) 12.0 - 16.0 g/dL    Hematocrit 24.7 (L) 37.0 - 48.5 %     (H) 82 - 98 fL    MCH 32.5 (H) 27.0 - 31.0 pg    MCHC 32.4 32.0 - 36.0 g/dL    RDW 13.6 11.5 - 14.5 %    Platelets 223 150 - 350 K/uL    MPV 9.5 9.2 - 12.9 fL    Immature Granulocytes 0.3 0.0 - 0.5 %    Gran # (ANC) 4.3 1.8 - 7.7 K/uL    Immature Grans (Abs) 0.02 0.00 - 0.04 K/uL    Lymph # 1.2 1.0 - 4.8 K/uL    Mono # 1.1 (H) 0.3 - 1.0 K/uL    Eos # 0.2 0.0 - 0.5 K/uL    Baso # 0.03 0.00 - 0.20 K/uL    nRBC 0 0 /100 WBC    Gran % 62.6 38.0 - 73.0 %    Lymph % 18.0 18.0 - 48.0 %    Mono % 15.9 (H) 4.0 - 15.0 %    Eosinophil % 2.8 0.0 - 8.0 %    Basophil % 0.4 0.0 - 1.9 %    Differential Method Automated    Comprehensive Metabolic Panel    Collection Time: 11/06/20  5:55 AM   Result Value Ref Range    Sodium 140 136 - 145 mmol/L    Potassium 4.3 3.5 - 5.1 mmol/L    Chloride 104 95 - 110 mmol/L    CO2 26 23 - 29 mmol/L    Glucose 111 (H) 70 - 110 mg/dL    BUN 17 8 - 23 mg/dL    Creatinine 1.0 0.5 - 1.4 mg/dL    Calcium 9.2 8.7 - 10.5 mg/dL    Total Protein 6.3 6.0 - 8.4 g/dL    Albumin 2.7 (L) 3.5 - 5.2 g/dL    Total  Bilirubin 0.7 0.1 - 1.0 mg/dL    Alkaline Phosphatase 66 55 - 135 U/L    AST 26 10 - 40 U/L    ALT 23 10 - 44 U/L    Anion Gap 10 8 - 16 mmol/L    eGFR if African American 59 (A) >60 mL/min/1.73 m^2    eGFR if non African American 51 (A) >60 mL/min/1.73 m^2   Cyclic Citrullinated Peptide Antibody, IgG    Collection Time: 11/06/20  5:55 AM   Result Value Ref Range    CCP Antibodies <0.5 <5.0 U/mL   Rheumatoid Factor    Collection Time: 11/06/20  5:55 AM   Result Value Ref Range    Rheumatoid Factor 17.0 (H) 0.0 - 15.0 IU/mL   TREY    Collection Time: 11/06/20  5:55 AM   Result Value Ref Range    TREY Screen Negative <1:80 Negative <1:80   CBC W/ AUTO DIFFERENTIAL    Collection Time: 11/10/20  2:30 PM   Result Value Ref Range    WBC 8.28 3.90 - 12.70 K/uL    RBC 3.07 (L) 4.00 - 5.40 M/uL    Hemoglobin 9.8 (L) 12.0 - 16.0 g/dL    Hematocrit 32.8 (L) 37.0 - 48.5 %     (H) 82 - 98 fL    MCH 31.9 (H) 27.0 - 31.0 pg    MCHC 29.9 (L) 32.0 - 36.0 g/dL    RDW 14.8 (H) 11.5 - 14.5 %    Platelets 442 (H) 150 - 350 K/uL    MPV 9.4 9.2 - 12.9 fL    Immature Granulocytes 0.4 0.0 - 0.5 %    Gran # (ANC) 4.4 1.8 - 7.7 K/uL    Immature Grans (Abs) 0.03 0.00 - 0.04 K/uL    Lymph # 2.3 1.0 - 4.8 K/uL    Mono # 1.3 (H) 0.3 - 1.0 K/uL    Eos # 0.2 0.0 - 0.5 K/uL    Baso # 0.05 0.00 - 0.20 K/uL    nRBC 0 0 /100 WBC    Gran % 53.5 38.0 - 73.0 %    Lymph % 27.5 18.0 - 48.0 %    Mono % 15.5 (H) 4.0 - 15.0 %    Eosinophil % 2.5 0.0 - 8.0 %    Basophil % 0.6 0.0 - 1.9 %    Differential Method Automated    COMPREHENSIVE METABOLIC PANEL    Collection Time: 11/10/20  2:30 PM   Result Value Ref Range    Sodium 136 136 - 145 mmol/L    Potassium 4.4 3.5 - 5.1 mmol/L    Chloride 101 95 - 110 mmol/L    CO2 22 (L) 23 - 29 mmol/L    Glucose 125 (H) 70 - 110 mg/dL    BUN 17 8 - 23 mg/dL    Creatinine 0.9 0.5 - 1.4 mg/dL    Calcium 8.5 (L) 8.7 - 10.5 mg/dL    Total Protein 6.4 6.0 - 8.4 g/dL    Albumin 3.5 3.5 - 5.2 g/dL    Total Bilirubin 0.7 0.1 -  1.0 mg/dL    Alkaline Phosphatase 86 55 - 135 U/L    AST 21 10 - 40 U/L    ALT 23 10 - 44 U/L    Anion Gap 13 8 - 16 mmol/L    eGFR if African American >60.0 >60 mL/min/1.73 m^2    eGFR if non  57.7 (A) >60 mL/min/1.73 m^2   TSH    Collection Time: 11/10/20  2:30 PM   Result Value Ref Range    TSH 6.265 (H) 0.400 - 4.000 uIU/mL   T4, Free    Collection Time: 11/10/20  2:30 PM   Result Value Ref Range    Free T4 0.78 0.71 - 1.51 ng/dL          Assessment:       No diagnosis found.      Plan:   Transitional Care Note    Family and/or Caretaker present at visit?  No.  Diagnostic tests reviewed/disposition: No diagnosic tests pending after this hospitalization.  Disease/illness education: yes  Home health/community services discussion/referrals: Patient does not have home health established from hospital visit.  They do not need home health.  If needed, we will set up home health for the patient.   Establishment or re-establishment of referral orders for community resources: No other necessary community resources.   Discussion with other health care providers: No discussion with other health care providers necessary.     Discussed with the patient the potential of the armor thyroid to give her atrial fibrillation.  Discussed how stress makes for not make is much T3 and she like to wait until she has less stress before switching off the armor thyroid.  She has been tolerating armor thyroid for many years.          There are no diagnoses linked to this encounter.  No follow-ups on file.

## 2020-11-18 NOTE — PATIENT INSTRUCTIONS
Take Benadryl 0.5 hr before going to bed and then take the tramadol on going to bed    Moist compress to the itching area your leg for 5 min then apply a hydrocortisone ointment 2 percent      Thank you for choosing [facility].     Please fill out the patient experience survey.

## 2020-11-20 ENCOUNTER — TELEPHONE (OUTPATIENT)
Dept: FAMILY MEDICINE | Facility: CLINIC | Age: 85
End: 2020-11-20

## 2020-11-20 NOTE — TELEPHONE ENCOUNTER
Please call up ox her home health to  the oxygen and stop it.  If there are forms they want me to sign the should fax it to us

## 2020-11-20 NOTE — TELEPHONE ENCOUNTER
----- Message from Doug Mendosa sent at 11/20/2020 10:51 AM CST -----  Regarding: Pt [phone]  She asked if you can send an order to [facility]. To discontinue her oxygen service. Phone # [phone]    Thank you

## 2020-11-23 ENCOUNTER — TELEPHONE (OUTPATIENT)
Dept: ORTHOPEDICS | Facility: CLINIC | Age: 85
End: 2020-11-23

## 2020-11-23 NOTE — TELEPHONE ENCOUNTER
----- Message from Claudia Mchugh sent at 11/23/2020 10:15 AM CST -----  Patient came home with oxygen from the hospital, but it can't be picked up unless a Doctor has to order it to be picked up-call patient at [phone]

## 2020-11-30 ENCOUNTER — TELEPHONE (OUTPATIENT)
Dept: FAMILY MEDICINE | Facility: CLINIC | Age: 85
End: 2020-11-30

## 2020-11-30 DIAGNOSIS — R09.02 HYPOXIA: Primary | ICD-10-CM

## 2020-11-30 NOTE — TELEPHONE ENCOUNTER
Dr Cates I sent a message to [facility] to send us a form to sign to  the patient's oxygen.They sent me a message saying you can put in an HME other order to discontinue oxygen and sign.   Possible Skilled Nursing Facilty (SNF)/Possible Acute Rehab

## 2020-12-03 ENCOUNTER — TELEPHONE (OUTPATIENT)
Dept: FAMILY MEDICINE | Facility: CLINIC | Age: 85
End: 2020-12-03

## 2020-12-03 NOTE — TELEPHONE ENCOUNTER
----- Message from Patti Chowdhury sent at 12/3/2020  9:11 AM CST -----  Contact: spouse  Type: Needs Medical Advice  Who Called:  Kiran Vallejo - spouse    Best Call Back Number: [phone] (home)   Additional Information: requesting order to  oxygen, is not using, [facility] will not  with out an order fax order to [phone], requesting a return call to advise when order is sent.

## 2020-12-10 ENCOUNTER — OFFICE VISIT (OUTPATIENT)
Dept: PULMONOLOGY | Facility: CLINIC | Age: 85
End: 2020-12-10
Payer: MEDICARE

## 2020-12-10 VITALS
WEIGHT: 136.56 LBS | HEART RATE: 82 BPM | SYSTOLIC BLOOD PRESSURE: 122 MMHG | OXYGEN SATURATION: 97 % | DIASTOLIC BLOOD PRESSURE: 59 MMHG | BODY MASS INDEX: 20.17 KG/M2

## 2020-12-10 DIAGNOSIS — J84.9 ILD (INTERSTITIAL LUNG DISEASE): Primary | ICD-10-CM

## 2020-12-10 PROBLEM — R09.02 HYPOXEMIA: Status: RESOLVED | Noted: 2020-11-04 | Resolved: 2020-12-10

## 2020-12-10 PROCEDURE — 1101F PT FALLS ASSESS-DOCD LE1/YR: CPT | Mod: HCNC,CPTII,S$GLB, | Performed by: INTERNAL MEDICINE

## 2020-12-10 PROCEDURE — 1159F PR MEDICATION LIST DOCUMENTED IN MEDICAL RECORD: ICD-10-PCS | Mod: HCNC,S$GLB,, | Performed by: INTERNAL MEDICINE

## 2020-12-10 PROCEDURE — 99499 UNLISTED E&M SERVICE: CPT | Mod: S$GLB,,, | Performed by: INTERNAL MEDICINE

## 2020-12-10 PROCEDURE — 1126F AMNT PAIN NOTED NONE PRSNT: CPT | Mod: HCNC,S$GLB,, | Performed by: INTERNAL MEDICINE

## 2020-12-10 PROCEDURE — 99214 PR OFFICE/OUTPT VISIT, EST, LEVL IV, 30-39 MIN: ICD-10-PCS | Mod: HCNC,S$GLB,, | Performed by: INTERNAL MEDICINE

## 2020-12-10 PROCEDURE — 1159F MED LIST DOCD IN RCRD: CPT | Mod: HCNC,S$GLB,, | Performed by: INTERNAL MEDICINE

## 2020-12-10 PROCEDURE — 99499 RISK ADDL DX/OHS AUDIT: ICD-10-PCS | Mod: S$GLB,,, | Performed by: INTERNAL MEDICINE

## 2020-12-10 PROCEDURE — 1126F PR PAIN SEVERITY QUANTIFIED, NO PAIN PRESENT: ICD-10-PCS | Mod: HCNC,S$GLB,, | Performed by: INTERNAL MEDICINE

## 2020-12-10 PROCEDURE — 3288F FALL RISK ASSESSMENT DOCD: CPT | Mod: HCNC,CPTII,S$GLB, | Performed by: INTERNAL MEDICINE

## 2020-12-10 PROCEDURE — 99999 PR PBB SHADOW E&M-EST. PATIENT-LVL V: ICD-10-PCS | Mod: PBBFAC,HCNC,, | Performed by: INTERNAL MEDICINE

## 2020-12-10 PROCEDURE — 99214 OFFICE O/P EST MOD 30 MIN: CPT | Mod: HCNC,S$GLB,, | Performed by: INTERNAL MEDICINE

## 2020-12-10 PROCEDURE — 1101F PR PT FALLS ASSESS DOC 0-1 FALLS W/OUT INJ PAST YR: ICD-10-PCS | Mod: HCNC,CPTII,S$GLB, | Performed by: INTERNAL MEDICINE

## 2020-12-10 PROCEDURE — 3288F PR FALLS RISK ASSESSMENT DOCUMENTED: ICD-10-PCS | Mod: HCNC,CPTII,S$GLB, | Performed by: INTERNAL MEDICINE

## 2020-12-10 PROCEDURE — 99999 PR PBB SHADOW E&M-EST. PATIENT-LVL V: CPT | Mod: PBBFAC,HCNC,, | Performed by: INTERNAL MEDICINE

## 2020-12-10 NOTE — PATIENT INSTRUCTIONS
Diaphragm high on right but moves well- no concern.    Rheumatoid looks to have involved upper lungs mildly.  No progression suggested over last 2 yrs.  Do not expect worsening.      Would recommend follow up for symptoms- rheumatoid lung usually very gradual disease, moves slowly.

## 2020-12-10 NOTE — PROGRESS NOTES
12/10/2020    [facility] Follow Up    Chief Complaint   Patient presents with    [facility]     feeling okay, not using oxygen at home    Cough       HPI:pt went home post knee- got off ox quickly.      From consult 11/5/2020 -    History of Present Illness:   Pt admitted with tka left  Knee on 11/2. Was on rm air on 11/4 but now needs oxygen 2-3 lpm to keep sat over 90.   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 No swallow nor Raynauld's problems.  djd problems.   Pt saw Dr Pagan for ild in past but dismissed after couple month f/u. Pt active - yd wk.  Declines cough. Did smoke 20 py.   I see her  as pt.   Pt hypothryoid by tsh 10/9/2020.    Plan:   Chr ild, could be early RA but seems unlikely, check ccp and rf.   She has component copd and bronchiectasis.   Hypoxic resp failure from multitude problems including fat embolism and atelectasis.   outpt in am on ox needed.  resp rx may trigger svt?     Need office f/u 6 wks- check rf and ccp in am.    doubt will have problems with progressive ild ?   Needs f/u .  The chief compliant  problem is varies with instablilty at time   PFSH:  Past Medical History:   Diagnosis Date    Arthritis     Blood transfusion     Bowel obstruction     X 3    Cancer     MELANOMA RIGHT EYE    Cancer of eyeball, except conjunctiva, cornea, retina, and choroid right    Depression     Diverticulitis     GERD (gastroesophageal reflux disease)     Hypertension     Melanoma     right eye    Peripheral neuropathy     PSVT (paroxysmal supraventricular tachycardia)     HAD X 3 OVER 10 YRS. LAST 7-25-12. HAD ADENOSINE IN [facility] ER. WAS ON INCREASED THYROID MED. NO PROBLEM SINCE    Salmonella     Short bowel syndrome     Thyroid disease     Transient ischemic attack     UTI (urinary tract infection)     Wears partial dentures     PARTIAL BOTTOM         Past Surgical History:   Procedure Laterality Date     ABDOMINAL SURGERY      X 3. ILEUM REMOVED. OSTOMY PLACED AND CLOSED.    ADENOIDECTOMY      BILATERAL OOPHORECTOMY      COLONOSCOPY      COLONOSCOPY N/A 2016    Procedure: COLONOSCOPY;  Surgeon: Alex Corral MD;  Location: [facility] ENDO;  Service: Endoscopy;  Laterality: N/A;    COLONOSCOPY N/A 2019    Procedure: COLONOSCOPY;  Surgeon: Tiffanie Vázquez MD;  Location: [facility] ENDO;  Service: Endoscopy;  Laterality: N/A;    COLONOSCOPY N/A 2019    Procedure: COLONOSCOPY;  Surgeon: Tiffanie Vázquez MD;  Location: [facility] ENDO;  Service: Endoscopy;  Laterality: N/A;    CTR      BILAT    ESOPHAGOGASTRODUODENOSCOPY N/A 2019    Procedure: EGD (ESOPHAGOGASTRODUODENOSCOPY);  Surgeon: Tiffanie Vázquez MD;  Location: [facility] ENDO;  Service: Endoscopy;  Laterality: N/A;    EYE SURGERY      RIGHT  - LASER MELANOMA; BILAT CATARACT    HYSTERECTOMY      BSO    JOINT REPLACEMENT      RIGHT KNEE    KNEE ARTHROPLASTY Left 2020    Procedure: ARTHROPLASTY, KNEE;  Surgeon: Eduardo Haider MD;  Location: [facility];  Service: Orthopedics;  Laterality: Left;  Giovany Doll    MANDIBLE FRACTURE SURGERY      MENISCECTOMY      MEDIAL AND LATERAL REPAIR, BAKERS CYST    TENDON REPAIR      LEFT THUMB    TONSILLECTOMY      T AND A     Social History     Tobacco Use    Smoking status: Former Smoker     Packs/day: 1.00     Years: 18.00     Pack years: 18.00     Start date:      Quit date: 1973     Years since quittin.8    Smokeless tobacco: Never Used   Substance Use Topics    Alcohol use: Yes     Alcohol/week: 2.0 standard drinks     Types: 2 Glasses of wine per week     Comment: WINE once per day    Drug use: No     Family History   Problem Relation Age of Onset    Diabetes Mother     Heart disease Mother         MI at 52    Stroke Father     Cancer Father         colon cancer    Rheum arthritis Father     COPD Father     Heart disease Brother     Stroke Brother     Diabetes Brother      Arthritis Daughter     Hypertension Daughter     Cancer Son         Leukemia    Hypertension Son     Heart disease Son     Diabetes Maternal Grandmother     Asthma Maternal Grandfather     Cancer Paternal Grandmother         melanoma    Melanoma Paternal Grandmother     Cancer Paternal Grandfather         lip cancer - smoked pipe    Sleep apnea Son     Hypertension Son     Sinus disease Son     Hypertension Son     Heart disease Son     Obesity Son      Review of patient's allergies indicates:   Allergen Reactions    Flagyl [metronidazole hcl] Diarrhea     SEVERE DIARRHEA    Latex, natural rubber Rash     ITCHING    Levaquin [levofloxacin] Diarrhea     MOOD CHANGES, WEAKNESS    Morphine Other (See Comments)     HALLUCINATES    Augmentin [amoxicillin-pot clavulanate] Other (See Comments)     AGITATION    Ciprofloxacin Anxiety    Codeine Nausea And Vomiting    Demerol [meperidine] Nausea And Vomiting     CAN TAKE WITH PHENERGAN    Dilaudid [hydromorphone (bulk)] Itching     OK WITH BENADRYL    Fentanyl Itching     OK WITH BENADRYL    Fosfomycin      diarrhia    Naproxen Other (See Comments)     HURT STOMACH    Nsaids (non-steroidal anti-inflammatory drug) Other (See Comments)     TOLD NOT TO TAKE AS HAD AN ULCER. HAD BLEEDING    Percocet [oxycodone-acetaminophen] Rash    Prednisone Other (See Comments)     AGITATION    Reglan [metoclopramide hcl] Anxiety    Sulfa (sulfonamide antibiotics) Rash    Vicodin [hydrocodone-acetaminophen] Rash    Zosyn [piperacillin-tazobactam] Rash    Ambien [zolpidem]     Ibuprofen     Pcn [penicillins]     Vasotec [enalapril maleate] Other (See Comments)     DRY COUGH       Performance Status:The patient's activity level is housebound activities.      Review of Systems:  a review of eleven systems covering constitutional, Eye, HEENT, Psych, Respiratory, Cardiac, GI, , Musculoskeletal, Endocrine, Dermatologic was negative except for pertinent  findings as listed ABOVE and below:  pertinent positive as above, rest is good      Exam:Comprehensive exam done. BP (!) 122/59 (BP Location: Left arm, Patient Position: Sitting)   Pulse 82   Wt 62 kg (136 lb 9.2 oz)   SpO2 97% Comment: on room air at rest  BMI 20.17 kg/m²   Exam included Vitals as listed, and patient's appearance and affect and alertness and mood, oral exam for yeast and hygiene and pharynx lesions and Mallapatti (M) score, neck with inspection for jvd and masses and thyroid abnormalities and lymph nodes (supraclavicular and infraclavicular nodes and axillary also examined and noted if abn), chest exam included symmetry and effort and fremitus and percussion and auscultation, cardiac exam included rhythm and gallops and murmur and rubs and jvd and edema, abdominal exam for mass and hepatosplenomegaly and tenderness and hernias and bowel sounds, Musculoskeletal exam with muscle tone and posture and mobility/gait and  strength, and skin for rashes and cyanosis and pallor and turgor, extremity for clubbing.  Findings were normal except for pertinent findings listed below:  M2. chest is symmetric, no distress, normal percussion, normal fremitus and good normal breath sounds            Radiographs (ct chest and cxr) reviewed: view by direct vision  Ct chest 2020 c/w 2018 with altelectasis 2020 and pneumonia 2018 , stable ild upper lung mainly both films    Labs reviewed           PFT was not done       Plan:  Clinical impression is apparently straight forward and impression with management as below.    There are no diagnoses linked to this encounter.    Follow up if symptoms worsen or fail to improve.    Discussed with patient above for education the following:      Patient Instructions   Diaphragm high on right but moves well- no concern.    Rheumatoid looks to have involved upper lungs mildly.  No progression suggested over last 2 yrs.  Do not expect worsening.      Would recommend follow up  for symptoms- rheumatoid lung usually very gradual disease, moves slowly.

## 2020-12-14 ENCOUNTER — OFFICE VISIT (OUTPATIENT)
Dept: ORTHOPEDICS | Facility: CLINIC | Age: 85
End: 2020-12-14
Payer: MEDICARE

## 2020-12-14 VITALS
DIASTOLIC BLOOD PRESSURE: 66 MMHG | BODY MASS INDEX: 20.14 KG/M2 | WEIGHT: 136 LBS | HEIGHT: 69 IN | SYSTOLIC BLOOD PRESSURE: 114 MMHG

## 2020-12-14 DIAGNOSIS — Z96.652 STATUS POST TOTAL KNEE REPLACEMENT, LEFT: Primary | ICD-10-CM

## 2020-12-14 PROCEDURE — 1126F AMNT PAIN NOTED NONE PRSNT: CPT | Mod: S$GLB,,, | Performed by: PHYSICIAN ASSISTANT

## 2020-12-14 PROCEDURE — 1101F PT FALLS ASSESS-DOCD LE1/YR: CPT | Mod: S$GLB,,, | Performed by: PHYSICIAN ASSISTANT

## 2020-12-14 PROCEDURE — 99024 PR POST-OP FOLLOW-UP VISIT: ICD-10-PCS | Mod: S$GLB,,, | Performed by: PHYSICIAN ASSISTANT

## 2020-12-14 PROCEDURE — 3288F PR FALLS RISK ASSESSMENT DOCUMENTED: ICD-10-PCS | Mod: S$GLB,,, | Performed by: PHYSICIAN ASSISTANT

## 2020-12-14 PROCEDURE — 99024 POSTOP FOLLOW-UP VISIT: CPT | Mod: S$GLB,,, | Performed by: PHYSICIAN ASSISTANT

## 2020-12-14 PROCEDURE — 1126F PR PAIN SEVERITY QUANTIFIED, NO PAIN PRESENT: ICD-10-PCS | Mod: S$GLB,,, | Performed by: PHYSICIAN ASSISTANT

## 2020-12-14 PROCEDURE — 1101F PR PT FALLS ASSESS DOC 0-1 FALLS W/OUT INJ PAST YR: ICD-10-PCS | Mod: S$GLB,,, | Performed by: PHYSICIAN ASSISTANT

## 2020-12-14 PROCEDURE — 3288F FALL RISK ASSESSMENT DOCD: CPT | Mod: S$GLB,,, | Performed by: PHYSICIAN ASSISTANT

## 2020-12-14 NOTE — PROGRESS NOTES
[facility]THOPED[facility]  [address]  [place] [address]  Phone: [phone]   [phone]    Patient's PCP: Stephon Cates MD  Referring Provider: No ref. provider found    Subjective:      Chief Complaint:   Chief Complaint   Patient presents with    Left Knee - Post-op Evaluation     S/p L TKA 11/02/20, patient having pain worse at night. Tylenol is not helping. She is attending Physical therapy at [facility].       Past Medical History:   Diagnosis Date    Arthritis     Blood transfusion     Bowel obstruction     X 3    Cancer     MELANOMA RIGHT EYE    Cancer of eyeball, except conjunctiva, cornea, retina, and choroid right    Depression     Diverticulitis     GERD (gastroesophageal reflux disease)     Hypertension     Melanoma     right eye    Peripheral neuropathy     PSVT (paroxysmal supraventricular tachycardia)     HAD X 3 OVER 10 YRS. LAST 7-25-12. HAD ADENOSINE IN [facility] ER. WAS ON INCREASED THYROID MED. NO PROBLEM SINCE    Salmonella     Short bowel syndrome     Thyroid disease     Transient ischemic attack     UTI (urinary tract infection)     Wears partial dentures     PARTIAL BOTTOM       Past Surgical History:   Procedure Laterality Date    ABDOMINAL SURGERY      X 3. ILEUM REMOVED. OSTOMY PLACED AND CLOSED.    ADENOIDECTOMY      BILATERAL OOPHORECTOMY      COLONOSCOPY      COLONOSCOPY N/A 8/24/2016    Procedure: COLONOSCOPY;  Surgeon: Alex Corral MD;  Location: [facility];  Service: Endoscopy;  Laterality: N/A;    COLONOSCOPY N/A 8/29/2019    Procedure: COLONOSCOPY;  Surgeon: Tiffanie Vázquez MD;  Location: [facility];  Service: Endoscopy;  Laterality: N/A;    COLONOSCOPY N/A 11/22/2019    Procedure: COLONOSCOPY;  Surgeon: Tiffanie Vázquez MD;  Location: [facility];  Service: Endoscopy;  Laterality: N/A;    CTR      BILAT    ESOPHAGOGASTRODUODENOSCOPY N/A 8/29/2019    Procedure: EGD (ESOPHAGOGASTRODUODENOSCOPY);  Surgeon: Tiffanie Vázquez MD;  Location:  [facility] ENDO;  Service: Endoscopy;  Laterality: N/A;    EYE SURGERY      RIGHT  - LASER MELANOMA; BILAT CATARACT    HYSTERECTOMY      BSO    JOINT REPLACEMENT      RIGHT KNEE    KNEE ARTHROPLASTY Left 11/2/2020    Procedure: ARTHROPLASTY, KNEE;  Surgeon: Eduardo Haider MD;  Location: [facility] OR;  Service: Orthopedics;  Laterality: Left;  Giovany Doll    MANDIBLE FRACTURE SURGERY      MENISCECTOMY      MEDIAL AND LATERAL REPAIR, BAKERS CYST    TENDON REPAIR      LEFT THUMB    TONSILLECTOMY      T AND A       Current Outpatient Medications   Medication Sig    ARMOUR THYROID 300 mg Tab Take 150 mg by mouth once daily. TAKE ONE-HALF TABLET BY MOUTH ONCE DAILY (Patient taking differently: Take 180 mg by mouth once daily. TAKE ONE-HALF TABLET BY MOUTH ONCE DAILY)    Bifidobacterium infantis (ALIGN) 4 mg Cap Take 4 mg by mouth once daily.    bisacodyL (DULCOLAX) 10 mg Supp Place 1 suppository (10 mg total) rectally once daily.    calcium carbonate (OS-RAMONITA) 500 mg calcium (1,250 mg) tablet Take 1 tablet by mouth 2 (two) times daily.    coenzyme Q10 100 mg capsule Take 100 mg by mouth once daily.    cyanocobalamin, vitamin B-12, 5,000 mcg Subl Place 5,000 mcg under the tongue every Mon, Wed, Fri.    diltiaZEM (TIAZAC) 240 MG Cs24 TAKE 1 CAPSULE EVERY DAY    fluticasone propionate (FLONASE) 50 mcg/actuation nasal spray 1 spray (50 mcg total) by Each Nostril route once daily.    krill oil 500 mg Cap Take 500 mg by mouth every evening.    loperamide (IMODIUM) 2 mg capsule Take 1 capsule (2 mg total) by mouth 4 (four) times daily as needed for Diarrhea.    magnesium oxide (MAG-OX) 400 mg tablet Take 400 mg by mouth every evening.     metoprolol succinate (TOPROL-XL) 25 MG 24 hr tablet Take 1 tablet (25 mg total) by mouth every evening. (Patient taking differently: Take 25 mg by mouth once daily. )    nitroGLYCERIN (NITROSTAT) 0.4 MG SL tablet DISSOLVE 1 TABLET UNDER THE TONGUE EVERY 5 MINUTES AS NEEDED FOR CHEST  PAIN (Patient taking differently: Place 0.4 mg under the tongue every 5 (five) minutes as needed for Chest pain. )    omeprazole (PRILOSEC) 20 MG capsule TAKE 1 CAPSULE (20 MG TOTAL) BY MOUTH ONCE DAILY.    propafenone (RHTHYMOL) 150 MG Tab Take 1 tablet (150 mg total) by mouth every 8 (eight) hours.    sertraline (ZOLOFT) 100 MG tablet Take 1 tablet (100 mg total) by mouth every evening. (Patient taking differently: Take 50 mg by mouth once daily. )    thyroid, pork, (ARMOUR THYROID) 30 mg Tab Take 1 tablet (30 mg total) by mouth before breakfast. Take with 300 mg    traMADoL (ULTRAM) 50 mg tablet Take 1 tablet (50 mg total) by mouth every evening.    VIT C/E/ZN/COPPR/LUTEIN/ZEAXAN (PRESERVISION AREDS 2 ORAL) Take 1 capsule by mouth 2 (two) times daily.     vitamin D 1000 units Tab Take 1,000 Units by mouth once daily.     No current facility-administered medications for this visit.        Review of patient's allergies indicates:   Allergen Reactions    Flagyl [metronidazole hcl] Diarrhea     SEVERE DIARRHEA    Latex, natural rubber Rash     ITCHING    Levaquin [levofloxacin] Diarrhea     MOOD CHANGES, WEAKNESS    Morphine Other (See Comments)     HALLUCINATES    Augmentin [amoxicillin-pot clavulanate] Other (See Comments)     AGITATION    Ciprofloxacin Anxiety    Codeine Nausea And Vomiting    Demerol [meperidine] Nausea And Vomiting     CAN TAKE WITH PHENERGAN    Dilaudid [hydromorphone (bulk)] Itching     OK WITH BENADRYL    Fentanyl Itching     OK WITH BENADRYL    Fosfomycin      diarrhia    Naproxen Other (See Comments)     HURT STOMACH    Nsaids (non-steroidal anti-inflammatory drug) Other (See Comments)     TOLD NOT TO TAKE AS HAD AN ULCER. HAD BLEEDING    Percocet [oxycodone-acetaminophen] Rash    Prednisone Other (See Comments)     AGITATION    Reglan [metoclopramide hcl] Anxiety    Sulfa (sulfonamide antibiotics) Rash    Vicodin [hydrocodone-acetaminophen] Rash    Zosyn  [piperacillin-tazobactam] Rash    Ambien [zolpidem]     Ibuprofen     Pcn [penicillins]     Vasotec [enalapril maleate] Other (See Comments)     DRY COUGH       Family History   Problem Relation Age of Onset    Diabetes Mother     Heart disease Mother         MI at 52    Stroke Father     Cancer Father         colon cancer    Rheum arthritis Father     COPD Father     Heart disease Brother     Stroke Brother     Diabetes Brother     Arthritis Daughter     Hypertension Daughter     Cancer Son         Leukemia    Hypertension Son     Heart disease Son     Diabetes Maternal Grandmother     Asthma Maternal Grandfather     Cancer Paternal Grandmother         melanoma    Melanoma Paternal Grandmother     Cancer Paternal Grandfather         lip cancer - smoked pipe    Sleep apnea Son     Hypertension Son     Sinus disease Son     Hypertension Son     Heart disease Son     Obesity Son        Social History     Socioeconomic History    Marital status:      Spouse name: Not on file    Number of children: Not on file    Years of education: Not on file    Highest education level: Not on file   Occupational History    Not on file   Social Needs    Financial resource strain: Not on file    Food insecurity     Worry: Not on file     Inability: Not on file    Transportation needs     Medical: Not on file     Non-medical: Not on file   Tobacco Use    Smoking status: Former Smoker     Packs/day: 1.00     Years: 18.00     Pack years: 18.00     Start date:      Quit date: 1973     Years since quittin.8    Smokeless tobacco: Never Used   Substance and Sexual Activity    Alcohol use: Yes     Alcohol/week: 2.0 standard drinks     Types: 2 Glasses of wine per week     Comment: WINE once per day    Drug use: No    Sexual activity: Not Currently   Lifestyle    Physical activity     Days per week: Not on file     Minutes per session: Not on file    Stress: Not on file    Relationships    Social connections     Talks on phone: Not on file     Gets together: Not on file     Attends Baptism service: Not on file     Active member of club or organization: Not on file     Attends meetings of clubs or organizations: Not on file     Relationship status: Not on file   Other Topics Concern    Are you pregnant or think you may be? Not Asked    Breast-feeding Not Asked   Social History Narrative    Not on file       History of present illness:  Patient returns today for the left knee, status post left total knee arthroplasty November 2nd.  Having some pain at night, difficulty sleeping.  Only taking Ultram and very little.  She is out of her pain medicine.    Review of Systems:    Constitutional: Negative for chills, fever and weight loss.   HENT: Negative for congestion.    Eyes: Negative for discharge and redness.   Respiratory: Negative for cough and shortness of breath.    Cardiovascular: Negative for chest pain.   Gastrointestinal: Negative for nausea and vomiting.   Musculoskeletal: See HPI.   Skin: Negative for rash.   Neurological: Negative for headaches.   Endo/Heme/Allergies: Does not bruise/bleed easily.   Psychiatric/Behavioral: The patient is not nervous/anxious.    All other systems reviewed and are negative.       Objective:      Physical Examination:    Vital Signs:    Vitals:    12/14/20 1255   BP: 114/66       Body mass index is 20.08 kg/m².    This a well-developed, well nourished patient in no acute distress.  They are alert and oriented and cooperative to examination.     Examination of the left knee, mild joint hypertrophy to be as expected.  No effusion.  Normal range of motion 0-120 degrees.  Stable anterior-posterior varus and valgus stresses.      Pertinent New Results:        XRAY Report / Interpretation:   AP lateral sunrise views of the left knee taken today in the office demonstrate a left total knee arthroplasty to be in appropriate position without  evidence of hardware failure or loosening.          Assessment:       1. Status post total knee replacement, left      Plan:     Status post total knee replacement, left  -     X-Ray Knee 3 View Left        No follow-ups on file.    Stable status post left total knee arthroplasty, patient is doing well with excellent range of motion.  Continue physical therapy, refilled her Ultram for pain, will check her back in 6 weeks.      TEX Goodman, PA-C    This note was created using JME[employer] voice recognition software that occasionally misinterprets words or phrases.

## 2020-12-15 ENCOUNTER — PES CALL (OUTPATIENT)
Dept: ADMINISTRATIVE | Facility: CLINIC | Age: 85
End: 2020-12-15

## 2020-12-15 RX ORDER — TRAMADOL HYDROCHLORIDE 50 MG/1
50 TABLET ORAL EVERY 6 HOURS PRN
Qty: 28 TABLET | Refills: 0 | Status: SHIPPED | OUTPATIENT
Start: 2020-12-15 | End: 2020-12-22

## 2020-12-18 ENCOUNTER — LAB VISIT (OUTPATIENT)
Dept: LAB | Facility: HOSPITAL | Age: 85
End: 2020-12-18
Attending: INTERNAL MEDICINE
Payer: MEDICARE

## 2020-12-18 DIAGNOSIS — D62 ANEMIA DUE TO BLOOD LOSS, ACUTE: ICD-10-CM

## 2020-12-18 LAB
BASOPHILS # BLD AUTO: 0.04 K/UL (ref 0–0.2)
BASOPHILS NFR BLD: 0.4 % (ref 0–1.9)
DIFFERENTIAL METHOD: ABNORMAL
EOSINOPHIL # BLD AUTO: 0.2 K/UL (ref 0–0.5)
EOSINOPHIL NFR BLD: 2.6 % (ref 0–8)
ERYTHROCYTE [DISTWIDTH] IN BLOOD BY AUTOMATED COUNT: 14 % (ref 11.5–14.5)
HCT VFR BLD AUTO: 36 % (ref 37–48.5)
HGB BLD-MCNC: 11.1 G/DL (ref 12–16)
IMM GRANULOCYTES # BLD AUTO: 0.02 K/UL (ref 0–0.04)
IMM GRANULOCYTES NFR BLD AUTO: 0.2 % (ref 0–0.5)
LYMPHOCYTES # BLD AUTO: 4.5 K/UL (ref 1–4.8)
LYMPHOCYTES NFR BLD: 50.7 % (ref 18–48)
MCH RBC QN AUTO: 31.8 PG (ref 27–31)
MCHC RBC AUTO-ENTMCNC: 30.8 G/DL (ref 32–36)
MCV RBC AUTO: 103 FL (ref 82–98)
MONOCYTES # BLD AUTO: 0.9 K/UL (ref 0.3–1)
MONOCYTES NFR BLD: 10.5 % (ref 4–15)
NEUTROPHILS # BLD AUTO: 3.2 K/UL (ref 1.8–7.7)
NEUTROPHILS NFR BLD: 35.6 % (ref 38–73)
NRBC BLD-RTO: 0 /100 WBC
PLATELET # BLD AUTO: 294 K/UL (ref 150–350)
PMV BLD AUTO: 9.5 FL (ref 9.2–12.9)
RBC # BLD AUTO: 3.49 M/UL (ref 4–5.4)
WBC # BLD AUTO: 8.93 K/UL (ref 3.9–12.7)

## 2020-12-18 PROCEDURE — 36415 COLL VENOUS BLD VENIPUNCTURE: CPT | Mod: HCNC,PO

## 2020-12-18 PROCEDURE — 85025 COMPLETE CBC W/AUTO DIFF WBC: CPT | Mod: HCNC

## 2021-01-04 ENCOUNTER — OFFICE VISIT (OUTPATIENT)
Dept: FAMILY MEDICINE | Facility: CLINIC | Age: 86
End: 2021-01-04
Payer: MEDICARE

## 2021-01-04 VITALS
SYSTOLIC BLOOD PRESSURE: 112 MMHG | RESPIRATION RATE: 16 BRPM | HEIGHT: 69 IN | WEIGHT: 135.38 LBS | OXYGEN SATURATION: 96 % | BODY MASS INDEX: 20.05 KG/M2 | DIASTOLIC BLOOD PRESSURE: 70 MMHG | HEART RATE: 87 BPM | TEMPERATURE: 97 F

## 2021-01-04 DIAGNOSIS — M46.99 INFLAMMATORY SPONDYLOPATHY OF MULTIPLE SITES IN SPINE: ICD-10-CM

## 2021-01-04 DIAGNOSIS — D64.9 ANEMIA, UNSPECIFIED TYPE: ICD-10-CM

## 2021-01-04 DIAGNOSIS — E03.9 HYPOTHYROIDISM, UNSPECIFIED TYPE: ICD-10-CM

## 2021-01-04 DIAGNOSIS — J44.9 CHRONIC OBSTRUCTIVE PULMONARY DISEASE, UNSPECIFIED COPD TYPE: ICD-10-CM

## 2021-01-04 DIAGNOSIS — E78.5 HYPERLIPIDEMIA, UNSPECIFIED HYPERLIPIDEMIA TYPE: ICD-10-CM

## 2021-01-04 DIAGNOSIS — G47.00 INSOMNIA, UNSPECIFIED TYPE: Primary | ICD-10-CM

## 2021-01-04 PROBLEM — F33.9 EPISODE OF RECURRENT MAJOR DEPRESSIVE DISORDER: Status: RESOLVED | Noted: 2020-01-14 | Resolved: 2021-01-04

## 2021-01-04 PROBLEM — I47.10 SVT (SUPRAVENTRICULAR TACHYCARDIA): Status: RESOLVED | Noted: 2019-07-18 | Resolved: 2021-01-04

## 2021-01-04 PROCEDURE — 3288F PR FALLS RISK ASSESSMENT DOCUMENTED: ICD-10-PCS | Mod: CPTII,S$GLB,, | Performed by: INTERNAL MEDICINE

## 2021-01-04 PROCEDURE — 1126F AMNT PAIN NOTED NONE PRSNT: CPT | Mod: S$GLB,,, | Performed by: INTERNAL MEDICINE

## 2021-01-04 PROCEDURE — 1101F PR PT FALLS ASSESS DOC 0-1 FALLS W/OUT INJ PAST YR: ICD-10-PCS | Mod: CPTII,S$GLB,, | Performed by: INTERNAL MEDICINE

## 2021-01-04 PROCEDURE — 3288F FALL RISK ASSESSMENT DOCD: CPT | Mod: CPTII,S$GLB,, | Performed by: INTERNAL MEDICINE

## 2021-01-04 PROCEDURE — 99499 UNLISTED E&M SERVICE: CPT | Mod: S$GLB,,, | Performed by: INTERNAL MEDICINE

## 2021-01-04 PROCEDURE — 1101F PT FALLS ASSESS-DOCD LE1/YR: CPT | Mod: CPTII,S$GLB,, | Performed by: INTERNAL MEDICINE

## 2021-01-04 PROCEDURE — 99214 OFFICE O/P EST MOD 30 MIN: CPT | Mod: S$GLB,,, | Performed by: INTERNAL MEDICINE

## 2021-01-04 PROCEDURE — 1126F PR PAIN SEVERITY QUANTIFIED, NO PAIN PRESENT: ICD-10-PCS | Mod: S$GLB,,, | Performed by: INTERNAL MEDICINE

## 2021-01-04 PROCEDURE — 1159F MED LIST DOCD IN RCRD: CPT | Mod: S$GLB,,, | Performed by: INTERNAL MEDICINE

## 2021-01-04 PROCEDURE — 99499 RISK ADDL DX/OHS AUDIT: ICD-10-PCS | Mod: S$GLB,,, | Performed by: INTERNAL MEDICINE

## 2021-01-04 PROCEDURE — 1159F PR MEDICATION LIST DOCUMENTED IN MEDICAL RECORD: ICD-10-PCS | Mod: S$GLB,,, | Performed by: INTERNAL MEDICINE

## 2021-01-04 PROCEDURE — 99214 PR OFFICE/OUTPT VISIT, EST, LEVL IV, 30-39 MIN: ICD-10-PCS | Mod: S$GLB,,, | Performed by: INTERNAL MEDICINE

## 2021-01-04 RX ORDER — THYROID 30 MG/1
30 TABLET ORAL
COMMUNITY
End: 2021-04-16

## 2021-01-04 RX ORDER — DOXEPIN HYDROCHLORIDE 10 MG/1
10 CAPSULE ORAL NIGHTLY
Qty: 30 CAPSULE | Refills: 11 | Status: SHIPPED | OUTPATIENT
Start: 2021-01-04 | End: 2022-03-18

## 2021-01-04 RX ORDER — SERTRALINE HYDROCHLORIDE 50 MG/1
50 TABLET, FILM COATED ORAL DAILY
COMMUNITY
End: 2022-05-27 | Stop reason: SDUPTHER

## 2021-01-04 RX ORDER — THYROID, PORCINE 300 MG/1
150 TABLET ORAL DAILY
Qty: 45 TABLET | Refills: 1 | Status: SHIPPED | OUTPATIENT
Start: 2021-01-04 | End: 2021-05-19

## 2021-01-04 RX ORDER — THYROID 30 MG/1
30 TABLET ORAL
Qty: 90 TABLET | Refills: 1 | Status: SHIPPED | OUTPATIENT
Start: 2021-01-04 | End: 2021-07-20

## 2021-01-09 ENCOUNTER — IMMUNIZATION (OUTPATIENT)
Dept: PRIMARY CARE CLINIC | Facility: CLINIC | Age: 86
End: 2021-01-09
Payer: MEDICARE

## 2021-01-09 DIAGNOSIS — Z23 NEED FOR VACCINATION: ICD-10-CM

## 2021-01-09 PROCEDURE — 0001A COVID-19, MRNA, LNP-S, PF, 30 MCG/0.3 ML DOSE VACCINE: CPT | Mod: S$GLB,,, | Performed by: FAMILY MEDICINE

## 2021-01-09 PROCEDURE — 0001A COVID-19, MRNA, LNP-S, PF, 30 MCG/0.3 ML DOSE VACCINE: ICD-10-PCS | Mod: S$GLB,,, | Performed by: FAMILY MEDICINE

## 2021-01-09 PROCEDURE — 91300 COVID-19, MRNA, LNP-S, PF, 30 MCG/0.3 ML DOSE VACCINE: ICD-10-PCS | Mod: S$GLB,,, | Performed by: FAMILY MEDICINE

## 2021-01-09 PROCEDURE — 91300 COVID-19, MRNA, LNP-S, PF, 30 MCG/0.3 ML DOSE VACCINE: CPT | Mod: S$GLB,,, | Performed by: FAMILY MEDICINE

## 2021-01-26 ENCOUNTER — OFFICE VISIT (OUTPATIENT)
Dept: ORTHOPEDICS | Facility: CLINIC | Age: 86
End: 2021-01-26
Payer: MEDICARE

## 2021-01-26 VITALS
SYSTOLIC BLOOD PRESSURE: 104 MMHG | HEART RATE: 68 BPM | HEIGHT: 69 IN | BODY MASS INDEX: 19.99 KG/M2 | WEIGHT: 135 LBS | DIASTOLIC BLOOD PRESSURE: 68 MMHG

## 2021-01-26 DIAGNOSIS — Z96.652 STATUS POST TOTAL KNEE REPLACEMENT, LEFT: Primary | ICD-10-CM

## 2021-01-26 PROCEDURE — 1101F PR PT FALLS ASSESS DOC 0-1 FALLS W/OUT INJ PAST YR: ICD-10-PCS | Mod: S$GLB,,, | Performed by: ORTHOPAEDIC SURGERY

## 2021-01-26 PROCEDURE — 3288F FALL RISK ASSESSMENT DOCD: CPT | Mod: S$GLB,,, | Performed by: ORTHOPAEDIC SURGERY

## 2021-01-26 PROCEDURE — 1126F AMNT PAIN NOTED NONE PRSNT: CPT | Mod: S$GLB,,, | Performed by: ORTHOPAEDIC SURGERY

## 2021-01-26 PROCEDURE — 1126F PR PAIN SEVERITY QUANTIFIED, NO PAIN PRESENT: ICD-10-PCS | Mod: S$GLB,,, | Performed by: ORTHOPAEDIC SURGERY

## 2021-01-26 PROCEDURE — 1101F PT FALLS ASSESS-DOCD LE1/YR: CPT | Mod: S$GLB,,, | Performed by: ORTHOPAEDIC SURGERY

## 2021-01-26 PROCEDURE — 3288F PR FALLS RISK ASSESSMENT DOCUMENTED: ICD-10-PCS | Mod: S$GLB,,, | Performed by: ORTHOPAEDIC SURGERY

## 2021-01-26 PROCEDURE — 99024 PR POST-OP FOLLOW-UP VISIT: ICD-10-PCS | Mod: S$GLB,,, | Performed by: ORTHOPAEDIC SURGERY

## 2021-01-26 PROCEDURE — 99024 POSTOP FOLLOW-UP VISIT: CPT | Mod: S$GLB,,, | Performed by: ORTHOPAEDIC SURGERY

## 2021-01-30 ENCOUNTER — IMMUNIZATION (OUTPATIENT)
Dept: PRIMARY CARE CLINIC | Facility: CLINIC | Age: 86
End: 2021-01-30
Payer: MEDICARE

## 2021-01-30 DIAGNOSIS — Z23 NEED FOR VACCINATION: Primary | ICD-10-CM

## 2021-01-30 PROCEDURE — 0002A COVID-19, MRNA, LNP-S, PF, 30 MCG/0.3 ML DOSE VACCINE: CPT | Mod: S$GLB,,, | Performed by: FAMILY MEDICINE

## 2021-01-30 PROCEDURE — 0002A COVID-19, MRNA, LNP-S, PF, 30 MCG/0.3 ML DOSE VACCINE: ICD-10-PCS | Mod: S$GLB,,, | Performed by: FAMILY MEDICINE

## 2021-01-30 PROCEDURE — 91300 COVID-19, MRNA, LNP-S, PF, 30 MCG/0.3 ML DOSE VACCINE: ICD-10-PCS | Mod: S$GLB,,, | Performed by: FAMILY MEDICINE

## 2021-01-30 PROCEDURE — 91300 COVID-19, MRNA, LNP-S, PF, 30 MCG/0.3 ML DOSE VACCINE: CPT | Mod: S$GLB,,, | Performed by: FAMILY MEDICINE

## 2021-03-05 ENCOUNTER — PES CALL (OUTPATIENT)
Dept: ADMINISTRATIVE | Facility: CLINIC | Age: 86
End: 2021-03-05

## 2021-03-08 ENCOUNTER — PES CALL (OUTPATIENT)
Dept: ADMINISTRATIVE | Facility: CLINIC | Age: 86
End: 2021-03-08

## 2021-03-11 ENCOUNTER — OFFICE VISIT (OUTPATIENT)
Dept: FAMILY MEDICINE | Facility: CLINIC | Age: 86
End: 2021-03-11
Payer: MEDICARE

## 2021-03-11 VITALS
TEMPERATURE: 98 F | OXYGEN SATURATION: 95 % | WEIGHT: 138.88 LBS | HEART RATE: 70 BPM | BODY MASS INDEX: 20.57 KG/M2 | DIASTOLIC BLOOD PRESSURE: 70 MMHG | SYSTOLIC BLOOD PRESSURE: 132 MMHG | HEIGHT: 69 IN

## 2021-03-11 DIAGNOSIS — E03.9 HYPOTHYROIDISM, UNSPECIFIED TYPE: ICD-10-CM

## 2021-03-11 DIAGNOSIS — Z00.00 ENCOUNTER FOR PREVENTIVE HEALTH EXAMINATION: Primary | ICD-10-CM

## 2021-03-11 DIAGNOSIS — J84.10 CALCIFIED GRANULOMA OF LUNG: ICD-10-CM

## 2021-03-11 DIAGNOSIS — M46.99 INFLAMMATORY SPONDYLOPATHY OF MULTIPLE SITES IN SPINE: ICD-10-CM

## 2021-03-11 DIAGNOSIS — J44.89 CHRONIC BRONCHITIS WITH COPD (CHRONIC OBSTRUCTIVE PULMONARY DISEASE): ICD-10-CM

## 2021-03-11 DIAGNOSIS — N18.30 STAGE 3 CHRONIC KIDNEY DISEASE, UNSPECIFIED WHETHER STAGE 3A OR 3B CKD: Chronic | ICD-10-CM

## 2021-03-11 DIAGNOSIS — J47.9 BRONCHIECTASIS WITHOUT COMPLICATION: ICD-10-CM

## 2021-03-11 PROCEDURE — 99499 RISK ADDL DX/OHS AUDIT: ICD-10-PCS | Mod: S$GLB,,, | Performed by: NURSE PRACTITIONER

## 2021-03-11 PROCEDURE — 3288F FALL RISK ASSESSMENT DOCD: CPT | Mod: CPTII,S$GLB,, | Performed by: NURSE PRACTITIONER

## 2021-03-11 PROCEDURE — 1101F PT FALLS ASSESS-DOCD LE1/YR: CPT | Mod: CPTII,S$GLB,, | Performed by: NURSE PRACTITIONER

## 2021-03-11 PROCEDURE — 1126F PR PAIN SEVERITY QUANTIFIED, NO PAIN PRESENT: ICD-10-PCS | Mod: S$GLB,,, | Performed by: NURSE PRACTITIONER

## 2021-03-11 PROCEDURE — 1101F PR PT FALLS ASSESS DOC 0-1 FALLS W/OUT INJ PAST YR: ICD-10-PCS | Mod: CPTII,S$GLB,, | Performed by: NURSE PRACTITIONER

## 2021-03-11 PROCEDURE — G0439 PR MEDICARE ANNUAL WELLNESS SUBSEQUENT VISIT: ICD-10-PCS | Mod: S$GLB,,, | Performed by: NURSE PRACTITIONER

## 2021-03-11 PROCEDURE — 1126F AMNT PAIN NOTED NONE PRSNT: CPT | Mod: S$GLB,,, | Performed by: NURSE PRACTITIONER

## 2021-03-11 PROCEDURE — 99499 UNLISTED E&M SERVICE: CPT | Mod: S$GLB,,, | Performed by: NURSE PRACTITIONER

## 2021-03-11 PROCEDURE — 3288F PR FALLS RISK ASSESSMENT DOCUMENTED: ICD-10-PCS | Mod: CPTII,S$GLB,, | Performed by: NURSE PRACTITIONER

## 2021-03-11 PROCEDURE — G0439 PPPS, SUBSEQ VISIT: HCPCS | Mod: S$GLB,,, | Performed by: NURSE PRACTITIONER

## 2021-03-30 ENCOUNTER — LAB VISIT (OUTPATIENT)
Dept: LAB | Facility: HOSPITAL | Age: 86
End: 2021-03-30
Attending: INTERNAL MEDICINE
Payer: MEDICARE

## 2021-03-30 DIAGNOSIS — D64.9 ANEMIA, UNSPECIFIED TYPE: ICD-10-CM

## 2021-03-30 DIAGNOSIS — E03.9 HYPOTHYROIDISM, UNSPECIFIED TYPE: ICD-10-CM

## 2021-03-30 DIAGNOSIS — E78.5 HYPERLIPIDEMIA, UNSPECIFIED HYPERLIPIDEMIA TYPE: ICD-10-CM

## 2021-03-30 LAB
ALBUMIN SERPL BCP-MCNC: 3.9 G/DL (ref 3.5–5.2)
ALP SERPL-CCNC: 74 U/L (ref 55–135)
ALT SERPL W/O P-5'-P-CCNC: 14 U/L (ref 10–44)
ANION GAP SERPL CALC-SCNC: 6 MMOL/L (ref 8–16)
AST SERPL-CCNC: 18 U/L (ref 10–40)
BASOPHILS # BLD AUTO: 0.05 K/UL (ref 0–0.2)
BASOPHILS NFR BLD: 0.7 % (ref 0–1.9)
BILIRUB SERPL-MCNC: 0.4 MG/DL (ref 0.1–1)
BUN SERPL-MCNC: 21 MG/DL (ref 8–23)
CALCIUM SERPL-MCNC: 9 MG/DL (ref 8.7–10.5)
CHLORIDE SERPL-SCNC: 106 MMOL/L (ref 95–110)
CHOLEST SERPL-MCNC: 140 MG/DL (ref 120–199)
CHOLEST/HDLC SERPL: 2.4 {RATIO} (ref 2–5)
CO2 SERPL-SCNC: 30 MMOL/L (ref 23–29)
CREAT SERPL-MCNC: 1 MG/DL (ref 0.5–1.4)
DIFFERENTIAL METHOD: ABNORMAL
EOSINOPHIL # BLD AUTO: 0.2 K/UL (ref 0–0.5)
EOSINOPHIL NFR BLD: 3 % (ref 0–8)
ERYTHROCYTE [DISTWIDTH] IN BLOOD BY AUTOMATED COUNT: 14.6 % (ref 11.5–14.5)
EST. GFR  (AFRICAN AMERICAN): 58.1 ML/MIN/1.73 M^2
EST. GFR  (NON AFRICAN AMERICAN): 50.4 ML/MIN/1.73 M^2
GLUCOSE SERPL-MCNC: 99 MG/DL (ref 70–110)
HCT VFR BLD AUTO: 36.9 % (ref 37–48.5)
HDLC SERPL-MCNC: 58 MG/DL (ref 40–75)
HDLC SERPL: 41.4 % (ref 20–50)
HGB BLD-MCNC: 11.8 G/DL (ref 12–16)
IMM GRANULOCYTES # BLD AUTO: 0 K/UL (ref 0–0.04)
IMM GRANULOCYTES NFR BLD AUTO: 0 % (ref 0–0.5)
LDLC SERPL CALC-MCNC: 54.8 MG/DL (ref 63–159)
LYMPHOCYTES # BLD AUTO: 3.8 K/UL (ref 1–4.8)
LYMPHOCYTES NFR BLD: 50.7 % (ref 18–48)
MCH RBC QN AUTO: 31.3 PG (ref 27–31)
MCHC RBC AUTO-ENTMCNC: 32 G/DL (ref 32–36)
MCV RBC AUTO: 98 FL (ref 82–98)
MONOCYTES # BLD AUTO: 1 K/UL (ref 0.3–1)
MONOCYTES NFR BLD: 13 % (ref 4–15)
NEUTROPHILS # BLD AUTO: 2.4 K/UL (ref 1.8–7.7)
NEUTROPHILS NFR BLD: 32.6 % (ref 38–73)
NONHDLC SERPL-MCNC: 82 MG/DL
NRBC BLD-RTO: 0 /100 WBC
PLATELET # BLD AUTO: 266 K/UL (ref 150–450)
PMV BLD AUTO: 9.6 FL (ref 9.2–12.9)
POTASSIUM SERPL-SCNC: 4.9 MMOL/L (ref 3.5–5.1)
PROT SERPL-MCNC: 7.2 G/DL (ref 6–8.4)
RBC # BLD AUTO: 3.77 M/UL (ref 4–5.4)
SODIUM SERPL-SCNC: 142 MMOL/L (ref 136–145)
T4 FREE SERPL-MCNC: 0.8 NG/DL (ref 0.71–1.51)
TRIGL SERPL-MCNC: 136 MG/DL (ref 30–150)
TSH SERPL DL<=0.005 MIU/L-ACNC: 17.71 UIU/ML (ref 0.4–4)
WBC # BLD AUTO: 7.45 K/UL (ref 3.9–12.7)

## 2021-03-30 PROCEDURE — 80053 COMPREHEN METABOLIC PANEL: CPT | Performed by: INTERNAL MEDICINE

## 2021-03-30 PROCEDURE — 84439 ASSAY OF FREE THYROXINE: CPT | Performed by: INTERNAL MEDICINE

## 2021-03-30 PROCEDURE — 80061 LIPID PANEL: CPT | Performed by: INTERNAL MEDICINE

## 2021-03-30 PROCEDURE — 84443 ASSAY THYROID STIM HORMONE: CPT | Performed by: INTERNAL MEDICINE

## 2021-03-30 PROCEDURE — 36415 COLL VENOUS BLD VENIPUNCTURE: CPT | Mod: PO | Performed by: INTERNAL MEDICINE

## 2021-03-30 PROCEDURE — 85025 COMPLETE CBC W/AUTO DIFF WBC: CPT | Performed by: INTERNAL MEDICINE

## 2021-03-30 RX ORDER — THYROID 15 MG/1
15 TABLET ORAL
Qty: 90 TABLET | Refills: 1 | Status: SHIPPED | OUTPATIENT
Start: 2021-03-30 | End: 2021-04-16

## 2021-04-16 ENCOUNTER — OFFICE VISIT (OUTPATIENT)
Dept: FAMILY MEDICINE | Facility: CLINIC | Age: 86
End: 2021-04-16
Payer: MEDICARE

## 2021-04-16 VITALS
RESPIRATION RATE: 16 BRPM | SYSTOLIC BLOOD PRESSURE: 104 MMHG | HEART RATE: 81 BPM | BODY MASS INDEX: 19.86 KG/M2 | HEIGHT: 69 IN | OXYGEN SATURATION: 96 % | DIASTOLIC BLOOD PRESSURE: 72 MMHG | WEIGHT: 134.06 LBS | TEMPERATURE: 99 F

## 2021-04-16 DIAGNOSIS — N30.00 ACUTE CYSTITIS WITHOUT HEMATURIA: Primary | ICD-10-CM

## 2021-04-16 DIAGNOSIS — H57.9 ITCHY EYES: ICD-10-CM

## 2021-04-16 DIAGNOSIS — E03.9 HYPOTHYROIDISM, UNSPECIFIED TYPE: ICD-10-CM

## 2021-04-16 PROCEDURE — 1126F PR PAIN SEVERITY QUANTIFIED, NO PAIN PRESENT: ICD-10-PCS | Mod: S$GLB,,, | Performed by: INTERNAL MEDICINE

## 2021-04-16 PROCEDURE — 1159F MED LIST DOCD IN RCRD: CPT | Mod: S$GLB,,, | Performed by: INTERNAL MEDICINE

## 2021-04-16 PROCEDURE — 87077 CULTURE AEROBIC IDENTIFY: CPT | Performed by: INTERNAL MEDICINE

## 2021-04-16 PROCEDURE — 87086 URINE CULTURE/COLONY COUNT: CPT | Performed by: INTERNAL MEDICINE

## 2021-04-16 PROCEDURE — 87186 SC STD MICRODIL/AGAR DIL: CPT | Performed by: INTERNAL MEDICINE

## 2021-04-16 PROCEDURE — 1101F PT FALLS ASSESS-DOCD LE1/YR: CPT | Mod: CPTII,S$GLB,, | Performed by: INTERNAL MEDICINE

## 2021-04-16 PROCEDURE — 3288F FALL RISK ASSESSMENT DOCD: CPT | Mod: CPTII,S$GLB,, | Performed by: INTERNAL MEDICINE

## 2021-04-16 PROCEDURE — 1126F AMNT PAIN NOTED NONE PRSNT: CPT | Mod: S$GLB,,, | Performed by: INTERNAL MEDICINE

## 2021-04-16 PROCEDURE — 1159F PR MEDICATION LIST DOCUMENTED IN MEDICAL RECORD: ICD-10-PCS | Mod: S$GLB,,, | Performed by: INTERNAL MEDICINE

## 2021-04-16 PROCEDURE — 81002 POCT URINE DIPSTICK WITHOUT MICROSCOPE: ICD-10-PCS | Mod: S$GLB,,, | Performed by: INTERNAL MEDICINE

## 2021-04-16 PROCEDURE — 81002 URINALYSIS NONAUTO W/O SCOPE: CPT | Mod: S$GLB,,, | Performed by: INTERNAL MEDICINE

## 2021-04-16 PROCEDURE — 99214 PR OFFICE/OUTPT VISIT, EST, LEVL IV, 30-39 MIN: ICD-10-PCS | Mod: 25,S$GLB,, | Performed by: INTERNAL MEDICINE

## 2021-04-16 PROCEDURE — 99214 OFFICE O/P EST MOD 30 MIN: CPT | Mod: 25,S$GLB,, | Performed by: INTERNAL MEDICINE

## 2021-04-16 PROCEDURE — 87088 URINE BACTERIA CULTURE: CPT | Performed by: INTERNAL MEDICINE

## 2021-04-16 PROCEDURE — 1101F PR PT FALLS ASSESS DOC 0-1 FALLS W/OUT INJ PAST YR: ICD-10-PCS | Mod: CPTII,S$GLB,, | Performed by: INTERNAL MEDICINE

## 2021-04-16 PROCEDURE — 3288F PR FALLS RISK ASSESSMENT DOCUMENTED: ICD-10-PCS | Mod: CPTII,S$GLB,, | Performed by: INTERNAL MEDICINE

## 2021-04-16 RX ORDER — PHENAZOPYRIDINE HYDROCHLORIDE 200 MG/1
200 TABLET, FILM COATED ORAL 3 TIMES DAILY PRN
Qty: 9 TABLET | Refills: 0 | Status: SHIPPED | OUTPATIENT
Start: 2021-04-16 | End: 2021-04-19

## 2021-04-16 RX ORDER — NITROFURANTOIN 25; 75 MG/1; MG/1
100 CAPSULE ORAL 2 TIMES DAILY
Qty: 10 CAPSULE | Refills: 0 | Status: SHIPPED | OUTPATIENT
Start: 2021-04-16 | End: 2021-04-21

## 2021-04-16 RX ORDER — NITROFURANTOIN 25; 75 MG/1; MG/1
100 CAPSULE ORAL 2 TIMES DAILY
Qty: 10 CAPSULE | Refills: 0 | Status: SHIPPED | OUTPATIENT
Start: 2021-04-16 | End: 2021-04-16 | Stop reason: SDUPTHER

## 2021-04-19 LAB
BILIRUB SERPL-MCNC: NORMAL MG/DL
BLOOD URINE, POC: NORMAL
CLARITY, POC UA: NORMAL
COLOR, POC UA: YELLOW
GLUCOSE UR QL STRIP: NORMAL
KETONES UR QL STRIP: NORMAL
LEUKOCYTE ESTERASE URINE, POC: NORMAL
NITRITE, POC UA: NORMAL
PH, POC UA: 5
PROTEIN, POC: NORMAL
SPECIFIC GRAVITY, POC UA: 1.01
UROBILINOGEN, POC UA: NORMAL

## 2021-04-20 LAB — BACTERIA UR CULT: ABNORMAL

## 2021-05-14 ENCOUNTER — LAB VISIT (OUTPATIENT)
Dept: LAB | Facility: HOSPITAL | Age: 86
End: 2021-05-14
Attending: INTERNAL MEDICINE
Payer: MEDICARE

## 2021-05-14 DIAGNOSIS — E03.9 HYPOTHYROIDISM, UNSPECIFIED TYPE: ICD-10-CM

## 2021-05-14 DIAGNOSIS — H57.9 ITCHY EYES: ICD-10-CM

## 2021-05-14 PROCEDURE — 86376 MICROSOMAL ANTIBODY EACH: CPT | Performed by: INTERNAL MEDICINE

## 2021-05-14 PROCEDURE — 84443 ASSAY THYROID STIM HORMONE: CPT | Performed by: INTERNAL MEDICINE

## 2021-05-14 PROCEDURE — 36415 COLL VENOUS BLD VENIPUNCTURE: CPT | Mod: PO | Performed by: INTERNAL MEDICINE

## 2021-05-14 PROCEDURE — 84445 ASSAY OF TSI GLOBULIN: CPT | Performed by: INTERNAL MEDICINE

## 2021-05-14 PROCEDURE — 84439 ASSAY OF FREE THYROXINE: CPT | Performed by: INTERNAL MEDICINE

## 2021-05-15 LAB
T4 FREE SERPL-MCNC: 1.03 NG/DL (ref 0.71–1.51)
THYROPEROXIDASE IGG SERPL-ACNC: <6 IU/ML
TSH SERPL DL<=0.005 MIU/L-ACNC: 0.04 UIU/ML (ref 0.4–4)

## 2021-05-17 DIAGNOSIS — E03.9 HYPOTHYROIDISM, UNSPECIFIED TYPE: Primary | ICD-10-CM

## 2021-05-17 LAB — TSI SER-ACNC: <0.1 IU/L

## 2021-05-19 ENCOUNTER — OFFICE VISIT (OUTPATIENT)
Dept: FAMILY MEDICINE | Facility: CLINIC | Age: 86
End: 2021-05-19
Payer: MEDICARE

## 2021-05-19 VITALS
HEIGHT: 69 IN | BODY MASS INDEX: 19.56 KG/M2 | RESPIRATION RATE: 16 BRPM | TEMPERATURE: 99 F | DIASTOLIC BLOOD PRESSURE: 68 MMHG | WEIGHT: 132.06 LBS | HEART RATE: 73 BPM | OXYGEN SATURATION: 97 % | SYSTOLIC BLOOD PRESSURE: 128 MMHG

## 2021-05-19 DIAGNOSIS — E03.9 HYPOTHYROIDISM, UNSPECIFIED TYPE: Primary | ICD-10-CM

## 2021-05-19 PROCEDURE — 1101F PT FALLS ASSESS-DOCD LE1/YR: CPT | Mod: CPTII,S$GLB,, | Performed by: INTERNAL MEDICINE

## 2021-05-19 PROCEDURE — 1159F MED LIST DOCD IN RCRD: CPT | Mod: S$GLB,,, | Performed by: INTERNAL MEDICINE

## 2021-05-19 PROCEDURE — 1126F PR PAIN SEVERITY QUANTIFIED, NO PAIN PRESENT: ICD-10-PCS | Mod: S$GLB,,, | Performed by: INTERNAL MEDICINE

## 2021-05-19 PROCEDURE — 1159F PR MEDICATION LIST DOCUMENTED IN MEDICAL RECORD: ICD-10-PCS | Mod: S$GLB,,, | Performed by: INTERNAL MEDICINE

## 2021-05-19 PROCEDURE — 99214 OFFICE O/P EST MOD 30 MIN: CPT | Mod: S$GLB,,, | Performed by: INTERNAL MEDICINE

## 2021-05-19 PROCEDURE — 3288F PR FALLS RISK ASSESSMENT DOCUMENTED: ICD-10-PCS | Mod: CPTII,S$GLB,, | Performed by: INTERNAL MEDICINE

## 2021-05-19 PROCEDURE — 1126F AMNT PAIN NOTED NONE PRSNT: CPT | Mod: S$GLB,,, | Performed by: INTERNAL MEDICINE

## 2021-05-19 PROCEDURE — 99214 PR OFFICE/OUTPT VISIT, EST, LEVL IV, 30-39 MIN: ICD-10-PCS | Mod: S$GLB,,, | Performed by: INTERNAL MEDICINE

## 2021-05-19 PROCEDURE — 1101F PR PT FALLS ASSESS DOC 0-1 FALLS W/OUT INJ PAST YR: ICD-10-PCS | Mod: CPTII,S$GLB,, | Performed by: INTERNAL MEDICINE

## 2021-05-19 PROCEDURE — 3288F FALL RISK ASSESSMENT DOCD: CPT | Mod: CPTII,S$GLB,, | Performed by: INTERNAL MEDICINE

## 2021-05-19 RX ORDER — LEVOTHYROXINE SODIUM 200 UG/1
200 TABLET ORAL
Qty: 30 TABLET | Refills: 11 | Status: SHIPPED | OUTPATIENT
Start: 2021-05-19 | End: 2021-06-29

## 2021-05-19 RX ORDER — LEVOTHYROXINE SODIUM 50 UG/1
50 TABLET ORAL DAILY
Qty: 30 TABLET | Refills: 11 | Status: SHIPPED | OUTPATIENT
Start: 2021-05-19 | End: 2021-06-29

## 2021-06-25 ENCOUNTER — LAB VISIT (OUTPATIENT)
Dept: LAB | Facility: HOSPITAL | Age: 86
End: 2021-06-25
Attending: INTERNAL MEDICINE
Payer: MEDICARE

## 2021-06-25 DIAGNOSIS — E03.9 HYPOTHYROIDISM, UNSPECIFIED TYPE: ICD-10-CM

## 2021-06-25 LAB
T4 FREE SERPL-MCNC: 1.79 NG/DL (ref 0.71–1.51)
TSH SERPL DL<=0.005 MIU/L-ACNC: 0.04 UIU/ML (ref 0.4–4)

## 2021-06-25 PROCEDURE — 36415 COLL VENOUS BLD VENIPUNCTURE: CPT | Mod: PO | Performed by: INTERNAL MEDICINE

## 2021-06-25 PROCEDURE — 84443 ASSAY THYROID STIM HORMONE: CPT | Performed by: INTERNAL MEDICINE

## 2021-06-25 PROCEDURE — 84439 ASSAY OF FREE THYROXINE: CPT | Performed by: INTERNAL MEDICINE

## 2021-06-28 DIAGNOSIS — E03.9 HYPOTHYROIDISM, UNSPECIFIED TYPE: Primary | ICD-10-CM

## 2021-06-28 RX ORDER — LEVOTHYROXINE SODIUM 175 UG/1
175 TABLET ORAL
Qty: 30 TABLET | Refills: 11 | Status: SHIPPED | OUTPATIENT
Start: 2021-06-28 | End: 2021-06-29

## 2021-06-29 ENCOUNTER — OFFICE VISIT (OUTPATIENT)
Dept: FAMILY MEDICINE | Facility: CLINIC | Age: 86
End: 2021-06-29
Payer: MEDICARE

## 2021-06-29 VITALS
RESPIRATION RATE: 16 BRPM | OXYGEN SATURATION: 96 % | TEMPERATURE: 97 F | WEIGHT: 133.19 LBS | HEART RATE: 82 BPM | HEIGHT: 69 IN | BODY MASS INDEX: 19.73 KG/M2 | SYSTOLIC BLOOD PRESSURE: 124 MMHG | DIASTOLIC BLOOD PRESSURE: 68 MMHG

## 2021-06-29 DIAGNOSIS — I47.10 SVT (SUPRAVENTRICULAR TACHYCARDIA): ICD-10-CM

## 2021-06-29 DIAGNOSIS — E03.9 HYPOTHYROIDISM, UNSPECIFIED TYPE: ICD-10-CM

## 2021-06-29 DIAGNOSIS — B07.9 VIRAL WARTS, UNSPECIFIED TYPE: Primary | ICD-10-CM

## 2021-06-29 PROCEDURE — 1159F MED LIST DOCD IN RCRD: CPT | Mod: S$GLB,,, | Performed by: INTERNAL MEDICINE

## 2021-06-29 PROCEDURE — 1126F AMNT PAIN NOTED NONE PRSNT: CPT | Mod: S$GLB,,, | Performed by: INTERNAL MEDICINE

## 2021-06-29 PROCEDURE — 1101F PR PT FALLS ASSESS DOC 0-1 FALLS W/OUT INJ PAST YR: ICD-10-PCS | Mod: CPTII,S$GLB,, | Performed by: INTERNAL MEDICINE

## 2021-06-29 PROCEDURE — 99499 RISK ADDL DX/OHS AUDIT: ICD-10-PCS | Mod: S$GLB,,, | Performed by: INTERNAL MEDICINE

## 2021-06-29 PROCEDURE — 99214 OFFICE O/P EST MOD 30 MIN: CPT | Mod: S$GLB,,, | Performed by: INTERNAL MEDICINE

## 2021-06-29 PROCEDURE — 3288F FALL RISK ASSESSMENT DOCD: CPT | Mod: CPTII,S$GLB,, | Performed by: INTERNAL MEDICINE

## 2021-06-29 PROCEDURE — 3288F PR FALLS RISK ASSESSMENT DOCUMENTED: ICD-10-PCS | Mod: CPTII,S$GLB,, | Performed by: INTERNAL MEDICINE

## 2021-06-29 PROCEDURE — 1126F PR PAIN SEVERITY QUANTIFIED, NO PAIN PRESENT: ICD-10-PCS | Mod: S$GLB,,, | Performed by: INTERNAL MEDICINE

## 2021-06-29 PROCEDURE — 1101F PT FALLS ASSESS-DOCD LE1/YR: CPT | Mod: CPTII,S$GLB,, | Performed by: INTERNAL MEDICINE

## 2021-06-29 PROCEDURE — 99214 PR OFFICE/OUTPT VISIT, EST, LEVL IV, 30-39 MIN: ICD-10-PCS | Mod: S$GLB,,, | Performed by: INTERNAL MEDICINE

## 2021-06-29 PROCEDURE — 1159F PR MEDICATION LIST DOCUMENTED IN MEDICAL RECORD: ICD-10-PCS | Mod: S$GLB,,, | Performed by: INTERNAL MEDICINE

## 2021-06-29 PROCEDURE — 99499 UNLISTED E&M SERVICE: CPT | Mod: S$GLB,,, | Performed by: INTERNAL MEDICINE

## 2021-06-29 RX ORDER — METOPROLOL SUCCINATE 25 MG/1
25 TABLET, EXTENDED RELEASE ORAL DAILY
Qty: 90 TABLET | Refills: 3 | Status: SHIPPED | OUTPATIENT
Start: 2021-06-29 | End: 2022-04-04 | Stop reason: SDUPTHER

## 2021-06-29 RX ORDER — LEVOTHYROXINE SODIUM 175 UG/1
175 TABLET ORAL
Qty: 30 TABLET | Refills: 11 | Status: SHIPPED | OUTPATIENT
Start: 2021-06-29 | End: 2022-02-04 | Stop reason: SDUPTHER

## 2021-06-29 RX ORDER — CEPHALEXIN 500 MG/1
500 CAPSULE ORAL 4 TIMES DAILY
Qty: 15 CAPSULE | Refills: 0 | Status: SHIPPED | OUTPATIENT
Start: 2021-06-29 | End: 2021-12-09

## 2021-07-19 ENCOUNTER — TELEPHONE (OUTPATIENT)
Dept: FAMILY MEDICINE | Facility: CLINIC | Age: 86
End: 2021-07-19

## 2021-07-20 ENCOUNTER — OFFICE VISIT (OUTPATIENT)
Dept: FAMILY MEDICINE | Facility: CLINIC | Age: 86
End: 2021-07-20
Payer: MEDICARE

## 2021-07-20 VITALS
HEART RATE: 72 BPM | OXYGEN SATURATION: 97 % | DIASTOLIC BLOOD PRESSURE: 62 MMHG | SYSTOLIC BLOOD PRESSURE: 122 MMHG | RESPIRATION RATE: 18 BRPM | WEIGHT: 134.5 LBS | TEMPERATURE: 98 F | BODY MASS INDEX: 19.86 KG/M2

## 2021-07-20 DIAGNOSIS — M25.551 RIGHT HIP PAIN: Primary | ICD-10-CM

## 2021-07-20 DIAGNOSIS — N30.01 ACUTE CYSTITIS WITH HEMATURIA: Primary | ICD-10-CM

## 2021-07-20 DIAGNOSIS — M70.61 TROCHANTERIC BURSITIS OF RIGHT HIP: ICD-10-CM

## 2021-07-20 PROCEDURE — 1159F MED LIST DOCD IN RCRD: CPT | Mod: CPTII,S$GLB,, | Performed by: INTERNAL MEDICINE

## 2021-07-20 PROCEDURE — 87086 URINE CULTURE/COLONY COUNT: CPT | Performed by: INTERNAL MEDICINE

## 2021-07-20 PROCEDURE — 1159F PR MEDICATION LIST DOCUMENTED IN MEDICAL RECORD: ICD-10-PCS | Mod: CPTII,S$GLB,, | Performed by: INTERNAL MEDICINE

## 2021-07-20 PROCEDURE — 81002 URINALYSIS NONAUTO W/O SCOPE: CPT | Mod: S$GLB,,, | Performed by: INTERNAL MEDICINE

## 2021-07-20 PROCEDURE — 99214 OFFICE O/P EST MOD 30 MIN: CPT | Mod: 25,S$GLB,, | Performed by: INTERNAL MEDICINE

## 2021-07-20 PROCEDURE — 99214 PR OFFICE/OUTPT VISIT, EST, LEVL IV, 30-39 MIN: ICD-10-PCS | Mod: 25,S$GLB,, | Performed by: INTERNAL MEDICINE

## 2021-07-20 PROCEDURE — 81002 POCT URINE DIPSTICK WITHOUT MICROSCOPE: ICD-10-PCS | Mod: S$GLB,,, | Performed by: INTERNAL MEDICINE

## 2021-07-20 RX ORDER — NITROFURANTOIN 25; 75 MG/1; MG/1
100 CAPSULE ORAL 2 TIMES DAILY
Qty: 10 CAPSULE | Refills: 0 | Status: SHIPPED | OUTPATIENT
Start: 2021-07-20 | End: 2021-07-25

## 2021-07-21 LAB
BILIRUB SERPL-MCNC: NORMAL MG/DL
BLOOD URINE, POC: 250
CLARITY, POC UA: NORMAL
COLOR, POC UA: NORMAL
GLUCOSE UR QL STRIP: NORMAL
KETONES UR QL STRIP: NORMAL
LEUKOCYTE ESTERASE URINE, POC: NORMAL
NITRITE, POC UA: NORMAL
PH, POC UA: 5
PROTEIN, POC: NORMAL
SPECIFIC GRAVITY, POC UA: 1.01
UROBILINOGEN, POC UA: NORMAL

## 2021-07-22 ENCOUNTER — TELEPHONE (OUTPATIENT)
Dept: FAMILY MEDICINE | Facility: CLINIC | Age: 86
End: 2021-07-22

## 2021-07-22 LAB — BACTERIA UR CULT: NO GROWTH

## 2021-07-23 ENCOUNTER — TELEPHONE (OUTPATIENT)
Dept: FAMILY MEDICINE | Facility: CLINIC | Age: 86
End: 2021-07-23

## 2021-07-23 DIAGNOSIS — R30.0 DYSURIA: Primary | ICD-10-CM

## 2021-07-24 ENCOUNTER — LAB VISIT (OUTPATIENT)
Dept: LAB | Facility: HOSPITAL | Age: 86
End: 2021-07-24
Attending: INTERNAL MEDICINE
Payer: MEDICARE

## 2021-07-24 DIAGNOSIS — N30.01 ACUTE CYSTITIS WITH HEMATURIA: ICD-10-CM

## 2021-07-24 DIAGNOSIS — R30.0 DYSURIA: ICD-10-CM

## 2021-07-24 LAB
MICROSCOPIC COMMENT: NORMAL
RBC #/AREA URNS AUTO: 0 /HPF (ref 0–4)
SQUAMOUS #/AREA URNS AUTO: 0 /HPF
WBC #/AREA URNS AUTO: 0 /HPF (ref 0–5)

## 2021-07-24 PROCEDURE — 87086 URINE CULTURE/COLONY COUNT: CPT | Performed by: INTERNAL MEDICINE

## 2021-07-24 PROCEDURE — 81001 URINALYSIS AUTO W/SCOPE: CPT | Performed by: INTERNAL MEDICINE

## 2021-07-25 LAB
BACTERIA UR CULT: NORMAL
BACTERIA UR CULT: NORMAL

## 2021-07-26 ENCOUNTER — OFFICE VISIT (OUTPATIENT)
Dept: ORTHOPEDICS | Facility: CLINIC | Age: 86
End: 2021-07-26
Payer: MEDICARE

## 2021-07-26 ENCOUNTER — HOSPITAL ENCOUNTER (OUTPATIENT)
Dept: RADIOLOGY | Facility: HOSPITAL | Age: 86
Discharge: HOME | End: 2021-07-26
Attending: ORTHOPAEDIC SURGERY
Payer: MEDICARE

## 2021-07-26 VITALS — WEIGHT: 134.5 LBS | BODY MASS INDEX: 19.92 KG/M2 | HEIGHT: 69 IN

## 2021-07-26 DIAGNOSIS — M70.61 TROCHANTERIC BURSITIS OF RIGHT HIP: ICD-10-CM

## 2021-07-26 DIAGNOSIS — M25.551 RIGHT HIP PAIN: Primary | ICD-10-CM

## 2021-07-26 DIAGNOSIS — M25.551 RIGHT HIP PAIN: ICD-10-CM

## 2021-07-26 PROCEDURE — 73502 X-RAY EXAM HIP UNI 2-3 VIEWS: CPT | Mod: 26,RT,, | Performed by: RADIOLOGY

## 2021-07-26 PROCEDURE — 1160F PR REVIEW ALL MEDS BY PRESCRIBER/CLIN PHARMACIST DOCUMENTED: ICD-10-PCS | Mod: CPTII,S$GLB,, | Performed by: ORTHOPAEDIC SURGERY

## 2021-07-26 PROCEDURE — 1101F PR PT FALLS ASSESS DOC 0-1 FALLS W/OUT INJ PAST YR: ICD-10-PCS | Mod: CPTII,S$GLB,, | Performed by: ORTHOPAEDIC SURGERY

## 2021-07-26 PROCEDURE — 1159F MED LIST DOCD IN RCRD: CPT | Mod: CPTII,S$GLB,, | Performed by: ORTHOPAEDIC SURGERY

## 2021-07-26 PROCEDURE — 1125F AMNT PAIN NOTED PAIN PRSNT: CPT | Mod: CPTII,S$GLB,, | Performed by: ORTHOPAEDIC SURGERY

## 2021-07-26 PROCEDURE — 99999 PR PBB SHADOW E&M-EST. PATIENT-LVL III: ICD-10-PCS | Mod: PBBFAC,,, | Performed by: ORTHOPAEDIC SURGERY

## 2021-07-26 PROCEDURE — 1125F PR PAIN SEVERITY QUANTIFIED, PAIN PRESENT: ICD-10-PCS | Mod: CPTII,S$GLB,, | Performed by: ORTHOPAEDIC SURGERY

## 2021-07-26 PROCEDURE — 73502 X-RAY EXAM HIP UNI 2-3 VIEWS: CPT | Mod: TC,PN,RT

## 2021-07-26 PROCEDURE — 99214 OFFICE O/P EST MOD 30 MIN: CPT | Mod: S$GLB,,, | Performed by: ORTHOPAEDIC SURGERY

## 2021-07-26 PROCEDURE — 1101F PT FALLS ASSESS-DOCD LE1/YR: CPT | Mod: CPTII,S$GLB,, | Performed by: ORTHOPAEDIC SURGERY

## 2021-07-26 PROCEDURE — 1159F PR MEDICATION LIST DOCUMENTED IN MEDICAL RECORD: ICD-10-PCS | Mod: CPTII,S$GLB,, | Performed by: ORTHOPAEDIC SURGERY

## 2021-07-26 PROCEDURE — 3288F FALL RISK ASSESSMENT DOCD: CPT | Mod: CPTII,S$GLB,, | Performed by: ORTHOPAEDIC SURGERY

## 2021-07-26 PROCEDURE — 73502 XR HIP WITH PELVIS WHEN PERFORMED, 2 OR 3  VIEWS RIGHT: ICD-10-PCS | Mod: 26,RT,, | Performed by: RADIOLOGY

## 2021-07-26 PROCEDURE — 99999 PR PBB SHADOW E&M-EST. PATIENT-LVL III: CPT | Mod: PBBFAC,,, | Performed by: ORTHOPAEDIC SURGERY

## 2021-07-26 PROCEDURE — 3288F PR FALLS RISK ASSESSMENT DOCUMENTED: ICD-10-PCS | Mod: CPTII,S$GLB,, | Performed by: ORTHOPAEDIC SURGERY

## 2021-07-26 PROCEDURE — 99214 PR OFFICE/OUTPT VISIT, EST, LEVL IV, 30-39 MIN: ICD-10-PCS | Mod: S$GLB,,, | Performed by: ORTHOPAEDIC SURGERY

## 2021-07-26 PROCEDURE — 1160F RVW MEDS BY RX/DR IN RCRD: CPT | Mod: CPTII,S$GLB,, | Performed by: ORTHOPAEDIC SURGERY

## 2021-07-27 ENCOUNTER — TELEPHONE (OUTPATIENT)
Dept: FAMILY MEDICINE | Facility: CLINIC | Age: 86
End: 2021-07-27

## 2021-08-03 ENCOUNTER — LAB VISIT (OUTPATIENT)
Dept: LAB | Facility: HOSPITAL | Age: 86
End: 2021-08-03
Attending: INTERNAL MEDICINE
Payer: MEDICARE

## 2021-08-03 DIAGNOSIS — E03.9 HYPOTHYROIDISM, UNSPECIFIED TYPE: ICD-10-CM

## 2021-08-03 LAB
T4 FREE SERPL-MCNC: 1.29 NG/DL (ref 0.71–1.51)
TSH SERPL DL<=0.005 MIU/L-ACNC: 0.51 UIU/ML (ref 0.4–4)

## 2021-08-03 PROCEDURE — 36415 COLL VENOUS BLD VENIPUNCTURE: CPT | Mod: PO | Performed by: INTERNAL MEDICINE

## 2021-08-03 PROCEDURE — 84443 ASSAY THYROID STIM HORMONE: CPT | Performed by: INTERNAL MEDICINE

## 2021-08-03 PROCEDURE — 84439 ASSAY OF FREE THYROXINE: CPT | Performed by: INTERNAL MEDICINE

## 2021-08-10 ENCOUNTER — OFFICE VISIT (OUTPATIENT)
Dept: FAMILY MEDICINE | Facility: CLINIC | Age: 86
End: 2021-08-10
Payer: MEDICARE

## 2021-08-10 VITALS
OXYGEN SATURATION: 95 % | SYSTOLIC BLOOD PRESSURE: 122 MMHG | WEIGHT: 133.19 LBS | HEART RATE: 74 BPM | TEMPERATURE: 98 F | DIASTOLIC BLOOD PRESSURE: 64 MMHG | RESPIRATION RATE: 18 BRPM | BODY MASS INDEX: 19.66 KG/M2

## 2021-08-10 DIAGNOSIS — R05.3 CHRONIC COUGH: Primary | ICD-10-CM

## 2021-08-10 DIAGNOSIS — M54.2 NECK PAIN: ICD-10-CM

## 2021-08-10 DIAGNOSIS — R49.0 HOARSENESS: ICD-10-CM

## 2021-08-10 PROCEDURE — 99214 OFFICE O/P EST MOD 30 MIN: CPT | Mod: S$GLB,,, | Performed by: INTERNAL MEDICINE

## 2021-08-10 PROCEDURE — 99214 PR OFFICE/OUTPT VISIT, EST, LEVL IV, 30-39 MIN: ICD-10-PCS | Mod: S$GLB,,, | Performed by: INTERNAL MEDICINE

## 2021-08-10 PROCEDURE — 99499 UNLISTED E&M SERVICE: CPT | Mod: S$GLB,,, | Performed by: INTERNAL MEDICINE

## 2021-08-10 PROCEDURE — 99499 RISK ADDL DX/OHS AUDIT: ICD-10-PCS | Mod: S$GLB,,, | Performed by: INTERNAL MEDICINE

## 2021-08-10 PROCEDURE — 1160F RVW MEDS BY RX/DR IN RCRD: CPT | Mod: CPTII,S$GLB,, | Performed by: INTERNAL MEDICINE

## 2021-08-10 PROCEDURE — 1160F PR REVIEW ALL MEDS BY PRESCRIBER/CLIN PHARMACIST DOCUMENTED: ICD-10-PCS | Mod: CPTII,S$GLB,, | Performed by: INTERNAL MEDICINE

## 2021-08-10 PROCEDURE — 1125F AMNT PAIN NOTED PAIN PRSNT: CPT | Mod: CPTII,S$GLB,, | Performed by: INTERNAL MEDICINE

## 2021-08-10 PROCEDURE — 1125F PR PAIN SEVERITY QUANTIFIED, PAIN PRESENT: ICD-10-PCS | Mod: CPTII,S$GLB,, | Performed by: INTERNAL MEDICINE

## 2021-08-10 PROCEDURE — 1159F PR MEDICATION LIST DOCUMENTED IN MEDICAL RECORD: ICD-10-PCS | Mod: CPTII,S$GLB,, | Performed by: INTERNAL MEDICINE

## 2021-08-10 PROCEDURE — 1159F MED LIST DOCD IN RCRD: CPT | Mod: CPTII,S$GLB,, | Performed by: INTERNAL MEDICINE

## 2021-08-10 RX ORDER — AZELASTINE 1 MG/ML
1 SPRAY, METERED NASAL 2 TIMES DAILY
Qty: 30 ML | Refills: 11 | Status: SHIPPED | OUTPATIENT
Start: 2021-08-10 | End: 2021-12-09

## 2021-08-10 RX ORDER — PANTOPRAZOLE SODIUM 40 MG/1
TABLET, DELAYED RELEASE ORAL
Qty: 90 TABLET | Refills: 1 | Status: SHIPPED | OUTPATIENT
Start: 2021-08-10 | End: 2021-12-08 | Stop reason: SDUPTHER

## 2021-08-10 RX ORDER — TIOTROPIUM BROMIDE 18 UG/1
18 CAPSULE ORAL; RESPIRATORY (INHALATION) DAILY
Qty: 30 CAPSULE | Refills: 0 | Status: SHIPPED | OUTPATIENT
Start: 2021-08-10 | End: 2021-12-09

## 2021-08-11 ENCOUNTER — HOSPITAL ENCOUNTER (OUTPATIENT)
Dept: RADIOLOGY | Facility: HOSPITAL | Age: 86
Discharge: HOME | End: 2021-08-11
Attending: INTERNAL MEDICINE
Payer: MEDICARE

## 2021-08-11 DIAGNOSIS — M54.2 NECK PAIN: ICD-10-CM

## 2021-08-11 DIAGNOSIS — R05.3 CHRONIC COUGH: ICD-10-CM

## 2021-08-11 PROCEDURE — 72040 X-RAY EXAM NECK SPINE 2-3 VW: CPT | Mod: TC,PO

## 2021-08-11 PROCEDURE — 71046 X-RAY EXAM CHEST 2 VIEWS: CPT | Mod: TC,PO

## 2021-08-12 ENCOUNTER — PATIENT MESSAGE (OUTPATIENT)
Dept: FAMILY MEDICINE | Facility: CLINIC | Age: 86
End: 2021-08-12

## 2021-08-20 ENCOUNTER — PATIENT MESSAGE (OUTPATIENT)
Dept: FAMILY MEDICINE | Facility: CLINIC | Age: 86
End: 2021-08-20

## 2021-08-26 ENCOUNTER — OFFICE VISIT (OUTPATIENT)
Dept: PODIATRY | Facility: CLINIC | Age: 86
End: 2021-08-26
Payer: MEDICARE

## 2021-08-26 VITALS — BODY MASS INDEX: 19.7 KG/M2 | WEIGHT: 133 LBS | HEART RATE: 100 BPM | OXYGEN SATURATION: 96 % | HEIGHT: 69 IN

## 2021-08-26 DIAGNOSIS — M79.675 GREAT TOE PAIN, LEFT: ICD-10-CM

## 2021-08-26 DIAGNOSIS — M67.40 MUCOID CYST OF JOINT: Primary | ICD-10-CM

## 2021-08-26 PROCEDURE — 1101F PR PT FALLS ASSESS DOC 0-1 FALLS W/OUT INJ PAST YR: ICD-10-PCS | Mod: CPTII,S$GLB,, | Performed by: PODIATRIST

## 2021-08-26 PROCEDURE — 3288F PR FALLS RISK ASSESSMENT DOCUMENTED: ICD-10-PCS | Mod: CPTII,S$GLB,, | Performed by: PODIATRIST

## 2021-08-26 PROCEDURE — 1160F RVW MEDS BY RX/DR IN RCRD: CPT | Mod: CPTII,S$GLB,, | Performed by: PODIATRIST

## 2021-08-26 PROCEDURE — 99203 PR OFFICE/OUTPT VISIT, NEW, LEVL III, 30-44 MIN: ICD-10-PCS | Mod: S$GLB,,, | Performed by: PODIATRIST

## 2021-08-26 PROCEDURE — 1159F MED LIST DOCD IN RCRD: CPT | Mod: CPTII,S$GLB,, | Performed by: PODIATRIST

## 2021-08-26 PROCEDURE — 1101F PT FALLS ASSESS-DOCD LE1/YR: CPT | Mod: CPTII,S$GLB,, | Performed by: PODIATRIST

## 2021-08-26 PROCEDURE — 1159F PR MEDICATION LIST DOCUMENTED IN MEDICAL RECORD: ICD-10-PCS | Mod: CPTII,S$GLB,, | Performed by: PODIATRIST

## 2021-08-26 PROCEDURE — 1160F PR REVIEW ALL MEDS BY PRESCRIBER/CLIN PHARMACIST DOCUMENTED: ICD-10-PCS | Mod: CPTII,S$GLB,, | Performed by: PODIATRIST

## 2021-08-26 PROCEDURE — 3288F FALL RISK ASSESSMENT DOCD: CPT | Mod: CPTII,S$GLB,, | Performed by: PODIATRIST

## 2021-08-26 PROCEDURE — 99203 OFFICE O/P NEW LOW 30 MIN: CPT | Mod: S$GLB,,, | Performed by: PODIATRIST

## 2021-08-26 PROCEDURE — 1125F AMNT PAIN NOTED PAIN PRSNT: CPT | Mod: CPTII,S$GLB,, | Performed by: PODIATRIST

## 2021-08-26 PROCEDURE — 1125F PR PAIN SEVERITY QUANTIFIED, PAIN PRESENT: ICD-10-PCS | Mod: CPTII,S$GLB,, | Performed by: PODIATRIST

## 2021-10-08 ENCOUNTER — IMMUNIZATION (OUTPATIENT)
Dept: PRIMARY CARE CLINIC | Facility: CLINIC | Age: 86
End: 2021-10-08
Payer: MEDICARE

## 2021-10-08 DIAGNOSIS — Z23 NEED FOR VACCINATION: Primary | ICD-10-CM

## 2021-10-08 PROCEDURE — 0003A COVID-19, MRNA, LNP-S, PF, 30 MCG/0.3 ML DOSE VACCINE: ICD-10-PCS | Mod: S$GLB,,, | Performed by: FAMILY MEDICINE

## 2021-10-08 PROCEDURE — 0003A COVID-19, MRNA, LNP-S, PF, 30 MCG/0.3 ML DOSE VACCINE: CPT | Mod: S$GLB,,, | Performed by: FAMILY MEDICINE

## 2021-10-08 PROCEDURE — 91300 COVID-19, MRNA, LNP-S, PF, 30 MCG/0.3 ML DOSE VACCINE: ICD-10-PCS | Mod: S$GLB,,, | Performed by: FAMILY MEDICINE

## 2021-10-08 PROCEDURE — 91300 COVID-19, MRNA, LNP-S, PF, 30 MCG/0.3 ML DOSE VACCINE: CPT | Mod: S$GLB,,, | Performed by: FAMILY MEDICINE

## 2021-11-04 ENCOUNTER — IMMUNIZATION (OUTPATIENT)
Dept: FAMILY MEDICINE | Facility: CLINIC | Age: 86
End: 2021-11-04
Payer: MEDICARE

## 2021-11-04 PROCEDURE — G0008 ADMIN INFLUENZA VIRUS VAC: HCPCS | Mod: HCNC,S$GLB,, | Performed by: INTERNAL MEDICINE

## 2021-11-04 PROCEDURE — 90694 VACC AIIV4 NO PRSRV 0.5ML IM: CPT | Mod: HCNC,S$GLB,, | Performed by: INTERNAL MEDICINE

## 2021-11-04 PROCEDURE — G0008 FLU VACCINE - QUADRIVALENT - ADJUVANTED: ICD-10-PCS | Mod: HCNC,S$GLB,, | Performed by: INTERNAL MEDICINE

## 2021-11-04 PROCEDURE — 90694 FLU VACCINE - QUADRIVALENT - ADJUVANTED: ICD-10-PCS | Mod: HCNC,S$GLB,, | Performed by: INTERNAL MEDICINE

## 2021-12-06 ENCOUNTER — TELEPHONE (OUTPATIENT)
Dept: FAMILY MEDICINE | Facility: CLINIC | Age: 86
End: 2021-12-06
Payer: MEDICARE

## 2021-12-08 DIAGNOSIS — R05.3 CHRONIC COUGH: ICD-10-CM

## 2021-12-08 DIAGNOSIS — R49.0 HOARSENESS: ICD-10-CM

## 2021-12-08 RX ORDER — PANTOPRAZOLE SODIUM 40 MG/1
TABLET, DELAYED RELEASE ORAL
Qty: 90 TABLET | Refills: 1 | Status: CANCELLED | OUTPATIENT
Start: 2021-12-08

## 2021-12-09 ENCOUNTER — OFFICE VISIT (OUTPATIENT)
Dept: FAMILY MEDICINE | Facility: CLINIC | Age: 86
End: 2021-12-09
Payer: MEDICARE

## 2021-12-09 VITALS
WEIGHT: 135.13 LBS | OXYGEN SATURATION: 98 % | SYSTOLIC BLOOD PRESSURE: 134 MMHG | HEART RATE: 79 BPM | RESPIRATION RATE: 18 BRPM | TEMPERATURE: 98 F | BODY MASS INDEX: 19.96 KG/M2 | DIASTOLIC BLOOD PRESSURE: 72 MMHG

## 2021-12-09 DIAGNOSIS — G89.29 CHRONIC NECK PAIN: ICD-10-CM

## 2021-12-09 DIAGNOSIS — R49.0 HOARSENESS: ICD-10-CM

## 2021-12-09 DIAGNOSIS — M54.2 CHRONIC NECK PAIN: ICD-10-CM

## 2021-12-09 DIAGNOSIS — G47.01 INSOMNIA DUE TO MEDICAL CONDITION: Primary | ICD-10-CM

## 2021-12-09 DIAGNOSIS — E03.9 HYPOTHYROIDISM, UNSPECIFIED TYPE: ICD-10-CM

## 2021-12-09 DIAGNOSIS — R05.3 CHRONIC COUGH: ICD-10-CM

## 2021-12-09 DIAGNOSIS — D64.9 ANEMIA, UNSPECIFIED TYPE: ICD-10-CM

## 2021-12-09 PROCEDURE — 99214 PR OFFICE/OUTPT VISIT, EST, LEVL IV, 30-39 MIN: ICD-10-PCS | Mod: S$GLB,,, | Performed by: INTERNAL MEDICINE

## 2021-12-09 PROCEDURE — 99214 OFFICE O/P EST MOD 30 MIN: CPT | Mod: S$GLB,,, | Performed by: INTERNAL MEDICINE

## 2021-12-09 RX ORDER — MIRTAZAPINE 7.5 MG/1
7.5 TABLET, FILM COATED ORAL NIGHTLY
Qty: 30 TABLET | Refills: 5 | Status: SHIPPED | OUTPATIENT
Start: 2021-12-09 | End: 2022-02-07 | Stop reason: SDUPTHER

## 2021-12-09 RX ORDER — PANTOPRAZOLE SODIUM 40 MG/1
TABLET, DELAYED RELEASE ORAL
Qty: 90 TABLET | Refills: 1 | Status: SHIPPED | OUTPATIENT
Start: 2021-12-09 | End: 2022-03-07 | Stop reason: SDUPTHER

## 2021-12-09 RX ORDER — BACLOFEN 10 MG/1
10 TABLET ORAL NIGHTLY
Qty: 30 TABLET | Refills: 5 | Status: SHIPPED | OUTPATIENT
Start: 2021-12-09 | End: 2022-03-18

## 2021-12-16 ENCOUNTER — TELEPHONE (OUTPATIENT)
Dept: FAMILY MEDICINE | Facility: CLINIC | Age: 86
End: 2021-12-16
Payer: MEDICARE

## 2021-12-23 ENCOUNTER — HOSPITAL ENCOUNTER (OUTPATIENT)
Dept: RADIOLOGY | Facility: HOSPITAL | Age: 86
Discharge: HOME | End: 2021-12-23
Attending: INTERNAL MEDICINE
Payer: MEDICARE

## 2021-12-23 ENCOUNTER — OFFICE VISIT (OUTPATIENT)
Dept: FAMILY MEDICINE | Facility: CLINIC | Age: 86
End: 2021-12-23
Payer: MEDICARE

## 2021-12-23 VITALS
HEART RATE: 81 BPM | OXYGEN SATURATION: 97 % | TEMPERATURE: 98 F | RESPIRATION RATE: 18 BRPM | BODY MASS INDEX: 20.18 KG/M2 | SYSTOLIC BLOOD PRESSURE: 135 MMHG | DIASTOLIC BLOOD PRESSURE: 84 MMHG | WEIGHT: 136.69 LBS

## 2021-12-23 DIAGNOSIS — R29.898 LEFT LEG WEAKNESS: ICD-10-CM

## 2021-12-23 DIAGNOSIS — R26.89 BALANCE DISORDER: ICD-10-CM

## 2021-12-23 DIAGNOSIS — R06.09 DYSPNEA ON EXERTION: ICD-10-CM

## 2021-12-23 DIAGNOSIS — R35.0 URINARY FREQUENCY: ICD-10-CM

## 2021-12-23 DIAGNOSIS — R53.81 MALAISE AND FATIGUE: Primary | ICD-10-CM

## 2021-12-23 DIAGNOSIS — R53.83 MALAISE AND FATIGUE: Primary | ICD-10-CM

## 2021-12-23 LAB
BILIRUB SERPL-MCNC: NEGATIVE MG/DL
BLOOD URINE, POC: NEGATIVE
CLARITY, POC UA: CLEAR
COLOR, POC UA: YELLOW
GLUCOSE UR QL STRIP: NORMAL
KETONES UR QL STRIP: NEGATIVE
LEUKOCYTE ESTERASE URINE, POC: NEGATIVE
NITRITE, POC UA: NEGATIVE
PH, POC UA: 5
PROTEIN, POC: NEGATIVE
SPECIFIC GRAVITY, POC UA: 1.01
UROBILINOGEN, POC UA: NORMAL

## 2021-12-23 PROCEDURE — 99215 OFFICE O/P EST HI 40 MIN: CPT | Mod: S$GLB,,, | Performed by: INTERNAL MEDICINE

## 2021-12-23 PROCEDURE — 70551 MRI BRAIN STEM W/O DYE: CPT | Mod: 26,HCNC,, | Performed by: RADIOLOGY

## 2021-12-23 PROCEDURE — 71046 XR CHEST PA AND LATERAL: ICD-10-PCS | Mod: 26,HCNC,, | Performed by: RADIOLOGY

## 2021-12-23 PROCEDURE — 70551 MRI BRAIN STEM W/O DYE: CPT | Mod: TC,HCNC

## 2021-12-23 PROCEDURE — 99215 PR OFFICE/OUTPT VISIT, EST, LEVL V, 40-54 MIN: ICD-10-PCS | Mod: S$GLB,,, | Performed by: INTERNAL MEDICINE

## 2021-12-23 PROCEDURE — 71046 X-RAY EXAM CHEST 2 VIEWS: CPT | Mod: 26,HCNC,, | Performed by: RADIOLOGY

## 2021-12-23 PROCEDURE — 93010 ELECTROCARDIOGRAM REPORT: CPT | Mod: S$GLB,,, | Performed by: INTERNAL MEDICINE

## 2021-12-23 PROCEDURE — 93010 EKG 12-LEAD: ICD-10-PCS | Mod: S$GLB,,, | Performed by: INTERNAL MEDICINE

## 2021-12-23 PROCEDURE — 1159F MED LIST DOCD IN RCRD: CPT | Mod: CPTII,S$GLB,, | Performed by: INTERNAL MEDICINE

## 2021-12-23 PROCEDURE — 93005 EKG 12-LEAD: ICD-10-PCS | Mod: S$GLB,,, | Performed by: INTERNAL MEDICINE

## 2021-12-23 PROCEDURE — 81002 URINALYSIS NONAUTO W/O SCOPE: CPT | Mod: S$GLB,,, | Performed by: INTERNAL MEDICINE

## 2021-12-23 PROCEDURE — 81002 POCT URINE DIPSTICK WITHOUT MICROSCOPE: ICD-10-PCS | Mod: S$GLB,,, | Performed by: INTERNAL MEDICINE

## 2021-12-23 PROCEDURE — 70551 MRI BRAIN DEMYELINATING WITHOUT CONTRAST: ICD-10-PCS | Mod: 26,HCNC,, | Performed by: RADIOLOGY

## 2021-12-23 PROCEDURE — 1160F PR REVIEW ALL MEDS BY PRESCRIBER/CLIN PHARMACIST DOCUMENTED: ICD-10-PCS | Mod: CPTII,S$GLB,, | Performed by: INTERNAL MEDICINE

## 2021-12-23 PROCEDURE — 71046 X-RAY EXAM CHEST 2 VIEWS: CPT | Mod: TC,HCNC,FY

## 2021-12-23 PROCEDURE — 1159F PR MEDICATION LIST DOCUMENTED IN MEDICAL RECORD: ICD-10-PCS | Mod: CPTII,S$GLB,, | Performed by: INTERNAL MEDICINE

## 2021-12-23 PROCEDURE — 1160F RVW MEDS BY RX/DR IN RCRD: CPT | Mod: CPTII,S$GLB,, | Performed by: INTERNAL MEDICINE

## 2021-12-23 PROCEDURE — 93005 ELECTROCARDIOGRAM TRACING: CPT | Mod: S$GLB,,, | Performed by: INTERNAL MEDICINE

## 2021-12-27 ENCOUNTER — TELEPHONE (OUTPATIENT)
Dept: FAMILY MEDICINE | Facility: CLINIC | Age: 86
End: 2021-12-27
Payer: MEDICARE

## 2021-12-27 DIAGNOSIS — G89.29 CHRONIC PAIN OF BOTH KNEES: ICD-10-CM

## 2021-12-27 DIAGNOSIS — G89.29 CHRONIC PAIN OF LEFT KNEE: ICD-10-CM

## 2021-12-27 DIAGNOSIS — M25.562 CHRONIC PAIN OF BOTH KNEES: ICD-10-CM

## 2021-12-27 DIAGNOSIS — M25.561 CHRONIC PAIN OF BOTH KNEES: ICD-10-CM

## 2021-12-27 DIAGNOSIS — M25.562 CHRONIC PAIN OF LEFT KNEE: ICD-10-CM

## 2022-02-04 RX ORDER — LEVOTHYROXINE SODIUM 175 UG/1
175 TABLET ORAL
Qty: 30 TABLET | Refills: 11 | Status: SHIPPED | OUTPATIENT
Start: 2022-02-04 | End: 2022-02-07 | Stop reason: SDUPTHER

## 2022-02-07 DIAGNOSIS — G47.01 INSOMNIA DUE TO MEDICAL CONDITION: ICD-10-CM

## 2022-02-07 NOTE — TELEPHONE ENCOUNTER
On call refill request. Patient is completely out of medication. Requesting assistance in absence of Dr Cates.    LOV--12-23-21  FOV--3-18-22    Preferred pharmacy for this encounter: [facility].

## 2022-02-07 NOTE — TELEPHONE ENCOUNTER
----- Message from Sita Lynch sent at 2/7/2022  2:59 PM CST -----  Type: Needs Medical Advice  Who Called:  Patient  Best Call Back Number:[phone]  Additional Information: Patient was seeing Dr Cates and is in need of refills on her medications, calling to find out if it can be filled or if she will need to see another doctor.

## 2022-02-08 RX ORDER — LEVOTHYROXINE SODIUM 175 UG/1
175 TABLET ORAL
Qty: 90 TABLET | Refills: 0 | Status: SHIPPED | OUTPATIENT
Start: 2022-02-08 | End: 2022-04-14 | Stop reason: SDUPTHER

## 2022-02-08 RX ORDER — MIRTAZAPINE 7.5 MG/1
7.5 TABLET, FILM COATED ORAL NIGHTLY
Qty: 90 TABLET | Refills: 0 | Status: SHIPPED | OUTPATIENT
Start: 2022-02-08 | End: 2022-04-14 | Stop reason: SDUPTHER

## 2022-03-04 ENCOUNTER — TELEPHONE (OUTPATIENT)
Dept: FAMILY MEDICINE | Facility: CLINIC | Age: 87
End: 2022-03-04
Payer: MEDICARE

## 2022-03-04 DIAGNOSIS — R05.3 CHRONIC COUGH: ICD-10-CM

## 2022-03-04 DIAGNOSIS — R49.0 HOARSENESS: ICD-10-CM

## 2022-03-04 NOTE — TELEPHONE ENCOUNTER
----- Message from Ivory Noe sent at 3/3/2022  2:51 PM CST -----  Type:  RX Refill Request    Who Called:  patient   Refill or New Rx:  refill   RX Name and Strength:  pantoprazole (PROTONIX) 40 MG tablet  How is the patient currently taking it? (ex. 1XDay):  as directed   Is this a 30 day or 90 day RX:  30   Preferred Pharmacy with phone number:    [id number] - [place]  [facility] [address] [facility] [address]  [facility] [address] [address]  Phone: [phone] Fax: [phone]  Local or Mail Order:  local   Ordering Provider:  shirin Beck Call Back Number:  [phone]   Additional Information:  per patient is completely out of this medication and will not be able to see this provider to establish care until 3/18, requesting a call back to discuss further-please advise-thank you

## 2022-03-07 RX ORDER — PANTOPRAZOLE SODIUM 40 MG/1
TABLET, DELAYED RELEASE ORAL
Qty: 90 TABLET | Refills: 1 | Status: SHIPPED | OUTPATIENT
Start: 2022-03-07 | End: 2022-05-27 | Stop reason: SDUPTHER

## 2022-03-09 ENCOUNTER — LAB VISIT (OUTPATIENT)
Dept: LAB | Facility: HOSPITAL | Age: 87
End: 2022-03-09
Attending: INTERNAL MEDICINE
Payer: MEDICARE

## 2022-03-09 DIAGNOSIS — D64.9 ANEMIA, UNSPECIFIED TYPE: ICD-10-CM

## 2022-03-09 DIAGNOSIS — E03.9 HYPOTHYROIDISM, UNSPECIFIED TYPE: ICD-10-CM

## 2022-03-09 LAB
BASOPHILS # BLD AUTO: 0.06 K/UL (ref 0–0.2)
BASOPHILS NFR BLD: 0.9 % (ref 0–1.9)
DIFFERENTIAL METHOD: ABNORMAL
EOSINOPHIL # BLD AUTO: 0.2 K/UL (ref 0–0.5)
EOSINOPHIL NFR BLD: 3.8 % (ref 0–8)
ERYTHROCYTE [DISTWIDTH] IN BLOOD BY AUTOMATED COUNT: 13.2 % (ref 11.5–14.5)
HCT VFR BLD AUTO: 38 % (ref 37–48.5)
HGB BLD-MCNC: 12.3 G/DL (ref 12–16)
IMM GRANULOCYTES # BLD AUTO: 0.02 K/UL (ref 0–0.04)
IMM GRANULOCYTES NFR BLD AUTO: 0.3 % (ref 0–0.5)
LYMPHOCYTES # BLD AUTO: 2.5 K/UL (ref 1–4.8)
LYMPHOCYTES NFR BLD: 39.5 % (ref 18–48)
MCH RBC QN AUTO: 32.2 PG (ref 27–31)
MCHC RBC AUTO-ENTMCNC: 32.4 G/DL (ref 32–36)
MCV RBC AUTO: 100 FL (ref 82–98)
MONOCYTES # BLD AUTO: 0.8 K/UL (ref 0.3–1)
MONOCYTES NFR BLD: 12.2 % (ref 4–15)
NEUTROPHILS # BLD AUTO: 2.7 K/UL (ref 1.8–7.7)
NEUTROPHILS NFR BLD: 43.3 % (ref 38–73)
NRBC BLD-RTO: 0 /100 WBC
PLATELET # BLD AUTO: 253 K/UL (ref 150–450)
PMV BLD AUTO: 9.8 FL (ref 9.2–12.9)
RBC # BLD AUTO: 3.82 M/UL (ref 4–5.4)
T4 FREE SERPL-MCNC: 0.98 NG/DL (ref 0.71–1.51)
TSH SERPL DL<=0.005 MIU/L-ACNC: 7.01 UIU/ML (ref 0.4–4)
WBC # BLD AUTO: 6.33 K/UL (ref 3.9–12.7)

## 2022-03-09 PROCEDURE — 36415 COLL VENOUS BLD VENIPUNCTURE: CPT | Mod: PO | Performed by: INTERNAL MEDICINE

## 2022-03-09 PROCEDURE — 85025 COMPLETE CBC W/AUTO DIFF WBC: CPT | Performed by: INTERNAL MEDICINE

## 2022-03-09 PROCEDURE — 84443 ASSAY THYROID STIM HORMONE: CPT | Performed by: INTERNAL MEDICINE

## 2022-03-09 PROCEDURE — 84439 ASSAY OF FREE THYROXINE: CPT | Performed by: INTERNAL MEDICINE

## 2022-03-18 ENCOUNTER — OFFICE VISIT (OUTPATIENT)
Dept: FAMILY MEDICINE | Facility: CLINIC | Age: 87
End: 2022-03-18
Payer: MEDICARE

## 2022-03-18 VITALS
HEIGHT: 69 IN | BODY MASS INDEX: 20.67 KG/M2 | OXYGEN SATURATION: 95 % | TEMPERATURE: 98 F | SYSTOLIC BLOOD PRESSURE: 138 MMHG | WEIGHT: 139.56 LBS | DIASTOLIC BLOOD PRESSURE: 79 MMHG | HEART RATE: 72 BPM

## 2022-03-18 DIAGNOSIS — Z28.39 IMMUNIZATION DEFICIENCY: ICD-10-CM

## 2022-03-18 DIAGNOSIS — I10 PRIMARY HYPERTENSION: ICD-10-CM

## 2022-03-18 DIAGNOSIS — E78.2 MIXED HYPERLIPIDEMIA: ICD-10-CM

## 2022-03-18 DIAGNOSIS — E03.9 ACQUIRED HYPOTHYROIDISM: Primary | ICD-10-CM

## 2022-03-18 DIAGNOSIS — R53.83 OTHER FATIGUE: ICD-10-CM

## 2022-03-18 PROCEDURE — 1126F AMNT PAIN NOTED NONE PRSNT: CPT | Mod: CPTII,S$GLB,, | Performed by: PHYSICIAN ASSISTANT

## 2022-03-18 PROCEDURE — 1101F PT FALLS ASSESS-DOCD LE1/YR: CPT | Mod: CPTII,S$GLB,, | Performed by: PHYSICIAN ASSISTANT

## 2022-03-18 PROCEDURE — 3288F FALL RISK ASSESSMENT DOCD: CPT | Mod: CPTII,S$GLB,, | Performed by: PHYSICIAN ASSISTANT

## 2022-03-18 PROCEDURE — 1159F PR MEDICATION LIST DOCUMENTED IN MEDICAL RECORD: ICD-10-PCS | Mod: CPTII,S$GLB,, | Performed by: PHYSICIAN ASSISTANT

## 2022-03-18 PROCEDURE — 99214 OFFICE O/P EST MOD 30 MIN: CPT | Mod: S$GLB,,, | Performed by: PHYSICIAN ASSISTANT

## 2022-03-18 PROCEDURE — 1160F RVW MEDS BY RX/DR IN RCRD: CPT | Mod: CPTII,S$GLB,, | Performed by: PHYSICIAN ASSISTANT

## 2022-03-18 PROCEDURE — 1126F PR PAIN SEVERITY QUANTIFIED, NO PAIN PRESENT: ICD-10-PCS | Mod: CPTII,S$GLB,, | Performed by: PHYSICIAN ASSISTANT

## 2022-03-18 PROCEDURE — 99214 PR OFFICE/OUTPT VISIT, EST, LEVL IV, 30-39 MIN: ICD-10-PCS | Mod: S$GLB,,, | Performed by: PHYSICIAN ASSISTANT

## 2022-03-18 PROCEDURE — 1159F MED LIST DOCD IN RCRD: CPT | Mod: CPTII,S$GLB,, | Performed by: PHYSICIAN ASSISTANT

## 2022-03-18 PROCEDURE — 3288F PR FALLS RISK ASSESSMENT DOCUMENTED: ICD-10-PCS | Mod: CPTII,S$GLB,, | Performed by: PHYSICIAN ASSISTANT

## 2022-03-18 PROCEDURE — 1101F PR PT FALLS ASSESS DOC 0-1 FALLS W/OUT INJ PAST YR: ICD-10-PCS | Mod: CPTII,S$GLB,, | Performed by: PHYSICIAN ASSISTANT

## 2022-03-18 PROCEDURE — 1160F PR REVIEW ALL MEDS BY PRESCRIBER/CLIN PHARMACIST DOCUMENTED: ICD-10-PCS | Mod: CPTII,S$GLB,, | Performed by: PHYSICIAN ASSISTANT

## 2022-03-18 NOTE — PROGRESS NOTES
Subjective:       Patient ID: Lorena Vallejo is a 88 y.o. female.    Chief Complaint: Follow-up (Labs done recently), Hypertension (.Taking her medication as directed w/o any problems or concerns ), Hypothyroidism (.Taking her medication as directed .), Fatigue (Feels maybe her thyroid levels), and Depression (Worse recent loss of her son)    HPI   Recent TSH 7  fatigue  Review of Systems   Constitutional: Positive for fatigue. Negative for activity change, appetite change, chills, diaphoresis, fever and unexpected weight change.   HENT: Negative.    Eyes: Negative.    Respiratory: Negative.  Negative for cough and shortness of breath.    Cardiovascular: Negative.  Negative for chest pain and leg swelling.   Gastrointestinal: Negative.    Endocrine: Negative.    Genitourinary: Negative.    Musculoskeletal: Negative.    Integumentary:  Negative for rash. Negative.   Neurological: Negative.    Psychiatric/Behavioral: Positive for dysphoric mood.         Objective:      Physical Exam  Vitals reviewed.   Constitutional:       General: She is not in acute distress.     Appearance: Normal appearance. She is normal weight. She is not ill-appearing, toxic-appearing or diaphoretic.   HENT:      Head: Normocephalic and atraumatic.      Right Ear: Tympanic membrane, ear canal and external ear normal. There is no impacted cerumen.      Left Ear: Tympanic membrane, ear canal and external ear normal. There is no impacted cerumen.      Nose: Nose normal.      Mouth/Throat:      Mouth: Mucous membranes are moist.      Pharynx: Oropharynx is clear. No oropharyngeal exudate or posterior oropharyngeal erythema.   Eyes:      General: No scleral icterus.     Conjunctiva/sclera: Conjunctivae normal.   Neck:      Vascular: No carotid bruit.   Cardiovascular:      Rate and Rhythm: Normal rate and regular rhythm.      Pulses: Normal pulses.      Heart sounds: Normal heart sounds. No murmur heard.    No friction rub. No gallop.   Pulmonary:       Effort: Pulmonary effort is normal. No respiratory distress.      Breath sounds: Normal breath sounds. No stridor. No wheezing, rhonchi or rales.   Abdominal:      General: Abdomen is flat. Bowel sounds are normal. There is no distension.      Palpations: Abdomen is soft. There is no mass.      Tenderness: There is no abdominal tenderness. There is no guarding or rebound.      Hernia: No hernia is present.   Musculoskeletal:         General: No swelling.      Cervical back: Normal range of motion and neck supple. No rigidity or tenderness.      Right lower leg: No edema.      Left lower leg: No edema.   Lymphadenopathy:      Cervical: No cervical adenopathy.   Skin:     General: Skin is warm and dry.      Findings: No rash.   Neurological:      General: No focal deficit present.      Mental Status: She is alert and oriented to person, place, and time.   Psychiatric:         Mood and Affect: Mood normal.         Behavior: Behavior normal.         Thought Content: Thought content normal.         Judgment: Judgment normal.         Assessment:       Problem List Items Addressed This Visit     Hyperlipidemia    Hypothyroidism, unspecified - Primary    Relevant Orders    T3    T4, Free    TSH    Other fatigue    Primary hypertension      Other Visit Diagnoses     Immunization deficiency              Plan:       Lorena was seen today for follow-up, hypertension, hypothyroidism, fatigue and depression.    Diagnoses and all orders for this visit:    Acquired hypothyroidism  -     T3; Future  -     T4, Free; Future  -     TSH; Future    Other fatigue    Mixed hyperlipidemia    Primary hypertension    Immunization deficiency    discussed new PCP  F/u check after repeat TSH  Discussed immunization needs

## 2022-04-04 DIAGNOSIS — I47.10 SVT (SUPRAVENTRICULAR TACHYCARDIA): ICD-10-CM

## 2022-04-04 RX ORDER — METOPROLOL SUCCINATE 25 MG/1
25 TABLET, EXTENDED RELEASE ORAL DAILY
Qty: 90 TABLET | Refills: 3 | Status: SHIPPED | OUTPATIENT
Start: 2022-04-04 | End: 2024-01-08 | Stop reason: SDUPTHER

## 2022-04-14 DIAGNOSIS — G47.01 INSOMNIA DUE TO MEDICAL CONDITION: ICD-10-CM

## 2022-04-14 RX ORDER — LEVOTHYROXINE SODIUM 175 UG/1
175 TABLET ORAL
Qty: 90 TABLET | Refills: 1 | Status: SHIPPED | OUTPATIENT
Start: 2022-04-14 | End: 2022-06-06

## 2022-04-14 RX ORDER — MIRTAZAPINE 7.5 MG/1
7.5 TABLET, FILM COATED ORAL NIGHTLY
Qty: 90 TABLET | Refills: 1 | Status: SHIPPED | OUTPATIENT
Start: 2022-04-14 | End: 2022-05-27

## 2022-04-27 ENCOUNTER — TELEPHONE (OUTPATIENT)
Dept: FAMILY MEDICINE | Facility: CLINIC | Age: 87
End: 2022-04-27
Payer: MEDICARE

## 2022-04-27 ENCOUNTER — PATIENT MESSAGE (OUTPATIENT)
Dept: FAMILY MEDICINE | Facility: CLINIC | Age: 87
End: 2022-04-27
Payer: MEDICARE

## 2022-04-27 NOTE — TELEPHONE ENCOUNTER
I called the patient to schedule their free one hour Enhanced Annual Wellness Visit with   Manuela Adorno NP.

## 2022-04-28 ENCOUNTER — IMMUNIZATION (OUTPATIENT)
Dept: PRIMARY CARE CLINIC | Facility: CLINIC | Age: 87
End: 2022-04-28
Payer: MEDICARE

## 2022-04-28 DIAGNOSIS — Z23 NEED FOR VACCINATION: Primary | ICD-10-CM

## 2022-04-28 PROCEDURE — 0054A COVID-19, MRNA, LNP-S, PF, 30 MCG/0.3 ML DOSE VACCINE (PFIZER): ICD-10-PCS | Mod: S$GLB,,, | Performed by: FAMILY MEDICINE

## 2022-04-28 PROCEDURE — 0054A COVID-19, MRNA, LNP-S, PF, 30 MCG/0.3 ML DOSE VACCINE (PFIZER): CPT | Mod: S$GLB,,, | Performed by: FAMILY MEDICINE

## 2022-04-28 PROCEDURE — 91305 COVID-19, MRNA, LNP-S, PF, 30 MCG/0.3 ML DOSE VACCINE (PFIZER): ICD-10-PCS | Mod: S$GLB,,, | Performed by: FAMILY MEDICINE

## 2022-04-28 PROCEDURE — 91305 COVID-19, MRNA, LNP-S, PF, 30 MCG/0.3 ML DOSE VACCINE (PFIZER): CPT | Mod: S$GLB,,, | Performed by: FAMILY MEDICINE

## 2022-05-27 ENCOUNTER — OFFICE VISIT (OUTPATIENT)
Dept: FAMILY MEDICINE | Facility: CLINIC | Age: 87
End: 2022-05-27
Payer: MEDICARE

## 2022-05-27 VITALS
HEIGHT: 69 IN | HEART RATE: 71 BPM | BODY MASS INDEX: 20.57 KG/M2 | WEIGHT: 138.88 LBS | SYSTOLIC BLOOD PRESSURE: 128 MMHG | TEMPERATURE: 99 F | OXYGEN SATURATION: 95 % | DIASTOLIC BLOOD PRESSURE: 60 MMHG | RESPIRATION RATE: 16 BRPM

## 2022-05-27 DIAGNOSIS — R49.0 HOARSENESS: ICD-10-CM

## 2022-05-27 DIAGNOSIS — E78.2 MIXED HYPERLIPIDEMIA: ICD-10-CM

## 2022-05-27 DIAGNOSIS — G47.01 INSOMNIA DUE TO MEDICAL CONDITION: ICD-10-CM

## 2022-05-27 DIAGNOSIS — N18.31 STAGE 3A CHRONIC KIDNEY DISEASE: ICD-10-CM

## 2022-05-27 DIAGNOSIS — R79.9 ABNORMAL FINDING OF BLOOD CHEMISTRY, UNSPECIFIED: ICD-10-CM

## 2022-05-27 DIAGNOSIS — K29.60 REFLUX GASTRITIS: ICD-10-CM

## 2022-05-27 DIAGNOSIS — E03.9 ACQUIRED HYPOTHYROIDISM: ICD-10-CM

## 2022-05-27 DIAGNOSIS — I47.10 PAROXYSMAL SUPRAVENTRICULAR TACHYCARDIA: ICD-10-CM

## 2022-05-27 DIAGNOSIS — Z00.00 HEALTHCARE MAINTENANCE: Primary | ICD-10-CM

## 2022-05-27 DIAGNOSIS — I10 PRIMARY HYPERTENSION: ICD-10-CM

## 2022-05-27 DIAGNOSIS — J44.89 CHRONIC BRONCHITIS WITH COPD (CHRONIC OBSTRUCTIVE PULMONARY DISEASE): ICD-10-CM

## 2022-05-27 PROBLEM — I25.118 ATHEROSCLEROTIC HEART DISEASE OF NATIVE CORONARY ARTERY WITH OTHER FORMS OF ANGINA PECTORIS: Status: ACTIVE | Noted: 2022-05-27

## 2022-05-27 PROCEDURE — 99499 RISK ADDL DX/OHS AUDIT: ICD-10-PCS | Mod: S$GLB,,, | Performed by: STUDENT IN AN ORGANIZED HEALTH CARE EDUCATION/TRAINING PROGRAM

## 2022-05-27 PROCEDURE — 1101F PT FALLS ASSESS-DOCD LE1/YR: CPT | Mod: CPTII,S$GLB,, | Performed by: STUDENT IN AN ORGANIZED HEALTH CARE EDUCATION/TRAINING PROGRAM

## 2022-05-27 PROCEDURE — 3288F PR FALLS RISK ASSESSMENT DOCUMENTED: ICD-10-PCS | Mod: CPTII,S$GLB,, | Performed by: STUDENT IN AN ORGANIZED HEALTH CARE EDUCATION/TRAINING PROGRAM

## 2022-05-27 PROCEDURE — 1126F AMNT PAIN NOTED NONE PRSNT: CPT | Mod: CPTII,S$GLB,, | Performed by: STUDENT IN AN ORGANIZED HEALTH CARE EDUCATION/TRAINING PROGRAM

## 2022-05-27 PROCEDURE — 99215 OFFICE O/P EST HI 40 MIN: CPT | Mod: S$GLB,,, | Performed by: STUDENT IN AN ORGANIZED HEALTH CARE EDUCATION/TRAINING PROGRAM

## 2022-05-27 PROCEDURE — 99215 PR OFFICE/OUTPT VISIT, EST, LEVL V, 40-54 MIN: ICD-10-PCS | Mod: S$GLB,,, | Performed by: STUDENT IN AN ORGANIZED HEALTH CARE EDUCATION/TRAINING PROGRAM

## 2022-05-27 PROCEDURE — 99499 UNLISTED E&M SERVICE: CPT | Mod: S$GLB,,, | Performed by: STUDENT IN AN ORGANIZED HEALTH CARE EDUCATION/TRAINING PROGRAM

## 2022-05-27 PROCEDURE — 1159F PR MEDICATION LIST DOCUMENTED IN MEDICAL RECORD: ICD-10-PCS | Mod: CPTII,S$GLB,, | Performed by: STUDENT IN AN ORGANIZED HEALTH CARE EDUCATION/TRAINING PROGRAM

## 2022-05-27 PROCEDURE — 3288F FALL RISK ASSESSMENT DOCD: CPT | Mod: CPTII,S$GLB,, | Performed by: STUDENT IN AN ORGANIZED HEALTH CARE EDUCATION/TRAINING PROGRAM

## 2022-05-27 PROCEDURE — 99999 PR PBB SHADOW E&M-EST. PATIENT-LVL V: CPT | Mod: PBBFAC,,, | Performed by: STUDENT IN AN ORGANIZED HEALTH CARE EDUCATION/TRAINING PROGRAM

## 2022-05-27 PROCEDURE — 1101F PR PT FALLS ASSESS DOC 0-1 FALLS W/OUT INJ PAST YR: ICD-10-PCS | Mod: CPTII,S$GLB,, | Performed by: STUDENT IN AN ORGANIZED HEALTH CARE EDUCATION/TRAINING PROGRAM

## 2022-05-27 PROCEDURE — 1126F PR PAIN SEVERITY QUANTIFIED, NO PAIN PRESENT: ICD-10-PCS | Mod: CPTII,S$GLB,, | Performed by: STUDENT IN AN ORGANIZED HEALTH CARE EDUCATION/TRAINING PROGRAM

## 2022-05-27 PROCEDURE — 1159F MED LIST DOCD IN RCRD: CPT | Mod: CPTII,S$GLB,, | Performed by: STUDENT IN AN ORGANIZED HEALTH CARE EDUCATION/TRAINING PROGRAM

## 2022-05-27 PROCEDURE — 99999 PR PBB SHADOW E&M-EST. PATIENT-LVL V: ICD-10-PCS | Mod: PBBFAC,,, | Performed by: STUDENT IN AN ORGANIZED HEALTH CARE EDUCATION/TRAINING PROGRAM

## 2022-05-27 RX ORDER — CALCIUM CARBONATE 500(1250)
TABLET ORAL
COMMUNITY

## 2022-05-27 RX ORDER — PANTOPRAZOLE SODIUM 40 MG/1
TABLET, DELAYED RELEASE ORAL
Qty: 90 TABLET | Refills: 3 | Status: SHIPPED | OUTPATIENT
Start: 2022-05-27 | End: 2022-05-30

## 2022-05-27 RX ORDER — SERTRALINE HYDROCHLORIDE 50 MG/1
50 TABLET, FILM COATED ORAL DAILY
Qty: 90 TABLET | Refills: 3 | Status: SHIPPED | OUTPATIENT
Start: 2022-05-27 | End: 2023-03-22

## 2022-05-27 RX ORDER — ASPIRIN 81 MG/1
TABLET ORAL
COMMUNITY
End: 2022-05-27

## 2022-05-27 RX ORDER — LOPERAMIDE HYDROCHLORIDE 2 MG/1
2 CAPSULE ORAL 3 TIMES DAILY
Qty: 180 CAPSULE | Refills: 3 | Status: SHIPPED | OUTPATIENT
Start: 2022-05-27 | End: 2022-08-17

## 2022-05-27 RX ORDER — GLUCOSAM/CHONDRO/HERB 149/HYAL 750-100 MG
TABLET ORAL
COMMUNITY

## 2022-05-27 RX ORDER — EPINEPHRINE 0.22MG
AEROSOL WITH ADAPTER (ML) INHALATION
COMMUNITY

## 2022-05-27 NOTE — PROGRESS NOTES
[facility] Note    Subjective:    The HPI and pertinent ROS is included in the Diagnostic Impression Remarks section at the end of the note. Please see below for further details. Chief complaint is at end of note.     Data reviewed 274}  Previous medical records reviewed and summarized in plan section at end of note.      The following portions of the patient's history were reviewed and updated as appropriate: allergies, current medications, past family history, past medical history, past social history, past surgical history and problem list.    She  has a past medical history of Arthritis, Blood transfusion, Bowel obstruction, Cancer, Cancer of eyeball, except conjunctiva, cornea, retina, and choroid (right), Depression, Diverticulitis, GERD (gastroesophageal reflux disease), Hypertension, Melanoma, Peripheral neuropathy, PSVT (paroxysmal supraventricular tachycardia), Salmonella, Short bowel syndrome, Thyroid disease, Transient ischemic attack, UTI (urinary tract infection), and Wears partial dentures.  She  has a past surgical history that includes Hysterectomy; Abdominal surgery; Tonsillectomy; CTR; Tendon repair; Eye surgery; Meniscectomy; Colonoscopy; Adenoidectomy; Joint replacement; Colonoscopy (N/A, 8/24/2016); Mandible fracture surgery; Bilateral oophorectomy; Esophagogastroduodenoscopy (N/A, 8/29/2019); Colonoscopy (N/A, 8/29/2019); Colonoscopy (N/A, 11/22/2019); and Knee Arthroplasty (Left, 11/2/2020).    She  reports that she quit smoking about 49 years ago. She started smoking about 68 years ago. She has a 18.00 pack-year smoking history. She has never used smokeless tobacco. She reports current alcohol use of about 2.0 standard drinks of alcohol per week. She reports that she does not use drugs.  She family history includes Arthritis in her daughter; Asthma in her maternal grandfather; COPD in her father; Cancer in her father, paternal grandfather, paternal grandmother, and son;  "Diabetes in her brother, maternal grandmother, and mother; Heart disease in her brother, mother, son, and son; Hypertension in her daughter, son, son, and son; Melanoma in her paternal grandmother; Obesity in her son; Rheum arthritis in her father; Sinus disease in her son; Sleep apnea in her son; Stroke in her brother and father.    Review of patient's allergies indicates:   Allergen Reactions    Flagyl [metronidazole hcl] Diarrhea     SEVERE DIARRHEA    Latex, natural rubber Rash     ITCHING    Levaquin [levofloxacin] Diarrhea     MOOD CHANGES, WEAKNESS    Morphine Other (See Comments)     HALLUCINATES    Augmentin [amoxicillin-pot clavulanate] Other (See Comments)     AGITATION    Ciprofloxacin Anxiety    Codeine Nausea And Vomiting    Demerol [meperidine] Nausea And Vomiting     CAN TAKE WITH PHENERGAN    Dilaudid [hydromorphone (bulk)] Itching     OK WITH BENADRYL    Fentanyl Itching     OK WITH BENADRYL    Fosfomycin      diarrhia    Naproxen Other (See Comments)     HURT STOMACH    Nsaids (non-steroidal anti-inflammatory drug) Other (See Comments)     TOLD NOT TO TAKE AS HAD AN ULCER. HAD BLEEDING    Percocet [oxycodone-acetaminophen] Rash    Prednisone Other (See Comments)     AGITATION    Reglan [metoclopramide hcl] Anxiety    Sulfa (sulfonamide antibiotics) Rash    Vicodin [hydrocodone-acetaminophen] Rash    Zosyn [piperacillin-tazobactam] Rash    Ambien [zolpidem]     Ibuprofen     Pcn [penicillins]     Vasotec [enalapril maleate] Other (See Comments)     DRY COUGH       Tobacco Use: Medium Risk    Smoking Tobacco Use: Former Smoker    Smokeless Tobacco Use: Never Used     Physical Examination  Wt Readings from Last 3 Encounters:   05/27/22 63 kg (138 lb 14.2 oz)   03/18/22 63.3 kg (139 lb 8.8 oz)   12/23/21 62 kg (136 lb 11 oz)                Estimated body mass index is 20.51 kg/m² as calculated from the following:    Height as of this encounter: 5' 9" (1.753 m).    Weight as of " "this encounter: 63 kg (138 lb 14.2 oz).     General appearance: alert, cooperative, no distress  Neck: no thyromegaly, no neck stiffness  Lungs: clear to auscultation, no wheezes, rales or rhonchi, symmetric air entry  Heart: normal rate, regular rhythm, normal S1, S2, no murmurs, rubs, clicks or gallops  Abdomen: soft, nontender, nondistended, no rigidity, rebound, or guarding.   Back: no point tenderness over spine  Extremities: peripheral pulses normal, no unilateral leg swelling or calf tenderness   Neurological:alert, oriented, normal speech, no new focal findings or movement disorder noted from baseline    BP Readings from Last 3 Encounters:   05/27/22 128/60   03/18/22 138/79   12/23/21 135/84     /60 (BP Location: Left arm, Patient Position: Sitting, BP Method: Medium (Manual))   Pulse 71   Temp 99.2 °F (37.3 °C) (Oral)   Resp 16   Ht 5' 9" (1.753 m)   Wt 63 kg (138 lb 14.2 oz)   SpO2 95%   BMI 20.51 kg/m²    274}  Laboratory: I have reviewed old labs below:    274}    Lab Results   Component Value Date    WBC 6.33 03/09/2022    HGB 12.3 03/09/2022    HCT 38.0 03/09/2022     (H) 03/09/2022     03/09/2022     12/23/2021    K 4.0 12/23/2021     (H) 12/23/2021    CALCIUM 9.1 12/23/2021    CO2 19 (L) 12/23/2021     12/23/2021    BUN 21 12/23/2021    CREATININE 0.9 12/23/2021    ANIONGAP 11 12/23/2021    ESTGFRAFRICA >60 12/23/2021    EGFRNONAA 57 (A) 12/23/2021    PROT 7.2 12/23/2021    ALBUMIN 4.0 12/23/2021    BILITOT 0.5 12/23/2021    ALKPHOS 84 12/23/2021    ALT 38 12/23/2021    AST 29 12/23/2021    INR 1.0 01/07/2020    CHOL 140 03/30/2021    TRIG 136 03/30/2021    HDL 58 03/30/2021    LDLCALC 54.8 (L) 03/30/2021    TSH 7.007 (H) 03/09/2022     Lab reviewed by me: Particular labs of significance that I will monitor, workup, or treat to improve are mentioned below in diagnostic impression remarks.    Imaging/EKG: I have reviewed the pertinent results and my " "findings are noted in remarks.  274}    CC:   Chief Complaint   Patient presents with    Establish Care    Thyroid Problem        274}    Assessment/Plan  Lorena Vallejo is a 89 y.o. female who presents to clinic with:  1. Healthcare maintenance    2. Primary hypertension    3. Mixed hyperlipidemia    4. Acquired hypothyroidism    5. Stage 3a chronic kidney disease    6. Hoarseness    7. Abnormal finding of blood chemistry, unspecified    8. Reflux gastritis    9. Chronic bronchitis with COPD (chronic obstructive pulmonary disease)    10. Paroxysmal supraventricular tachycardia    11. Insomnia due to medical condition       274}  Diagnostic Impression Remarks + HPI     Documentation entered by me for this encounter may have been done in part using speech-recognition technology. Although I have made an effort to ensure accuracy, "sound like" errors may exist and should be interpreted in context.      Hypothyroidism-control uncertain has labile thyroid levels for years and reports she takes in the morning with no other foods consistently and has been compliant on this.  Patient has been taking the supplement biotin which is very well known to interfere with if the test assays on measuring it thus we will hold the biotin for 1 week and then recheck the thyroid levels and adjust thyroid based on this.   hypertension-well controlled continue current meds   Hyperlipidemia-stable has low levels recheck discussed risk and benefits of statin since she has had a previous stroke in the past and she wants to hold off on this   COPD-stable no shortness of breath monitor   SVT-well controlled on medications she reports she had some episodes years ago but none recently very well control is not want to be on anticoagulation or any blood thinner recommend follow-up with Cardiology will put in referral   Reflux gastritis-worse controlled can continue PPI   Insomnia-needs improvement is taking mirtazapine but has had some weight " "gain and does help with sleep will try some supplements 1st can try apigenin and mag threonate and will monitor for this not improve we could try trazodone       The patient's chronic kidney disease stage 3 was monitored, evaluated, addressed and/or treated.    Regarding CKD, the patient was educated on etiology (including infections, diabetes, high blood pressure, kidney stones, circulation problems, and reactions to medications) and the importance on making healthy changes and complying with treatment plan to prevent kidney failure.   Patient was advised to limit sodium intake by:  eating 1,500 mg or less of sodium daily; limiting processed foods (i.e., frozen dinners and packaged meals, canned fish and meats, pickled foods, salted snacks, lunch meats, most cheeses, and fast foods); not adding salt to food while cooking or before eating at the table; eating unprocessed foods to lower the sodium intake (i.e., fresh turkey and chicken, lean beef, unsalted tuna, fresh fish, and fresh vegetables and fruits); seasoning foods with fresh herbs, garlic, onions, citrus, flavored vinegar, and sodium-free spice blends instead of salt when cooking;  avoiding drinking "softened" water, because of the sodium content; and avoiding over-the-counter medicines that contain sodium bicarbonate or sodium carbonate.  Patient was also instructed to: avoid becoming overly fatigued; getting plenty of rest and more sleep at night; moving around and bending legs to avoid getting blood clots when resting for long periods of time; taking medications as directed; keeping all medical appointments; taking steps to control high blood pressure and diabetes; and by obtaining a daily weight and keeping a record of those weights for healthcare provider to review.  The patient was instructed to contact primary care provider or seek medical care at the closest emergency department if they experience: difficulty eating or drinking; weight loss of more " than 2 pounds in 24 hours or more than 5 pounds in 7 days; little or no urine output; trouble breathing; muscle aches;  fever of 100.4°F or higher, or chills; blood in urine or stool; bloody discharge from the nose, mouth, or ears; severe headache or a seizure; vomiting; swelling of legs or ankles, and/or chest pain.      Altogether 62 minutes were spent  over half of which was spent in counseling on diagnosis, prognosis, and treatment options.I also counseled her on common and the most usual side effect of prescribed medications. Risk and benefits of the medication were also discussed. I reviewed previous notes to better coordinate patient's care. She test results and lifestyle changes were also discussed. All questions were answered, and patient voiced understanding.     This is the extent of the patient's concerns at this present time. She did not feel chest pain upon exertion, dyspnea, nausea, vomiting, diaphoresis, and syncope. No pleuritic chest pain, unilateral leg swelling, calf tenderness, or calf pain.     Lorena will return to clinic in a few months for further workup and reassessment or sooner as needed. She was instructed to call the clinic or go to the emergency department if her symptoms do not improve, worsens, or if new symptoms develop. As we discussed that symptoms could worsen over the next 24 hours she was advised that if any increased swelling, pain, or numbness arise to go immediately to the ED. Patient knows to call any time if an emergency arises. Shared decision making occurred and she verbalized understanding in agreement with this plan.     I discussed imaging findings, diagnosis, possibilities, treatment options, medications, risks, and benefits. She had many questions regarding the options and long-term effects. All questions were answered. She expressed understanding after counseling regarding the diagnosis and recommendations. She was capable and demonstrated competence with  understanding of these options. Shared decision making was performed resulting in her choosing the current treatment plan. Patient handout was given with instructions and recommendations. Advised the patient that if they become pregnant to alert us immediately to assess for medication changes.     I also discussed the importance of close follow up to discuss labs, change or modify her medications if needed, monitor side effects, and further evaluation of medical problems.     Additional workup planned: see labs ordered below.    See below for labs and meds ordered with associated diagnosis      1. Healthcare maintenance    2. Primary hypertension  - Comprehensive Metabolic Panel; Future    3. Mixed hyperlipidemia    4. Acquired hypothyroidism  - TSH; Future    5. Stage 3a chronic kidney disease    6. Hoarseness  - pantoprazole (PROTONIX) 40 MG tablet; 1/2 p.o. b.i.d. with meals  Dispense: 90 tablet; Refill: 3    7. Abnormal finding of blood chemistry, unspecified  - CBC Auto Differential; Future  - Comprehensive Metabolic Panel; Future  - Hemoglobin A1C; Future  - Lipid Panel; Future  - Vitamin B12 Deficiency Panel; Future    8. Reflux gastritis  - pantoprazole (PROTONIX) 40 MG tablet; 1/2 p.o. b.i.d. with meals  Dispense: 90 tablet; Refill: 3    9. Chronic bronchitis with COPD (chronic obstructive pulmonary disease)    10. Paroxysmal supraventricular tachycardia  - Ambulatory referral/consult to Cardiology; Future    11. Insomnia due to medical condition    Medication List with Changes/Refills   Current Medications    BIFIDOBACTERIUM INFANTIS 4 MG CAP    Take 4 mg by mouth once daily.    CALCIUM CARBONATE (OS-RAMONITA) 500 MG CALCIUM (1,250 MG) TABLET        COENZYME Q10 100 MG CAPSULE        CYANOCOBALAMIN, VITAMIN B-12, 5,000 MCG SUBL    Place 5,000 mcg under the tongue every Mon, Wed, Fri.    DILTIAZEM (TIAZAC) 240 MG CS24    TAKE 1 CAPSULE EVERY DAY    ERGOCALCIFEROL, VITAMIN D2, (VITAMIN D2 ORAL)        FLUTICASONE  PROPIONATE (FLONASE) 50 MCG/ACTUATION NASAL SPRAY    1 spray (50 mcg total) by Each Nostril route once daily.    KRILL  MG CAP    Take 500 mg by mouth every evening.    LEVOTHYROXINE (SYNTHROID, LEVOTHROID) 175 MCG TABLET    Take 1 tablet (175 mcg total) by mouth before breakfast. Synthroid brand only    MAGNESIUM OXIDE (MAG-OX) 400 MG TABLET    Take 400 mg by mouth every evening.     METOPROLOL SUCCINATE (TOPROL-XL) 25 MG 24 HR TABLET    Take 1 tablet (25 mg total) by mouth once daily.    OMEGA 3-DHA-EPA-FISH OIL 1,000 MG (120 MG-180 MG) CAP        PROPAFENONE (RHTHYMOL) 150 MG TAB    TAKE 1 TABLET EVERY 8 HOURS    VIT C/E/ZN/COPPR/LUTEIN/ZEAXAN (PRESERVISION AREDS 2 ORAL)    Take 1 capsule by mouth 2 (two) times daily.    Changed and/or Refilled Medications    Modified Medication Previous Medication    LOPERAMIDE (IMODIUM) 2 MG CAPSULE loperamide (IMODIUM) 2 mg capsule       Take 1 capsule (2 mg total) by mouth 3 (three) times daily.    TAKE 1 CAPSULE (2 MG TOTAL) BY MOUTH 4 (FOUR) TIMES DAILY AS NEEDED FOR DIARRHEA.    PANTOPRAZOLE (PROTONIX) 40 MG TABLET pantoprazole (PROTONIX) 40 MG tablet       1/2 p.o. b.i.d. with meals    1/2 p.o. b.i.d. with meals    SERTRALINE (ZOLOFT) 50 MG TABLET sertraline (ZOLOFT) 50 MG tablet       Take 1 tablet (50 mg total) by mouth once daily.    Take 50 mg by mouth once daily.   Discontinued Medications    ASPIRIN (ECOTRIN) 81 MG EC TABLET        CALCIUM CARBONATE (OS-RAMONITA) 500 MG CALCIUM (1,250 MG) TABLET    Take 1 tablet by mouth 2 (two) times daily.    MIRTAZAPINE (REMERON) 7.5 MG TAB    Take 1 tablet (7.5 mg total) by mouth every evening.    VITAMIN D 1000 UNITS TAB    Take 1,000 Units by mouth once daily.     Modified Medications    Modified Medication Previous Medication    LOPERAMIDE (IMODIUM) 2 MG CAPSULE loperamide (IMODIUM) 2 mg capsule       Take 1 capsule (2 mg total) by mouth 3 (three) times daily.    TAKE 1 CAPSULE (2 MG TOTAL) BY MOUTH 4 (FOUR) TIMES DAILY AS  NEEDED FOR DIARRHEA.    PANTOPRAZOLE (PROTONIX) 40 MG TABLET pantoprazole (PROTONIX) 40 MG tablet       1/2 p.o. b.i.d. with meals    1/2 p.o. b.i.d. with meals    SERTRALINE (ZOLOFT) 50 MG TABLET sertraline (ZOLOFT) 50 MG tablet       Take 1 tablet (50 mg total) by mouth once daily.    Take 50 mg by mouth once daily.       Lee Menon MD   274}  05/27/2022     This note was completed with dictation software and grammatical errors may exist.    If you are due for any health screening(s) below please notify me so we can arrange them to be ordered and scheduled to maintain your health.     Health Maintenance Due   Topic Date Due    TETANUS VACCINE  08/09/2021

## 2022-05-27 NOTE — PATIENT INSTRUCTIONS
Av Carrillo,     If you are due for any health screening(s) below please notify me so we can arrange them to be ordered and scheduled to maintain your health. Most healthy patients complete it. Don't lose out on improving your health.     Health Maintenance   Topic Date Due    TETANUS VACCINE  08/09/2021    Colonoscopy  11/22/2022    Lipid Panel  03/30/2026

## 2022-05-30 ENCOUNTER — TELEPHONE (OUTPATIENT)
Dept: FAMILY MEDICINE | Facility: CLINIC | Age: 87
End: 2022-05-30
Payer: MEDICARE

## 2022-05-30 DIAGNOSIS — K29.60 REFLUX GASTRITIS: Primary | ICD-10-CM

## 2022-05-30 RX ORDER — PANTOPRAZOLE SODIUM 20 MG/1
20 TABLET, DELAYED RELEASE ORAL
Qty: 180 TABLET | Refills: 1 | Status: SHIPPED | OUTPATIENT
Start: 2022-05-30 | End: 2022-08-17

## 2022-05-30 NOTE — TELEPHONE ENCOUNTER
Lee Menon MD  You 36 minutes ago (2:35 PM)         Thanks sent in 20 mg tablets to the pharmacy    Message text

## 2022-05-30 NOTE — TELEPHONE ENCOUNTER
"Received fax from [facility] pharmacy that states " Rx for pantoprazole DR 40 mg tab with directions to cut this tablet. This medication is not designed to be split abd cutting may alter its release. Please clarify the directions and strength."  Please advise  "

## 2022-05-31 ENCOUNTER — TELEPHONE (OUTPATIENT)
Dept: CARDIOLOGY | Facility: CLINIC | Age: 87
End: 2022-05-31

## 2022-05-31 NOTE — TELEPHONE ENCOUNTER
----- Message from Yolanda Castillo sent at 5/31/2022 12:06 PM CDT -----  Contact: pt  Type: Needs Medical Advice         Who Called: pt  Best Call Back Number:[phone]  Additional Information: Requesting a call back regarding  pt is needing a appt for Paroxysmal supraventricular tachycardia. Pt has a referral. Pt is asking to see Dr Whitley because her  is a pt of his. Pt is willing to see a different provider if she can be seen sooner also.    Please Advise- Thank you

## 2022-06-03 ENCOUNTER — LAB VISIT (OUTPATIENT)
Dept: LAB | Facility: HOSPITAL | Age: 87
End: 2022-06-03
Attending: STUDENT IN AN ORGANIZED HEALTH CARE EDUCATION/TRAINING PROGRAM
Payer: MEDICARE

## 2022-06-03 ENCOUNTER — TELEPHONE (OUTPATIENT)
Dept: CARDIOLOGY | Facility: CLINIC | Age: 87
End: 2022-06-03
Payer: MEDICARE

## 2022-06-03 DIAGNOSIS — R79.9 ABNORMAL FINDING OF BLOOD CHEMISTRY, UNSPECIFIED: ICD-10-CM

## 2022-06-03 DIAGNOSIS — I10 PRIMARY HYPERTENSION: ICD-10-CM

## 2022-06-03 DIAGNOSIS — E03.9 ACQUIRED HYPOTHYROIDISM: ICD-10-CM

## 2022-06-03 LAB
ALBUMIN SERPL BCP-MCNC: 4 G/DL (ref 3.5–5.2)
ALP SERPL-CCNC: 70 U/L (ref 55–135)
ALT SERPL W/O P-5'-P-CCNC: 26 U/L (ref 10–44)
ANION GAP SERPL CALC-SCNC: 9 MMOL/L (ref 8–16)
AST SERPL-CCNC: 21 U/L (ref 10–40)
BASOPHILS # BLD AUTO: 0.05 K/UL (ref 0–0.2)
BASOPHILS NFR BLD: 0.8 % (ref 0–1.9)
BILIRUB SERPL-MCNC: 0.5 MG/DL (ref 0.1–1)
BUN SERPL-MCNC: 23 MG/DL (ref 8–23)
CALCIUM SERPL-MCNC: 9.5 MG/DL (ref 8.7–10.5)
CHLORIDE SERPL-SCNC: 107 MMOL/L (ref 95–110)
CHOLEST SERPL-MCNC: 153 MG/DL (ref 120–199)
CHOLEST/HDLC SERPL: 2.9 {RATIO} (ref 2–5)
CO2 SERPL-SCNC: 24 MMOL/L (ref 23–29)
CREAT SERPL-MCNC: 1 MG/DL (ref 0.5–1.4)
DIFFERENTIAL METHOD: ABNORMAL
EOSINOPHIL # BLD AUTO: 0.2 K/UL (ref 0–0.5)
EOSINOPHIL NFR BLD: 4 % (ref 0–8)
ERYTHROCYTE [DISTWIDTH] IN BLOOD BY AUTOMATED COUNT: 13.9 % (ref 11.5–14.5)
EST. GFR  (AFRICAN AMERICAN): 57.7 ML/MIN/1.73 M^2
EST. GFR  (NON AFRICAN AMERICAN): 50.1 ML/MIN/1.73 M^2
ESTIMATED AVG GLUCOSE: 100 MG/DL (ref 68–131)
GLUCOSE SERPL-MCNC: 96 MG/DL (ref 70–110)
HBA1C MFR BLD: 5.1 % (ref 4–5.6)
HCT VFR BLD AUTO: 38.1 % (ref 37–48.5)
HDLC SERPL-MCNC: 53 MG/DL (ref 40–75)
HDLC SERPL: 34.6 % (ref 20–50)
HGB BLD-MCNC: 12.1 G/DL (ref 12–16)
IMM GRANULOCYTES # BLD AUTO: 0.02 K/UL (ref 0–0.04)
IMM GRANULOCYTES NFR BLD AUTO: 0.3 % (ref 0–0.5)
LDLC SERPL CALC-MCNC: 67.2 MG/DL (ref 63–159)
LYMPHOCYTES # BLD AUTO: 3 K/UL (ref 1–4.8)
LYMPHOCYTES NFR BLD: 49.2 % (ref 18–48)
MCH RBC QN AUTO: 31.3 PG (ref 27–31)
MCHC RBC AUTO-ENTMCNC: 31.8 G/DL (ref 32–36)
MCV RBC AUTO: 98 FL (ref 82–98)
MONOCYTES # BLD AUTO: 0.7 K/UL (ref 0.3–1)
MONOCYTES NFR BLD: 12.3 % (ref 4–15)
NEUTROPHILS # BLD AUTO: 2 K/UL (ref 1.8–7.7)
NEUTROPHILS NFR BLD: 33.4 % (ref 38–73)
NONHDLC SERPL-MCNC: 100 MG/DL
NRBC BLD-RTO: 0 /100 WBC
PLATELET # BLD AUTO: 257 K/UL (ref 150–450)
PMV BLD AUTO: 9.7 FL (ref 9.2–12.9)
POTASSIUM SERPL-SCNC: 4.3 MMOL/L (ref 3.5–5.1)
PROT SERPL-MCNC: 7.1 G/DL (ref 6–8.4)
RBC # BLD AUTO: 3.87 M/UL (ref 4–5.4)
SODIUM SERPL-SCNC: 140 MMOL/L (ref 136–145)
T4 FREE SERPL-MCNC: 0.86 NG/DL (ref 0.71–1.51)
TRIGL SERPL-MCNC: 164 MG/DL (ref 30–150)
TSH SERPL DL<=0.005 MIU/L-ACNC: 8.87 UIU/ML (ref 0.4–4)
WBC # BLD AUTO: 6 K/UL (ref 3.9–12.7)

## 2022-06-03 PROCEDURE — 84443 ASSAY THYROID STIM HORMONE: CPT | Performed by: STUDENT IN AN ORGANIZED HEALTH CARE EDUCATION/TRAINING PROGRAM

## 2022-06-03 PROCEDURE — 83036 HEMOGLOBIN GLYCOSYLATED A1C: CPT | Performed by: STUDENT IN AN ORGANIZED HEALTH CARE EDUCATION/TRAINING PROGRAM

## 2022-06-03 PROCEDURE — 85025 COMPLETE CBC W/AUTO DIFF WBC: CPT | Performed by: STUDENT IN AN ORGANIZED HEALTH CARE EDUCATION/TRAINING PROGRAM

## 2022-06-03 PROCEDURE — 82607 VITAMIN B-12: CPT | Performed by: STUDENT IN AN ORGANIZED HEALTH CARE EDUCATION/TRAINING PROGRAM

## 2022-06-03 PROCEDURE — 84439 ASSAY OF FREE THYROXINE: CPT | Performed by: STUDENT IN AN ORGANIZED HEALTH CARE EDUCATION/TRAINING PROGRAM

## 2022-06-03 PROCEDURE — 80061 LIPID PANEL: CPT | Performed by: STUDENT IN AN ORGANIZED HEALTH CARE EDUCATION/TRAINING PROGRAM

## 2022-06-03 PROCEDURE — 80053 COMPREHEN METABOLIC PANEL: CPT | Performed by: STUDENT IN AN ORGANIZED HEALTH CARE EDUCATION/TRAINING PROGRAM

## 2022-06-03 NOTE — TELEPHONE ENCOUNTER
----- Message from Yogi Yost MA sent at 6/3/2022 10:13 AM CDT -----  Contact: PERLITA CHOUDHARY [[id number]]    Type:  Sooner Appointment Request    Caller is requesting a sooner appointment.  Caller declined first available appointment listed below.  Caller will not accept being placed on the waitlist and is requesting a message be sent to doctor.    Name of Caller:  PERLITA CHOUDHARY [[id number]]  When is the first available appointment?  N/a  Symptoms:  irregular heartbeats , tachycardia   Best Call Back Number:  [phone]  Additional Information:  new pt

## 2022-06-06 ENCOUNTER — PATIENT MESSAGE (OUTPATIENT)
Dept: FAMILY MEDICINE | Facility: CLINIC | Age: 87
End: 2022-06-06
Payer: MEDICARE

## 2022-06-06 DIAGNOSIS — E03.9 ACQUIRED HYPOTHYROIDISM: Primary | ICD-10-CM

## 2022-06-06 RX ORDER — LEVOTHYROXINE SODIUM 200 UG/1
200 TABLET ORAL
Qty: 30 TABLET | Refills: 2 | Status: SHIPPED | OUTPATIENT
Start: 2022-06-06 | End: 2024-01-08

## 2022-06-07 LAB — VIT B12 SERPL-MCNC: >1400 NG/L (ref 180–914)

## 2022-08-16 DIAGNOSIS — K29.60 REFLUX GASTRITIS: ICD-10-CM

## 2022-08-16 NOTE — TELEPHONE ENCOUNTER
No new care gaps identified.  [facility] Embedded Care Gaps. Reference number: [id number]. 8/16/2022   1:33:23 PM CDT

## 2022-08-17 RX ORDER — PANTOPRAZOLE SODIUM 20 MG/1
TABLET, DELAYED RELEASE ORAL
Qty: 180 TABLET | Refills: 2 | Status: SHIPPED | OUTPATIENT
Start: 2022-08-17

## 2022-08-17 RX ORDER — LOPERAMIDE HYDROCHLORIDE 2 MG/1
CAPSULE ORAL
Qty: 270 CAPSULE | Refills: 1 | Status: SHIPPED | OUTPATIENT
Start: 2022-08-17

## 2022-08-17 NOTE — TELEPHONE ENCOUNTER
Refill Routing Note   Medication(s) are not appropriate for processing by [facility] for the following reason(s):      - Outside of protocol  - Medication is a new start (<3 months)    [facility] action(s):  Route  Defer          Medication reconciliation completed: No     Appointments  past 12m or future 3m with PCP    Date Provider   Last Visit   5/27/2022 Lee Menon MD   Next Visit   12/15/2022 Lee Menon MD   ED visits in past 90 days: 0        Note composed:12:43 PM 08/17/2022

## 2022-09-16 ENCOUNTER — PATIENT MESSAGE (OUTPATIENT)
Dept: FAMILY MEDICINE | Facility: CLINIC | Age: 87
End: 2022-09-16
Payer: MEDICARE

## 2022-09-16 ENCOUNTER — TELEPHONE (OUTPATIENT)
Dept: FAMILY MEDICINE | Facility: CLINIC | Age: 87
End: 2022-09-16
Payer: MEDICARE

## 2022-09-26 ENCOUNTER — IMMUNIZATION (OUTPATIENT)
Dept: PRIMARY CARE CLINIC | Facility: CLINIC | Age: 87
End: 2022-09-26
Payer: MEDICARE

## 2022-09-26 DIAGNOSIS — Z23 NEED FOR VACCINATION: Primary | ICD-10-CM

## 2022-09-26 PROCEDURE — 91312 COVID-19, MRNA, LNP-S, BIVALENT BOOSTER, PF, 30 MCG/0.3 ML DOSE: CPT | Mod: S$GLB,,, | Performed by: FAMILY MEDICINE

## 2022-09-26 PROCEDURE — 0124A COVID-19, MRNA, LNP-S, BIVALENT BOOSTER, PF, 30 MCG/0.3 ML DOSE: ICD-10-PCS | Mod: S$GLB,,, | Performed by: FAMILY MEDICINE

## 2022-09-26 PROCEDURE — 91312 COVID-19, MRNA, LNP-S, BIVALENT BOOSTER, PF, 30 MCG/0.3 ML DOSE: ICD-10-PCS | Mod: S$GLB,,, | Performed by: FAMILY MEDICINE

## 2022-09-26 PROCEDURE — 0124A COVID-19, MRNA, LNP-S, BIVALENT BOOSTER, PF, 30 MCG/0.3 ML DOSE: CPT | Mod: S$GLB,,, | Performed by: FAMILY MEDICINE

## 2022-09-27 ENCOUNTER — TELEPHONE (OUTPATIENT)
Dept: CARDIOLOGY | Facility: CLINIC | Age: 87
End: 2022-09-27
Payer: MEDICARE

## 2022-09-27 NOTE — TELEPHONE ENCOUNTER
We have to cancel Thursday appointment due to Dr Alexandru Whitley being on call at [facility]

## 2022-09-27 NOTE — TELEPHONE ENCOUNTER
We had to cancel Thursdays appointment due to Dr. Alexandru carrion being on call at [facility]

## 2022-11-01 ENCOUNTER — PES CALL (OUTPATIENT)
Dept: ADMINISTRATIVE | Facility: CLINIC | Age: 87
End: 2022-11-01
Payer: MEDICARE

## 2022-11-29 ENCOUNTER — PES CALL (OUTPATIENT)
Dept: ADMINISTRATIVE | Facility: CLINIC | Age: 87
End: 2022-11-29
Payer: MEDICARE

## 2022-12-06 DIAGNOSIS — N64.4 PAIN OF LEFT BREAST: Primary | ICD-10-CM

## 2022-12-08 ENCOUNTER — PES CALL (OUTPATIENT)
Dept: ADMINISTRATIVE | Facility: CLINIC | Age: 87
End: 2022-12-08
Payer: MEDICARE

## 2023-01-10 ENCOUNTER — HOSPITAL ENCOUNTER (OUTPATIENT)
Dept: RADIOLOGY | Facility: HOSPITAL | Age: 88
Discharge: HOME | End: 2023-01-10
Attending: INTERNAL MEDICINE
Payer: MEDICARE

## 2023-01-10 DIAGNOSIS — N64.4 PAIN OF LEFT BREAST: ICD-10-CM

## 2023-01-10 PROCEDURE — 77062 BREAST TOMOSYNTHESIS BI: CPT | Mod: TC,PO

## 2023-01-10 PROCEDURE — 76642 ULTRASOUND BREAST LIMITED: CPT | Mod: TC,PO,LT

## 2023-01-11 ENCOUNTER — TELEPHONE (OUTPATIENT)
Dept: CARDIOLOGY | Facility: CLINIC | Age: 88
End: 2023-01-11
Payer: MEDICARE

## 2023-01-11 NOTE — TELEPHONE ENCOUNTER
----- Message from Adwoa Galdamez sent at 1/11/2023  9:39 AM CST -----  Contact: Self  Type:  Sooner Appointment Request    Caller is requesting a sooner appointment.  Caller declined first available appointment listed below.  Caller will not accept being placed on the waitlist and is requesting a message be sent to doctor.    Name of Caller:  Patient  When is the first available appointment?  N/A  Symptoms:  Est Care/ referral for Paroxysmal supraventricular tachycardia  Best Call Back Number:  [phone]  Additional Information:  Thank You

## 2023-01-19 ENCOUNTER — OFFICE VISIT (OUTPATIENT)
Dept: CARDIOLOGY | Facility: CLINIC | Age: 88
End: 2023-01-19
Payer: MEDICARE

## 2023-01-19 VITALS
DIASTOLIC BLOOD PRESSURE: 80 MMHG | BODY MASS INDEX: 19.99 KG/M2 | OXYGEN SATURATION: 98 % | RESPIRATION RATE: 16 BRPM | HEART RATE: 73 BPM | SYSTOLIC BLOOD PRESSURE: 124 MMHG | WEIGHT: 135 LBS | HEIGHT: 69 IN

## 2023-01-19 DIAGNOSIS — I25.118 ATHEROSCLEROTIC HEART DISEASE OF NATIVE CORONARY ARTERY WITH OTHER FORMS OF ANGINA PECTORIS: ICD-10-CM

## 2023-01-19 DIAGNOSIS — I10 PRIMARY HYPERTENSION: ICD-10-CM

## 2023-01-19 DIAGNOSIS — E78.2 MIXED HYPERLIPIDEMIA: ICD-10-CM

## 2023-01-19 DIAGNOSIS — N18.31 STAGE 3A CHRONIC KIDNEY DISEASE: Chronic | ICD-10-CM

## 2023-01-19 DIAGNOSIS — I47.10 PAROXYSMAL SUPRAVENTRICULAR TACHYCARDIA: ICD-10-CM

## 2023-01-19 DIAGNOSIS — E03.9 ACQUIRED HYPOTHYROIDISM: ICD-10-CM

## 2023-01-19 PROCEDURE — 99999 PR PBB SHADOW E&M-EST. PATIENT-LVL IV: CPT | Mod: PBBFAC,,, | Performed by: INTERNAL MEDICINE

## 2023-01-19 PROCEDURE — 1101F PT FALLS ASSESS-DOCD LE1/YR: CPT | Mod: CPTII,S$GLB,, | Performed by: INTERNAL MEDICINE

## 2023-01-19 PROCEDURE — 1159F MED LIST DOCD IN RCRD: CPT | Mod: CPTII,S$GLB,, | Performed by: INTERNAL MEDICINE

## 2023-01-19 PROCEDURE — 99999 PR PBB SHADOW E&M-EST. PATIENT-LVL IV: ICD-10-PCS | Mod: PBBFAC,,, | Performed by: INTERNAL MEDICINE

## 2023-01-19 PROCEDURE — 1126F AMNT PAIN NOTED NONE PRSNT: CPT | Mod: CPTII,S$GLB,, | Performed by: INTERNAL MEDICINE

## 2023-01-19 PROCEDURE — 93000 EKG 12-LEAD: ICD-10-PCS | Mod: S$GLB,,, | Performed by: INTERNAL MEDICINE

## 2023-01-19 PROCEDURE — 93000 ELECTROCARDIOGRAM COMPLETE: CPT | Mod: S$GLB,,, | Performed by: INTERNAL MEDICINE

## 2023-01-19 PROCEDURE — 99204 OFFICE O/P NEW MOD 45 MIN: CPT | Mod: S$GLB,,, | Performed by: INTERNAL MEDICINE

## 2023-01-19 PROCEDURE — 99204 PR OFFICE/OUTPT VISIT, NEW, LEVL IV, 45-59 MIN: ICD-10-PCS | Mod: S$GLB,,, | Performed by: INTERNAL MEDICINE

## 2023-01-19 PROCEDURE — 1159F PR MEDICATION LIST DOCUMENTED IN MEDICAL RECORD: ICD-10-PCS | Mod: CPTII,S$GLB,, | Performed by: INTERNAL MEDICINE

## 2023-01-19 PROCEDURE — 1160F PR REVIEW ALL MEDS BY PRESCRIBER/CLIN PHARMACIST DOCUMENTED: ICD-10-PCS | Mod: CPTII,S$GLB,, | Performed by: INTERNAL MEDICINE

## 2023-01-19 PROCEDURE — 1126F PR PAIN SEVERITY QUANTIFIED, NO PAIN PRESENT: ICD-10-PCS | Mod: CPTII,S$GLB,, | Performed by: INTERNAL MEDICINE

## 2023-01-19 PROCEDURE — 3288F FALL RISK ASSESSMENT DOCD: CPT | Mod: CPTII,S$GLB,, | Performed by: INTERNAL MEDICINE

## 2023-01-19 PROCEDURE — 1160F RVW MEDS BY RX/DR IN RCRD: CPT | Mod: CPTII,S$GLB,, | Performed by: INTERNAL MEDICINE

## 2023-01-19 PROCEDURE — 3288F PR FALLS RISK ASSESSMENT DOCUMENTED: ICD-10-PCS | Mod: CPTII,S$GLB,, | Performed by: INTERNAL MEDICINE

## 2023-01-19 PROCEDURE — 1101F PR PT FALLS ASSESS DOC 0-1 FALLS W/OUT INJ PAST YR: ICD-10-PCS | Mod: CPTII,S$GLB,, | Performed by: INTERNAL MEDICINE

## 2023-01-19 RX ORDER — OMEPRAZOLE 40 MG/1
20 CAPSULE, DELAYED RELEASE ORAL DAILY
COMMUNITY

## 2023-01-19 RX ORDER — MIRTAZAPINE 7.5 MG/1
1 TABLET, FILM COATED ORAL NIGHTLY
COMMUNITY
Start: 2022-06-27 | End: 2024-01-08

## 2023-01-19 RX ORDER — NAPROXEN SODIUM 220 MG/1
81 TABLET, FILM COATED ORAL DAILY
COMMUNITY

## 2023-01-19 NOTE — ASSESSMENT & PLAN NOTE
She has no further clinical episodes of PSVT.  Continue on Rythmol 150 mg 3 times daily.  And continue on diltiazem 240 mg once a day and she is also on low doses of Toprol-XL 25 mg daily in addition to magnesium oxide 400 mg a day encouraged her to continue the same obtain echocardiogram for LV function assessment chamber sizes and valvular morphology.

## 2023-01-19 NOTE — ASSESSMENT & PLAN NOTE
Her lipid levels are fairly stable maintain on Omega 3 fatty acids continue diet and exercise   Latest Reference Range & Units 06/03/22 09:35   Cholesterol 120 - 199 mg/dL 153   HDL 40 - 75 mg/dL 53   HDL/Cholesterol Ratio 20.0 - 50.0 % 34.6   LDL Cholesterol External 63.0 - 159.0 mg/dL 67.2   Non-HDL Cholesterol mg/dL 100   Total Cholesterol/HDL Ratio 2.0 - 5.0  2.9   Triglycerides 30 - 150 mg/dL 164 (H)   (H): Data is abnormally high

## 2023-01-19 NOTE — ASSESSMENT & PLAN NOTE
She has history of thyroid dysfunction currently on levothyroxine of 225 mcg for 5 days and then 175 mcg on Saturdays and Sundays

## 2023-01-19 NOTE — PROGRESS NOTES
Subjective:    Patient ID:  Lorena Vallejo is a 89 y.o. female patient here for evaluation Establish Care and Tachycardia      History of Present Illness:     Patient is 89-year-old lady who has history of paroxysmal supraventricular tachycardia is seeking to reestablish cardiovascular care with me.  She had an episode which appears to be in January of 2020 perhaps and this was worked up at that time subsequently she was placed on Rythmol with reasonable control of her symptoms.  She is seen Dr. Benjy Sepulveda, and since his USP she is seeking to reestablish cardiovascular care with me   Patient does admit to have some fatigue and tiredness because of her disequilibrium she does not exercise a whole lot.  But no exertional angina noted no arm neck or jaw pain and no significant swelling in the lower extremities.  She does have some dry cough with a tickle in her throat.  She did have COVID infection despite being vaccinated.  No symptoms of PND or orthopnea noted.  She does have some inclined to the head of the bed      Review of patient's allergies indicates:   Allergen Reactions    Flagyl [metronidazole hcl] Diarrhea     SEVERE DIARRHEA    Latex, natural rubber Rash     ITCHING    Levaquin [levofloxacin] Diarrhea     MOOD CHANGES, WEAKNESS    Morphine Other (See Comments)     HALLUCINATES    Augmentin [amoxicillin-pot clavulanate] Other (See Comments)     AGITATION    Ciprofloxacin Anxiety    Codeine Nausea And Vomiting    Demerol [meperidine] Nausea And Vomiting     CAN TAKE WITH PHENERGAN    Dilaudid [hydromorphone (bulk)] Itching     OK WITH BENADRYL    Fentanyl Itching     OK WITH BENADRYL    Fosfomycin      diarrhia    Naproxen Other (See Comments)     HURT STOMACH    Nsaids (non-steroidal anti-inflammatory drug) Other (See Comments)     TOLD NOT TO TAKE AS HAD AN ULCER. HAD BLEEDING    Percocet [oxycodone-acetaminophen] Rash    Prednisone Other (See Comments)     AGITATION    Reglan [metoclopramide hcl]  Anxiety    Sulfa (sulfonamide antibiotics) Rash    Vicodin [hydrocodone-acetaminophen] Rash    Zosyn [piperacillin-tazobactam] Rash    Ambien [zolpidem]     Ibuprofen     Pcn [penicillins]     Vasotec [enalapril maleate] Other (See Comments)     DRY COUGH       Past Medical History:   Diagnosis Date    Arthritis     Blood transfusion     Bowel obstruction     X 3    Cancer     MELANOMA RIGHT EYE    Cancer of eyeball, except conjunctiva, cornea, retina, and choroid right    Depression     Diverticulitis     GERD (gastroesophageal reflux disease)     Hypertension     Melanoma     right eye    Peripheral neuropathy     PSVT (paroxysmal supraventricular tachycardia)     HAD X 3 OVER 10 YRS. LAST 7-25-12. HAD ADENOSINE IN [facility] ER. WAS ON INCREASED THYROID MED. NO PROBLEM SINCE    Salmonella     Short bowel syndrome     Thyroid disease     Transient ischemic attack     UTI (urinary tract infection)     Wears partial dentures     PARTIAL BOTTOM     Past Surgical History:   Procedure Laterality Date    ABDOMINAL SURGERY      X 3. ILEUM REMOVED. OSTOMY PLACED AND CLOSED.    ADENOIDECTOMY      BILATERAL OOPHORECTOMY      COLONOSCOPY      COLONOSCOPY N/A 8/24/2016    Procedure: COLONOSCOPY;  Surgeon: Alex Corral MD;  Location: [facility];  Service: Endoscopy;  Laterality: N/A;    COLONOSCOPY N/A 8/29/2019    Procedure: COLONOSCOPY;  Surgeon: Tiffanie Vázquez MD;  Location: [facility] ENDO;  Service: Endoscopy;  Laterality: N/A;    COLONOSCOPY N/A 11/22/2019    Procedure: COLONOSCOPY;  Surgeon: Tiffanie Vázquez MD;  Location: [facility] ENDO;  Service: Endoscopy;  Laterality: N/A;    CTR      BILAT    ESOPHAGOGASTRODUODENOSCOPY N/A 8/29/2019    Procedure: EGD (ESOPHAGOGASTRODUODENOSCOPY);  Surgeon: Tiffanie Vázquez MD;  Location: [facility];  Service: Endoscopy;  Laterality: N/A;    EYE SURGERY      RIGHT  - LASER MELANOMA; BILAT CATARACT    HYSTERECTOMY      BSO    JOINT REPLACEMENT      RIGHT KNEE    KNEE ARTHROPLASTY Left 11/2/2020     Procedure: ARTHROPLASTY, KNEE;  Surgeon: Eduardo Haider MD;  Location: [facility];  Service: Orthopedics;  Laterality: Left;  Giovany Doll    MANDIBLE FRACTURE SURGERY      MENISCECTOMY      MEDIAL AND LATERAL REPAIR, BAKERS CYST    TENDON REPAIR      LEFT THUMB    TONSILLECTOMY      T AND A     Social History     Tobacco Use    Smoking status: Former     Packs/day: 1.00     Years: 18.00     Pack years: 18.00     Types: Cigarettes     Start date:      Quit date: 1973     Years since quittin.9    Smokeless tobacco: Never   Substance Use Topics    Alcohol use: Yes     Alcohol/week: 2.0 standard drinks     Types: 2 Glasses of wine per week     Comment: WINE once per day    Drug use: No        Review of Systems   As noted in HPI in addition     Constitutional: Negative for chills, positive fatigue and negative for fever.    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     Respiratory: Negative for cough, shortness of breath and wheezing.    Cardiovascular: Negative for chest pain. Negative for palpitations and leg swelling.   Gastrointestinal: Negative for abdominal pain, No melena, diarrhea, nausea and vomiting.   Genitourinary: Negative for dysuria and frequency, Negative for hematuria  Skin: Negative for bruising, Negative for edema or discoloration noted.   Endocrine: Negative for polyphagia, Negative for heat intolerance, Negative for cold intolerance  Psychiatric: Negative for depression, Negative for anxiety, Negative for memory loss  Musculoskeletal: Negative for neck pain, Negative for muscle weakness, Negative for back pain          Objective        Vitals:    23 1606   BP: 124/80   Pulse: 73   Resp: 16       LIPIDS - LAST 2   Lab Results   Component Value Date    CHOL 153 2022    CHOL 140 2021    HDL 53 2022    HDL 58 2021    LDLCALC 67.2  06/03/2022    LDLCALC 54.8 (L) 03/30/2021    TRIG 164 (H) 06/03/2022    TRIG 136 03/30/2021    CHOLHDL 34.6 06/03/2022    CHOLHDL 41.4 03/30/2021       CBC - LAST 2  Lab Results   Component Value Date    WBC 6.00 06/03/2022    WBC 6.33 03/09/2022    RBC 3.87 (L) 06/03/2022    RBC 3.82 (L) 03/09/2022    HGB 12.1 06/03/2022    HGB 12.3 03/09/2022    HCT 38.1 06/03/2022    HCT 38.0 03/09/2022    MCV 98 06/03/2022     (H) 03/09/2022    MCH 31.3 (H) 06/03/2022    MCH 32.2 (H) 03/09/2022    MCHC 31.8 (L) 06/03/2022    MCHC 32.4 03/09/2022    RDW 13.9 06/03/2022    RDW 13.2 03/09/2022     06/03/2022     03/09/2022    MPV 9.7 06/03/2022    MPV 9.8 03/09/2022    GRAN 2.0 06/03/2022    GRAN 33.4 (L) 06/03/2022    LYMPH 3.0 06/03/2022    LYMPH 49.2 (H) 06/03/2022    MONO 0.7 06/03/2022    MONO 12.3 06/03/2022    BASO 0.05 06/03/2022    BASO 0.06 03/09/2022    NRBC 0 06/03/2022    NRBC 0 03/09/2022       CHEMISTRY & LIVER FUNCTION - LAST 2  Lab Results   Component Value Date     06/03/2022     12/23/2021    K 4.3 06/03/2022    K 4.0 12/23/2021     06/03/2022     (H) 12/23/2021    CO2 24 06/03/2022    CO2 19 (L) 12/23/2021    ANIONGAP 9 06/03/2022    ANIONGAP 11 12/23/2021    BUN 23 06/03/2022    BUN 21 12/23/2021    CREATININE 1.0 06/03/2022    CREATININE 0.9 12/23/2021    GLU 96 06/03/2022     12/23/2021    CALCIUM 9.5 06/03/2022    CALCIUM 9.1 12/23/2021    MG 2.1 01/09/2020    MG 2.0 01/08/2020    ALBUMIN 4.0 06/03/2022    ALBUMIN 4.0 12/23/2021    PROT 7.1 06/03/2022    PROT 7.2 12/23/2021    ALKPHOS 70 06/03/2022    ALKPHOS 84 12/23/2021    ALT 26 06/03/2022    ALT 38 12/23/2021    AST 21 06/03/2022    AST 29 12/23/2021    BILITOT 0.5 06/03/2022    BILITOT 0.5 12/23/2021        CARDIAC PROFILE - LAST 2  Lab Results   Component Value Date    BNP 87 10/16/2019    CPK 86 01/07/2020    CPK 85 01/07/2020    CPKMB 3.5 01/07/2020    CPKMB 3.1 01/07/2020    TROPONINI 0.034  01/08/2020    TROPONINI 0.034 01/08/2020        COAGULATION - LAST 2  Lab Results   Component Value Date    LABPT 12.3 01/07/2020    INR 1.0 01/07/2020    APTT 118.0 (H) 01/07/2020       ENDOCRINE & PSA - LAST 2  Lab Results   Component Value Date    HGBA1C 5.1 06/03/2022    TSH 8.874 (H) 06/03/2022    TSH 7.007 (H) 03/09/2022        ECHOCARDIOGRAM RESULTS  Results for orders placed during the hospital encounter of 08/29/18    Transthoracic echo (TTE) complete    Interpretation Summary  · Left ventricle ejection fraction is normal at 55%  · Normal LV diastolic function.  · Left ventricle shows mild concentric hypertrophy.  · The left ventricle cavity is small.  · RV systolic function is normal.  · Left atrium is mildly dilated.  · Right atrium is mildly dilated.  · Mitral valve shows trace regurgitation.  · There is mild stenosis of the Mitral Valve.  · Tricuspid valve shows mild regurgitation.  · Normal central venous pressure (3 mm Hg).  · The estimated PA systolic pressure is 18.21 mm Hg      CURRENT/PREVIOUS VISIT EKG  Results for orders placed or performed in visit on 12/23/21   EKG 12-lead    Collection Time: 12/23/21  1:10 PM    Narrative    Test Reason : R06.00,    Vent. Rate : 076 BPM     Atrial Rate : 076 BPM     P-R Int : 202 ms          QRS Dur : 078 ms      QT Int : 378 ms       P-R-T Axes : 059 001 038 degrees     QTc Int : 425 ms    Normal sinus rhythm  Normal ECG  When compared with ECG of 07-JAN-2020 09:26,  Questionable change in The axis due to lead misplacement.  Confirmed by Olvin Engle MD ([id number]) on 12/27/2021 9:59:09 PM    Referred By:             Confirmed By:Olvin Engle MD     No valid procedures specified.   Results for orders placed during the hospital encounter of 01/07/20    Treadmill Stress Test    Interpretation Summary    ECG Stress Nuclear portion of this study will be reported separately.    The EKG portion of this study is negative for ischemia.    The patient reported chest pain during  the stress test.    There were no arrhythmias during stress.    The blood pressure response to stress was normal.    No valid procedures specified.          PREVIOUS STRESS TEST              PREVIOUS ANGIOGRAM        PHYSICAL EXAM    GENERAL: well built, well nourished, well-developed in no apparent distress alert and oriented.   HEENT: Normocephalic. Pupils normal and conjunctivae normal.  Mucous membranes normal, no cyanosis or icterus, trachea central,no pallor or icterus is noted..   NECK: No JVD. No bruit..   THYROID: Thyroid not enlarged. No nodules present..   CARDIAC: Regular rate and rhythm. S1 is normal.S2 is normal.No gallops, soft systolic murmur noted lower left sternal border.     CHEST ANATOMY: normal.   LUNGS: Clear to auscultation. No wheezing or rhonchi..   ABDOMEN: Soft no masses or organomegaly.  No abdomen pulsations or bruits.  Normal bowel sounds. No pulsations and no masses felt, No guarding or rebound.   EXTREMITIES: No cyanosis, clubbing or edema noted at this time., no calf tenderness bilaterally.   PERIPHERAL VASCULAR SYSTEM: Good palpable distal pulses.   CENTRAL NERVOUS SYSTEM: No focal motor or sensory deficits noted.   SKIN: Skin without lesions, moist, well perfused.   MUSCLE STRENGTH & TONE: No noteable weakness, atrophy or abnormal movement.     I HAVE REVIEWED :    The vital signs, nurses notes, and all the pertinent radiology and labs.    01/19/2023 EKG shows normal sinus rhythm within normal limits QTC interval of 417 milliseconds with ventricular rate of 74 beats per minute.    Current Outpatient Medications   Medication Instructions    aspirin 81 mg, Oral, Daily    Bifidobacterium infantis 4 mg, Oral, Daily    calcium carbonate (OS-RAMONITA) 500 mg calcium (1,250 mg) tablet No dose, route, or frequency recorded.    coenzyme Q10 100 mg capsule No dose, route, or frequency recorded.    cyanocobalamin (vitamin B-12) 5,000 mcg, Sublingual, Every Mon, Wed, Fri    diltiaZEM (TIAZAC) 240  MG Cs24 TAKE 1 CAPSULE EVERY DAY    ergocalciferol, vitamin D2, (VITAMIN D2 ORAL) No dose, route, or frequency recorded.    fluticasone propionate (FLONASE) 50 mcg, Each Nostril, Daily    krill oil 500 mg, Oral, Nightly    levothyroxine (SYNTHROID) 200 mcg, Oral, Before breakfast    loperamide (IMODIUM) 2 mg capsule TAKE 1 CAPSULE THREE TIMES DAILY    magnesium oxide (MAG-OX) 400 mg, Oral, Nightly    metoprolol succinate (TOPROL-XL) 25 mg, Oral, Daily    mirtazapine (REMERON) 7.5 MG Tab 1 tablet, Oral, Nightly    omega 3-dha-epa-fish oil 1,000 mg (120 mg-180 mg) Cap No dose, route, or frequency recorded.    omeprazole (PRILOSEC) 20 mg, Oral, Daily    pantoprazole (PROTONIX) 20 MG tablet TAKE 1 TABLET TWICE DAILY BEFORE MEALS    propafenone (RHTHYMOL) 150 MG Tab TAKE 1 TABLET EVERY 8 HOURS    sertraline (ZOLOFT) 50 mg, Oral, Daily    VIT C/E/ZN/COPPR/LUTEIN/ZEAXAN (PRESERVISION AREDS 2 ORAL) 1 capsule, Oral, 2 times daily          Assessment & Plan     Atherosclerotic heart disease of native coronary artery with other forms of angina pectoris  Now with associated with symptoms of some progressive fatigue and tiredness recommend to obtain further cardiac evaluation.  Recommend a Lexiscan Myoview to evaluate for any progression of ischemic heart disease.  In the meanwhile continue on Toprol-XL 25 mg once daily and enteric-coated aspirin 81 mg a day.  Her lipid profile have been reasonably controlled previously and continue the same regimen.    Hyperlipidemia  Her lipid levels are fairly stable maintain on Omega 3 fatty acids continue diet and exercise   Latest Reference Range & Units 06/03/22 09:35   Cholesterol 120 - 199 mg/dL 153   HDL 40 - 75 mg/dL 53   HDL/Cholesterol Ratio 20.0 - 50.0 % 34.6   LDL Cholesterol External 63.0 - 159.0 mg/dL 67.2   Non-HDL Cholesterol mg/dL 100   Total Cholesterol/HDL Ratio 2.0 - 5.0  2.9   Triglycerides 30 - 150 mg/dL 164 (H)   (H): Data is abnormally high    Paroxysmal  supraventricular tachycardia  She has no further clinical episodes of PSVT.  Continue on Rythmol 150 mg 3 times daily.  And continue on diltiazem 240 mg once a day and she is also on low doses of Toprol-XL 25 mg daily in addition to magnesium oxide 400 mg a day encouraged her to continue the same obtain echocardiogram for LV function assessment chamber sizes and valvular morphology.    Primary hypertension  Blood pressure is stable on the present regimen 124/80 mm Hg maintain the same regimen    Chronic kidney disease, stage III (moderate)  Stable at this time recheck her laboratory profile in the future    Hypothyroidism, unspecified  She has history of thyroid dysfunction currently on levothyroxine of 225 mcg for 5 days and then 175 mcg on Saturdays and Sundays          Follow up in about 6 weeks (around 3/2/2023).

## 2023-01-19 NOTE — ASSESSMENT & PLAN NOTE
Now with associated with symptoms of some progressive fatigue and tiredness recommend to obtain further cardiac evaluation.  Recommend a Lexiscan Myoview to evaluate for any progression of ischemic heart disease.  In the meanwhile continue on Toprol-XL 25 mg once daily and enteric-coated aspirin 81 mg a day.  Her lipid profile have been reasonably controlled previously and continue the same regimen.

## 2023-02-02 ENCOUNTER — TELEPHONE (OUTPATIENT)
Dept: CARDIOLOGY | Facility: HOSPITAL | Age: 88
End: 2023-02-02

## 2023-02-02 NOTE — TELEPHONE ENCOUNTER
Left message on voicemail.     Patient advised, test will be at [facility] ([address]   Will need to register on the first floor at the main entrance.   Patient advised that arrival time is 6:50am.  Patient advised that she may be here about 3.5-4 hours, and may want to bring something to occupy their time, as there will be periods of waiting.    Patient advised, may take her medications prior to testing if you need to. Advised if she needs to eat to take her medications, please keep it light, like toast and juice.    Patient advised to avoid all caffeine 12 hours prior to testing.  This includes decaf tea and coffee.    Will provide peanut butter crackers for a snack after stress test.  If patient would prefer something else, please bring a snack from home.    Wear comfortable clothing.   No lotions, oils, or powders to the upper chest area. May wear deodorant.    No metal jewelry, buttons, or zippers to the upper body.  Advised to call the office if any questions.     Will send instructions via [facility] as well.

## 2023-02-03 ENCOUNTER — HOSPITAL ENCOUNTER (OUTPATIENT)
Dept: CARDIOLOGY | Facility: HOSPITAL | Age: 88
Discharge: HOME | End: 2023-02-03
Attending: INTERNAL MEDICINE
Payer: MEDICARE

## 2023-02-03 ENCOUNTER — HOSPITAL ENCOUNTER (OUTPATIENT)
Dept: RADIOLOGY | Facility: HOSPITAL | Age: 88
Discharge: HOME | End: 2023-02-03
Attending: INTERNAL MEDICINE
Payer: MEDICARE

## 2023-02-03 VITALS — WEIGHT: 135 LBS | BODY MASS INDEX: 19.99 KG/M2 | HEIGHT: 69 IN

## 2023-02-03 DIAGNOSIS — I25.118 ATHEROSCLEROTIC HEART DISEASE OF NATIVE CORONARY ARTERY WITH OTHER FORMS OF ANGINA PECTORIS: ICD-10-CM

## 2023-02-03 LAB
AORTIC ROOT ANNULUS: 3.1 CM
AORTIC VALVE CUSP SEPERATION: 1.9 CM
AV INDEX (PROSTH): 0.76
AV MEAN GRADIENT: 4 MMHG
AV PEAK GRADIENT: 8 MMHG
AV VALVE AREA: 2.4 CM2
AV VELOCITY RATIO: 0.75
BSA FOR ECHO PROCEDURE: 1.73 M2
CV ECHO LV RWT: 0.53 CM
CV PHARM DOSE: 0.4 MG
CV STRESS BASE HR: 72 BPM
DIASTOLIC BLOOD PRESSURE: 80 MMHG
DOP CALC AO PEAK VEL: 1.38 M/S
DOP CALC AO VTI: 26.3 CM
DOP CALC LVOT AREA: 3.1 CM2
DOP CALC LVOT DIAMETER: 2 CM
DOP CALC LVOT PEAK VEL: 1.04 M/S
DOP CALC LVOT STROKE VOLUME: 63.11 CM3
DOP CALCLVOT PEAK VEL VTI: 20.1 CM
E WAVE DECELERATION TIME: 201 MSEC
E/A RATIO: 0.6
E/E' RATIO: 7.33 M/S
ECHO LV POSTERIOR WALL: 0.99 CM (ref 0.6–1.1)
EJECTION FRACTION- HIGH: 65 %
EJECTION FRACTION: 60 %
END DIASTOLIC INDEX-HIGH: 153 ML/M2
END DIASTOLIC INDEX-LOW: 93 ML/M2
END SYSTOLIC INDEX-HIGH: 71 ML/M2
END SYSTOLIC INDEX-LOW: 31 ML/M2
FRACTIONAL SHORTENING: 29 % (ref 28–44)
INTERVENTRICULAR SEPTUM: 1.37 CM (ref 0.6–1.1)
IVRT: 121 MSEC
LEFT ATRIUM SIZE: 3.4 CM
LEFT INTERNAL DIMENSION IN SYSTOLE: 2.63 CM (ref 2.1–4)
LEFT VENTRICLE DIASTOLIC VOLUME INDEX: 33.43 ML/M2
LEFT VENTRICLE DIASTOLIC VOLUME: 58.5 ML
LEFT VENTRICLE MASS INDEX: 82 G/M2
LEFT VENTRICLE SYSTOLIC VOLUME INDEX: 14.5 ML/M2
LEFT VENTRICLE SYSTOLIC VOLUME: 25.3 ML
LEFT VENTRICULAR INTERNAL DIMENSION IN DIASTOLE: 3.71 CM (ref 3.5–6)
LEFT VENTRICULAR MASS: 144.19 G
LV LATERAL E/E' RATIO: 7.86 M/S
LV SEPTAL E/E' RATIO: 6.88 M/S
LVOT MG: 2 MMHG
LVOT MV: 0.68 CM/S
MV PEAK A VEL: 0.92 M/S
MV PEAK E VEL: 0.55 M/S
MV STENOSIS PRESSURE HALF TIME: 71 MS
MV VALVE AREA P 1/2 METHOD: 3.1 CM2
NUC REST DIASTOLIC VOLUME INDEX: 52
NUC REST EJECTION FRACTION: 77
NUC REST SYSTOLIC VOLUME INDEX: 12
NUC STRESS DIASTOLIC VOLUME INDEX: 36
NUC STRESS EJECTION FRACTION: 78 %
NUC STRESS SYSTOLIC VOLUME INDEX: 8
OHS CV CPX 1 MINUTE RECOVERY HEART RATE: 87 BPM
OHS CV CPX 85 PERCENT MAX PREDICTED HEART RATE MALE: 109
OHS CV CPX MAX PREDICTED HEART RATE: 128
OHS CV CPX PATIENT IS FEMALE: 1
OHS CV CPX PATIENT IS MALE: 0
OHS CV CPX PEAK DIASTOLIC BLOOD PRESSURE: 68 MMHG
OHS CV CPX PEAK HEAR RATE: 87 BPM
OHS CV CPX PEAK RATE PRESSURE PRODUCT: NORMAL
OHS CV CPX PEAK SYSTOLIC BLOOD PRESSURE: 136 MMHG
OHS CV CPX PERCENT MAX PREDICTED HEART RATE ACHIEVED: 68
OHS CV CPX RATE PRESSURE PRODUCT PRESENTING: NORMAL
PISA TR MAX VEL: 2.1 M/S
RA PRESSURE: 3 MMHG
RETIRED EF AND QEF - SEE NOTES: 53 %
RIGHT VENTRICULAR END-DIASTOLIC DIMENSION: 2.44 CM
SYSTOLIC BLOOD PRESSURE: 140 MMHG
TDI LATERAL: 0.07 M/S
TDI SEPTAL: 0.08 M/S
TDI: 0.08 M/S
TR MAX PG: 18 MMHG
TV REST PULMONARY ARTERY PRESSURE: 21 MMHG

## 2023-02-03 PROCEDURE — 93306 ECHO (CUPID ONLY): ICD-10-PCS | Mod: 26,,, | Performed by: INTERNAL MEDICINE

## 2023-02-03 PROCEDURE — 93018 NUCLEAR STRESS - CARDIOLOGY INTERPRETED (CUPID ONLY): ICD-10-PCS | Mod: ,,, | Performed by: INTERNAL MEDICINE

## 2023-02-03 PROCEDURE — 93018 CV STRESS TEST I&R ONLY: CPT | Mod: ,,, | Performed by: INTERNAL MEDICINE

## 2023-02-03 PROCEDURE — 78452 NUCLEAR STRESS - CARDIOLOGY INTERPRETED (CUPID ONLY): ICD-10-PCS | Mod: 26,,, | Performed by: INTERNAL MEDICINE

## 2023-02-03 PROCEDURE — 93016 NUCLEAR STRESS - CARDIOLOGY INTERPRETED (CUPID ONLY): ICD-10-PCS | Mod: ,,, | Performed by: NURSE PRACTITIONER

## 2023-02-03 PROCEDURE — A9502 TC99M TETROFOSMIN: HCPCS

## 2023-02-03 PROCEDURE — 78452 HT MUSCLE IMAGE SPECT MULT: CPT | Mod: 26,,, | Performed by: INTERNAL MEDICINE

## 2023-02-03 PROCEDURE — 93017 CV STRESS TEST TRACING ONLY: CPT

## 2023-02-03 PROCEDURE — 93016 CV STRESS TEST SUPVJ ONLY: CPT | Mod: ,,, | Performed by: NURSE PRACTITIONER

## 2023-02-03 PROCEDURE — 93306 TTE W/DOPPLER COMPLETE: CPT

## 2023-02-03 PROCEDURE — 93306 TTE W/DOPPLER COMPLETE: CPT | Mod: 26,,, | Performed by: INTERNAL MEDICINE

## 2023-02-03 RX ORDER — REGADENOSON 0.08 MG/ML
0.4 INJECTION, SOLUTION INTRAVENOUS ONCE
Status: COMPLETED | OUTPATIENT
Start: 2023-02-03 | End: 2023-02-03

## 2023-02-03 RX ADMIN — REGADENOSON 0.4 MG: 0.08 INJECTION, SOLUTION INTRAVENOUS at 09:02

## 2023-03-07 ENCOUNTER — OFFICE VISIT (OUTPATIENT)
Dept: CARDIOLOGY | Facility: CLINIC | Age: 88
End: 2023-03-07
Payer: MEDICARE

## 2023-03-07 VITALS
HEART RATE: 70 BPM | DIASTOLIC BLOOD PRESSURE: 80 MMHG | RESPIRATION RATE: 16 BRPM | WEIGHT: 132 LBS | SYSTOLIC BLOOD PRESSURE: 140 MMHG | BODY MASS INDEX: 19.55 KG/M2 | OXYGEN SATURATION: 92 % | HEIGHT: 69 IN

## 2023-03-07 DIAGNOSIS — E02 SUBCLINICAL IODINE-DEFICIENCY HYPOTHYROIDISM: ICD-10-CM

## 2023-03-07 DIAGNOSIS — I25.118 ATHEROSCLEROTIC HEART DISEASE OF NATIVE CORONARY ARTERY WITH OTHER FORMS OF ANGINA PECTORIS: ICD-10-CM

## 2023-03-07 DIAGNOSIS — I47.10 PAROXYSMAL SUPRAVENTRICULAR TACHYCARDIA: ICD-10-CM

## 2023-03-07 DIAGNOSIS — E78.2 MIXED HYPERLIPIDEMIA: ICD-10-CM

## 2023-03-07 DIAGNOSIS — I10 PRIMARY HYPERTENSION: ICD-10-CM

## 2023-03-07 PROCEDURE — 3288F FALL RISK ASSESSMENT DOCD: CPT | Mod: CPTII,S$GLB,, | Performed by: INTERNAL MEDICINE

## 2023-03-07 PROCEDURE — 3288F PR FALLS RISK ASSESSMENT DOCUMENTED: ICD-10-PCS | Mod: CPTII,S$GLB,, | Performed by: INTERNAL MEDICINE

## 2023-03-07 PROCEDURE — 1101F PT FALLS ASSESS-DOCD LE1/YR: CPT | Mod: CPTII,S$GLB,, | Performed by: INTERNAL MEDICINE

## 2023-03-07 PROCEDURE — 1160F PR REVIEW ALL MEDS BY PRESCRIBER/CLIN PHARMACIST DOCUMENTED: ICD-10-PCS | Mod: CPTII,S$GLB,, | Performed by: INTERNAL MEDICINE

## 2023-03-07 PROCEDURE — 1101F PR PT FALLS ASSESS DOC 0-1 FALLS W/OUT INJ PAST YR: ICD-10-PCS | Mod: CPTII,S$GLB,, | Performed by: INTERNAL MEDICINE

## 2023-03-07 PROCEDURE — 1126F PR PAIN SEVERITY QUANTIFIED, NO PAIN PRESENT: ICD-10-PCS | Mod: CPTII,S$GLB,, | Performed by: INTERNAL MEDICINE

## 2023-03-07 PROCEDURE — 1159F MED LIST DOCD IN RCRD: CPT | Mod: CPTII,S$GLB,, | Performed by: INTERNAL MEDICINE

## 2023-03-07 PROCEDURE — 99214 OFFICE O/P EST MOD 30 MIN: CPT | Mod: S$GLB,,, | Performed by: INTERNAL MEDICINE

## 2023-03-07 PROCEDURE — 99999 PR PBB SHADOW E&M-EST. PATIENT-LVL IV: ICD-10-PCS | Mod: PBBFAC,,, | Performed by: INTERNAL MEDICINE

## 2023-03-07 PROCEDURE — 1126F AMNT PAIN NOTED NONE PRSNT: CPT | Mod: CPTII,S$GLB,, | Performed by: INTERNAL MEDICINE

## 2023-03-07 PROCEDURE — 93000 ELECTROCARDIOGRAM COMPLETE: CPT | Mod: S$GLB,,, | Performed by: INTERNAL MEDICINE

## 2023-03-07 PROCEDURE — 99999 PR PBB SHADOW E&M-EST. PATIENT-LVL IV: CPT | Mod: PBBFAC,,, | Performed by: INTERNAL MEDICINE

## 2023-03-07 PROCEDURE — 93000 EKG 12-LEAD: ICD-10-PCS | Mod: S$GLB,,, | Performed by: INTERNAL MEDICINE

## 2023-03-07 PROCEDURE — 1160F RVW MEDS BY RX/DR IN RCRD: CPT | Mod: CPTII,S$GLB,, | Performed by: INTERNAL MEDICINE

## 2023-03-07 PROCEDURE — 1159F PR MEDICATION LIST DOCUMENTED IN MEDICAL RECORD: ICD-10-PCS | Mod: CPTII,S$GLB,, | Performed by: INTERNAL MEDICINE

## 2023-03-07 PROCEDURE — 99214 PR OFFICE/OUTPT VISIT, EST, LEVL IV, 30-39 MIN: ICD-10-PCS | Mod: S$GLB,,, | Performed by: INTERNAL MEDICINE

## 2023-03-07 NOTE — ASSESSMENT & PLAN NOTE
Statin intolerance noted maintain on low-fat low-cholesterol diet, maintain on omega-3 fish oil capsules

## 2023-03-07 NOTE — PROGRESS NOTES
Subjective:    Patient ID:  Lorena Vallejo is a 89 y.o. female patient here for evaluation Results (Stress and echo results) and Medication Reaction (She stopped her diltiazem, she started with a rash and itching. She restarted and the symptoms returned. She has since stopped the medication all together. She did increase her metoprolol to twice a day and her pressure is still a little elevated.)      History of Present Illness:     89-year-old who is here for follow-up evaluation had developed side effects to diltiazem she stopped it and because of itching and rash.  And she stop it and restarted again she symptoms return so she findings as of last week.  She has no occurrence of any angina neck or jaw pain pressure was slightly elevated and she is taken extra dose of metoprolol in the evening  Positive for chronic headaches does have chronic double vision problems with the ice beta being followed by ophthalmologist  She does have some postnasal drip cough in the back of the throat.      Review of patient's allergies indicates:   Allergen Reactions    Flagyl [metronidazole hcl] Diarrhea     SEVERE DIARRHEA    Latex, natural rubber Rash     ITCHING    Levaquin [levofloxacin] Diarrhea     MOOD CHANGES, WEAKNESS    Morphine Other (See Comments)     HALLUCINATES    Augmentin [amoxicillin-pot clavulanate] Other (See Comments)     AGITATION    Ciprofloxacin Anxiety    Codeine Nausea And Vomiting    Demerol [meperidine] Nausea And Vomiting     CAN TAKE WITH PHENERGAN    Dilaudid [hydromorphone (bulk)] Itching     OK WITH BENADRYL    Fentanyl Itching     OK WITH BENADRYL    Fosfomycin      diarrhia    Naproxen Other (See Comments)     HURT STOMACH    Nsaids (non-steroidal anti-inflammatory drug) Other (See Comments)     TOLD NOT TO TAKE AS HAD AN ULCER. HAD BLEEDING    Percocet [oxycodone-acetaminophen] Rash    Prednisone Other (See Comments)     AGITATION    Reglan [metoclopramide hcl] Anxiety    Sulfa (sulfonamide  antibiotics) Rash    Vicodin [hydrocodone-acetaminophen] Rash    Zosyn [piperacillin-tazobactam] Rash    Ambien [zolpidem]     Ibuprofen     Pcn [penicillins]     Vasotec [enalapril maleate] Other (See Comments)     DRY COUGH   Patient had developed some insomnia rash and itching secondary to diltiazem    Past Medical History:   Diagnosis Date    Arthritis     Blood transfusion     Bowel obstruction     X 3    Cancer     MELANOMA RIGHT EYE    Cancer of eyeball, except conjunctiva, cornea, retina, and choroid right    Depression     Diverticulitis     GERD (gastroesophageal reflux disease)     Hypertension     Melanoma     right eye    Peripheral neuropathy     PSVT (paroxysmal supraventricular tachycardia)     HAD X 3 OVER 10 YRS. LAST 7-25-12. HAD ADENOSINE IN [facility] ER. WAS ON INCREASED THYROID MED. NO PROBLEM SINCE    Salmonella     Short bowel syndrome     Thyroid disease     Transient ischemic attack     UTI (urinary tract infection)     Wears partial dentures     PARTIAL BOTTOM     Past Surgical History:   Procedure Laterality Date    ABDOMINAL SURGERY      X 3. ILEUM REMOVED. OSTOMY PLACED AND CLOSED.    ADENOIDECTOMY      BILATERAL OOPHORECTOMY      COLONOSCOPY      COLONOSCOPY N/A 8/24/2016    Procedure: COLONOSCOPY;  Surgeon: Alex Corral MD;  Location: [facility];  Service: Endoscopy;  Laterality: N/A;    COLONOSCOPY N/A 8/29/2019    Procedure: COLONOSCOPY;  Surgeon: Tiffanie Vázquez MD;  Location: [facility] ENDO;  Service: Endoscopy;  Laterality: N/A;    COLONOSCOPY N/A 11/22/2019    Procedure: COLONOSCOPY;  Surgeon: Tiffanie Vázquez MD;  Location: [facility] ENDO;  Service: Endoscopy;  Laterality: N/A;    CTR      BILAT    ESOPHAGOGASTRODUODENOSCOPY N/A 8/29/2019    Procedure: EGD (ESOPHAGOGASTRODUODENOSCOPY);  Surgeon: Tiffanie Vázquez MD;  Location: [facility];  Service: Endoscopy;  Laterality: N/A;    EYE SURGERY      RIGHT  - LASER MELANOMA; BILAT CATARACT    HYSTERECTOMY      BSO    JOINT REPLACEMENT       RIGHT KNEE    KNEE ARTHROPLASTY Left 2020    Procedure: ARTHROPLASTY, KNEE;  Surgeon: Eduardo Haider MD;  Location: [facility];  Service: Orthopedics;  Laterality: Left;  Giovany Doll    MANDIBLE FRACTURE SURGERY      MENISCECTOMY      MEDIAL AND LATERAL REPAIR, BAKERS CYST    TENDON REPAIR      LEFT THUMB    TONSILLECTOMY      T AND A     Social History     Tobacco Use    Smoking status: Former     Packs/day: 1.00     Years: 18.00     Pack years: 18.00     Types: Cigarettes     Start date:      Quit date: 1973     Years since quittin.0    Smokeless tobacco: Never   Substance Use Topics    Alcohol use: Yes     Alcohol/week: 2.0 standard drinks     Types: 2 Glasses of wine per week     Comment: WINE once per day    Drug use: No        Review of Systems:    As noted in HPI in addition      REVIEW OF SYSTEMS  CARDIOVASCULAR: No recent chest pain, palpitations, arm, neck, or jaw pain  RESPIRATORY: No recent fever, cough chills, SOB or congestion  : No blood in the urine  GI: No Nausea, vomiting, constipation, diarrhea, blood, or reflux.  MUSCULOSKELETAL: No myalgias  NEURO: No lightheadedness or dizziness  EYES: No Double vision, blurry, vision or headache              Objective        Vitals:    23 1016   BP: (!) 140/80   Pulse: 70   Resp: 16       LIPIDS - LAST 2   Lab Results   Component Value Date    CHOL 153 2022    CHOL 140 2021    HDL 53 2022    HDL 58 2021    LDLCALC 67.2 2022    LDLCALC 54.8 (L) 2021    TRIG 164 (H) 2022    TRIG 136 2021    CHOLHDL 34.6 2022    CHOLHDL 41.4 2021       CBC - LAST 2  Lab Results   Component Value Date    WBC 6.00 2022    WBC 6.33 2022    RBC 3.87 (L) 2022    RBC 3.82 (L) 2022    HGB 12.1 2022    HGB 12.3 2022    HCT 38.1 2022    HCT 38.0 2022    MCV 98 2022     (H) 2022    MCH 31.3 (H) 2022    MCH 32.2 (H) 2022    [facility]  31.8 (L) 06/03/2022    MCHC 32.4 03/09/2022    RDW 13.9 06/03/2022    RDW 13.2 03/09/2022     06/03/2022     03/09/2022    MPV 9.7 06/03/2022    MPV 9.8 03/09/2022    GRAN 2.0 06/03/2022    GRAN 33.4 (L) 06/03/2022    LYMPH 3.0 06/03/2022    LYMPH 49.2 (H) 06/03/2022    MONO 0.7 06/03/2022    MONO 12.3 06/03/2022    BASO 0.05 06/03/2022    BASO 0.06 03/09/2022    NRBC 0 06/03/2022    NRBC 0 03/09/2022       CHEMISTRY & LIVER FUNCTION - LAST 2  Lab Results   Component Value Date     06/03/2022     12/23/2021    K 4.3 06/03/2022    K 4.0 12/23/2021     06/03/2022     (H) 12/23/2021    CO2 24 06/03/2022    CO2 19 (L) 12/23/2021    ANIONGAP 9 06/03/2022    ANIONGAP 11 12/23/2021    BUN 23 06/03/2022    BUN 21 12/23/2021    CREATININE 1.0 06/03/2022    CREATININE 0.9 12/23/2021    GLU 96 06/03/2022     12/23/2021    CALCIUM 9.5 06/03/2022    CALCIUM 9.1 12/23/2021    MG 2.1 01/09/2020    MG 2.0 01/08/2020    ALBUMIN 4.0 06/03/2022    ALBUMIN 4.0 12/23/2021    PROT 7.1 06/03/2022    PROT 7.2 12/23/2021    ALKPHOS 70 06/03/2022    ALKPHOS 84 12/23/2021    ALT 26 06/03/2022    ALT 38 12/23/2021    AST 21 06/03/2022    AST 29 12/23/2021    BILITOT 0.5 06/03/2022    BILITOT 0.5 12/23/2021        CARDIAC PROFILE - LAST 2  Lab Results   Component Value Date    BNP 87 10/16/2019    CPK 86 01/07/2020    CPK 85 01/07/2020    CPKMB 3.5 01/07/2020    CPKMB 3.1 01/07/2020    TROPONINI 0.034 01/08/2020    TROPONINI 0.034 01/08/2020        COAGULATION - LAST 2  Lab Results   Component Value Date    LABPT 12.3 01/07/2020    INR 1.0 01/07/2020    APTT 118.0 (H) 01/07/2020       ENDOCRINE & PSA - LAST 2  Lab Results   Component Value Date    HGBA1C 5.1 06/03/2022    TSH 8.874 (H) 06/03/2022    TSH 7.007 (H) 03/09/2022        ECHOCARDIOGRAM RESULTS  Results for orders placed during the hospital encounter of 02/03/23    Echo    Interpretation Summary  · The left ventricle is normal in size with  mild concentric hypertrophy and normal systolic function.  · The estimated ejection fraction is 60%.  · Normal left ventricular diastolic function.  · Normal right ventricular size with normal right ventricular systolic function.  · Normal central venous pressure (3 mmHg).  · The estimated PA systolic pressure is 21 mmHg.      CURRENT/PREVIOUS VISIT EKG  Results for orders placed or performed in visit on 01/19/23   IN OFFICE EKG 12-LEAD (to [place])    Collection Time: 01/19/23  4:15 PM    Narrative    Test Reason : I25.118,I47.1,I10,    Vent. Rate : 074 BPM     Atrial Rate : 074 BPM     P-R Int : 198 ms          QRS Dur : 076 ms      QT Int : 376 ms       P-R-T Axes : 097 002 043 degrees     QTc Int : 417 ms    Normal sinus rhythm  Normal ECG  When compared with ECG of 23-DEC-2021 13:10,  No significant change was found  Confirmed by Bj Whitley MD ([id number]) on 1/29/2023 2:04:27 PM    Referred By: JENN HOWARD           Confirmed By:jB Whitley MD     No valid procedures specified.   Results for orders placed during the hospital encounter of 02/03/23    Nuclear Stress - Cardiology Interpreted    Interpretation Summary    Normal myocardial perfusion scan. There is no evidence of myocardial ischemia or infarction.    There is a mild intensity perfusion abnormality in the anteroapical wall of the left ventricle, secondary to breast attenuation.    The gated perfusion images showed an ejection fraction of 77% at rest. The gated perfusion images showed an ejection fraction of 78% post stress. Normal ejection fraction is greater than 53%.    There is normal wall motion at rest and post stress.    LV cavity size is normal at rest and normal at stress.    The ECG portion of the study is negative for ischemia.    The patient reported chest pain during the stress test.    There were no arrhythmias during stress.    No valid procedures specified.    PHYSICAL EXAM  CONSTITUTIONAL: Well built, well nourished in no  apparent distress  NECK: no carotid bruit, no JVD  LUNGS:  Fairly good air exchange this point no wheezing noted.    CHEST WALL: no tenderness  HEART: regular rate and rhythm, S1, S2 normal, no murmur, click, rub or gallop   ABDOMEN: soft, non-tender; bowel sounds normal; no masses,  no organomegaly  EXTREMITIES: Extremities normal, no edema, no calf tenderness noted  NEURO: AAO X 3    I HAVE REVIEWED :    The vital signs, nurses notes, and all the pertinent radiology and labs.        Current Outpatient Medications   Medication Instructions    aspirin 81 mg, Oral, Daily    Bifidobacterium infantis 4 mg, Oral, Daily    calcium carbonate (OS-RAMONITA) 500 mg calcium (1,250 mg) tablet No dose, route, or frequency recorded.    coenzyme Q10 100 mg capsule No dose, route, or frequency recorded.    cyanocobalamin (vitamin B-12) 5,000 mcg, Sublingual, Every Mon, Wed, Fri    diltiaZEM (TIAZAC) 240 MG Cs24 TAKE 1 CAPSULE EVERY DAY    ergocalciferol, vitamin D2, (VITAMIN D2 ORAL) No dose, route, or frequency recorded.    fluticasone propionate (FLONASE) 50 mcg, Each Nostril, Daily    krill oil 500 mg, Oral, Nightly    levothyroxine (SYNTHROID) 200 mcg, Oral, Before breakfast    loperamide (IMODIUM) 2 mg capsule TAKE 1 CAPSULE THREE TIMES DAILY    magnesium oxide (MAG-OX) 400 mg, Oral, Nightly    metoprolol succinate (TOPROL-XL) 25 mg, Oral, Daily    mirtazapine (REMERON) 7.5 MG Tab 1 tablet, Oral, Nightly    omega 3-dha-epa-fish oil 1,000 mg (120 mg-180 mg) Cap No dose, route, or frequency recorded.    omeprazole (PRILOSEC) 20 mg, Oral, Daily    pantoprazole (PROTONIX) 20 MG tablet TAKE 1 TABLET TWICE DAILY BEFORE MEALS    propafenone (RHTHYMOL) 150 MG Tab TAKE 1 TABLET EVERY 8 HOURS    sertraline (ZOLOFT) 50 mg, Oral, Daily    VIT C/E/ZN/COPPR/LUTEIN/ZEAXAN (PRESERVISION AREDS 2 ORAL) 1 capsule, Oral, 2 times daily          Assessment & Plan     Atherosclerotic heart disease of native coronary artery with other forms of angina  pectoris  Continue on risk factor modification maintain on enteric-coated aspirin 81 mg daily  She has statin intolerance.    Hyperlipidemia  Statin intolerance noted maintain on low-fat low-cholesterol diet, maintain on omega-3 fish oil capsules    Paroxysmal supraventricular tachycardia  No sustained palpitations are noted.  Continue on Toprol-XL 25 mg twice daily, continue magnesium oxide 400 mg daily.  She is on Rythmol 150 mg 3 times daily.  Maintain the    Hypothyroidism, unspecified  Continue on levothyroxine he is on 225 mg Monday through Friday 175 mg Saturday and Sunday.  Continue the same          Follow up in about 6 months (around 9/7/2023).

## 2023-03-07 NOTE — ASSESSMENT & PLAN NOTE
As we are stopping the diltiazem out encourage her to take metoprolol succinate 37.5 mg twice daily.  Maintain on low-salt diet

## 2023-03-07 NOTE — ASSESSMENT & PLAN NOTE
Continue on levothyroxine he is on 225 mg Monday through Friday 175 mg Saturday and Sunday.  Continue the same

## 2023-03-07 NOTE — ASSESSMENT & PLAN NOTE
No sustained palpitations are noted.  Continue on Toprol-XL 25 mg twice daily, continue magnesium oxide 400 mg daily.  She is on Rythmol 150 mg 3 times daily.  Maintain the

## 2023-03-07 NOTE — ASSESSMENT & PLAN NOTE
Continue on risk factor modification maintain on enteric-coated aspirin 81 mg daily  She has statin intolerance.

## 2023-04-24 ENCOUNTER — OFFICE VISIT (OUTPATIENT)
Dept: PULMONOLOGY | Facility: CLINIC | Age: 88
End: 2023-04-24
Payer: MEDICARE

## 2023-04-24 VITALS
OXYGEN SATURATION: 95 % | HEIGHT: 69 IN | WEIGHT: 130.75 LBS | SYSTOLIC BLOOD PRESSURE: 137 MMHG | BODY MASS INDEX: 19.37 KG/M2 | HEART RATE: 77 BPM | DIASTOLIC BLOOD PRESSURE: 85 MMHG

## 2023-04-24 DIAGNOSIS — I63.9 CEREBELLAR INFARCT: Primary | ICD-10-CM

## 2023-04-24 DIAGNOSIS — J84.9 ILD (INTERSTITIAL LUNG DISEASE): ICD-10-CM

## 2023-04-24 DIAGNOSIS — R53.1 WEAKNESS: ICD-10-CM

## 2023-04-24 DIAGNOSIS — R06.09 DOE (DYSPNEA ON EXERTION): ICD-10-CM

## 2023-04-24 PROCEDURE — 1159F MED LIST DOCD IN RCRD: CPT | Mod: CPTII,S$GLB,, | Performed by: INTERNAL MEDICINE

## 2023-04-24 PROCEDURE — 3288F PR FALLS RISK ASSESSMENT DOCUMENTED: ICD-10-PCS | Mod: CPTII,S$GLB,, | Performed by: INTERNAL MEDICINE

## 2023-04-24 PROCEDURE — 99214 PR OFFICE/OUTPT VISIT, EST, LEVL IV, 30-39 MIN: ICD-10-PCS | Mod: S$GLB,,, | Performed by: INTERNAL MEDICINE

## 2023-04-24 PROCEDURE — 1159F PR MEDICATION LIST DOCUMENTED IN MEDICAL RECORD: ICD-10-PCS | Mod: CPTII,S$GLB,, | Performed by: INTERNAL MEDICINE

## 2023-04-24 PROCEDURE — 99214 OFFICE O/P EST MOD 30 MIN: CPT | Mod: S$GLB,,, | Performed by: INTERNAL MEDICINE

## 2023-04-24 PROCEDURE — 1101F PR PT FALLS ASSESS DOC 0-1 FALLS W/OUT INJ PAST YR: ICD-10-PCS | Mod: CPTII,S$GLB,, | Performed by: INTERNAL MEDICINE

## 2023-04-24 PROCEDURE — 99999 PR PBB SHADOW E&M-EST. PATIENT-LVL V: CPT | Mod: PBBFAC,,, | Performed by: INTERNAL MEDICINE

## 2023-04-24 PROCEDURE — 1126F AMNT PAIN NOTED NONE PRSNT: CPT | Mod: CPTII,S$GLB,, | Performed by: INTERNAL MEDICINE

## 2023-04-24 PROCEDURE — 99999 PR PBB SHADOW E&M-EST. PATIENT-LVL V: ICD-10-PCS | Mod: PBBFAC,,, | Performed by: INTERNAL MEDICINE

## 2023-04-24 PROCEDURE — 1101F PT FALLS ASSESS-DOCD LE1/YR: CPT | Mod: CPTII,S$GLB,, | Performed by: INTERNAL MEDICINE

## 2023-04-24 PROCEDURE — 1126F PR PAIN SEVERITY QUANTIFIED, NO PAIN PRESENT: ICD-10-PCS | Mod: CPTII,S$GLB,, | Performed by: INTERNAL MEDICINE

## 2023-04-24 PROCEDURE — 3288F FALL RISK ASSESSMENT DOCD: CPT | Mod: CPTII,S$GLB,, | Performed by: INTERNAL MEDICINE

## 2023-04-24 RX ORDER — ACETAMINOPHEN 500 MG
500 TABLET ORAL EVERY 6 HOURS PRN
COMMUNITY

## 2023-04-24 NOTE — PROGRESS NOTES
4/24/2023    [facility] Follow Up    Chief Complaint   Patient presents with    Shortness of Breath     Increased shortness breath since having Covid in December        HPI:    2/24/2023-- pt was doing well til Nov 2022 when had covid - no hosp nor resp c/o-- developed orourke, no sob rest. No chest pain, coughs NP, cough may be reflux -- uses protonix bid and cut back due to sore mouth but had less hoarsenss.  Had had severe constant rhinnitis since November.   No wheezes, no noct worsening..    Pt had stress test 2/2023 with no issues stess echo/nuclear---sees Dr Hawthorne pcp -- recent blood wk good.    Covid was mainly with severe weakness and head symptoms -- lasted 3 wks.        12/10/2023 pt went home post knee- got off ox quickly.      From consult 11/5/2020 -    History of Present Illness:   Pt admitted with tka left  Knee on 11/2. Was on rm air on 11/4 but now needs oxygen 2-3 lpm to keep sat over 90.   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 No swallow nor Raynauld's problems.  djd problems.   Pt saw Dr Pagan for ild in past but dismissed after couple month f/u. Pt active - yd wk.  Declines cough. Did smoke 20 py.   I see her  as pt.   Pt hypothryoid by tsh 10/9/2020.    Plan:   Chr ild, could be early RA but seems unlikely, check ccp and rf.   She has component copd and bronchiectasis.   Hypoxic resp failure from multitude problems including fat embolism and atelectasis.   outpt in am on ox needed.  resp rx may trigger svt?     Need office f/u 6 wks- check rf and ccp in am.    doubt will have problems with progressive ild ?   Needs f/u .  The chief compliant  problem is varies with instablilty at time       Patient Instructions   Diaphragm high on right but moves well- no concern.    Rheumatoid looks to have involved upper lungs mildly.  No progression suggested over last 2 yrs.  Do not expect worsening.      Would recommend follow  up for symptoms- rheumatoid lung usually very gradual disease, moves slowly.    PFSH:  Past Medical History:   Diagnosis Date    Arthritis     Blood transfusion     Bowel obstruction     X 3    Cancer     MELANOMA RIGHT EYE    Cancer of eyeball, except conjunctiva, cornea, retina, and choroid right    Depression     Diverticulitis     GERD (gastroesophageal reflux disease)     Hypertension     Melanoma     right eye    Peripheral neuropathy     PSVT (paroxysmal supraventricular tachycardia)     HAD X 3 OVER 10 YRS. LAST 7-25-12. HAD ADENOSINE IN [facility] ER. WAS ON INCREASED THYROID MED. NO PROBLEM SINCE    Salmonella     Short bowel syndrome     Thyroid disease     Transient ischemic attack     UTI (urinary tract infection)     Wears partial dentures     PARTIAL BOTTOM         Past Surgical History:   Procedure Laterality Date    ABDOMINAL SURGERY      X 3. ILEUM REMOVED. OSTOMY PLACED AND CLOSED.    ADENOIDECTOMY      BILATERAL OOPHORECTOMY      COLONOSCOPY      COLONOSCOPY N/A 8/24/2016    Procedure: COLONOSCOPY;  Surgeon: Alex Corral MD;  Location: [facility];  Service: Endoscopy;  Laterality: N/A;    COLONOSCOPY N/A 8/29/2019    Procedure: COLONOSCOPY;  Surgeon: Tiffanie Vázquez MD;  Location: [facility] ENDO;  Service: Endoscopy;  Laterality: N/A;    COLONOSCOPY N/A 11/22/2019    Procedure: COLONOSCOPY;  Surgeon: Tiffanie Vázquez MD;  Location: [facility] ENDO;  Service: Endoscopy;  Laterality: N/A;    CTR      BILAT    ESOPHAGOGASTRODUODENOSCOPY N/A 8/29/2019    Procedure: EGD (ESOPHAGOGASTRODUODENOSCOPY);  Surgeon: Tiffanie Vázquez MD;  Location: [facility];  Service: Endoscopy;  Laterality: N/A;    EYE SURGERY      RIGHT  - LASER MELANOMA; BILAT CATARACT    HYSTERECTOMY      BSO    JOINT REPLACEMENT      RIGHT KNEE    KNEE ARTHROPLASTY Left 11/2/2020    Procedure: ARTHROPLASTY, KNEE;  Surgeon: Eduardo Haider MD;  Location: [facility];  Service: Orthopedics;  Laterality: Left;  Giovany Doll    MANDIBLE FRACTURE SURGERY       MENISCECTOMY      MEDIAL AND LATERAL REPAIR, BAKERS CYST    TENDON REPAIR      LEFT THUMB    TONSILLECTOMY      T AND A     Social History     Tobacco Use    Smoking status: Former     Packs/day: 1.00     Years: 18.00     Pack years: 18.00     Types: Cigarettes     Start date:      Quit date: 1973     Years since quittin.2    Smokeless tobacco: Never   Substance Use Topics    Alcohol use: Yes     Alcohol/week: 2.0 standard drinks     Types: 2 Glasses of wine per week     Comment: WINE once per day    Drug use: No     Family History   Problem Relation Age of Onset    Diabetes Mother     Heart disease Mother         MI at 52    Stroke Father     Cancer Father         colon cancer    Rheum arthritis Father     COPD Father     Heart disease Brother     Stroke Brother     Diabetes Brother     Arthritis Daughter     Hypertension Daughter     Cancer Son         Leukemia    Hypertension Son     Heart disease Son     Diabetes Maternal Grandmother     Asthma Maternal Grandfather     Cancer Paternal Grandmother         melanoma    Melanoma Paternal Grandmother     Cancer Paternal Grandfather         lip cancer - smoked pipe    Sleep apnea Son     Hypertension Son     Sinus disease Son     Hypertension Son     Heart disease Son     Obesity Son      Review of patient's allergies indicates:   Allergen Reactions    Flagyl [metronidazole hcl] Diarrhea     SEVERE DIARRHEA    Latex, natural rubber Rash     ITCHING    Levaquin [levofloxacin] Diarrhea     MOOD CHANGES, WEAKNESS    Morphine Other (See Comments)     HALLUCINATES    Augmentin [amoxicillin-pot clavulanate] Other (See Comments)     AGITATION    Ciprofloxacin Anxiety    Codeine Nausea And Vomiting    Demerol [meperidine] Nausea And Vomiting     CAN TAKE WITH PHENERGAN    Dilaudid [hydromorphone (bulk)] Itching     OK WITH BENADRYL    Fentanyl Itching     OK WITH BENADRYL    Fosfomycin      diarrhia    Naproxen Other (See Comments)     HURT STOMACH    Nsaids  "(non-steroidal anti-inflammatory drug) Other (See Comments)     TOLD NOT TO TAKE AS HAD AN ULCER. HAD BLEEDING    Percocet [oxycodone-acetaminophen] Rash    Prednisone Other (See Comments)     AGITATION    Reglan [metoclopramide hcl] Anxiety    Sulfa (sulfonamide antibiotics) Rash    Vicodin [hydrocodone-acetaminophen] Rash    Zosyn [piperacillin-tazobactam] Rash    Ambien [zolpidem]     Ibuprofen     Pcn [penicillins]     Vasotec [enalapril maleate] Other (See Comments)     DRY COUGH       Performance Status:The patient's activity level is housebound activities.      Review of Systems:  a review of eleven systems covering constitutional, Eye, HEENT, Psych, Respiratory, Cardiac, GI, , Musculoskeletal, Endocrine, Dermatologic was negative except for pertinent findings as listed ABOVE and below:  pertinent positive as above, rest is good      Exam:Comprehensive exam done. /85 (BP Location: Left arm, Patient Position: Sitting, BP Method: Small (Automatic))   Pulse 77   Ht 5' 9" (1.753 m)   Wt 59.3 kg (130 lb 11.7 oz)   SpO2 95% Comment: on room air at rest  BMI 19.31 kg/m²   Exam included Vitals as listed, and patient's appearance and affect and alertness and mood, oral exam for yeast and hygiene and pharynx lesions and Mallapatti (M) score, neck with inspection for jvd and masses and thyroid abnormalities and lymph nodes (supraclavicular and infraclavicular nodes and axillary also examined and noted if abn), chest exam included symmetry and effort and fremitus and percussion and auscultation, cardiac exam included rhythm and gallops and murmur and rubs and jvd and edema, abdominal exam for mass and hepatosplenomegaly and tenderness and hernias and bowel sounds, Musculoskeletal exam with muscle tone and posture and mobility/gait and  strength, and skin for rashes and cyanosis and pallor and turgor, extremity for clubbing.  Findings were normal except for pertinent findings listed below:  M2. chest is " symmetric, no distress, normal percussion, normal fremitus and good normal breath sounds            Radiographs (ct chest and cxr) reviewed: view by direct vision  Ct chest 2020 c/w 2018 with altelectasis 2020 and pneumonia 2018 , stable ild upper lung mainly both films    Labs reviewed           PFT was not done       Plan:  Clinical impression is apparently straight forward and impression with management as below.    Lorena was seen today for shortness of breath.    Diagnoses and all orders for this visit:    Cerebellar infarct  -     Ambulatory referral/consult to Physical/Occupational Therapy; Future    COLEMNA (dyspnea on exertion)  -     Ambulatory referral/consult to Physical/Occupational Therapy; Future  -     CT Chest Without Contrast; Future  -     D-DIMER, QUANTITATIVE; Future    ILD (interstitial lung disease)  -     CT Chest Without Contrast; Future    Weakness  -     Ambulatory referral/consult to Physical/Occupational Therapy; Future        Follow up in about 4 weeks (around 5/22/2023), or if symptoms worsen or fail to improve.    Discussed with patient above for education the following:      Patient Instructions     Ct chest from 2020 viewed today    Crackles heard both lower lungs,  ct chest from 2020 viewed and had mild upper lung scarring suggested.    Heart testing was good.    Walking oxygen saturation was very good today --98 --lung tissue not likely bad or significant.    Gait was unsteady and weak looking.    (Could do breathing test but low yield)    Would check ddimer and do ct for blood clot if high.    Would check ct chest for lung tissue.    Exercise program would be worthwhile.            Carmencita took 30- min

## 2023-05-04 ENCOUNTER — HOSPITAL ENCOUNTER (OUTPATIENT)
Dept: RADIOLOGY | Facility: HOSPITAL | Age: 88
Discharge: HOME | End: 2023-05-04
Attending: INTERNAL MEDICINE
Payer: MEDICARE

## 2023-05-04 DIAGNOSIS — J84.9 ILD (INTERSTITIAL LUNG DISEASE): ICD-10-CM

## 2023-05-04 DIAGNOSIS — R06.09 DOE (DYSPNEA ON EXERTION): ICD-10-CM

## 2023-05-04 PROCEDURE — 71250 CT CHEST WITHOUT CONTRAST: ICD-10-PCS | Mod: 26,,, | Performed by: RADIOLOGY

## 2023-05-04 PROCEDURE — 71250 CT THORAX DX C-: CPT | Mod: TC

## 2023-05-04 PROCEDURE — 71250 CT THORAX DX C-: CPT | Mod: 26,,, | Performed by: RADIOLOGY

## 2023-05-24 ENCOUNTER — OFFICE VISIT (OUTPATIENT)
Dept: PULMONOLOGY | Facility: CLINIC | Age: 88
End: 2023-05-24
Payer: MEDICARE

## 2023-05-24 VITALS
HEIGHT: 69 IN | HEART RATE: 72 BPM | WEIGHT: 129.06 LBS | BODY MASS INDEX: 19.12 KG/M2 | SYSTOLIC BLOOD PRESSURE: 123 MMHG | OXYGEN SATURATION: 96 % | DIASTOLIC BLOOD PRESSURE: 75 MMHG

## 2023-05-24 DIAGNOSIS — R06.09 DOE (DYSPNEA ON EXERTION): ICD-10-CM

## 2023-05-24 DIAGNOSIS — K76.0 FATTY LIVER: ICD-10-CM

## 2023-05-24 DIAGNOSIS — J31.0 RHINITIS, UNSPECIFIED TYPE: ICD-10-CM

## 2023-05-24 DIAGNOSIS — J84.9 ILD (INTERSTITIAL LUNG DISEASE): Primary | ICD-10-CM

## 2023-05-24 DIAGNOSIS — J47.9 BRONCHIECTASIS WITHOUT COMPLICATION: ICD-10-CM

## 2023-05-24 DIAGNOSIS — R26.89 POOR BALANCE: ICD-10-CM

## 2023-05-24 PROCEDURE — 99214 PR OFFICE/OUTPT VISIT, EST, LEVL IV, 30-39 MIN: ICD-10-PCS | Mod: S$GLB,,, | Performed by: INTERNAL MEDICINE

## 2023-05-24 PROCEDURE — 99999 PR PBB SHADOW E&M-EST. PATIENT-LVL IV: CPT | Mod: PBBFAC,,, | Performed by: INTERNAL MEDICINE

## 2023-05-24 PROCEDURE — 1126F PR PAIN SEVERITY QUANTIFIED, NO PAIN PRESENT: ICD-10-PCS | Mod: CPTII,S$GLB,, | Performed by: INTERNAL MEDICINE

## 2023-05-24 PROCEDURE — 1159F MED LIST DOCD IN RCRD: CPT | Mod: CPTII,S$GLB,, | Performed by: INTERNAL MEDICINE

## 2023-05-24 PROCEDURE — 1159F PR MEDICATION LIST DOCUMENTED IN MEDICAL RECORD: ICD-10-PCS | Mod: CPTII,S$GLB,, | Performed by: INTERNAL MEDICINE

## 2023-05-24 PROCEDURE — 3288F PR FALLS RISK ASSESSMENT DOCUMENTED: ICD-10-PCS | Mod: CPTII,S$GLB,, | Performed by: INTERNAL MEDICINE

## 2023-05-24 PROCEDURE — 1101F PT FALLS ASSESS-DOCD LE1/YR: CPT | Mod: CPTII,S$GLB,, | Performed by: INTERNAL MEDICINE

## 2023-05-24 PROCEDURE — 1126F AMNT PAIN NOTED NONE PRSNT: CPT | Mod: CPTII,S$GLB,, | Performed by: INTERNAL MEDICINE

## 2023-05-24 PROCEDURE — 99999 PR PBB SHADOW E&M-EST. PATIENT-LVL IV: ICD-10-PCS | Mod: PBBFAC,,, | Performed by: INTERNAL MEDICINE

## 2023-05-24 PROCEDURE — 1101F PR PT FALLS ASSESS DOC 0-1 FALLS W/OUT INJ PAST YR: ICD-10-PCS | Mod: CPTII,S$GLB,, | Performed by: INTERNAL MEDICINE

## 2023-05-24 PROCEDURE — 3288F FALL RISK ASSESSMENT DOCD: CPT | Mod: CPTII,S$GLB,, | Performed by: INTERNAL MEDICINE

## 2023-05-24 PROCEDURE — 99214 OFFICE O/P EST MOD 30 MIN: CPT | Mod: S$GLB,,, | Performed by: INTERNAL MEDICINE

## 2023-05-24 RX ORDER — IPRATROPIUM BROMIDE 42 UG/1
2 SPRAY, METERED NASAL 3 TIMES DAILY
Qty: 15 ML | Refills: 11 | Status: SHIPPED | OUTPATIENT
Start: 2023-05-24

## 2023-05-24 RX ORDER — AZELASTINE 1 MG/ML
2 SPRAY, METERED NASAL 2 TIMES DAILY
Qty: 30 ML | Refills: 11 | Status: SHIPPED | OUTPATIENT
Start: 2023-06-30

## 2023-05-24 NOTE — PROGRESS NOTES
5/24/2023    [facility] Follow Up    Chief Complaint   Patient presents with    4 week f/u     Shortness of Breath     With exertion.        HPI:  5/24/2023 ct chest viewed from 5/2023 c/w cta lungs 2020.  Ild not worse by ct but still present.  Pattern of sob reported by pt to be stable and chronic with no episode abrupt sob. Ddimer up -- prior ddimer 0.69 2019 with neg eval with u/s legs and v/q scanning..      Pt appreciates unstable gait benefited from ongoing therapy  Nose drip still..      2/24/2023-- pt was doing well til Nov 2022 when had covid - no hosp nor resp c/o-- developed orourke, no sob rest. No chest pain, coughs NP, cough may be reflux -- uses protonix bid and cut back due to sore mouth but had less hoarsenss.  Had had severe constant rhinnitis since November.   No wheezes, no noct worsening..    Pt had stress test 2/2023 with no issues stess echo/nuclear---sees Dr Hawthorne pcp -- recent blood wk good.    Covid was mainly with severe weakness and head symptoms -- lasted 3 wks.    Patient Instructions     Ct chest from 2020 viewed today  i9    Crackles heard both lower lungs,  ct chest from 2020 viewed and had mild upper lung scarring suggested.    Heart testing was good.    Walking oxygen saturation was very good today --98 --lung tissue not likely bad or significant.    Gait was unsteady and weak looking.    (Could do breathing test but low yield)    Would check ddimer and do ct for blood clot if high.    Would check ct chest for lung tissue.    Exercise program would be worthwhile.    12/10/2023 pt went home post knee- got off ox quickly.      From consult 11/5/2020 -    History of Present Illness:   Pt admitted with tka left  Knee on 11/2. Was on rm air on 11/4 but now needs oxygen 2-3 lpm to keep sat over 90.   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 No swallow nor Raynauld's problems.  djd problems.   Pt saw Dr Pagan for ild in  past but dismissed after couple month f/u. Pt active - yd wk.  Declines cough. Did smoke 20 py.   I see her  as pt.   Pt hypothryoid by tsh 10/9/2020.    Plan:   Chr ild, could be early RA but seems unlikely, check ccp and rf.   She has component copd and bronchiectasis.   Hypoxic resp failure from multitude problems including fat embolism and atelectasis.   outpt in am on ox needed.  resp rx may trigger svt?     Need office f/u 6 wks- check rf and ccp in am.    doubt will have problems with progressive ild ?   Needs f/u .  The chief compliant  problem is varies with instablilty at time       Patient Instructions   Diaphragm high on right but moves well- no concern.    Rheumatoid looks to have involved upper lungs mildly.  No progression suggested over last 2 yrs.  Do not expect worsening.      Would recommend follow up for symptoms- rheumatoid lung usually very gradual disease, moves slowly.    PFSH:  Past Medical History:   Diagnosis Date    Arthritis     Blood transfusion     Bowel obstruction     X 3    Cancer     MELANOMA RIGHT EYE    Cancer of eyeball, except conjunctiva, cornea, retina, and choroid right    Depression     Diverticulitis     GERD (gastroesophageal reflux disease)     Hypertension     Melanoma     right eye    Peripheral neuropathy     PSVT (paroxysmal supraventricular tachycardia)     HAD X 3 OVER 10 YRS. LAST 7-25-12. HAD ADENOSINE IN [facility] ER. WAS ON INCREASED THYROID MED. NO PROBLEM SINCE    Salmonella     Short bowel syndrome     Thyroid disease     Transient ischemic attack     UTI (urinary tract infection)     Wears partial dentures     PARTIAL BOTTOM         Past Surgical History:   Procedure Laterality Date    ABDOMINAL SURGERY      X 3. ILEUM REMOVED. OSTOMY PLACED AND CLOSED.    ADENOIDECTOMY      BILATERAL OOPHORECTOMY      COLONOSCOPY      COLONOSCOPY N/A 8/24/2016    Procedure: COLONOSCOPY;  Surgeon: Alex Corral MD;  Location: [facility];  Service: Endoscopy;  Laterality:  N/A;    COLONOSCOPY N/A 2019    Procedure: COLONOSCOPY;  Surgeon: Tiffanie Vázquez MD;  Location: [facility] ENDO;  Service: Endoscopy;  Laterality: N/A;    COLONOSCOPY N/A 2019    Procedure: COLONOSCOPY;  Surgeon: Tiffanie Vázquez MD;  Location: [facility] ENDO;  Service: Endoscopy;  Laterality: N/A;    CTR      BILAT    ESOPHAGOGASTRODUODENOSCOPY N/A 2019    Procedure: EGD (ESOPHAGOGASTRODUODENOSCOPY);  Surgeon: Tiffanie Vázquez MD;  Location: [facility] ENDO;  Service: Endoscopy;  Laterality: N/A;    EYE SURGERY      RIGHT  - LASER MELANOMA; BILAT CATARACT    HYSTERECTOMY      BSO    JOINT REPLACEMENT      RIGHT KNEE    KNEE ARTHROPLASTY Left 2020    Procedure: ARTHROPLASTY, KNEE;  Surgeon: Eduardo Haider MD;  Location: [facility];  Service: Orthopedics;  Laterality: Left;  Giovany Doll    MANDIBLE FRACTURE SURGERY      MENISCECTOMY      MEDIAL AND LATERAL REPAIR, BAKERS CYST    TENDON REPAIR      LEFT THUMB    TONSILLECTOMY      T AND A     Social History     Tobacco Use    Smoking status: Former     Packs/day: 1.00     Years: 18.00     Pack years: 18.00     Types: Cigarettes     Start date:      Quit date: 1973     Years since quittin.3    Smokeless tobacco: Never   Substance Use Topics    Alcohol use: Yes     Alcohol/week: 2.0 standard drinks     Types: 2 Glasses of wine per week     Comment: WINE once per day    Drug use: No     Family History   Problem Relation Age of Onset    Diabetes Mother     Heart disease Mother         MI at 52    Stroke Father     Cancer Father         colon cancer    Rheum arthritis Father     COPD Father     Heart disease Brother     Stroke Brother     Diabetes Brother     Arthritis Daughter     Hypertension Daughter     Cancer Son         Leukemia    Hypertension Son     Heart disease Son     Diabetes Maternal Grandmother     Asthma Maternal Grandfather     Cancer Paternal Grandmother         melanoma    Melanoma Paternal Grandmother     Cancer Paternal  "Grandfather         lip cancer - smoked pipe    Sleep apnea Son     Hypertension Son     Sinus disease Son     Hypertension Son     Heart disease Son     Obesity Son      Review of patient's allergies indicates:   Allergen Reactions    Flagyl [metronidazole hcl] Diarrhea     SEVERE DIARRHEA    Latex, natural rubber Rash     ITCHING    Levaquin [levofloxacin] Diarrhea     MOOD CHANGES, WEAKNESS    Morphine Other (See Comments)     HALLUCINATES    Augmentin [amoxicillin-pot clavulanate] Other (See Comments)     AGITATION    Ciprofloxacin Anxiety    Codeine Nausea And Vomiting    Demerol [meperidine] Nausea And Vomiting     CAN TAKE WITH PHENERGAN    Dilaudid [hydromorphone (bulk)] Itching     OK WITH BENADRYL    Fentanyl Itching     OK WITH BENADRYL    Fosfomycin      diarrhia    Naproxen Other (See Comments)     HURT STOMACH    Nsaids (non-steroidal anti-inflammatory drug) Other (See Comments)     TOLD NOT TO TAKE AS HAD AN ULCER. HAD BLEEDING    Percocet [oxycodone-acetaminophen] Rash    Prednisone Other (See Comments)     AGITATION    Reglan [metoclopramide hcl] Anxiety    Sulfa (sulfonamide antibiotics) Rash    Vicodin [hydrocodone-acetaminophen] Rash    Zosyn [piperacillin-tazobactam] Rash    Ambien [zolpidem]     Ibuprofen     Pcn [penicillins]     Vasotec [enalapril maleate] Other (See Comments)     DRY COUGH       Performance Status:The patient's activity level is housebound activities.      Review of Systems:  a review of eleven systems covering constitutional, Eye, HEENT, Psych, Respiratory, Cardiac, GI, , Musculoskeletal, Endocrine, Dermatologic was negative except for pertinent findings as listed ABOVE and below:  pertinent positive as above, rest is good      Exam:Comprehensive exam done. /75   Pulse 72   Ht 5' 9" (1.753 m)   Wt 58.6 kg (129 lb 1.3 oz)   SpO2 96% Comment: room air at rest  BMI 19.06 kg/m²   Exam included Vitals as listed, and patient's appearance and affect and alertness and " mood, oral exam for yeast and hygiene and pharynx lesions and Mallapatti (M) score, neck with inspection for jvd and masses and thyroid abnormalities and lymph nodes (supraclavicular and infraclavicular nodes and axillary also examined and noted if abn), chest exam included symmetry and effort and fremitus and percussion and auscultation, cardiac exam included rhythm and gallops and murmur and rubs and jvd and edema, abdominal exam for mass and hepatosplenomegaly and tenderness and hernias and bowel sounds, Musculoskeletal exam with muscle tone and posture and mobility/gait and  strength, and skin for rashes and cyanosis and pallor and turgor, extremity for clubbing.  Findings were normal except for pertinent findings listed below:  M2. chest is symmetric, no distress, normal percussion, normal fremitus and good normal breath sounds            Radiographs (ct chest and cxr) reviewed: view by direct vision  Ct chest 2020 c/w 2018 with altelectasis 2020 and pneumonia 2018 , stable ild upper lung mainly both films    Labs reviewed           PFT was not done       Plan:  Clinical impression is apparently straight forward and impression with management as below.    Lorena was seen today for 4 week f/u  and shortness of breath.    Diagnoses and all orders for this visit:    ILD (interstitial lung disease)    Rhinitis, unspecified type  -     azelastine (ASTELIN) 137 mcg (0.1 %) nasal spray; 2 sprays (274 mcg total) by Nasal route 2 (two) times daily.  -     ipratropium (ATROVENT) 42 mcg (0.06 %) nasal spray; 2 sprays by Each Nostril route 3 (three) times daily.    Bronchiectasis without complication    Fatty liver    COLEMAN (dyspnea on exertion)    Poor balance          Follow up in about 1 year (around 5/24/2024), or if symptoms worsen or fail to improve.    Discussed with patient above for education the following:      Patient Instructions   Ddimer is up-- was up in 2019 with neg lung scan and leg study, cta lungs (best  test for blood clot) neg in 2020 for blood clot.  Ddimer is up 5/4/2023-- ct no contrast (not able to detect blood clot wihtout contrast) shows stable lung tissue disease..     Could do ct lung with contrast but clinically blood clot not likely-- we discuss.      Lung tissue may slowly worsen -- no worsening seen last 3 yrs or so.    If breathing worsening --- call    If breathing stable-- may do ct chest in 2 yrs or so?    -- try atrovent 1st --- use fairly regular to start then as needed.  Try astelin later    Fatty liver seen-- avoid alcohol as able.   Notify Dr Hawthorne fatty liver seen on ct chest.

## 2023-05-24 NOTE — PATIENT INSTRUCTIONS
Ddimer is up-- was up in 2019 with neg lung scan and leg study, cta lungs (best test for blood clot) neg in 2020 for blood clot.  Ddimer is up 5/4/2023-- ct no contrast (not able to detect blood clot wihtout contrast) shows stable lung tissue disease..     Could do ct lung with contrast but clinically blood clot not likely-- we discuss.      Lung tissue may slowly worsen -- no worsening seen last 3 yrs or so.    If breathing worsening --- call    If breathing stable-- may do ct chest in 2 yrs or so?    -- try atrovent 1st --- use fairly regular to start then as needed.  Try astelin later    Fatty liver seen-- avoid alcohol as able.   Notify Dr Hawthorne fatty liver seen on ct chest.

## 2023-07-18 ENCOUNTER — TELEPHONE (OUTPATIENT)
Dept: PULMONOLOGY | Facility: CLINIC | Age: 88
End: 2023-07-18
Payer: MEDICARE

## 2023-08-02 DIAGNOSIS — R63.4 ABNORMAL WEIGHT LOSS: Primary | ICD-10-CM

## 2023-08-16 ENCOUNTER — HOSPITAL ENCOUNTER (OUTPATIENT)
Dept: RADIOLOGY | Facility: HOSPITAL | Age: 88
Discharge: HOME | End: 2023-08-16
Attending: INTERNAL MEDICINE
Payer: MEDICARE

## 2023-08-16 DIAGNOSIS — R63.4 ABNORMAL WEIGHT LOSS: ICD-10-CM

## 2023-08-16 LAB
CREAT SERPL-MCNC: 0.8 MG/DL (ref 0.5–1.4)
SAMPLE: NORMAL

## 2023-08-16 PROCEDURE — 25500020 PHARM REV CODE 255: Mod: PO | Performed by: INTERNAL MEDICINE

## 2023-08-16 PROCEDURE — 74178 CT ABD&PLV WO CNTR FLWD CNTR: CPT | Mod: TC,PO

## 2023-08-16 RX ADMIN — IOHEXOL 100 ML: 350 INJECTION, SOLUTION INTRAVENOUS at 11:08

## 2023-08-23 ENCOUNTER — PATIENT MESSAGE (OUTPATIENT)
Dept: FAMILY MEDICINE | Facility: CLINIC | Age: 88
End: 2023-08-23
Payer: MEDICARE

## 2024-01-08 ENCOUNTER — OFFICE VISIT (OUTPATIENT)
Dept: CARDIOLOGY | Facility: CLINIC | Age: 89
End: 2024-01-08
Payer: MEDICARE

## 2024-01-08 VITALS
SYSTOLIC BLOOD PRESSURE: 122 MMHG | HEIGHT: 69 IN | DIASTOLIC BLOOD PRESSURE: 76 MMHG | HEART RATE: 65 BPM | RESPIRATION RATE: 16 BRPM | WEIGHT: 125 LBS | OXYGEN SATURATION: 96 % | BODY MASS INDEX: 18.51 KG/M2

## 2024-01-08 DIAGNOSIS — E03.2 HYPOTHYROIDISM DUE TO NON-MEDICATION EXOGENOUS SUBSTANCES: ICD-10-CM

## 2024-01-08 DIAGNOSIS — I10 PRIMARY HYPERTENSION: ICD-10-CM

## 2024-01-08 DIAGNOSIS — I47.10 PAROXYSMAL SUPRAVENTRICULAR TACHYCARDIA: ICD-10-CM

## 2024-01-08 DIAGNOSIS — I25.118 ATHEROSCLEROTIC HEART DISEASE OF NATIVE CORONARY ARTERY WITH OTHER FORMS OF ANGINA PECTORIS: Primary | ICD-10-CM

## 2024-01-08 DIAGNOSIS — I47.10 SVT (SUPRAVENTRICULAR TACHYCARDIA): ICD-10-CM

## 2024-01-08 PROCEDURE — 99999 PR PBB SHADOW E&M-EST. PATIENT-LVL IV: CPT | Mod: PBBFAC,,, | Performed by: INTERNAL MEDICINE

## 2024-01-08 PROCEDURE — 3288F FALL RISK ASSESSMENT DOCD: CPT | Mod: CPTII,S$GLB,, | Performed by: INTERNAL MEDICINE

## 2024-01-08 PROCEDURE — 1159F MED LIST DOCD IN RCRD: CPT | Mod: CPTII,S$GLB,, | Performed by: INTERNAL MEDICINE

## 2024-01-08 PROCEDURE — 1101F PT FALLS ASSESS-DOCD LE1/YR: CPT | Mod: CPTII,S$GLB,, | Performed by: INTERNAL MEDICINE

## 2024-01-08 PROCEDURE — 99214 OFFICE O/P EST MOD 30 MIN: CPT | Mod: S$GLB,,, | Performed by: INTERNAL MEDICINE

## 2024-01-08 PROCEDURE — 1126F AMNT PAIN NOTED NONE PRSNT: CPT | Mod: CPTII,S$GLB,, | Performed by: INTERNAL MEDICINE

## 2024-01-08 PROCEDURE — 1160F RVW MEDS BY RX/DR IN RCRD: CPT | Mod: CPTII,S$GLB,, | Performed by: INTERNAL MEDICINE

## 2024-01-08 RX ORDER — PROPAFENONE HYDROCHLORIDE 150 MG/1
150 TABLET, COATED ORAL EVERY 8 HOURS
Qty: 270 TABLET | Refills: 3 | Status: SHIPPED | OUTPATIENT
Start: 2024-01-08 | End: 2025-01-07

## 2024-01-08 RX ORDER — METOPROLOL SUCCINATE 25 MG/1
25 TABLET, EXTENDED RELEASE ORAL 2 TIMES DAILY
Qty: 180 TABLET | Refills: 3 | Status: SHIPPED | OUTPATIENT
Start: 2024-01-08 | End: 2025-01-07

## 2024-01-08 NOTE — PROGRESS NOTES
Subjective:    Patient ID:  Lorena Vallejo is a 89 y.o. female patient here for evaluation Follow-up (She has noticed her bp has been a little elevated into the 140's)      History of Present Illness:   Patient is a 89-year-old with history of arterial hypertension has noted some labile fluctuations more recently.  Her blood pressure has been trending up in the 140s in the afternoons especially.  She has no occurrence of any angina no shortness of breath noted and denies having recent episodes of palpitations.    Many years ago she had history of SVT and has been under reasonable control with propafenone and beta-blockers.  Previously she was tried diltiazem she could not tolerate this and that has been discontinued.    Patient has numerous drug allergies and especially allergic to sulfa drugs which caused her to have Dustin Otilio's manifestation.    Presently no cough or congestion no fevers chills no nausea vomiting no blood in the stools or black stools        Review of patient's allergies indicates:   Allergen Reactions    Flagyl [metronidazole hcl] Diarrhea     SEVERE DIARRHEA    Latex, natural rubber Rash     ITCHING    Levaquin [levofloxacin] Diarrhea     MOOD CHANGES, WEAKNESS    Morphine Other (See Comments)     HALLUCINATES    Augmentin [amoxicillin-pot clavulanate] Other (See Comments)     AGITATION    Ciprofloxacin Anxiety    Codeine Nausea And Vomiting    Demerol [meperidine] Nausea And Vomiting     CAN TAKE WITH PHENERGAN    Dilaudid [hydromorphone (bulk)] Itching     OK WITH BENADRYL    Fentanyl Itching     OK WITH BENADRYL    Fosfomycin      diarrhia    Naproxen Other (See Comments)     HURT STOMACH    Nsaids (non-steroidal anti-inflammatory drug) Other (See Comments)     TOLD NOT TO TAKE AS HAD AN ULCER. HAD BLEEDING    Percocet [oxycodone-acetaminophen] Rash    Prednisone Other (See Comments)     AGITATION    Reglan [metoclopramide hcl] Anxiety    Sulfa (sulfonamide antibiotics) Rash    Vicodin  [hydrocodone-acetaminophen] Rash    Zosyn [piperacillin-tazobactam] Rash    Ambien [zolpidem]     Ibuprofen     Pcn [penicillins]     Vasotec [enalapril maleate] Other (See Comments)     DRY COUGH       Past Medical History:   Diagnosis Date    Arthritis     Blood transfusion     Bowel obstruction     X 3    Cancer     MELANOMA RIGHT EYE    Cancer of eyeball, except conjunctiva, cornea, retina, and choroid right    Depression     Diverticulitis     GERD (gastroesophageal reflux disease)     Hypertension     Melanoma     right eye    Peripheral neuropathy     PSVT (paroxysmal supraventricular tachycardia)     HAD X 3 OVER 10 YRS. LAST 7-25-12. HAD ADENOSINE IN [facility] ER. WAS ON INCREASED THYROID MED. NO PROBLEM SINCE    Salmonella     Short bowel syndrome     Thyroid disease     Transient ischemic attack     UTI (urinary tract infection)     Wears partial dentures     PARTIAL BOTTOM     Past Surgical History:   Procedure Laterality Date    ABDOMINAL SURGERY      X 3. ILEUM REMOVED. OSTOMY PLACED AND CLOSED.    ADENOIDECTOMY      BILATERAL OOPHORECTOMY      COLONOSCOPY      COLONOSCOPY N/A 8/24/2016    Procedure: COLONOSCOPY;  Surgeon: Alex Corral MD;  Location: [facility] ENDO;  Service: Endoscopy;  Laterality: N/A;    COLONOSCOPY N/A 8/29/2019    Procedure: COLONOSCOPY;  Surgeon: Tiffanie Vázquez MD;  Location: [facility] ENDO;  Service: Endoscopy;  Laterality: N/A;    COLONOSCOPY N/A 11/22/2019    Procedure: COLONOSCOPY;  Surgeon: Tiffanie Vázquez MD;  Location: [facility] ENDO;  Service: Endoscopy;  Laterality: N/A;    CTR      BILAT    ESOPHAGOGASTRODUODENOSCOPY N/A 8/29/2019    Procedure: EGD (ESOPHAGOGASTRODUODENOSCOPY);  Surgeon: Tiffanie Vázquez MD;  Location: [facility] ENDO;  Service: Endoscopy;  Laterality: N/A;    EYE SURGERY      RIGHT  - LASER MELANOMA; BILAT CATARACT    HYSTERECTOMY      BSO    JOINT REPLACEMENT      RIGHT KNEE    KNEE ARTHROPLASTY Left 11/2/2020    Procedure: ARTHROPLASTY, KNEE;  Surgeon: Eduardo Haider  MD;  Location: [facility];  Service: Orthopedics;  Laterality: Left;  Giovany Doll    MANDIBLE FRACTURE SURGERY      MENISCECTOMY      MEDIAL AND LATERAL REPAIR, BAKERS CYST    TENDON REPAIR      LEFT THUMB    TONSILLECTOMY      T AND A     Social History     Tobacco Use    Smoking status: Former     Current packs/day: 0.00     Average packs/day: 1 pack/day for 19.1 years (19.1 ttl pk-yrs)     Types: Cigarettes     Start date:      Quit date: 1973     Years since quittin.9    Smokeless tobacco: Never   Substance Use Topics    Alcohol use: Yes     Alcohol/week: 2.0 standard drinks of alcohol     Types: 2 Glasses of wine per week     Comment: WINE once per day    Drug use: No          Review of Systems     Constitutional: Negative for chills, fatigue and fever.   Eyes: No double vision, No blurred vision  Neuro: No headaches, No dizziness she does have some balance issues secondary to previous cerebellar infarcts  Respiratory: Negative for cough, shortness of breath and wheezing.    Cardiovascular: Negative for chest pain. Negative for palpitations and leg swelling.   Gastrointestinal: Negative for abdominal pain, No melena, diarrhea, nausea and vomiting.   Genitourinary: Negative for dysuria and frequency, Negative for hematuria  Skin: Negative for bruising, Negative for edema or discoloration noted.          Objective        Vitals:    24 1325   BP: 122/76   Pulse: 65   Resp: 16       LIPIDS - LAST 2   Lab Results   Component Value Date    CHOL 159 2023    CHOL 153 2022    HDL 53 2023    HDL 53 2022    LDLCALC 78 2023    LDLCALC 67.2 2022    TRIG 168 (H) 2023    TRIG 164 (H) 2022    CHOLHDL 34.6 2022    CHOLHDL 41.4 2021       CBC - LAST 2  Lab Results   Component Value Date    WBC 6.3 2023    WBC 6.00 2022    RBC 3.87 (L) 2022    RBC 3.82 (L) 2022    HGB 12.6 2023    HGB 12.1 2022    HCT 37.9 2023     HCT 38.1 06/03/2022    MCV 98 06/03/2022     (H) 03/09/2022    MCH 32.1 04/11/2023    MCH 31.3 (H) 06/03/2022    MCHC 31.8 (L) 06/03/2022    MCHC 32.4 03/09/2022    RDW 13.0 04/11/2023    RDW 13.9 06/03/2022     04/11/2023     06/03/2022    MPV 9.7 06/03/2022    MPV 9.8 03/09/2022    GRAN 2.0 06/03/2022    GRAN 33.4 (L) 06/03/2022    LYMPH 3.0 06/03/2022    LYMPH 49.2 (H) 06/03/2022    MONO 0.7 06/03/2022    MONO 12.3 06/03/2022    BASO 0.1 04/11/2023    BASO 0.05 06/03/2022    NRBC 0 06/03/2022    NRBC 0 03/09/2022       CHEMISTRY & LIVER FUNCTION - LAST 2  Lab Results   Component Value Date     06/03/2022     12/23/2021    K 4.3 06/03/2022    K 4.0 12/23/2021     06/03/2022     (H) 12/23/2021    CO2 24 06/03/2022    CO2 19 (L) 12/23/2021    ANIONGAP 9 06/03/2022    ANIONGAP 11 12/23/2021    BUN 23 06/03/2022    BUN 21 12/23/2021    CREATININE 1.0 06/03/2022    CREATININE 0.9 12/23/2021    GLU 96 06/03/2022     12/23/2021    CALCIUM 9.5 06/03/2022    CALCIUM 9.1 12/23/2021    MG 2.1 01/09/2020    MG 2.0 01/08/2020    ALBUMIN 4.0 06/03/2022    ALBUMIN 4.0 12/23/2021    PROT 7.1 06/03/2022    PROT 7.2 12/23/2021    ALKPHOS 70 06/03/2022    ALKPHOS 84 12/23/2021    ALT 26 06/03/2022    ALT 38 12/23/2021    AST 21 06/03/2022    AST 29 12/23/2021    BILITOT 0.5 06/03/2022    BILITOT 0.5 12/23/2021        CARDIAC PROFILE - LAST 2  Lab Results   Component Value Date    BNP 87 10/16/2019    CPK 86 01/07/2020    CPK 85 01/07/2020    CPKMB 3.5 01/07/2020    CPKMB 3.1 01/07/2020    TROPONINI 0.034 01/08/2020    TROPONINI 0.034 01/08/2020        COAGULATION - LAST 2  Lab Results   Component Value Date    LABPT 12.3 01/07/2020    INR 1.0 01/07/2020    APTT 118.0 (H) 01/07/2020       ENDOCRINE & PSA - LAST 2  Lab Results   Component Value Date    HGBA1C 5.1 06/03/2022    TSH 0.121 (L) 11/28/2023    TSH 0.249 (L) 10/12/2023        ECHOCARDIOGRAM RESULTS  Results for orders  placed during the hospital encounter of 02/03/23    Echo    Interpretation Summary  · The left ventricle is normal in size with mild concentric hypertrophy and normal systolic function.  · The estimated ejection fraction is 60%.  · Normal left ventricular diastolic function.  · Normal right ventricular size with normal right ventricular systolic function.  · Normal central venous pressure (3 mmHg).  · The estimated PA systolic pressure is 21 mmHg.      CURRENT/PREVIOUS VISIT EKG  Results for orders placed or performed in visit on 03/07/23   IN OFFICE EKG 12-LEAD (to [facility])    Collection Time: 03/07/23 10:26 AM    Narrative    Test Reason : I25.118,I47.1,    Vent. Rate : 070 BPM     Atrial Rate : 070 BPM     P-R Int : 192 ms          QRS Dur : 084 ms      QT Int : 384 ms       P-R-T Axes : 047 -01 048 degrees     QTc Int : 414 ms    Normal sinus rhythm  Normal ECG  When compared with ECG of 19-JAN-2023 16:15,  No significant change was found  Confirmed by Bj Whitley MD ([id number]) on 3/29/2023 9:25:49 PM    Referred By:             Confirmed By:Bj Whitley MD     No valid procedures specified.   Results for orders placed during the hospital encounter of 02/03/23    Nuclear Stress - Cardiology Interpreted    Interpretation Summary    Normal myocardial perfusion scan. There is no evidence of myocardial ischemia or infarction.    There is a mild intensity perfusion abnormality in the anteroapical wall of the left ventricle, secondary to breast attenuation.    The gated perfusion images showed an ejection fraction of 77% at rest. The gated perfusion images showed an ejection fraction of 78% post stress. Normal ejection fraction is greater than 53%.    There is normal wall motion at rest and post stress.    LV cavity size is normal at rest and normal at stress.    The ECG portion of the study is negative for ischemia.    The patient reported chest pain during the stress test.    There were no arrhythmias during  stress.    No valid procedures specified.          PREVIOUS STRESS TEST              PREVIOUS ANGIOGRAM      Physical examination:      Constitutional:  Well-built well-nourished in no apparent distress, alert and oriented    Neck: no carotid bruit, no JVD, no masses    Lungs: diminished breath sounds bibasilar, rhonchi bilaterally  Chest Wall: no tenderness  CARDIAC:  regular rate and rhythm, S1, S2 normal, no murmur, click, rub or gallop  Abdomen: soft, non-tender; bowel sounds normal; no masses,  no organomegaly, no guarding or rebound noted  Extremities: Extremities normal, atraumatic, no cyanosis, clubbing, or edema  Skin: Skin color, texture, turgor normal. No rashes or lesions  Neuro: Alert and responsive.  Moving all extremities      I HAVE REVIEWED :    The vital signs, nurses notes, and all the pertinent radiology and labs.        Current Outpatient Medications   Medication Instructions    acetaminophen (TYLENOL) 500 mg, Oral, Every 6 hours PRN    aspirin 81 mg, Oral, Daily    azelastine (ASTELIN) 274 mcg, Nasal, 2 times daily    Bifidobacterium infantis 4 mg, Oral, Daily    calcium carbonate (OS-RAMONITA) 500 mg calcium (1,250 mg) tablet No dose, route, or frequency recorded.    coenzyme Q10 100 mg capsule No dose, route, or frequency recorded.    cyanocobalamin (vitamin B-12) 5,000 mcg, Sublingual, Every Mon, Wed, Fri    ergocalciferol, vitamin D2, (VITAMIN D2 ORAL) No dose, route, or frequency recorded.    fluticasone propionate (FLONASE) 50 mcg, Each Nostril, Daily    ipratropium (ATROVENT) 42 mcg (0.06 %) nasal spray 2 sprays, Each Nostril, 3 times daily    krill oil 500 mg, Oral, Nightly    levothyroxine (SYNTHROID) 200 mcg, Oral, Before breakfast    loperamide (IMODIUM) 2 mg capsule TAKE 1 CAPSULE THREE TIMES DAILY    magnesium oxide (MAG-OX) 400 mg, Oral, Nightly    metoprolol succinate (TOPROL-XL) 25 mg, Oral, Daily    omega 3-dha-epa-fish oil 1,000 mg (120 mg-180 mg) Cap No dose, route, or frequency  recorded.    omeprazole (PRILOSEC) 20 mg, Oral, Daily    pantoprazole (PROTONIX) 20 MG tablet TAKE 1 TABLET TWICE DAILY BEFORE MEALS    propafenone (RHTHYMOL) 150 MG Tab TAKE 1 TABLET EVERY 8 HOURS    sertraline (ZOLOFT) 50 MG tablet TAKE 1 TABLET ONE TIME DAILY    VIT C/E/ZN/COPPR/LUTEIN/ZEAXAN (PRESERVISION AREDS 2 ORAL) 1 capsule, Oral, 2 times daily          Assessment & Plan     Atherosclerotic heart disease of native coronary artery with other forms of angina pectoris  Stable coronary artery disease continue on risk factor modification  She has statin intolerance.  Maintaining on aspirin 81 mg a day    Paroxysmal supraventricular tachycardia  No recent arrhythmias noted discussed about ablation but she is presently controlled on Rythmol given her age and she was to maintain on conservative therapy.  Maintain 150 mg every 8 hours Rythmol along with magnesium supplements and metoprolol change the dose to 25 mg p.o. b.i.d..    Primary hypertension  She has mild labile fluctuations of her blood pressure perhaps with the adjustment of metoprolol 25 twice a day she should have adequate coverage.  As a backup if necessary may consider small dose of possibly Avapro at 75 mg daily, need to check the molecular description of this drug to avoid sulfa as a side chain      Hypothyroidism, unspecified  Her dose of thyroid is being adjusted and currently on 225 mcg Monday through Friday and 175 Saturday and Sunday being monitored by primary care recommend her to follow-up with PCP          Follow up in about 6 months (around 7/8/2024).

## 2024-01-08 NOTE — ASSESSMENT & PLAN NOTE
No recent arrhythmias noted discussed about ablation but she is presently controlled on Rythmol given her age and she was to maintain on conservative therapy.  Maintain 150 mg every 8 hours Rythmol along with magnesium supplements and metoprolol change the dose to 25 mg p.o. b.i.d..

## 2024-01-08 NOTE — ASSESSMENT & PLAN NOTE
Her dose of thyroid is being adjusted and currently on 225 mcg Monday through Friday and 175 Saturday and Sunday being monitored by primary care recommend her to follow-up with PCP

## 2024-01-08 NOTE — ASSESSMENT & PLAN NOTE
She has mild labile fluctuations of her blood pressure perhaps with the adjustment of metoprolol 25 twice a day she should have adequate coverage.  As a backup if necessary may consider small dose of possibly Avapro at 75 mg daily, need to check the molecular description of this drug to avoid sulfa as a side chain

## 2024-01-08 NOTE — ASSESSMENT & PLAN NOTE
Stable coronary artery disease continue on risk factor modification  She has statin intolerance.  Maintaining on aspirin 81 mg a day

## 2024-05-28 NOTE — TELEPHONE ENCOUNTER
----- Message from Manuela Portillo sent at 10/16/2019  3:45 PM CDT -----  Contact: Arnaldo/ halina  Type:  Pharmacy Calling to Clarify an RX    Name of Caller:  Radha  Pharmacy Name:  [facility]  Prescription Name:  ARMOUR THYROID 300 mg Tab  What do they need to clarify?:  Dosage and direstions  Best Call Back Number:  [phone]  Additional Information:  Please Advise ---Thank you       
Spoke with pharmacy.  Clarified directions  
Kemal Harden

## 2024-06-19 DIAGNOSIS — R63.4 ABNORMAL WEIGHT LOSS: Primary | ICD-10-CM

## 2024-06-19 DIAGNOSIS — R10.9 ABDOMINAL PAIN, UNSPECIFIED ABDOMINAL LOCATION: ICD-10-CM

## 2024-06-25 ENCOUNTER — HOSPITAL ENCOUNTER (OUTPATIENT)
Dept: RADIOLOGY | Facility: HOSPITAL | Age: 89
Discharge: HOME | End: 2024-06-25
Attending: INTERNAL MEDICINE
Payer: MEDICARE

## 2024-06-25 DIAGNOSIS — R63.4 ABNORMAL WEIGHT LOSS: ICD-10-CM

## 2024-06-25 DIAGNOSIS — R10.9 ABDOMINAL PAIN, UNSPECIFIED ABDOMINAL LOCATION: ICD-10-CM

## 2024-06-25 DIAGNOSIS — R16.0 LIVER MASSES: ICD-10-CM

## 2024-06-25 DIAGNOSIS — K86.89 PANCREATIC MASS: Primary | ICD-10-CM

## 2024-06-25 PROCEDURE — 74176 CT ABD & PELVIS W/O CONTRAST: CPT | Mod: 26,,, | Performed by: RADIOLOGY

## 2024-06-25 PROCEDURE — 74176 CT ABD & PELVIS W/O CONTRAST: CPT | Mod: TC,PO

## 2024-06-27 ENCOUNTER — TELEPHONE (OUTPATIENT)
Facility: CLINIC | Age: 89
End: 2024-06-27
Payer: MEDICARE

## 2024-07-05 ENCOUNTER — TELEPHONE (OUTPATIENT)
Facility: CLINIC | Age: 89
End: 2024-07-05

## 2024-07-05 ENCOUNTER — OFFICE VISIT (OUTPATIENT)
Facility: CLINIC | Age: 89
End: 2024-07-05
Payer: MEDICARE

## 2024-07-05 VITALS
RESPIRATION RATE: 20 BRPM | BODY MASS INDEX: 16.59 KG/M2 | WEIGHT: 112 LBS | SYSTOLIC BLOOD PRESSURE: 116 MMHG | DIASTOLIC BLOOD PRESSURE: 70 MMHG | TEMPERATURE: 98 F | HEIGHT: 69 IN | HEART RATE: 90 BPM

## 2024-07-05 DIAGNOSIS — R16.0 LIVER MASSES: ICD-10-CM

## 2024-07-05 DIAGNOSIS — K86.89 PANCREATIC MASS: Primary | ICD-10-CM

## 2024-07-05 DIAGNOSIS — Z71.89 ENCOUNTER FOR HOSPICE CARE DISCUSSION: ICD-10-CM

## 2024-07-05 DIAGNOSIS — R93.5 ABNORMAL CT OF THE ABDOMEN: ICD-10-CM

## 2024-07-05 PROCEDURE — 99999 PR PBB SHADOW E&M-EST. PATIENT-LVL III: CPT | Mod: PBBFAC,,, | Performed by: INTERNAL MEDICINE

## 2024-07-05 NOTE — TELEPHONE ENCOUNTER
Per Dr Thakkar's verbal orders, I placed orders for [facility], spoke to Clarice at [facility] to see if they are able to see patient today, she states yes they should be able to and will call to schedule once referral received. Referral faxed.

## 2024-12-19 NOTE — PROGRESS NOTES
[facility]  History & Physical    Subjective:      Patient ID:   NAME: Lorena Vallejo : 3/27/1933     89 y.o. female    Referring Doc: Kimberlee Hawthorne*  Other Physicians: Espinoza Poon; Meliza;  QUINTIN Lynn        Chief Complaint: panc mass with liver lesions      HPI:  89 y.o. female with diagnosis of pancreatic mass with liver lesions who has been referred by Kimberlee Hawthorne* for evaluation by medical hematology/oncology. She is here with her  and daughter.      She started having problems earlier this year with weight loss, weakness, nagging RUQ discomfort and had previously has some epigastric pain which radiated to her mid-back. She had lost 12lbs in 6 months last year, and has lost another 12lbs this year.     She has some occasional nausea but no vomiting.      Breathing ok but has COLEMAN. Using wheelchair today    She is a retired hospice nurse.     Dad had colon cancer, paternal grand-mother had malignant melanoma    Former smoker and quit in                   ROS:   GEN: general malaise, weakness, fatigue, decline in condition over past 6 months; progressive weight loss since last year  HEENT: normal with no HA's, sore throat, stiff neck, changes in vision  CV: normal with no CP, SOB, PND, COLEMAN or orthopnea  PULM: normal with no SOB, cough, hemoptysis, sputum or pleuritic pain  GI:  abdominal pain, nausea, RUQ and epigastric discomfort  : normal with no hematuria, dysuria  BREAST: normal with no mass, discharge, pain  SKIN: normal with no rash, erythema, bruising, or swelling       Past Medical/Surgical History:  Past Medical History:   Diagnosis Date    Abnormal CT of the abdomen 2024    Arthritis     Blood transfusion     Bowel obstruction     X 3    Cancer     MELANOMA RIGHT EYE    Cancer of eyeball, except conjunctiva, cornea, retina, and choroid right    Depression     Diverticulitis     Encounter for hospice care discussion 2024    GERD  (gastroesophageal reflux disease)     Hypertension     Liver masses 07/05/2024    Melanoma     right eye    Pancreatic mass 07/05/2024    Peripheral neuropathy     PSVT (paroxysmal supraventricular tachycardia)     HAD X 3 OVER 10 YRS. LAST 7-25-12. HAD ADENOSINE IN [facility] ER. WAS ON INCREASED THYROID MED. NO PROBLEM SINCE    Salmonella     Short bowel syndrome     Thyroid disease     Transient ischemic attack     UTI (urinary tract infection)     Wears partial dentures     PARTIAL BOTTOM     Past Surgical History:   Procedure Laterality Date    ABDOMINAL SURGERY      X 3. ILEUM REMOVED. OSTOMY PLACED AND CLOSED.    ADENOIDECTOMY      BILATERAL OOPHORECTOMY      COLONOSCOPY      COLONOSCOPY N/A 8/24/2016    Procedure: COLONOSCOPY;  Surgeon: Alex Corral MD;  Location: [facility] ENDO;  Service: Endoscopy;  Laterality: N/A;    COLONOSCOPY N/A 8/29/2019    Procedure: COLONOSCOPY;  Surgeon: Tiffanie Vázquez MD;  Location: [facility] ENDO;  Service: Endoscopy;  Laterality: N/A;    COLONOSCOPY N/A 11/22/2019    Procedure: COLONOSCOPY;  Surgeon: Tiffanie Vázquez MD;  Location: [facility] ENDO;  Service: Endoscopy;  Laterality: N/A;    CTR      BILAT    ESOPHAGOGASTRODUODENOSCOPY N/A 8/29/2019    Procedure: EGD (ESOPHAGOGASTRODUODENOSCOPY);  Surgeon: Tiffanie Vázquez MD;  Location: [facility] ENDO;  Service: Endoscopy;  Laterality: N/A;    EYE SURGERY      RIGHT  - LASER MELANOMA; BILAT CATARACT    HYSTERECTOMY      BSO    JOINT REPLACEMENT      RIGHT KNEE    KNEE ARTHROPLASTY Left 11/2/2020    Procedure: ARTHROPLASTY, KNEE;  Surgeon: Eduardo Haider MD;  Location: [facility] OR;  Service: Orthopedics;  Laterality: Left;  Giovany Doll    MANDIBLE FRACTURE SURGERY      MENISCECTOMY      MEDIAL AND LATERAL REPAIR, BAKERS CYST    TENDON REPAIR      LEFT THUMB    TONSILLECTOMY      T AND A         Allergies:  Review of patient's allergies indicates:   Allergen Reactions    Flagyl [metronidazole hcl] Diarrhea     SEVERE DIARRHEA    Latex, natural rubber  Rash     ITCHING    Levaquin [levofloxacin] Diarrhea     MOOD CHANGES, WEAKNESS    Morphine Other (See Comments)     HALLUCINATES    Augmentin [amoxicillin-pot clavulanate] Other (See Comments)     AGITATION    Ciprofloxacin Anxiety    Codeine Nausea And Vomiting    Demerol [meperidine] Nausea And Vomiting     CAN TAKE WITH PHENERGAN    Dilaudid [hydromorphone (bulk)] Itching     OK WITH BENADRYL    Fentanyl Itching     OK WITH BENADRYL    Fosfomycin      diarrhia    Naproxen Other (See Comments)     HURT STOMACH    Nsaids (non-steroidal anti-inflammatory drug) Other (See Comments)     TOLD NOT TO TAKE AS HAD AN ULCER. HAD BLEEDING    Percocet [oxycodone-acetaminophen] Rash    Prednisone Other (See Comments)     AGITATION    Reglan [metoclopramide hcl] Anxiety    Sulfa (sulfonamide antibiotics) Rash    Vicodin [hydrocodone-acetaminophen] Rash    Zosyn [piperacillin-tazobactam] Rash    Ambien [zolpidem]     Ibuprofen     Pcn [penicillins]     Vasotec [enalapril maleate] Other (See Comments)     DRY COUGH       Social/Family History:  Social History     Socioeconomic History    Marital status:    Tobacco Use    Smoking status: Former     Current packs/day: 0.00     Average packs/day: 1 pack/day for 19.1 years (19.1 ttl pk-yrs)     Types: Cigarettes     Start date:      Quit date: 1973     Years since quittin.4    Smokeless tobacco: Never   Substance and Sexual Activity    Alcohol use: Yes     Alcohol/week: 2.0 standard drinks of alcohol     Types: 2 Glasses of wine per week     Comment: WINE once per day    Drug use: No    Sexual activity: Not Currently   Social History Narrative    Retired nurse      Social Determinants of Health     Financial Resource Strain: Low Risk  (2024)    Overall Financial Resource Strain (CARDIA)     Difficulty of Paying Living Expenses: Not very hard   Food Insecurity: No Food Insecurity (2024)    Hunger Vital Sign     Worried About Running Out of Food in the  Last Year: Never true     Ran Out of Food in the Last Year: Never true   Physical Activity: Unknown (7/2/2024)    Exercise Vital Sign     Days of Exercise per Week: 0 days   Stress: No Stress Concern Present (7/2/2024)    [employer] - Occupational Stress Questionnaire     Feeling of Stress : Only a little   Housing Stability: Unknown (7/2/2024)    Housing Stability Vital Sign     Unable to Pay for Housing in the Last Year: No     Family History   Problem Relation Name Age of Onset    Diabetes Mother      Heart disease Mother          MI at 52    Stroke Father      Cancer Father          colon cancer    Rheum arthritis Father      COPD Father      Heart disease Brother      Stroke Brother      Diabetes Brother      Arthritis Daughter Aleah     Hypertension Daughter Aleah     Cancer Son Brad         Leukemia    Hypertension Son Brad     Heart disease Son Brad     Diabetes Maternal Grandmother      Asthma Maternal Grandfather      Cancer Paternal Grandmother          melanoma    Melanoma Paternal Grandmother      Cancer Paternal Grandfather          lip cancer - smoked pipe    Sleep apnea Son Cordell     Hypertension Son Cordell     Sinus disease Son Cordell     Hypertension Son Pascual     Heart disease Son Pascual     Obesity Son Pascual          Medications:    Current Outpatient Medications:     acetaminophen (TYLENOL) 500 MG tablet, Take 500 mg by mouth every 6 (six) hours as needed., Disp: , Rfl:     aspirin 81 MG Chew, Take 81 mg by mouth once daily., Disp: , Rfl:     Bifidobacterium infantis 4 mg Cap, Take 4 mg by mouth once daily., Disp: , Rfl:     calcium carbonate (OS-RAMONITA) 500 mg calcium (1,250 mg) tablet, , Disp: , Rfl:     coenzyme Q10 100 mg capsule, , Disp: , Rfl:     cyanocobalamin, vitamin B-12, 5,000 mcg Subl, Place 5,000 mcg under the tongue every Mon, Wed, Fri., Disp: , Rfl:     ipratropium (ATROVENT) 42 mcg (0.06 %) nasal spray, 2 sprays by Each Nostril route 3 (three) times daily.,  "Disp: 15 mL, Rfl: 11    magnesium oxide (MAG-OX) 400 mg tablet, Take 400 mg by mouth every evening. , Disp: , Rfl:     metoprolol succinate (TOPROL-XL) 25 MG 24 hr tablet, Take 1 tablet (25 mg total) by mouth 2 (two) times daily., Disp: 180 tablet, Rfl: 3    omega 3-dha-epa-fish oil 1,000 mg (120 mg-180 mg) Cap, , Disp: , Rfl:     pantoprazole (PROTONIX) 20 MG tablet, TAKE 1 TABLET TWICE DAILY BEFORE MEALS, Disp: 180 tablet, Rfl: 2    propafenone (RHTHYMOL) 150 MG Tab, Take 1 tablet (150 mg total) by mouth every 8 (eight) hours., Disp: 270 tablet, Rfl: 3    sertraline (ZOLOFT) 50 MG tablet, TAKE 1 TABLET ONE TIME DAILY, Disp: 90 tablet, Rfl: 0    VIT C/E/ZN/COPPR/LUTEIN/ZEAXAN (PRESERVISION AREDS 2 ORAL), Take 1 capsule by mouth 2 (two) times daily. , Disp: , Rfl:     azelastine (ASTELIN) 137 mcg (0.1 %) nasal spray, 2 sprays (274 mcg total) by Nasal route 2 (two) times daily., Disp: 30 mL, Rfl: 11    ergocalciferol, vitamin D2, (VITAMIN D2 ORAL), , Disp: , Rfl:     fluticasone propionate (FLONASE) 50 mcg/actuation nasal spray, 1 spray (50 mcg total) by Each Nostril route once daily., Disp: 16 mL, Rfl: 5    krill oil 500 mg Cap, Take 500 mg by mouth every evening., Disp: , Rfl:     levothyroxine (SYNTHROID) 200 MCG tablet, Take 1 tablet (200 mcg total) by mouth before breakfast. (Patient taking differently: Take 255 mcg by mouth before breakfast. 225 mcg mon-fri 175 mcg sat and sun), Disp: 30 tablet, Rfl: 2    loperamide (IMODIUM) 2 mg capsule, TAKE 1 CAPSULE THREE TIMES DAILY, Disp: 270 capsule, Rfl: 1    omeprazole (PRILOSEC) 40 MG capsule, Take 20 mg by mouth once daily., Disp: , Rfl:       Pathology:   Cancer Staging   No matching staging information was found for the patient.        Objective:   Vitals:  Blood pressure 116/70, pulse 90, temperature 97.7 °F (36.5 °C), resp. rate 20, height 5' 9" (1.753 m), weight 50.8 kg (112 lb).    Physical Examination:   GEN: no apparent distress, comfortable; AAOx3; elderly " thin deconditioned  HEAD: atraumatic and normocephalic  EYES: no pallor, no icterus, PERRLA  ENT: OMM, no pharyngeal erythema, external ears WNL; no nasal discharge; no thrush  NECK: no masses, thyroid normal, trachea midline, no LAD/LN's, supple  CV: RRR with no murmur; normal pulse; normal S1 and S2; no pedal edema  CHEST: Normal respiratory effort; CTAB; normal breath sounds; no wheeze or crackles  ABDOM: nontender and nondistended; soft; normal bowel sounds; no rebound/guarding  MUSC/Skeletal: ROM normal; no crepitus; joints normal; no deformities or arthropathy  EXTREM: no clubbing, cyanosis, inflammation or swelling  SKIN: no rashes, lesions, ulcers, petechiae or subcutaneous nodules; chronic age related skin changes  : no schumacher  NEURO: grossly intact; motor/sensory WNL; AAOx3; no tremors; generalized weakness; wheelchair  PSYCH: normal mood, affect and behavior  LYMPH: normal cervical, supraclavicular, axillary and groin LN's      Labs:   Lab Results   Component Value Date    WBC 6.3 04/11/2023    HGB 12.6 04/11/2023    HCT 37.9 04/11/2023    MCV 98 06/03/2022     04/11/2023    CMP  Sodium   Date Value Ref Range Status   06/03/2022 140 136 - 145 mmol/L Final     Potassium   Date Value Ref Range Status   06/03/2022 4.3 3.5 - 5.1 mmol/L Final     Chloride   Date Value Ref Range Status   06/03/2022 107 95 - 110 mmol/L Final     CO2   Date Value Ref Range Status   06/03/2022 24 23 - 29 mmol/L Final     Glucose   Date Value Ref Range Status   06/03/2022 96 70 - 110 mg/dL Final     BUN   Date Value Ref Range Status   06/03/2022 23 8 - 23 mg/dL Final     Creatinine   Date Value Ref Range Status   06/03/2022 1.0 0.5 - 1.4 mg/dL Final   04/25/2013 0.7 0.5 - 1.4 mg/dL Final     Calcium   Date Value Ref Range Status   06/03/2022 9.5 8.7 - 10.5 mg/dL Final   04/25/2013 8.4 (L) 8.7 - 10.5 mg/dL Final     Total Protein   Date Value Ref Range Status   06/03/2022 7.1 6.0 - 8.4 g/dL Final     Albumin   Date Value Ref  Range Status   06/03/2022 4.0 3.5 - 5.2 g/dL Final     Total Bilirubin   Date Value Ref Range Status   06/03/2022 0.5 0.1 - 1.0 mg/dL Final     Comment:     For infants and newborns, interpretation of results should be based  on gestational age, weight and in agreement with clinical  observations.    Premature Infant recommended reference ranges:  Up to 24 hours.............<8.0 mg/dL  Up to 48 hours............<12.0 mg/dL  3-5 days..................<15.0 mg/dL  6-29 days.................<15.0 mg/dL       Alkaline Phosphatase   Date Value Ref Range Status   06/03/2022 70 55 - 135 U/L Final     AST   Date Value Ref Range Status   06/03/2022 21 10 - 40 U/L Final     ALT   Date Value Ref Range Status   06/03/2022 26 10 - 44 U/L Final     Anion Gap   Date Value Ref Range Status   06/03/2022 9 8 - 16 mmol/L Final   04/25/2013 7 5 - 15 meq/L Final     eGFR if    Date Value Ref Range Status   06/03/2022 57.7 (A) >60 mL/min/1.73 m^2 Final     eGFR if non    Date Value Ref Range Status   06/03/2022 50.1 (A) >60 mL/min/1.73 m^2 Final     Comment:     Calculation used to obtain the estimated glomerular filtration  rate (eGFR) is the CKD-EPI equation.            Radiology/Diagnostic Studies:    CT Abdom/pelvis  6/25/2024:       Liver: The liver is normal in size.  New ill-defined rounded low-attenuation structures are seen scattered throughout the liver, highly suspicious for malignancy/metastatic disease with index foci outlined below:     -6.1 x 4.4 cm low-attenuation lesion in the medial dome (image 26)     -1.9 x 1.7 cm lesion in the hepatic dome (image 34)     -1.9 x 1.6 cm lesion in the right lobe of liver (image 48)     -8 x 8 mm lesion in the posterior right lobe of liver (image 47)     -12 x 8 mm lesion in the quadrate lobe (image 57)     -15 x 15 mm lesion in the left lobe (image 50).     Gallbladder: The gallbladder is full/filled.  There is sludge and tiny stones seen in the  gallbladder neck.  No gallbladder wall thickening or pericholecystic fluid is present to suggest acute cholecystitis.     Biliary Tree: No intra or extrahepatic ductal dilation.     Spleen: Normal.     Pancreas: The pancreatic duct is somewhat patulous measuring 6 mm in diameter (image 87).  There is mild fullness to the pancreatic tail (image 63), new from the previous exam, covering an area of approximately 2.5 cm in diameter..     Adrenal Glands: Normal     Kidneys: The kidneys are normal in imaging appearance without hydronephrosis or hydroureter.     Vasculature: Calcified plaques are present in the abdominal aorta with no aneurysm.     Lymph nodes: No abdominal lymphadenopathy is seen.     Intraperitoneal structures: There is no ascites.     Bowel: There is a moderate to large volume of stool and gas throughout the colon with a nonspecific, nonobstructive bowel gas pattern.     Abdominal wall: Small spigelian hernias are present bilaterally.     Musculoskeletal: Degenerative changes present throughout the lower lumbar spine.  There is diffusely decreased osseous mineralization (suggesting osteoporosis/osteopenia).     CT Pelvis:     Bladder: Normal.     Reproductive Organs: The uterus is not visualized, surgically absent.     Pelvic Lymph nodes: No pelvic lymphadenopathy is identified.     Impression:     1.  New rounded hypoattenuating foci throughout the liver highly suspicious for malignancy/metastatic disease.     2.  New, mild nonspecific fullness in the pancreatic tail,consider correlation with amylase/lipase, CA 19-9 and contrast-enhanced follow-up (as this is moderately suspicious).  In addition there is relative pancreatic ductal dilation with a transition point in the pancreatic head.     3.  Cholelithiasis with a full gallbladder, but without CT findings of acute cholecystitis.     4.  Large volume of stool and gas throughout the colon with a nonspecific, nonobstructive bowel gas pattern.     5.   Mild nonspecific prominence of the anus, possibly secondary to under distension, consider correlation with direct visualization.         All lab results and imaging results have been reviewed and discussed with the patient    Assessment:   (1) 89 y.o. female with diagnosis of pancreatic mass with liver lesions who has been referred by Kimberlee Hawthorne* for evaluation by medical hematology/oncology.     - discussed to CT scan results with high degree of suspicion of a metastatic cancer process, most likely pancreatic  - she does not wish to pursue any further studies or diagnostic workup; she just wants to proceed with palliative/comfort care  - discussed hospice and palliative care and she is interested in [facility]  - given her age and the unfortunate suspected poor prognosis, this is a reasonable decision    (2) HTN and hypercholesterolemia    (3) Interstitial lung disease, calcified granuloma of lung; bronchiectasis; COPD; ? 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 I have answered all of their questions to the best of my ability and to their complete satisfaction.           Thank you for allowing me to  participate in this patient's care. Please call with any questions or concerns.    Electronically signed Siddhartha Thakkar MD    Answers submitted by the patient for this visit:  Review of Systems Questionnaire (Submitted on 7/2/2024)  appetite change : Yes  unexpected weight change: Yes  mouth sores: No  visual disturbance: Yes  cough: Yes  shortness of breath: Yes  chest pain: Yes  abdominal pain: Yes  diarrhea: No  frequency: No  back pain: Yes  rash: No  headaches: No  adenopathy: No  nervous/ anxious: No     Home

## 2025-04-15 NOTE — PLAN OF CARE
Problem: Occupational Therapy Goal  Goal: Occupational Therapy Goal  Description: Goals to be met by: 11/9/2020     Patient will increase functional independence with ADLs by performing:    LE Dressing with Stand-by Assistance and Assistive Devices as needed.  Toileting from toilet with Stand-by Assistance and Assistive Devices as needed for hygiene and clothing management.   Toilet transfer to toilet with Stand-by Assistance.    Outcome: Ongoing, Progressing   OT evaluation completed today. Goals & care plan established.  BRANDON Izquierdo  11/3/2020     Patient daughter called wanting to know if she can get her incision wet. Also patient is having left leg numbness and is wondering if this is normal. She would also like celebrex sent to her [facility] in [place].  Please call [phone]

## (undated) DEVICE — SEE MEDLINE ITEM 152622

## (undated) DEVICE — BOWL SUMMIT VACUUM CEMENT

## (undated) DEVICE — SOL IRR NACL .9% 3000ML

## (undated) DEVICE — CUBE COLD THERAPY POLAR CARE

## (undated) DEVICE — SEE MEDLINE ITEM 157166

## (undated) DEVICE — SLEEVE SCD EXPRESS CALF MEDIUM

## (undated) DEVICE — STRAP OR TABLE 5IN X 72IN

## (undated) DEVICE — SEE MEDLINE ITEM 146231

## (undated) DEVICE — MANIFOLD 4 PORT

## (undated) DEVICE — SUT STRATAFIX SPIRAL VIOLET

## (undated) DEVICE — GLOVE SURG ULTRA TOUCH 8.5

## (undated) DEVICE — SEE MEDLINE ITEM 157131

## (undated) DEVICE — BLADE SURG CARBON STEEL #10

## (undated) DEVICE — NDL SPINAL 18GX3.5 SPINOCAN

## (undated) DEVICE — GOWN TOGA SYS PEELWY ZIP 2 XL

## (undated) DEVICE — UNDERGLOVES BIOGEL PI SZ 7 LF

## (undated) DEVICE — SUT MONOCRYL 3-0 PS-1

## (undated) DEVICE — DRAPE STERI-DRAPE 1000 17X11IN

## (undated) DEVICE — SOL 9P NACL IRR PIC IL

## (undated) DEVICE — TOGA FLYTE PEEL AWAY XLARGE

## (undated) DEVICE — PACK LOWER EXTREMITY

## (undated) DEVICE — UNDERGLOVES BIOGEL PI SIZE 8.5

## (undated) DEVICE — GLOVE SURG ULTRA TOUCH 7

## (undated) DEVICE — CLOSURE SKIN STERI STRIP 1/2X4

## (undated) DEVICE — APPLICATOR CHLORAPREP ORN 26ML

## (undated) DEVICE — LINER SUCTION 3000CC

## (undated) DEVICE — DRAPE INCISE IOBAN 2 23X17IN

## (undated) DEVICE — DRAPE STERI INSTRUMENT 1018

## (undated) DEVICE — ALCOHOL 70% ISOP RUBBING 4OZ

## (undated) DEVICE — TOURNIQUET SB QC DP 34X4IN

## (undated) DEVICE — TUBE SUCTION YANKAUER HI CAP

## (undated) DEVICE — SYR 50CC LL

## (undated) DEVICE — TAPE SILK 3IN

## (undated) DEVICE — INTERPULSE SET

## (undated) DEVICE — PACK CUSTOM UNIV BASIN SLI

## (undated) DEVICE — BANDAGE ESMARK 6X12

## (undated) DEVICE — ADHESIVE MASTISOL VIAL 48/BX

## (undated) DEVICE — COVER SURG LIGHT HANDLE

## (undated) DEVICE — BLADE SAW SGTL 21.2X85.5X1.2

## (undated) DEVICE — SUT FIBERWIRE 2 38 IN TAPER

## (undated) DEVICE — PACK BASIC

## (undated) DEVICE — SET DECANTER MEDICHOICE

## (undated) DEVICE — SEE MEDLINE ITEM 152530

## (undated) DEVICE — SUT STRATAFIX PDS 1 CTX 18IN

## (undated) DEVICE — PAD CAST SPECIALIST STRL 6

## (undated) DEVICE — ELECTRODE REM PLYHSV RETURN 9

## (undated) DEVICE — SPONGE SUPER KERLIX 6X6.75IN

## (undated) DEVICE — SEE MEDLINE ITEM 107746